# Patient Record
Sex: FEMALE | Race: WHITE | Employment: OTHER | URBAN - METROPOLITAN AREA
[De-identification: names, ages, dates, MRNs, and addresses within clinical notes are randomized per-mention and may not be internally consistent; named-entity substitution may affect disease eponyms.]

---

## 2023-09-09 ENCOUNTER — HOSPITAL ENCOUNTER (EMERGENCY)
Age: 78
Discharge: HOME | DRG: 870 | End: 2023-09-09
Attending: EMERGENCY MEDICINE
Payer: MEDICARE

## 2023-09-09 VITALS
BODY MASS INDEX: 31.41 KG/M2 | OXYGEN SATURATION: 100 % | WEIGHT: 160 LBS | SYSTOLIC BLOOD PRESSURE: 109 MMHG | RESPIRATION RATE: 29 BRPM | HEART RATE: 90 BPM | HEIGHT: 60 IN | DIASTOLIC BLOOD PRESSURE: 47 MMHG | TEMPERATURE: 96 F

## 2023-09-09 DIAGNOSIS — A41.9 SEPTIC SHOCK (HCC): ICD-10-CM

## 2023-09-09 DIAGNOSIS — R41.89 UNRESPONSIVE: ICD-10-CM

## 2023-09-09 DIAGNOSIS — Z66 DNR (DO NOT RESUSCITATE): Primary | ICD-10-CM

## 2023-09-09 DIAGNOSIS — R65.21 SEPTIC SHOCK (HCC): ICD-10-CM

## 2023-09-09 DIAGNOSIS — Z71.89 ENCOUNTER FOR HOSPICE CARE DISCUSSION: ICD-10-CM

## 2023-09-09 LAB
ABO + RH BLD: NORMAL
ALBUMIN SERPL-MCNC: 3.7 G/DL (ref 3.5–5.2)
ALP SERPL-CCNC: 140 U/L (ref 35–104)
ALT SERPL-CCNC: 21 U/L (ref 0–32)
ANION GAP SERPL CALCULATED.3IONS-SCNC: 11 MMOL/L (ref 7–16)
ARM BAND NUMBER: NORMAL
AST SERPL-CCNC: 27 U/L (ref 0–31)
BACTERIA URNS QL MICRO: ABNORMAL
BASOPHILS # BLD: 0 K/UL (ref 0–0.2)
BASOPHILS NFR BLD: 0 % (ref 0–2)
BILIRUB SERPL-MCNC: 0.4 MG/DL (ref 0–1.2)
BILIRUB UR QL STRIP: NEGATIVE
BLOOD BANK SAMPLE EXPIRATION: NORMAL
BLOOD GROUP ANTIBODIES SERPL: NEGATIVE
BUN SERPL-MCNC: 18 MG/DL (ref 6–23)
CALCIUM SERPL-MCNC: 9.3 MG/DL (ref 8.6–10.2)
CASTS #/AREA URNS LPF: ABNORMAL /LPF
CASTS #/AREA URNS LPF: ABNORMAL /LPF
CHLORIDE SERPL-SCNC: 98 MMOL/L (ref 98–107)
CLARITY UR: ABNORMAL
CO2 SERPL-SCNC: 24 MMOL/L (ref 22–29)
COLOR UR: YELLOW
CREAT SERPL-MCNC: 0.9 MG/DL (ref 0.5–1)
EKG ATRIAL RATE: 79 BPM
EKG P AXIS: 75 DEGREES
EKG P-R INTERVAL: 148 MS
EKG Q-T INTERVAL: 468 MS
EKG QRS DURATION: 94 MS
EKG QTC CALCULATION (BAZETT): 536 MS
EKG R AXIS: 20 DEGREES
EKG T AXIS: -177 DEGREES
EKG VENTRICULAR RATE: 79 BPM
EOSINOPHIL # BLD: 0 K/UL (ref 0.05–0.5)
EOSINOPHILS RELATIVE PERCENT: 0 % (ref 0–6)
ERYTHROCYTE [DISTWIDTH] IN BLOOD BY AUTOMATED COUNT: 15.3 % (ref 11.5–15)
GFR SERPL CREATININE-BSD FRML MDRD: >60 ML/MIN/1.73M2
GLUCOSE BLD-MCNC: 217 MG/DL (ref 74–99)
GLUCOSE SERPL-MCNC: 204 MG/DL (ref 74–99)
GLUCOSE UR STRIP-MCNC: NEGATIVE MG/DL
HCT VFR BLD AUTO: 37 % (ref 34–48)
HGB BLD-MCNC: 11.7 G/DL (ref 11.5–15.5)
HGB UR QL STRIP.AUTO: ABNORMAL
INR PPP: 1.1
KETONES UR STRIP-MCNC: NEGATIVE MG/DL
LACTATE BLDV-SCNC: 1.5 MMOL/L (ref 0.5–2.2)
LEUKOCYTE ESTERASE UR QL STRIP: ABNORMAL
LYMPHOCYTES NFR BLD: 0.66 K/UL (ref 1.5–4)
LYMPHOCYTES RELATIVE PERCENT: 3 % (ref 20–42)
MAGNESIUM SERPL-MCNC: 2.3 MG/DL (ref 1.6–2.6)
MCH RBC QN AUTO: 27.9 PG (ref 26–35)
MCHC RBC AUTO-ENTMCNC: 31.6 G/DL (ref 32–34.5)
MCV RBC AUTO: 88.1 FL (ref 80–99.9)
MONOCYTES NFR BLD: 1.32 K/UL (ref 0.1–0.95)
MONOCYTES NFR BLD: 5 % (ref 2–12)
NEUTROPHILS NFR BLD: 92 % (ref 43–80)
NEUTS SEG NFR BLD: 23.32 K/UL (ref 1.8–7.3)
NITRITE UR QL STRIP: POSITIVE
NUCLEATED RED BLOOD CELLS: 1 PER 100 WBC
PH UR STRIP: 6 [PH] (ref 5–9)
PLATELET # BLD AUTO: 319 K/UL (ref 130–450)
PMV BLD AUTO: 10.2 FL (ref 7–12)
POTASSIUM SERPL-SCNC: 5.1 MMOL/L (ref 3.5–5)
PROT SERPL-MCNC: 7.5 G/DL (ref 6.4–8.3)
PROT UR STRIP-MCNC: 100 MG/DL
PROTHROMBIN TIME: 12.2 SEC (ref 9.3–12.4)
RBC # BLD AUTO: 4.2 M/UL (ref 3.5–5.5)
RBC # BLD: ABNORMAL 10*6/UL
RBC #/AREA URNS HPF: ABNORMAL /HPF
SODIUM SERPL-SCNC: 133 MMOL/L (ref 132–146)
SP GR UR STRIP: 1.02 (ref 1–1.03)
TROPONIN I SERPL HS-MCNC: 75 NG/L (ref 0–9)
UROBILINOGEN UR STRIP-ACNC: 0.2 EU/DL (ref 0–1)
WBC #/AREA URNS HPF: ABNORMAL /HPF
WBC OTHER # BLD: 25.3 K/UL (ref 4.5–11.5)

## 2023-09-09 PROCEDURE — 51701 INSERT BLADDER CATHETER: CPT

## 2023-09-09 PROCEDURE — 87154 CUL TYP ID BLD PTHGN 6+ TRGT: CPT

## 2023-09-09 PROCEDURE — 2580000003 HC RX 258

## 2023-09-09 PROCEDURE — 93010 ELECTROCARDIOGRAM REPORT: CPT | Performed by: INTERNAL MEDICINE

## 2023-09-09 PROCEDURE — 86850 RBC ANTIBODY SCREEN: CPT

## 2023-09-09 PROCEDURE — 99284 EMERGENCY DEPT VISIT MOD MDM: CPT

## 2023-09-09 PROCEDURE — 86901 BLOOD TYPING SEROLOGIC RH(D): CPT

## 2023-09-09 PROCEDURE — 96360 HYDRATION IV INFUSION INIT: CPT

## 2023-09-09 PROCEDURE — 80053 COMPREHEN METABOLIC PANEL: CPT

## 2023-09-09 PROCEDURE — 84484 ASSAY OF TROPONIN QUANT: CPT

## 2023-09-09 PROCEDURE — 81001 URINALYSIS AUTO W/SCOPE: CPT

## 2023-09-09 PROCEDURE — 87077 CULTURE AEROBIC IDENTIFY: CPT

## 2023-09-09 PROCEDURE — 85610 PROTHROMBIN TIME: CPT

## 2023-09-09 PROCEDURE — 82962 GLUCOSE BLOOD TEST: CPT

## 2023-09-09 PROCEDURE — 87040 BLOOD CULTURE FOR BACTERIA: CPT

## 2023-09-09 PROCEDURE — 96361 HYDRATE IV INFUSION ADD-ON: CPT

## 2023-09-09 PROCEDURE — 83605 ASSAY OF LACTIC ACID: CPT

## 2023-09-09 PROCEDURE — 85025 COMPLETE CBC W/AUTO DIFF WBC: CPT

## 2023-09-09 PROCEDURE — 86900 BLOOD TYPING SEROLOGIC ABO: CPT

## 2023-09-09 PROCEDURE — 93005 ELECTROCARDIOGRAM TRACING: CPT

## 2023-09-09 PROCEDURE — 83735 ASSAY OF MAGNESIUM: CPT

## 2023-09-09 RX ORDER — PANTOPRAZOLE SODIUM 40 MG/1
40 FOR SUSPENSION ORAL
COMMUNITY
End: 2023-09-10 | Stop reason: ALTCHOICE

## 2023-09-09 RX ORDER — 0.9 % SODIUM CHLORIDE 0.9 %
1000 INTRAVENOUS SOLUTION INTRAVENOUS ONCE
Status: COMPLETED | OUTPATIENT
Start: 2023-09-09 | End: 2023-09-09

## 2023-09-09 RX ORDER — POLYETHYLENE GLYCOL 3350 17 G/17G
17 POWDER, FOR SOLUTION ORAL DAILY PRN
COMMUNITY

## 2023-09-09 RX ORDER — OMEPRAZOLE 20 MG/1
40 CAPSULE, DELAYED RELEASE ORAL DAILY
COMMUNITY

## 2023-09-09 RX ORDER — FUROSEMIDE 20 MG/1
20 TABLET ORAL DAILY
COMMUNITY

## 2023-09-09 RX ORDER — ACETAMINOPHEN 325 MG/1
650 TABLET ORAL EVERY 4 HOURS PRN
COMMUNITY

## 2023-09-09 RX ORDER — BISACODYL 10 MG
10 SUPPOSITORY, RECTAL RECTAL DAILY PRN
COMMUNITY

## 2023-09-09 RX ORDER — NITROGLYCERIN 0.4 MG/1
0.4 TABLET SUBLINGUAL EVERY 5 MIN PRN
Status: ON HOLD | COMMUNITY
End: 2023-09-16 | Stop reason: HOSPADM

## 2023-09-09 RX ORDER — ARIPIPRAZOLE 15 MG/1
15 TABLET ORAL DAILY
COMMUNITY

## 2023-09-09 RX ORDER — BUDESONIDE 0.5 MG/2ML
1 INHALANT ORAL EVERY 12 HOURS
COMMUNITY

## 2023-09-09 RX ORDER — ASPIRIN 81 MG/1
324 TABLET, CHEWABLE ORAL ONCE
Status: DISCONTINUED | OUTPATIENT
Start: 2023-09-09 | End: 2023-09-09 | Stop reason: HOSPADM

## 2023-09-09 RX ORDER — AMLODIPINE BESYLATE 5 MG/1
5 TABLET ORAL DAILY
COMMUNITY

## 2023-09-09 RX ORDER — HYDROCODONE BITARTRATE AND ACETAMINOPHEN 5; 325 MG/1; MG/1
1 TABLET ORAL EVERY 6 HOURS PRN
COMMUNITY
End: 2023-09-10 | Stop reason: ALTCHOICE

## 2023-09-09 RX ORDER — SUCRALFATE ORAL 1 G/10ML
1 SUSPENSION ORAL 2 TIMES DAILY
COMMUNITY

## 2023-09-09 RX ORDER — FERROUS SULFATE 300 MG/5ML
300 LIQUID (ML) ORAL DAILY
COMMUNITY

## 2023-09-09 RX ORDER — SODIUM CHLORIDE, SODIUM LACTATE, POTASSIUM CHLORIDE, CALCIUM CHLORIDE 600; 310; 30; 20 MG/100ML; MG/100ML; MG/100ML; MG/100ML
INJECTION, SOLUTION INTRAVENOUS
Status: COMPLETED
Start: 2023-09-09 | End: 2023-09-09

## 2023-09-09 RX ORDER — CLOPIDOGREL BISULFATE 75 MG/1
75 TABLET ORAL DAILY
COMMUNITY

## 2023-09-09 RX ORDER — VENLAFAXINE HYDROCHLORIDE 75 MG/1
75 CAPSULE, EXTENDED RELEASE ORAL EVERY EVENING
Status: ON HOLD | COMMUNITY
End: 2023-09-16 | Stop reason: HOSPADM

## 2023-09-09 RX ORDER — GUAIFENESIN AND CODEINE PHOSPHATE 100; 10 MG/5ML; MG/5ML
5 SOLUTION ORAL 4 TIMES DAILY PRN
COMMUNITY
End: 2023-09-10 | Stop reason: ALTCHOICE

## 2023-09-09 RX ORDER — ATORVASTATIN CALCIUM 10 MG/1
10 TABLET, FILM COATED ORAL NIGHTLY
COMMUNITY

## 2023-09-09 RX ORDER — ISOSORBIDE MONONITRATE 30 MG/1
30 TABLET, EXTENDED RELEASE ORAL DAILY
COMMUNITY

## 2023-09-09 RX ORDER — SODIUM CHLORIDE, SODIUM LACTATE, POTASSIUM CHLORIDE, CALCIUM CHLORIDE 600; 310; 30; 20 MG/100ML; MG/100ML; MG/100ML; MG/100ML
INJECTION, SOLUTION INTRAVENOUS ONCE
Status: COMPLETED | OUTPATIENT
Start: 2023-09-09 | End: 2023-09-09

## 2023-09-09 RX ORDER — IPRATROPIUM BROMIDE AND ALBUTEROL SULFATE 2.5; .5 MG/3ML; MG/3ML
1 SOLUTION RESPIRATORY (INHALATION) 4 TIMES DAILY
COMMUNITY

## 2023-09-09 RX ADMIN — SODIUM CHLORIDE 1000 ML: 9 INJECTION, SOLUTION INTRAVENOUS at 06:05

## 2023-09-09 RX ADMIN — SODIUM CHLORIDE, SODIUM LACTATE, POTASSIUM CHLORIDE, CALCIUM CHLORIDE: 600; 310; 30; 20 INJECTION, SOLUTION INTRAVENOUS at 07:10

## 2023-09-09 RX ADMIN — SODIUM CHLORIDE, POTASSIUM CHLORIDE, SODIUM LACTATE AND CALCIUM CHLORIDE: 600; 310; 30; 20 INJECTION, SOLUTION INTRAVENOUS at 07:10

## 2023-09-09 ASSESSMENT — PULMONARY FUNCTION TESTS
PIF_VALUE: 19
PIF_VALUE: 22
PIF_VALUE: 20
PIF_VALUE: 28
PIF_VALUE: 21

## 2023-09-09 ASSESSMENT — PAIN - FUNCTIONAL ASSESSMENT: PAIN_FUNCTIONAL_ASSESSMENT: CRITICAL CARE PAIN OBSERVATION TOOL (CPOT)

## 2023-09-09 NOTE — ED NOTES
PT's son at bedside. Asked if he needed anything;stated he is ok at this time.      Alexia Nichols RN  09/09/23 9687

## 2023-09-09 NOTE — ED NOTES
Informed by ED physician who spoke with patient's son via telephone that she is now a [facility] and he wants no further heroic measures.      David Guaman RN  09/09/23 7457

## 2023-09-09 NOTE — ED PROVIDER NOTES
[facility]  Department of Emergency Medicine   EM Physician H&P                  Shelley Mackay 66 y.o. female PHMx of trach and PEG, dementia, chronic deficits presents to the ED c/o unresponsive at the nursing home facility. No caregiver or family is with the patient to give history. Apparently EMS gave the patient IM Narcan and sent her to the ER. No family with the patient at time of receiving to the ER. No caregiver with the patient. Report from nursing homes that she was discharged from [facility] yesterday. Then this morning was unresponsive and not acting herself. EMS brought her to [address] [place] [address] because it is closest.  All of her records are at [facility].  There is no records here at [address] [place] [address]. Review of Systems   Unable to perform ROS: Dementia        Physical Exam  Constitutional:       Appearance: She is toxic-appearing. HENT:      Head: Normocephalic and atraumatic. Right Ear: External ear normal.      Left Ear: External ear normal.      Nose: Nose normal.      Mouth/Throat:      Mouth: Mucous membranes are moist.      Pharynx: Oropharynx is clear. Cardiovascular:      Rate and Rhythm: Normal rate and regular rhythm. Pulses: Normal pulses. Heart sounds: Normal heart sounds. Pulmonary:      Effort: Pulmonary effort is normal.      Comments: Chronic trach/vent  Abdominal:      Palpations: Abdomen is soft. Comments: PEG tube in place   Musculoskeletal:         General: Deformity present. Normal range of motion. Cervical back: Normal range of motion and neck supple. Skin:     General: Skin is warm and dry. Capillary Refill: Capillary refill takes less than 2 seconds. Neurological:      Mental Status: She is disoriented.           Procedures     Medical Decision Making  This is a 68-year-old female presented to the ER from her nursing home facility for report of her being unresponsive and out of her baseline signed and interpreted by EP. Rate: 79  Rhythm: Sinus  Interpretation: st segment elevation in V3, concern for STEMI  Comparison: no previous EKG available  [JR]   0629 EKG#2:  This EKG is signed and interpreted by EP. Rate: 78  Rhythm: Sinus  Interpretation: ST segment elevation in V3 still present, concern for STEMI  Comparison: stable as compared to patient's most recent EKG  [JR]   0630 Long discussion with patient's POA/son Parag over the phone. I informed him that his mother is likely having a heart attack. After thorough discussion options the son requested to make the patient DNR comfort care. He reports that he lives about 2 hours when he is driving here right now to be with her. He reports he just wants to keep her comfortable and out of pain. He wants no aggressive treatments, medications, or testing done. He was made aware that this is likely end-of-life for her and she will pass today or tonight. He verbalized understanding. He verbally consented over the phone to the patient being DNR CC. [JR]   0698 ATTENDING PROVIDER ATTESTATION:     I have personally performed and/or participated in the history, exam, medical decision making, and procedures and agree with all pertinent clinical information unless otherwise noted. I have also reviewed and agree with the past medical, family and social history unless otherwise noted. I have discussed this patient in detail with the resident and provided the instruction and education regarding the evidence-based evaluation and treatment of altered mental status. Any EKG that may have been performed has been personally reviewed by me and I agree with the documentation as noted by the resident. History: Patient presents from local nursing facility. She usually goes to [facility] but, EMS brought her here as this was the closest facility. She was found unresponsive this morning. She is ventilated via trach.   She was noted to

## 2023-09-09 NOTE — ED NOTES
Called to blood bank and informed them that Dr Bola Aparicio and Dr Stormy Zamora stated to discontinue type & screen at this time. Dr Stormy Zamora notified that order needed canceled.       Elena Ventura RN  09/09/23 0843

## 2023-09-09 NOTE — ED TRIAGE NOTES
Patient on vent from SNF. Unresponsive. Narcan 2mg given by EMS and patient did respond some to sternal rub.

## 2023-09-09 NOTE — ED NOTES
Attempted to call nurse to nurse report to [facility].  No answer at this time     Aaron Patrick RN  09/09/23 9582

## 2023-09-09 NOTE — PROGRESS NOTES
Consult received and chart reviewed. Son at the bedside. Hospice philosophy and services discussed. Pt is currently on a vent and would need to be compassionately extubated to meet criteria for hospice care. Son is not ready to compassionately extubate the pt. Plan for pt to go back to [address]  [facility] on the vent as a [facility]. Son would like to think about extubation for a few days. Contact information given to son should he decide he would like to compassionately extubate pt at the hospice house. If the son would like to proceed with that he will contact [facility] in the community to proceed. Updated bedside nurse and Dr. Edgar Seymour about conversation with son.      Electronically signed by Yvon Tolbert RN on 9/9/2023 at 1:29 PM  [phone]

## 2023-09-10 ENCOUNTER — APPOINTMENT (OUTPATIENT)
Dept: GENERAL RADIOLOGY | Age: 78
DRG: 870 | End: 2023-09-10
Payer: MEDICARE

## 2023-09-10 ENCOUNTER — HOSPITAL ENCOUNTER (INPATIENT)
Age: 78
LOS: 6 days | Discharge: OTHER INSTITUTION | DRG: 870 | End: 2023-09-16
Attending: EMERGENCY MEDICINE | Admitting: INTERNAL MEDICINE
Payer: MEDICARE

## 2023-09-10 DIAGNOSIS — R78.81 BACTEREMIA: Primary | ICD-10-CM

## 2023-09-10 LAB
ACB COMPLEX DNA BLD POS QL NAA+NON-PROBE: NOT DETECTED
ALBUMIN SERPL-MCNC: 3.1 G/DL (ref 3.5–5.2)
ALP SERPL-CCNC: 103 U/L (ref 35–104)
ALT SERPL-CCNC: 19 U/L (ref 0–32)
ANION GAP SERPL CALCULATED.3IONS-SCNC: 11 MMOL/L (ref 7–16)
AST SERPL-CCNC: 23 U/L (ref 0–31)
B FRAGILIS DNA BLD POS QL NAA+NON-PROBE: NOT DETECTED
BACTERIA URNS QL MICRO: ABNORMAL
BASOPHILS # BLD: 0.02 K/UL (ref 0–0.2)
BASOPHILS NFR BLD: 0 % (ref 0–2)
BILIRUB SERPL-MCNC: 0.4 MG/DL (ref 0–1.2)
BILIRUB UR QL STRIP: NEGATIVE
BIOFIRE TEST COMMENT: ABNORMAL
BLACTX-M ISLT/SPM QL: DETECTED
BLAIMP ISLT/SPM QL: NOT DETECTED
BLAKPC ISLT/SPM QL: NOT DETECTED
BLAOXA-48-LIKE ISLT/SPM QL: NOT DETECTED
BLAVIM ISLT/SPM QL: NOT DETECTED
BUN SERPL-MCNC: 29 MG/DL (ref 6–23)
C ALBICANS DNA BLD POS QL NAA+NON-PROBE: NOT DETECTED
C AURIS DNA BLD POS QL NAA+NON-PROBE: NOT DETECTED
C GATTII+NEOFOR DNA BLD POS QL NAA+N-PRB: NOT DETECTED
C GLABRATA DNA BLD POS QL NAA+NON-PROBE: NOT DETECTED
C KRUSEI DNA BLD POS QL NAA+NON-PROBE: NOT DETECTED
C PARAP DNA BLD POS QL NAA+NON-PROBE: NOT DETECTED
C TROPICLS DNA BLD POS QL NAA+NON-PROBE: NOT DETECTED
CALCIUM SERPL-MCNC: 9.1 MG/DL (ref 8.6–10.2)
CHLORIDE SERPL-SCNC: 103 MMOL/L (ref 98–107)
CLARITY UR: CLEAR
CO2 SERPL-SCNC: 23 MMOL/L (ref 22–29)
COLISTIN RES MCR-1 ISLT/SPM QL: NOT DETECTED
COLOR UR: YELLOW
CREAT SERPL-MCNC: 0.9 MG/DL (ref 0.5–1)
E CLOAC COMP DNA BLD POS NAA+NON-PROBE: NOT DETECTED
E COLI DNA BLD POS QL NAA+NON-PROBE: NOT DETECTED
E FAECALIS DNA BLD POS QL NAA+NON-PROBE: NOT DETECTED
E FAECIUM DNA BLD POS QL NAA+NON-PROBE: NOT DETECTED
ENTEROBACTERALES DNA BLD POS NAA+N-PRB: DETECTED
EOSINOPHIL # BLD: 0.1 K/UL (ref 0.05–0.5)
EOSINOPHILS RELATIVE PERCENT: 1 % (ref 0–6)
EPI CELLS #/AREA URNS HPF: ABNORMAL /HPF
ERYTHROCYTE [DISTWIDTH] IN BLOOD BY AUTOMATED COUNT: 16.1 % (ref 11.5–15)
GFR SERPL CREATININE-BSD FRML MDRD: >60 ML/MIN/1.73M2
GLUCOSE SERPL-MCNC: 76 MG/DL (ref 74–99)
GLUCOSE UR STRIP-MCNC: NEGATIVE MG/DL
GP B STREP DNA BLD POS QL NAA+NON-PROBE: NOT DETECTED
HAEM INFLU DNA BLD POS QL NAA+NON-PROBE: NOT DETECTED
HCT VFR BLD AUTO: 25.4 % (ref 34–48)
HGB BLD-MCNC: 8.2 G/DL (ref 11.5–15.5)
HGB UR QL STRIP.AUTO: ABNORMAL
IMM GRANULOCYTES # BLD AUTO: 0.03 K/UL (ref 0–0.58)
IMM GRANULOCYTES NFR BLD: 0 % (ref 0–5)
K OXYTOCA DNA BLD POS QL NAA+NON-PROBE: NOT DETECTED
KETONES UR STRIP-MCNC: NEGATIVE MG/DL
KLEBSIELLA SP DNA BLD POS QL NAA+NON-PRB: DETECTED
KLEBSIELLA SP DNA BLD POS QL NAA+NON-PRB: NOT DETECTED
L MONOCYTOG DNA BLD POS QL NAA+NON-PROBE: NOT DETECTED
LACTATE BLDV-SCNC: 1.2 MMOL/L (ref 0.5–1.9)
LEUKOCYTE ESTERASE UR QL STRIP: ABNORMAL
LYMPHOCYTES NFR BLD: 0.82 K/UL (ref 1.5–4)
LYMPHOCYTES RELATIVE PERCENT: 9 % (ref 20–42)
MCH RBC QN AUTO: 27.8 PG (ref 26–35)
MCHC RBC AUTO-ENTMCNC: 32.3 G/DL (ref 32–34.5)
MCV RBC AUTO: 86.1 FL (ref 80–99.9)
MECA+MECC ISLT/SPM QL: DETECTED
MICROORGANISM SPEC CULT: ABNORMAL
MICROORGANISM SPEC CULT: NORMAL
MICROORGANISM/AGENT SPEC: ABNORMAL
MONOCYTES NFR BLD: 0.61 K/UL (ref 0.1–0.95)
MONOCYTES NFR BLD: 7 % (ref 2–12)
N MEN DNA BLD POS QL NAA+NON-PROBE: NOT DETECTED
NEUTROPHILS NFR BLD: 83 % (ref 43–80)
NEUTS SEG NFR BLD: 7.49 K/UL (ref 1.8–7.3)
NITRITE UR QL STRIP: POSITIVE
P AERUGINOSA DNA BLD POS NAA+NON-PROBE: NOT DETECTED
PH UR STRIP: 5.5 [PH] (ref 5–9)
PLATELET, FLUORESCENCE: 218 K/UL (ref 130–450)
PMV BLD AUTO: 10.8 FL (ref 7–12)
POTASSIUM SERPL-SCNC: 4.3 MMOL/L (ref 3.5–5)
PROT SERPL-MCNC: 6.3 G/DL (ref 6.4–8.3)
PROT UR STRIP-MCNC: NEGATIVE MG/DL
PROTEUS SP DNA BLD POS QL NAA+NON-PROBE: NOT DETECTED
RBC # BLD AUTO: 2.95 M/UL (ref 3.5–5.5)
RBC #/AREA URNS HPF: ABNORMAL /HPF
RESISTANT GENE NDM BY PCR: NOT DETECTED
S AUREUS DNA BLD POS QL NAA+NON-PROBE: NOT DETECTED
S AUREUS+CONS DNA BLD POS NAA+NON-PROBE: DETECTED
S EPIDERMIDIS DNA BLD POS QL NAA+NON-PRB: DETECTED
S LUGDUNENSIS DNA BLD POS QL NAA+NON-PRB: NOT DETECTED
S MALTOPHILIA DNA BLD POS QL NAA+NON-PRB: NOT DETECTED
S MARCESCENS DNA BLD POS NAA+NON-PROBE: NOT DETECTED
S PNEUM DNA BLD POS QL NAA+NON-PROBE: NOT DETECTED
S PYO DNA BLD POS QL NAA+NON-PROBE: NOT DETECTED
SALMONELLA DNA BLD POS QL NAA+NON-PROBE: NOT DETECTED
SERVICE CMNT-IMP: ABNORMAL
SERVICE CMNT-IMP: NORMAL
SODIUM SERPL-SCNC: 137 MMOL/L (ref 132–146)
SP GR UR STRIP: 1.01 (ref 1–1.03)
SPECIMEN DESCRIPTION: ABNORMAL
SPECIMEN DESCRIPTION: NORMAL
STREPTOCOCCUS DNA BLD POS NAA+NON-PROBE: NOT DETECTED
UROBILINOGEN UR STRIP-ACNC: 0.2 EU/DL (ref 0–1)
WBC #/AREA URNS HPF: ABNORMAL /HPF
WBC OTHER # BLD: 9.1 K/UL (ref 4.5–11.5)

## 2023-09-10 PROCEDURE — 80053 COMPREHEN METABOLIC PANEL: CPT

## 2023-09-10 PROCEDURE — 36415 COLL VENOUS BLD VENIPUNCTURE: CPT

## 2023-09-10 PROCEDURE — 93005 ELECTROCARDIOGRAM TRACING: CPT | Performed by: EMERGENCY MEDICINE

## 2023-09-10 PROCEDURE — 85025 COMPLETE CBC W/AUTO DIFF WBC: CPT

## 2023-09-10 PROCEDURE — 87077 CULTURE AEROBIC IDENTIFY: CPT

## 2023-09-10 PROCEDURE — 94002 VENT MGMT INPAT INIT DAY: CPT

## 2023-09-10 PROCEDURE — 96365 THER/PROPH/DIAG IV INF INIT: CPT

## 2023-09-10 PROCEDURE — 99285 EMERGENCY DEPT VISIT HI MDM: CPT

## 2023-09-10 PROCEDURE — 2580000003 HC RX 258: Performed by: EMERGENCY MEDICINE

## 2023-09-10 PROCEDURE — 6360000002 HC RX W HCPCS: Performed by: EMERGENCY MEDICINE

## 2023-09-10 PROCEDURE — 96367 TX/PROPH/DG ADDL SEQ IV INF: CPT

## 2023-09-10 PROCEDURE — 2060000000 HC ICU INTERMEDIATE R&B

## 2023-09-10 PROCEDURE — 81001 URINALYSIS AUTO W/SCOPE: CPT

## 2023-09-10 PROCEDURE — 87086 URINE CULTURE/COLONY COUNT: CPT

## 2023-09-10 PROCEDURE — 71045 X-RAY EXAM CHEST 1 VIEW: CPT

## 2023-09-10 PROCEDURE — 99223 1ST HOSP IP/OBS HIGH 75: CPT | Performed by: INTERNAL MEDICINE

## 2023-09-10 PROCEDURE — 83605 ASSAY OF LACTIC ACID: CPT

## 2023-09-10 PROCEDURE — 87040 BLOOD CULTURE FOR BACTERIA: CPT

## 2023-09-10 RX ORDER — ENOXAPARIN SODIUM 100 MG/ML
40 INJECTION SUBCUTANEOUS DAILY
Status: DISCONTINUED | OUTPATIENT
Start: 2023-09-11 | End: 2023-09-11

## 2023-09-10 RX ORDER — ACETAMINOPHEN 650 MG/1
650 SUPPOSITORY RECTAL EVERY 6 HOURS PRN
Status: DISCONTINUED | OUTPATIENT
Start: 2023-09-10 | End: 2023-09-16 | Stop reason: HOSPADM

## 2023-09-10 RX ORDER — POLYETHYLENE GLYCOL 3350 17 G/17G
17 POWDER, FOR SOLUTION ORAL DAILY PRN
Status: DISCONTINUED | OUTPATIENT
Start: 2023-09-10 | End: 2023-09-11 | Stop reason: SDUPTHER

## 2023-09-10 RX ORDER — SODIUM CHLORIDE 9 MG/ML
INJECTION, SOLUTION INTRAVENOUS PRN
Status: DISCONTINUED | OUTPATIENT
Start: 2023-09-10 | End: 2023-09-14 | Stop reason: SDUPTHER

## 2023-09-10 RX ORDER — PROMETHAZINE HYDROCHLORIDE 25 MG/1
12.5 TABLET ORAL EVERY 6 HOURS PRN
Status: DISCONTINUED | OUTPATIENT
Start: 2023-09-10 | End: 2023-09-16 | Stop reason: HOSPADM

## 2023-09-10 RX ORDER — GUAIFENESIN 200 MG/10ML
200 LIQUID ORAL EVERY 6 HOURS PRN
COMMUNITY

## 2023-09-10 RX ORDER — SODIUM CHLORIDE 0.9 % (FLUSH) 0.9 %
10 SYRINGE (ML) INJECTION EVERY 12 HOURS SCHEDULED
Status: DISCONTINUED | OUTPATIENT
Start: 2023-09-10 | End: 2023-09-16 | Stop reason: HOSPADM

## 2023-09-10 RX ORDER — ONDANSETRON 2 MG/ML
4 INJECTION INTRAMUSCULAR; INTRAVENOUS EVERY 6 HOURS PRN
Status: DISCONTINUED | OUTPATIENT
Start: 2023-09-10 | End: 2023-09-16 | Stop reason: HOSPADM

## 2023-09-10 RX ORDER — SODIUM CHLORIDE 0.9 % (FLUSH) 0.9 %
10 SYRINGE (ML) INJECTION PRN
Status: DISCONTINUED | OUTPATIENT
Start: 2023-09-10 | End: 2023-09-16 | Stop reason: HOSPADM

## 2023-09-10 RX ORDER — ACETAMINOPHEN 325 MG/1
650 TABLET ORAL EVERY 6 HOURS PRN
Status: DISCONTINUED | OUTPATIENT
Start: 2023-09-10 | End: 2023-09-16 | Stop reason: HOSPADM

## 2023-09-10 RX ORDER — ACETAMINOPHEN 650 MG/1
650 SUPPOSITORY RECTAL EVERY 4 HOURS PRN
COMMUNITY

## 2023-09-10 RX ADMIN — VANCOMYCIN HYDROCHLORIDE 1500 MG: 10 INJECTION, POWDER, LYOPHILIZED, FOR SOLUTION INTRAVENOUS at 20:11

## 2023-09-10 RX ADMIN — MEROPENEM 1000 MG: 1 INJECTION, POWDER, FOR SOLUTION INTRAVENOUS at 19:02

## 2023-09-10 ASSESSMENT — PULMONARY FUNCTION TESTS
PIF_VALUE: 22
PIF_VALUE: 23

## 2023-09-10 ASSESSMENT — ENCOUNTER SYMPTOMS
ABDOMINAL PAIN: 0
CHEST TIGHTNESS: 0

## 2023-09-10 ASSESSMENT — LIFESTYLE VARIABLES
HOW MANY STANDARD DRINKS CONTAINING ALCOHOL DO YOU HAVE ON A TYPICAL DAY: PATIENT DOES NOT DRINK
HOW OFTEN DO YOU HAVE A DRINK CONTAINING ALCOHOL: NEVER

## 2023-09-10 NOTE — ED NOTES
Reviewed patients after hours culture results. Blood culture growing GRAM NEGATIVE RODS, patient discharged back to [facility]. I contacted the facility three times, unable to reach a nurse in patient's unit at the facility. I did talk to a nurse in the crabapple unit, who requested results be faxed to [phone] and she will give the results to the unit taking care of the patient. I spoke with Robbie, hospice nurse who saw patient in the ER - she states patient is not currently under hospice care at this time and there is nothing further she can do. Colette Vaughn PharmD, BCPS 9/10/2023 1:54 PM   [phone]       Addendum:    Multiple attempts to fax - called [facility] again, apparently they do not have a working fax machine. Dr. Christa Rojas is taking care of Marilu Daley at the facility, a nurse at the facility provided this fax number: [phone] for Dr. Martha Richards office. Patient's son, Pee Richards, was notified of results and that the results were faxed to Dr. Christa Rojas. Colette Vaughn PharmD, BCPS 9/10/2023 2:19 PM   [phone]     Addendum:    Patient's nurse from the facility returned my call and provided an additional fax #: [phone], notified her of positive cultures and faxed the results.     Colette Vaughn PharmD, BCPS 9/10/2023 3:36 PM   [phone]

## 2023-09-11 PROBLEM — Z86.718 HISTORY OF DVT (DEEP VEIN THROMBOSIS): Status: ACTIVE | Noted: 2023-09-11

## 2023-09-11 PROBLEM — J96.11 CHRONIC RESPIRATORY FAILURE WITH HYPOXIA (HCC): Status: ACTIVE | Noted: 2023-09-11

## 2023-09-11 PROBLEM — I69.30 HISTORY OF STROKE WITH RESIDUAL DEFICIT: Status: ACTIVE | Noted: 2023-09-11

## 2023-09-11 PROBLEM — I10 PRIMARY HYPERTENSION: Status: ACTIVE | Noted: 2023-09-11

## 2023-09-11 LAB
BASOPHILS # BLD: 0.03 K/UL (ref 0–0.2)
BASOPHILS NFR BLD: 0 % (ref 0–2)
EKG ATRIAL RATE: 107 BPM
EKG P AXIS: 80 DEGREES
EKG P-R INTERVAL: 130 MS
EKG Q-T INTERVAL: 368 MS
EKG QRS DURATION: 90 MS
EKG QTC CALCULATION (BAZETT): 491 MS
EKG R AXIS: 58 DEGREES
EKG T AXIS: -140 DEGREES
EKG VENTRICULAR RATE: 107 BPM
EOSINOPHIL # BLD: 0.07 K/UL (ref 0.05–0.5)
EOSINOPHILS RELATIVE PERCENT: 1 % (ref 0–6)
ERYTHROCYTE [DISTWIDTH] IN BLOOD BY AUTOMATED COUNT: 16 % (ref 11.5–15)
HCT VFR BLD AUTO: 25.3 % (ref 34–48)
HGB BLD-MCNC: 8.3 G/DL (ref 11.5–15.5)
IMM GRANULOCYTES # BLD AUTO: 0.03 K/UL (ref 0–0.58)
IMM GRANULOCYTES NFR BLD: 0 % (ref 0–5)
LACTATE BLDV-SCNC: 0.8 MMOL/L (ref 0.5–1.9)
LYMPHOCYTES NFR BLD: 0.76 K/UL (ref 1.5–4)
LYMPHOCYTES RELATIVE PERCENT: 9 % (ref 20–42)
MCH RBC QN AUTO: 27.9 PG (ref 26–35)
MCHC RBC AUTO-ENTMCNC: 32.8 G/DL (ref 32–34.5)
MCV RBC AUTO: 84.9 FL (ref 80–99.9)
MONOCYTES NFR BLD: 0.54 K/UL (ref 0.1–0.95)
MONOCYTES NFR BLD: 7 % (ref 2–12)
NEUTROPHILS NFR BLD: 83 % (ref 43–80)
NEUTS SEG NFR BLD: 6.86 K/UL (ref 1.8–7.3)
PLATELET # BLD AUTO: 219 K/UL (ref 130–450)
PMV BLD AUTO: 10.1 FL (ref 7–12)
RBC # BLD AUTO: 2.98 M/UL (ref 3.5–5.5)
WBC OTHER # BLD: 8.3 K/UL (ref 4.5–11.5)

## 2023-09-11 PROCEDURE — 36415 COLL VENOUS BLD VENIPUNCTURE: CPT

## 2023-09-11 PROCEDURE — 6360000002 HC RX W HCPCS: Performed by: INTERNAL MEDICINE

## 2023-09-11 PROCEDURE — 93010 ELECTROCARDIOGRAM REPORT: CPT | Performed by: INTERNAL MEDICINE

## 2023-09-11 PROCEDURE — 85025 COMPLETE CBC W/AUTO DIFF WBC: CPT

## 2023-09-11 PROCEDURE — 94003 VENT MGMT INPAT SUBQ DAY: CPT

## 2023-09-11 PROCEDURE — 6370000000 HC RX 637 (ALT 250 FOR IP): Performed by: INTERNAL MEDICINE

## 2023-09-11 PROCEDURE — 2500000003 HC RX 250 WO HCPCS: Performed by: INTERNAL MEDICINE

## 2023-09-11 PROCEDURE — 99232 SBSQ HOSP IP/OBS MODERATE 35: CPT | Performed by: INTERNAL MEDICINE

## 2023-09-11 PROCEDURE — 2060000000 HC ICU INTERMEDIATE R&B

## 2023-09-11 PROCEDURE — 5A1955Z RESPIRATORY VENTILATION, GREATER THAN 96 CONSECUTIVE HOURS: ICD-10-PCS | Performed by: INTERNAL MEDICINE

## 2023-09-11 PROCEDURE — 0BH17EZ INSERTION OF ENDOTRACHEAL AIRWAY INTO TRACHEA, VIA NATURAL OR ARTIFICIAL OPENING: ICD-10-PCS | Performed by: INTERNAL MEDICINE

## 2023-09-11 PROCEDURE — 94640 AIRWAY INHALATION TREATMENT: CPT

## 2023-09-11 PROCEDURE — 2580000003 HC RX 258: Performed by: INTERNAL MEDICINE

## 2023-09-11 RX ORDER — GUAIFENESIN 200 MG/10ML
200 LIQUID ORAL EVERY 6 HOURS PRN
Status: DISCONTINUED | OUTPATIENT
Start: 2023-09-11 | End: 2023-09-16 | Stop reason: HOSPADM

## 2023-09-11 RX ORDER — FERROUS SULFATE 300 MG/5ML
300 LIQUID (ML) ORAL DAILY
Status: DISCONTINUED | OUTPATIENT
Start: 2023-09-11 | End: 2023-09-16 | Stop reason: HOSPADM

## 2023-09-11 RX ORDER — AMLODIPINE BESYLATE 5 MG/1
5 TABLET ORAL DAILY
Status: DISCONTINUED | OUTPATIENT
Start: 2023-09-11 | End: 2023-09-16 | Stop reason: HOSPADM

## 2023-09-11 RX ORDER — VENLAFAXINE HYDROCHLORIDE 37.5 MG/1
75 CAPSULE, EXTENDED RELEASE ORAL EVERY EVENING
Status: DISCONTINUED | OUTPATIENT
Start: 2023-09-11 | End: 2023-09-11

## 2023-09-11 RX ORDER — ARIPIPRAZOLE 5 MG/1
15 TABLET ORAL DAILY
Status: DISCONTINUED | OUTPATIENT
Start: 2023-09-11 | End: 2023-09-16 | Stop reason: HOSPADM

## 2023-09-11 RX ORDER — ATORVASTATIN CALCIUM 10 MG/1
10 TABLET, FILM COATED ORAL NIGHTLY
Status: DISCONTINUED | OUTPATIENT
Start: 2023-09-11 | End: 2023-09-16 | Stop reason: HOSPADM

## 2023-09-11 RX ORDER — HYDROCODONE BITARTRATE AND ACETAMINOPHEN 5; 325 MG/1; MG/1
1 TABLET ORAL EVERY 6 HOURS PRN
Status: DISCONTINUED | OUTPATIENT
Start: 2023-09-11 | End: 2023-09-16 | Stop reason: HOSPADM

## 2023-09-11 RX ORDER — POLYETHYLENE GLYCOL 3350 17 G/17G
17 POWDER, FOR SOLUTION ORAL DAILY PRN
Status: DISCONTINUED | OUTPATIENT
Start: 2023-09-11 | End: 2023-09-16 | Stop reason: HOSPADM

## 2023-09-11 RX ORDER — BISACODYL 10 MG
10 SUPPOSITORY, RECTAL RECTAL DAILY PRN
Status: DISCONTINUED | OUTPATIENT
Start: 2023-09-11 | End: 2023-09-16 | Stop reason: HOSPADM

## 2023-09-11 RX ORDER — BUDESONIDE 0.5 MG/2ML
500 INHALANT ORAL EVERY 12 HOURS
Status: DISCONTINUED | OUTPATIENT
Start: 2023-09-11 | End: 2023-09-16 | Stop reason: HOSPADM

## 2023-09-11 RX ORDER — VENLAFAXINE 25 MG/1
25 TABLET ORAL
Status: DISCONTINUED | OUTPATIENT
Start: 2023-09-11 | End: 2023-09-16 | Stop reason: HOSPADM

## 2023-09-11 RX ORDER — IPRATROPIUM BROMIDE AND ALBUTEROL SULFATE 2.5; .5 MG/3ML; MG/3ML
1 SOLUTION RESPIRATORY (INHALATION) 4 TIMES DAILY
Status: DISCONTINUED | OUTPATIENT
Start: 2023-09-11 | End: 2023-09-16 | Stop reason: HOSPADM

## 2023-09-11 RX ORDER — HYDROCODONE BITARTRATE AND ACETAMINOPHEN 5; 325 MG/1; MG/1
1 TABLET ORAL EVERY 6 HOURS PRN
COMMUNITY

## 2023-09-11 RX ORDER — FUROSEMIDE 20 MG/1
20 TABLET ORAL DAILY
Status: DISCONTINUED | OUTPATIENT
Start: 2023-09-11 | End: 2023-09-16 | Stop reason: HOSPADM

## 2023-09-11 RX ORDER — CLOPIDOGREL BISULFATE 75 MG/1
75 TABLET ORAL DAILY
Status: DISCONTINUED | OUTPATIENT
Start: 2023-09-11 | End: 2023-09-16 | Stop reason: HOSPADM

## 2023-09-11 RX ADMIN — ACETAMINOPHEN 650 MG: 325 TABLET ORAL at 14:03

## 2023-09-11 RX ADMIN — VENLAFAXINE 25 MG: 25 TABLET ORAL at 18:31

## 2023-09-11 RX ADMIN — HYDROCODONE BITARTRATE AND ACETAMINOPHEN 1 TABLET: 5; 325 TABLET ORAL at 00:53

## 2023-09-11 RX ADMIN — SODIUM CHLORIDE, PRESERVATIVE FREE 10 ML: 5 INJECTION INTRAVENOUS at 00:06

## 2023-09-11 RX ADMIN — CLOPIDOGREL BISULFATE 75 MG: 75 TABLET ORAL at 09:14

## 2023-09-11 RX ADMIN — APIXABAN 2.5 MG: 2.5 TABLET, FILM COATED ORAL at 00:59

## 2023-09-11 RX ADMIN — IPRATROPIUM BROMIDE AND ALBUTEROL SULFATE 1 DOSE: .5; 2.5 SOLUTION RESPIRATORY (INHALATION) at 17:51

## 2023-09-11 RX ADMIN — HYDROCODONE BITARTRATE AND ACETAMINOPHEN 1 TABLET: 5; 325 TABLET ORAL at 09:14

## 2023-09-11 RX ADMIN — SODIUM CHLORIDE, PRESERVATIVE FREE 10 ML: 5 INJECTION INTRAVENOUS at 09:16

## 2023-09-11 RX ADMIN — BUDESONIDE INHALATION 500 MCG: 0.5 SUSPENSION RESPIRATORY (INHALATION) at 05:31

## 2023-09-11 RX ADMIN — ACETAMINOPHEN 650 MG: 650 SUPPOSITORY RECTAL at 06:26

## 2023-09-11 RX ADMIN — IPRATROPIUM BROMIDE AND ALBUTEROL SULFATE 1 DOSE: .5; 2.5 SOLUTION RESPIRATORY (INHALATION) at 14:07

## 2023-09-11 RX ADMIN — BUDESONIDE INHALATION 500 MCG: 0.5 SUSPENSION RESPIRATORY (INHALATION) at 17:51

## 2023-09-11 RX ADMIN — FUROSEMIDE 20 MG: 20 TABLET ORAL at 09:15

## 2023-09-11 RX ADMIN — MEROPENEM 1000 MG: 1 INJECTION, POWDER, FOR SOLUTION INTRAVENOUS at 18:33

## 2023-09-11 RX ADMIN — ACETAMINOPHEN 650 MG: 325 TABLET ORAL at 20:29

## 2023-09-11 RX ADMIN — METOPROLOL TARTRATE 12.5 MG: 25 TABLET, FILM COATED ORAL at 20:29

## 2023-09-11 RX ADMIN — METOPROLOL TARTRATE 12.5 MG: 25 TABLET, FILM COATED ORAL at 00:59

## 2023-09-11 RX ADMIN — Medication 300 MG: at 09:15

## 2023-09-11 RX ADMIN — SODIUM CHLORIDE, PRESERVATIVE FREE 10 ML: 5 INJECTION INTRAVENOUS at 20:34

## 2023-09-11 RX ADMIN — VENLAFAXINE 25 MG: 25 TABLET ORAL at 14:00

## 2023-09-11 RX ADMIN — APIXABAN 2.5 MG: 2.5 TABLET, FILM COATED ORAL at 20:29

## 2023-09-11 RX ADMIN — MEROPENEM 1000 MG: 1 INJECTION, POWDER, FOR SOLUTION INTRAVENOUS at 06:31

## 2023-09-11 RX ADMIN — IPRATROPIUM BROMIDE AND ALBUTEROL SULFATE 1 DOSE: .5; 2.5 SOLUTION RESPIRATORY (INHALATION) at 05:31

## 2023-09-11 RX ADMIN — METOPROLOL TARTRATE 12.5 MG: 25 TABLET, FILM COATED ORAL at 09:16

## 2023-09-11 RX ADMIN — IPRATROPIUM BROMIDE AND ALBUTEROL SULFATE 1 DOSE: .5; 2.5 SOLUTION RESPIRATORY (INHALATION) at 09:21

## 2023-09-11 RX ADMIN — ATORVASTATIN CALCIUM 10 MG: 10 TABLET, FILM COATED ORAL at 00:59

## 2023-09-11 RX ADMIN — GUAIFENESIN 200 MG: 200 SOLUTION ORAL at 09:15

## 2023-09-11 RX ADMIN — ARIPIPRAZOLE 15 MG: 5 TABLET ORAL at 09:20

## 2023-09-11 RX ADMIN — VANCOMYCIN HYDROCHLORIDE 1250 MG: 10 INJECTION, POWDER, LYOPHILIZED, FOR SOLUTION INTRAVENOUS at 20:29

## 2023-09-11 RX ADMIN — ATORVASTATIN CALCIUM 10 MG: 10 TABLET, FILM COATED ORAL at 20:30

## 2023-09-11 RX ADMIN — AMLODIPINE BESYLATE 5 MG: 5 TABLET ORAL at 09:15

## 2023-09-11 RX ADMIN — APIXABAN 2.5 MG: 2.5 TABLET, FILM COATED ORAL at 09:14

## 2023-09-11 ASSESSMENT — PAIN DESCRIPTION - ORIENTATION
ORIENTATION: RIGHT;LEFT
ORIENTATION: LEFT

## 2023-09-11 ASSESSMENT — PAIN SCALES - GENERAL
PAINLEVEL_OUTOF10: 7
PAINLEVEL_OUTOF10: 2
PAINLEVEL_OUTOF10: 0

## 2023-09-11 ASSESSMENT — PULMONARY FUNCTION TESTS
PIF_VALUE: 29
PIF_VALUE: 22
PIF_VALUE: 26

## 2023-09-11 ASSESSMENT — PAIN - FUNCTIONAL ASSESSMENT
PAIN_FUNCTIONAL_ASSESSMENT: PREVENTS OR INTERFERES WITH MANY ACTIVE NOT PASSIVE ACTIVITIES
PAIN_FUNCTIONAL_ASSESSMENT: ACTIVITIES ARE NOT PREVENTED

## 2023-09-11 ASSESSMENT — PAIN DESCRIPTION - LOCATION
LOCATION: GENERALIZED
LOCATION: FOOT
LOCATION: GENERALIZED

## 2023-09-11 ASSESSMENT — PAIN SCALES - WONG BAKER
WONGBAKER_NUMERICALRESPONSE: 2
WONGBAKER_NUMERICALRESPONSE: HURTS A LITTLE BIT

## 2023-09-11 ASSESSMENT — PAIN DESCRIPTION - DESCRIPTORS
DESCRIPTORS: DISCOMFORT
DESCRIPTORS: ACHING

## 2023-09-11 NOTE — PROGRESS NOTES
4 Eyes Skin Assessment     NAME:  Brittaney Graff  YOB: 1945  MEDICAL RECORD NUMBER:  [id number]    The patient is being assessed for  Admission    I agree that at least one RN has performed a thorough Head to Toe Skin Assessment on the patient. ALL assessment sites listed below have been assessed. Areas assessed by both nurses:    Head, Face, Ears, Shoulders, Back, Chest, Arms, Elbows, Hands, Sacrum. Buttock, Coccyx, Ischium, Legs. Feet and Heels, and Under Medical Devices         Does the Patient have a Wound? Yes wound(s) were present on assessment.  LDA wound assessment was Initiated and completed by RN       Branden Prevention initiated by RN: Yes  Wound Care Orders initiated by RN: Yes    Pressure Injury (Stage 3,4, Unstageable, DTI, NWPT, and Complex wounds) if present, place Wound referral order by RN under : Yes    2nd toe on left foot- necrotic area  Left great toe- open with yellow slough  Right great toe- blanchable redness  Sacrum- redness    New Ostomies, if present place, Ostomy referral order under : No     Nurse 1 eSignature: Electronically signed by Gonzalo Gonsalez RN on 9/11/23 at 2:33 AM EDT    **SHARE this note so that the co-signing nurse can place an eSignature**    Nurse 2 eSignature: Electronically signed by Janet Ohara RN on 9/11/23 at 2:35 AM EDT

## 2023-09-11 NOTE — PROGRESS NOTES
Dr. Fernando Pride made aware of dietician's recommendations for TF rate. OK to change rate accordingly. TF changed to 35 mL/hr with 10 mL water flush q 1 hour.

## 2023-09-11 NOTE — H&P
[facility]   HISTORY AND PHYSICAL EXAM      AUTHOR: Wong Roth MD PATIENT NAME: Cheli Weir   PCP: Susan Lagos MD  MRN: [id number], : 1945       CHIEF COMPLAINT / REASON FOR ADMISSION: Blood culture is growing bacteria  HPI:   This is a 66 y.o. female PMH with Trach and PEG, quadriplegia, multiple decubiti wounds presented with positive Blood culture is growing bacteria . She vitiated ED yesterday where a blood culture was drawn and today SNF staff sent the patient to ED because blood cure is growing. Patient has a a trach and on chronically vent. Nonverbal but can obey simple commands. upper and lower limbs are contracted. Patient is DNR cc but after extensive discussion in ED, family wanted to get treated for bacteremia with IV antibiotics, but she remains DNR cc. No additional history could be obtained because of poor mentals and non-verbal state. ROS:  Unable to obtain because of poor historian    PMH:  History reviewed. No pertinent past medical history. Surgical History:  History reviewed. No pertinent surgical history. Medications Prior to Admission:    Prior to Admission medications    Medication Sig Start Date End Date Taking? Authorizing Provider   HYDROcodone-acetaminophen (NORCO) 5-325 MG per tablet Take 1 tablet by mouth every 6 hours as needed for Pain.  Max Daily Amount: 4 tablets   Yes Historical Provider, MD   acetaminophen (TYLENOL) 650 MG suppository Place 1 suppository rectally every 4 hours as needed for Pain   Yes Historical Provider, MD   guaiFENesin (ROBITUSSIN) 100 MG/5ML liquid 10 mLs by Per G Tube route every 6 hours as needed for Cough   Yes Historical Provider, MD   Nutritional Supplements (JEVITY 1.5 ENID PO) 35 mL/hr by PEG Tube route 2 times daily   Yes Historical Provider, MD   acetaminophen (TYLENOL) 325 MG tablet 2 tablets by PEG Tube route every 4 hours as needed for Pain or Fever    Historical Provider, MD   amLODIPine 48.0 %    MCV 86.1 80.0 - 99.9 fL    MCH 27.8 26.0 - 35.0 pg    MCHC 32.3 32.0 - 34.5 g/dL    RDW 16.1 (H) 11.5 - 15.0 %    MPV 10.8 7.0 - 12.0 fL    Platelet, Fluorescence 218 130 - 450 k/uL    Neutrophils % 83 (H) 43.0 - 80.0 %    Lymphocytes % 9 (L) 20.0 - 42.0 %    Monocytes % 7 2.0 - 12.0 %    Eosinophils % 1 0 - 6 %    Basophils % 0 0.0 - 2.0 %    Immature Granulocytes 0 0.0 - 5.0 %    Neutrophils Absolute 7.49 (H) 1.80 - 7.30 k/uL    Lymphocytes Absolute 0.82 (L) 1.50 - 4.00 k/uL    Monocytes Absolute 0.61 0.10 - 0.95 k/uL    Eosinophils Absolute 0.10 0.05 - 0.50 k/uL    Basophils Absolute 0.02 0.00 - 0.20 k/uL    Absolute Immature Granulocyte 0.03 0.00 - 0.58 k/uL   CMP w/ Reflex to MG   Result Value Ref Range    Sodium 137 132 - 146 mmol/L    Potassium 4.3 3.5 - 5.0 mmol/L    Chloride 103 98 - 107 mmol/L    CO2 23 22 - 29 mmol/L    Anion Gap 11 7 - 16 mmol/L    Glucose 76 74 - 99 mg/dL    BUN 29 (H) 6 - 23 mg/dL    Creatinine 0.9 0.50 - 1.00 mg/dL    Est, Glom Filt Rate >60 >60 mL/min/1.73m2    Calcium 9.1 8.6 - 10.2 mg/dL    Total Protein 6.3 (L) 6.4 - 8.3 g/dL    Albumin 3.1 (L) 3.5 - 5.2 g/dL    Total Bilirubin 0.4 0.0 - 1.2 mg/dL    Alkaline Phosphatase 103 35 - 104 U/L    ALT 19 0 - 32 U/L    AST 23 0 - 31 U/L   Lactate, Sepsis   Result Value Ref Range    Lactic Acid, Sepsis 1.2 0.5 - 1.9 mmol/L   Lactate, Sepsis   Result Value Ref Range    Lactic Acid, Sepsis 0.8 0.5 - 1.9 mmol/L   Urinalysis   Result Value Ref Range    Color, UA Yellow Yellow    Turbidity UA Clear Clear    Glucose, Ur NEGATIVE NEGATIVE mg/dL    Bilirubin Urine NEGATIVE NEGATIVE    Ketones, Urine NEGATIVE NEGATIVE mg/dL    Specific Gravity, UA 1.015 1.005 - 1.030    Urine Hgb TRACE (A) NEGATIVE    pH, UA 5.5 5.0 - 9.0    Protein, UA NEGATIVE NEGATIVE mg/dL    Urobilinogen, Urine 0.2 0.0 - 1.0 EU/dL    Nitrite, Urine POSITIVE (A) NEGATIVE    Leukocyte Esterase, Urine SMALL (A) NEGATIVE   Microscopic Urinalysis   Result Value Ref Range

## 2023-09-11 NOTE — ED NOTES
Attempted to call pt report, nurse unavailable for report now and will call back for report soon.       Traci Sidhu RN  09/10/23 9572

## 2023-09-11 NOTE — ED NOTES
Pt report called to [facility], pt stable for transport on current vent settings      Erick Guevara RN  09/10/23 0491

## 2023-09-11 NOTE — CONSULTS
Consult Note            Date:9/11/2023        Patient Leslie Pod     YOB: 1945     Age:78 y.o. Inpatient consult to Infectious Diseases  Consult performed by: Bashir Zafar MD  Consult ordered by: Aileen Nash DO          Chief Complaint     Chief Complaint   Patient presents with    Abnormal Lab     Here yesterday for NSTEMI and UTI on UA, [facility] and sent back to facility; called today with positive blood cultures from yesterday; trach and vent d/t hx chronic resp failure           History Obtained From   patient, electronic medical record, printed records    History of Present Illness   Tracy Seen is a 66 y.o. female who  has no past medical history on file. Afebrile tachycardic trach 4/2023/vent peg  From NH with unresponsiveness  Recent admission form [facility]  Pt was in ER on 9/9  Seen by Hospice d/c back to snf  Pt was brought back in due to + blood cx    Wbc25.3->9.1->8.3 cr0.9   9/9 Blood cx CONS/Klebsuella   XR CHEST PORTABLE    Result Date: 9/10/2023  No acute process. Currently on vanco/meropenem   Reviewed  [address]   -h/o esbl mdro A baumanii pneumonia  Left foot ulcer seen by podiatry/Dr Mavis Latham and gregg       Past Medical History   History reviewed. No pertinent past medical history. Trach/vent/peg    Past Surgical History   History reviewed. No pertinent surgical history. Medications   Medications Prior to Admission: HYDROcodone-acetaminophen (NORCO) 5-325 MG per tablet, Take 1 tablet by mouth every 6 hours as needed for Pain.  Max Daily Amount: 4 tablets  acetaminophen (TYLENOL) 650 MG suppository, Place 1 suppository rectally every 4 hours as needed for Pain  guaiFENesin (ROBITUSSIN) 100 MG/5ML liquid, 10 mLs by Per G Tube route every 6 hours as needed for Cough  Nutritional Supplements (JEVITY 1.5 ENID PO), 35 mL/hr by PEG Tube route 2 times daily  acetaminophen (TYLENOL) 325 MG tablet, 2 tablets by PEG Tube route every 4 hours as needed for Pain or

## 2023-09-11 NOTE — PROGRESS NOTES
Comprehensive Nutrition Assessment    Type and Reason for Visit:  Initial, Positive Nutrition Screen (home TF)    Nutrition Recommendations/Plan:   TF Recommendations: Jevity 1.5 to goal 35 mL/hr x 24 hours, continue 10ml water q 1 hour when TF infusing   To provide: 840 ml, 1260 kcal, 54 gm pro/d, 638 ml free water (878 ml water)    Current renal TF providing over estimated needs at this time. Hyperkalemia resolved, renal labs WNL, no indication for renal formula at this time. Will monitor. 2. Recommend initiation of Willie BID via PEG to aid in wound healing if desired per goals of care. (Provides additional 180 kcal, 5 gm pro per day)     Malnutrition Assessment:  Malnutrition Status: At risk for malnutrition (Comment) (09/11/23 8415)    Context:  Chronic Illness     Findings of the 6 clinical characteristics of malnutrition:  Energy Intake:  Unable to assess (Noted per Caroline Cargo tolerating TF at goal rate x 22 hours/d. Pt NPO)  Weight Loss:  Unable to assess (No wt hx to trend.)     Body Fat Loss:  Unable to assess     Muscle Mass Loss:  Unable to assess (contractures)    Fluid Accumulation:  Unable to assess     Strength:  Not Performed    Nutrition Assessment:    Pt admitted with bacteremia. PMHx: stroke, PEG / trach, vent dependent, quadriplegia, multiple decubiti wounds, contractures. Home TF per [facility] 1.5 at 35 mL/hr x 24 hours (up to 2 hours off per day) with 10ml/hr water infusing with TF (providing ~770 ml, ~1155 kcal, 49 gm pro) . Hyperkalemia noted on admission, renal TF formula initiated. K+ now improved, recommend resuming home TF formula, Jevity 1.5. Recommendations provided. Nutrition Related Findings:    Nonverbal, trach/vent, +PEG; -I/Os;  trace edema.  Wound Type: Multiple, Unstageable, Wound Consult Pending (Great toes, heels, sacrum)       Current Nutrition Intake & Therapies:    Average Meal Intake: NPO  Average Supplements Intake: NPO  Diet NPO  ADULT TUBE FEEDING;

## 2023-09-11 NOTE — PROGRESS NOTES
Pharmacy Consultation Note  (Antibiotic Dosing and Monitoring)    Initial consult date: 09/10/2023   Consulting physician/provider: Jaxson  Drug: Vancomycin  Indication: Bloodstream Infection     Age/  Gender Height Weight IBW  Allergy Information   78 y.o./female         Ideal body weight: 45.5 kg (100 lb 4.9 oz)  Adjusted ideal body weight: 56.3 kg (124 lb 3 oz)   Penicillins      Renal Function:  Recent Labs     09/09/23  0619 09/10/23  1809   BUN 18 29*   CREATININE 0.9 0.9     No intake or output data in the 24 hours ending 09/10/23 2325    Vancomycin Monitoring:  Trough:  No results for input(s): \"VANCOTROUGH\" in the last 72 hours. Random:  No results for input(s): \"VANCORANDOM\" in the last 72 hours. No results for input(s): \"BLOODCULT2\" in the last 72 hours. Historical Cultures:  No results found for: \"ORG\"  No results for input(s): \"BC\" in the last 72 hours. Vancomycin Administration Times:  Recent vancomycin administrations                     vancomycin (VANCOCIN) 1,500 mg in sodium chloride 0.9 % 250 mL IVPB (mg) 1,500 mg New Bag 09/10/23 2011                    Assessment:  Patient is a 66 y.o. female who has been initiated on vancomycin  Estimated Creatinine Clearance: 46 mL/min (based on SCr of 0.9 mg/dL). To dose vancomycin, pharmacy will be utilizing Mod[employer] calculation software for goal AUC/DIANE 400-600 mg/L-hr (predicted AUC/DIANE = 472, Tr =13.8)    Plan:   Will check vancomycin levels when appropriate  Will continue to monitor renal function   Pharmacy to follow      ANUJ Mix [facility] 9/10/2023 11:25 PM

## 2023-09-11 NOTE — CARE COORDINATION
Case Management Assessment  Initial Evaluation    Date/Time of Evaluation: 9/11/2023 1:00 PM  Assessment Completed by: DAMARIS Donaldson    If patient is discharged prior to next notation, then this note serves as note for discharge by case management. Patient Name: Genie Chowdhury                   YOB: 1945  Diagnosis: Bacteremia [R78.81]                   Date / Time: 9/10/2023  5:08 PM    Patient Admission Status: Inpatient   Readmission Risk (Low < 19, Mod (19-27), High > 27): No data recorded  Current PCP: Richard Zuniga MD  PCP verified by CM? Yes    Chart Reviewed: Yes      History Provided by: Medical Record, Other (see comment) (discussed with rep from SNF)  Patient Orientation: Alert and Oriented, Person    Patient Cognition: Alert    Hospitalization in the last 30 days (Readmission):  No    If yes, Readmission Assessment in  Navigator will be completed. Advance Directives:      Code Status: DNR-CCA   Patient's Primary Decision Maker is: Legal Next of Kin      Discharge Planning:    Patient lives with: Other (Comment) (LTC facility) Type of Home: Long-Term Care  Primary Care Giver: Other (Comment) ([facility])  Patient Support Systems include: Children   Current Financial resources:    Current community resources:    Current services prior to admission: [address]            Current DME:              Type of Home Care services:  None    ADLS  Prior functional level: Assistance with the following:, Bathing, Dressing, Toileting, Cooking, Housework, Shopping, Mobility, Feeding  Current functional level: Assistance with the following:, Toileting, Dressing, Bathing, Mobility, Shopping, Cooking, Housework, Feeding    PT AM-PAC:   /24  OT AM-PAC:   /24    Family can provide assistance at DC: No  Would you like Case Management to discuss the discharge plan with any other family members/significant others, and if so, who?  Yes (son)  Plans to Return to Present Housing: Yes  Other Identified Issues/Barriers to RETURNING to current housing: none noted   Potential Assistance needed at discharge: Other (Comment) (possible hospice care per son)            Potential DME:    Patient expects to discharge to: Long-term care  Plan for transportation at discharge:      Financial    Payor: [facility] / Plan: MEDICARE PART A AND B / Product Type: *No Product type* /     Does insurance require precert for SNF: No    Potential assistance Purchasing Medications:    Meds-to-Beds request:      No Pharmacies Listed    Notes:    Factors facilitating achievement of predicted outcomes: pt's facility have been managing vent/trach PEG     Barriers to discharge: none noted     Additional Case Management Notes: Pt admitted from [facility] and [address] [place] [address] fax # [phone] intermediate. Per Bernarda Montgomery, rep pt is a [employer], will accept back and NO PRECERT is needed. Pt is trach/vent dependent. Sw spoke with son, Jagjit Rodriguez via phone, agreeable with return when medically stable. Pt was admitted locally 5/2023 from [facility] in [place] due to need for vent SNF. IV Vanco/ IV Merrem. LEOBARDO will need signed by physician.               DAMARIS Cruz  Case Management Department  Ph:  Fax:

## 2023-09-11 NOTE — FLOWSHEET NOTE
Inpatient Wound Care    Admit Date: 9/10/2023  5:08 PM    Reason for consult:  left foot    Significant history:  History reviewed. No pertinent past medical history. Wound history:      Findings:     09/11/23 1606   Wound 09/11/23 Toe (Comment  which one) Left; Anterior 2x2cm black area   Date First Assessed/Time First Assessed: 09/11/23 0129   Present on Original Admission: Yes  Primary Wound Type: Soft Tissue Necrosis  Location: Toe (Comment  which one)  Wound Location Orientation: Left; Anterior  Wound Description (Comments): 2x2cm b. .. Wound Image    Wound Cleansed Cleansed with saline   Wound Length (cm) 2 cm   Wound Width (cm) 2 cm   Wound Surface Area (cm^2) 4 cm^2   Change in Wound Size % (l*w) 0   Wound Assessment Eschar dry   Drainage Amount None (dry)   Odor None   Wound 09/11/23 Foot Left; Anterior yellow, moist open area to lateral side of left great toe 1.5 x 2 cm   Date First Assessed/Time First Assessed: 09/11/23 0130   Present on Original Admission: Yes  Primary Wound Type: Pressure Injury  Location: Foot  Wound Location Orientation: Left; Anterior  Wound Description (Comments): yellow, moist open area to later. .. Wound Image    Wound Cleansed Cleansed with saline   Wound Length (cm) 1.5 cm   Wound Width (cm) 2 cm   Wound Depth (cm) 0.2 cm   Wound Surface Area (cm^2) 3 cm^2   Change in Wound Size % (l*w) 0   Wound Volume (cm^3) 0.6 cm^3   Wound Assessment Slough   Drainage Amount Scant (moist but unmeasurable)   Drainage Description Yellow   Odor None   Ana-wound Assessment Fragile       **Informed Consent**    The patient has given verbal consent to have photos taken of wounds and inserted into their chart as part of their permanent medical record for purposes of documentation, treatment management and/or medical review.    All Images taken on 9/11/23 of patient name: Tye Pérez were transmitted and stored on secured [employer] located within [employer] by a registered [employer] Mobile Application Device.         Impression:     Interventions in place:  using aquacell and mepilex    Plan:  Cont with aquacell and mepilex dressing      Finesse Rockwell RN 9/11/2023 4:13 PM

## 2023-09-11 NOTE — PROGRESS NOTES
Pharmacy Consultation Note  (Antibiotic Dosing and Monitoring)    Initial consult date: 09/10/2023   Consulting physician/provider: Jaxson  Drug: Vancomycin  Indication: Bloodstream Infection     Age/  Gender Height Weight IBW  Allergy Information   78 y.o./female 5' (152.4 cm) 110 lb 6.4 oz (50.1 kg)     Ideal body weight: 45.5 kg (100 lb 4.9 oz)  Adjusted ideal body weight: 47.3 kg (104 lb 5.5 oz)   Penicillins      Renal Function:  Recent Labs     09/09/23  0619 09/10/23  1809   BUN 18 29*   CREATININE 0.9 0.9         Intake/Output Summary (Last 24 hours) at 9/11/2023 1433  Last data filed at 9/11/2023 1335  Gross per 24 hour   Intake 344 ml   Output 700 ml   Net -356 ml       Vancomycin Monitoring:  Trough:  No results for input(s): \"VANCOTROUGH\" in the last 72 hours. Random:  No results for input(s): \"VANCORANDOM\" in the last 72 hours. No results for input(s): \"BLOODCULT2\" in the last 72 hours. Historical Cultures:  No results found for: \"ORG\"  No results for input(s): \"BC\" in the last 72 hours. Recent vancomycin administrations                     vancomycin (VANCOCIN) 1,500 mg in sodium chloride 0.9 % 250 mL IVPB (mg) 1,500 mg New Bag 09/10/23 2011             Assessment:  Patient is a 66 y.o. female who has been initiated on vancomycin  Estimated Creatinine Clearance: 37 mL/min (based on SCr of 0.9 mg/dL).   To dose vancomycin, pharmacy will be utilizing [employer] calculation software for goal AUC/DIANE 400-600 mg/L-hr (predicted AUC/DIANE = 472, Tr =13.8)    Plan:  Continue vancomycin 1250 mg IV q24hr  Will check vancomycin levels when appropriate  Will continue to monitor renal function   Pharmacy to follow    Nina GriffinD, BCPS 9/11/2023 2:33 PM   Ext: [phone]

## 2023-09-11 NOTE — ACP (ADVANCE CARE PLANNING)
Advance Care Planning   Healthcare Decision Maker:    Primary Decision Maker: Stewart Sampson [facility] - [phone]    Click here to complete Healthcare Decision Makers including selection of the Healthcare Decision Maker Relationship (ie \"Primary\"). Today we documented Decision Maker(s) consistent with Legal Next of Kin hierarchy.

## 2023-09-11 NOTE — ED NOTES
When [facility] bed ready, resp to be called to bag pt during transport.       Patrick Martin RN  09/10/23 3132

## 2023-09-11 NOTE — PROGRESS NOTES
This patient is on medication that requires renal, weight, and/or indication dose adjustment. Date Body Weight IBW  Adjusted BW SCr  CrCl Dialysis status   9/10/2023   Ideal body weight: 45.5 kg (100 lb 4.9 oz)  Adjusted ideal body weight: 56.3 kg (124 lb 3 oz) Serum creatinine: 0.9 mg/dL 09/10/23 1809  Estimated creatinine clearance: 46 mL/min N/a       Pharmacy has dose-adjusted the following medication(s):    Date Previous Order Adjusted Order   9/10/2023 Merrem 1000 mg Q8H Merrem 1000 mg Q12H       These changes were made per protocol according to the [address] [facility]. *Please note this dose may need readjusted if patient's condition changes. Please contact pharmacy with any questions regarding these changes.     Braulio Reyes, [facility]  9/10/2023  11:11 PM

## 2023-09-12 ENCOUNTER — APPOINTMENT (OUTPATIENT)
Dept: ULTRASOUND IMAGING | Age: 78
DRG: 870 | End: 2023-09-12
Payer: MEDICARE

## 2023-09-12 ENCOUNTER — APPOINTMENT (OUTPATIENT)
Dept: GENERAL RADIOLOGY | Age: 78
DRG: 870 | End: 2023-09-12
Payer: MEDICARE

## 2023-09-12 PROBLEM — I25.5 ISCHEMIC CARDIOMYOPATHY: Status: ACTIVE | Noted: 2023-09-12

## 2023-09-12 PROBLEM — I25.10 CORONARY ARTERY DISEASE INVOLVING NATIVE CORONARY ARTERY OF NATIVE HEART WITHOUT ANGINA PECTORIS: Status: ACTIVE | Noted: 2023-09-12

## 2023-09-12 LAB
ANION GAP SERPL CALCULATED.3IONS-SCNC: 8 MMOL/L (ref 7–16)
BASOPHILS # BLD: 0.02 K/UL (ref 0–0.2)
BASOPHILS NFR BLD: 0 % (ref 0–2)
BUN SERPL-MCNC: 22 MG/DL (ref 6–23)
CALCIUM SERPL-MCNC: 9.3 MG/DL (ref 8.6–10.2)
CHLORIDE SERPL-SCNC: 101 MMOL/L (ref 98–107)
CO2 SERPL-SCNC: 29 MMOL/L (ref 22–29)
CREAT SERPL-MCNC: 0.8 MG/DL (ref 0.5–1)
CRP SERPL HS-MCNC: 48 MG/L (ref 0–5)
EOSINOPHIL # BLD: 0.09 K/UL (ref 0.05–0.5)
EOSINOPHILS RELATIVE PERCENT: 2 % (ref 0–6)
ERYTHROCYTE [DISTWIDTH] IN BLOOD BY AUTOMATED COUNT: 16.8 % (ref 11.5–15)
ERYTHROCYTE [SEDIMENTATION RATE] IN BLOOD BY WESTERGREN METHOD: 55 MM/HR (ref 0–20)
GFR SERPL CREATININE-BSD FRML MDRD: >60 ML/MIN/1.73M2
GLUCOSE SERPL-MCNC: 109 MG/DL (ref 74–99)
HCT VFR BLD AUTO: 26.5 % (ref 34–48)
HGB BLD-MCNC: 8.6 G/DL (ref 11.5–15.5)
IMM GRANULOCYTES # BLD AUTO: 0.03 K/UL (ref 0–0.58)
IMM GRANULOCYTES NFR BLD: 1 % (ref 0–5)
LYMPHOCYTES NFR BLD: 0.77 K/UL (ref 1.5–4)
LYMPHOCYTES RELATIVE PERCENT: 13 % (ref 20–42)
MAGNESIUM SERPL-MCNC: 1.7 MG/DL (ref 1.6–2.6)
MCH RBC QN AUTO: 27.7 PG (ref 26–35)
MCHC RBC AUTO-ENTMCNC: 32.5 G/DL (ref 32–34.5)
MCV RBC AUTO: 85.2 FL (ref 80–99.9)
MONOCYTES NFR BLD: 0.42 K/UL (ref 0.1–0.95)
MONOCYTES NFR BLD: 7 % (ref 2–12)
NEUTROPHILS NFR BLD: 77 % (ref 43–80)
NEUTS SEG NFR BLD: 4.41 K/UL (ref 1.8–7.3)
PHOSPHATE SERPL-MCNC: 2.5 MG/DL (ref 2.5–4.5)
PLATELET # BLD AUTO: 215 K/UL (ref 130–450)
PMV BLD AUTO: 10.3 FL (ref 7–12)
POTASSIUM SERPL-SCNC: 4.1 MMOL/L (ref 3.5–5)
RBC # BLD AUTO: 3.11 M/UL (ref 3.5–5.5)
SODIUM SERPL-SCNC: 138 MMOL/L (ref 132–146)
WBC OTHER # BLD: 5.7 K/UL (ref 4.5–11.5)

## 2023-09-12 PROCEDURE — 84100 ASSAY OF PHOSPHORUS: CPT

## 2023-09-12 PROCEDURE — 36415 COLL VENOUS BLD VENIPUNCTURE: CPT

## 2023-09-12 PROCEDURE — 6370000000 HC RX 637 (ALT 250 FOR IP): Performed by: INTERNAL MEDICINE

## 2023-09-12 PROCEDURE — 99232 SBSQ HOSP IP/OBS MODERATE 35: CPT | Performed by: INTERNAL MEDICINE

## 2023-09-12 PROCEDURE — 6360000002 HC RX W HCPCS: Performed by: INTERNAL MEDICINE

## 2023-09-12 PROCEDURE — 76937 US GUIDE VASCULAR ACCESS: CPT

## 2023-09-12 PROCEDURE — 2060000000 HC ICU INTERMEDIATE R&B

## 2023-09-12 PROCEDURE — 94640 AIRWAY INHALATION TREATMENT: CPT

## 2023-09-12 PROCEDURE — 73620 X-RAY EXAM OF FOOT: CPT

## 2023-09-12 PROCEDURE — 85025 COMPLETE CBC W/AUTO DIFF WBC: CPT

## 2023-09-12 PROCEDURE — 31502 CHANGE OF WINDPIPE AIRWAY: CPT

## 2023-09-12 PROCEDURE — 86140 C-REACTIVE PROTEIN: CPT

## 2023-09-12 PROCEDURE — 85652 RBC SED RATE AUTOMATED: CPT

## 2023-09-12 PROCEDURE — 2580000003 HC RX 258: Performed by: INTERNAL MEDICINE

## 2023-09-12 PROCEDURE — 80048 BASIC METABOLIC PNL TOTAL CA: CPT

## 2023-09-12 PROCEDURE — 83735 ASSAY OF MAGNESIUM: CPT

## 2023-09-12 PROCEDURE — 93925 LOWER EXTREMITY STUDY: CPT

## 2023-09-12 PROCEDURE — 94003 VENT MGMT INPAT SUBQ DAY: CPT

## 2023-09-12 RX ADMIN — BUDESONIDE INHALATION 500 MCG: 0.5 SUSPENSION RESPIRATORY (INHALATION) at 17:42

## 2023-09-12 RX ADMIN — FUROSEMIDE 20 MG: 20 TABLET ORAL at 09:44

## 2023-09-12 RX ADMIN — VENLAFAXINE 25 MG: 25 TABLET ORAL at 12:37

## 2023-09-12 RX ADMIN — APIXABAN 2.5 MG: 2.5 TABLET, FILM COATED ORAL at 21:05

## 2023-09-12 RX ADMIN — AMLODIPINE BESYLATE 5 MG: 5 TABLET ORAL at 09:45

## 2023-09-12 RX ADMIN — SODIUM CHLORIDE, PRESERVATIVE FREE 10 ML: 5 INJECTION INTRAVENOUS at 21:06

## 2023-09-12 RX ADMIN — ARIPIPRAZOLE 15 MG: 5 TABLET ORAL at 09:44

## 2023-09-12 RX ADMIN — IPRATROPIUM BROMIDE AND ALBUTEROL SULFATE 1 DOSE: .5; 2.5 SOLUTION RESPIRATORY (INHALATION) at 17:42

## 2023-09-12 RX ADMIN — VANCOMYCIN HYDROCHLORIDE 1250 MG: 10 INJECTION, POWDER, LYOPHILIZED, FOR SOLUTION INTRAVENOUS at 19:33

## 2023-09-12 RX ADMIN — CLOPIDOGREL BISULFATE 75 MG: 75 TABLET ORAL at 09:44

## 2023-09-12 RX ADMIN — BUDESONIDE INHALATION 500 MCG: 0.5 SUSPENSION RESPIRATORY (INHALATION) at 06:37

## 2023-09-12 RX ADMIN — VENLAFAXINE 25 MG: 25 TABLET ORAL at 09:44

## 2023-09-12 RX ADMIN — METOPROLOL TARTRATE 12.5 MG: 25 TABLET, FILM COATED ORAL at 09:44

## 2023-09-12 RX ADMIN — VENLAFAXINE 25 MG: 25 TABLET ORAL at 18:18

## 2023-09-12 RX ADMIN — IPRATROPIUM BROMIDE AND ALBUTEROL SULFATE 1 DOSE: .5; 2.5 SOLUTION RESPIRATORY (INHALATION) at 13:36

## 2023-09-12 RX ADMIN — METOPROLOL TARTRATE 12.5 MG: 25 TABLET, FILM COATED ORAL at 21:05

## 2023-09-12 RX ADMIN — IPRATROPIUM BROMIDE AND ALBUTEROL SULFATE 1 DOSE: .5; 2.5 SOLUTION RESPIRATORY (INHALATION) at 09:22

## 2023-09-12 RX ADMIN — ATORVASTATIN CALCIUM 10 MG: 10 TABLET, FILM COATED ORAL at 21:05

## 2023-09-12 RX ADMIN — APIXABAN 2.5 MG: 2.5 TABLET, FILM COATED ORAL at 09:44

## 2023-09-12 RX ADMIN — Medication 300 MG: at 09:44

## 2023-09-12 RX ADMIN — ACETAMINOPHEN 650 MG: 325 TABLET ORAL at 18:18

## 2023-09-12 RX ADMIN — MEROPENEM 1000 MG: 1 INJECTION, POWDER, FOR SOLUTION INTRAVENOUS at 19:29

## 2023-09-12 RX ADMIN — HYDROCODONE BITARTRATE AND ACETAMINOPHEN 1 TABLET: 5; 325 TABLET ORAL at 15:04

## 2023-09-12 RX ADMIN — IPRATROPIUM BROMIDE AND ALBUTEROL SULFATE 1 DOSE: .5; 2.5 SOLUTION RESPIRATORY (INHALATION) at 06:37

## 2023-09-12 ASSESSMENT — PULMONARY FUNCTION TESTS
PIF_VALUE: 26
PIF_VALUE: 27
PIF_VALUE: 24
PIF_VALUE: 29

## 2023-09-12 ASSESSMENT — PAIN - FUNCTIONAL ASSESSMENT
PAIN_FUNCTIONAL_ASSESSMENT: PREVENTS OR INTERFERES WITH MANY ACTIVE NOT PASSIVE ACTIVITIES
PAIN_FUNCTIONAL_ASSESSMENT: PREVENTS OR INTERFERES WITH MANY ACTIVE NOT PASSIVE ACTIVITIES

## 2023-09-12 ASSESSMENT — PAIN SCALES - WONG BAKER
WONGBAKER_NUMERICALRESPONSE: 2
WONGBAKER_NUMERICALRESPONSE: HURTS A LITTLE BIT

## 2023-09-12 ASSESSMENT — PAIN DESCRIPTION - DESCRIPTORS
DESCRIPTORS: ACHING;DISCOMFORT
DESCRIPTORS: DISCOMFORT

## 2023-09-12 ASSESSMENT — PAIN DESCRIPTION - LOCATION
LOCATION: FOOT
LOCATION: GENERALIZED

## 2023-09-12 ASSESSMENT — PAIN DESCRIPTION - ORIENTATION: ORIENTATION: LEFT

## 2023-09-12 ASSESSMENT — PAIN SCALES - GENERAL
PAINLEVEL_OUTOF10: 9
PAINLEVEL_OUTOF10: 7

## 2023-09-12 NOTE — PROGRESS NOTES
IV to right wrist infiltrated at this team.  Patient difficult to obtain access. IV team notified via perfect serve.

## 2023-09-12 NOTE — PROGRESS NOTES
Inpatient wound care note  Discussed treatment of foot ulcers with Dr Kaitlyn Lay to have Dr Krupa veeal and treat

## 2023-09-12 NOTE — PLAN OF CARE
Problem: Discharge Planning  Goal: Discharge to home or other facility with appropriate resources  Outcome: Progressing  Flowsheets (Taken 9/11/2023 1930)  Discharge to home or other facility with appropriate resources:   Identify barriers to discharge with patient and caregiver   Arrange for needed discharge resources and transportation as appropriate     Problem: Pain  Goal: Verbalizes/displays adequate comfort level or baseline comfort level  Outcome: Progressing     Problem: Skin/Tissue Integrity  Goal: Absence of new skin breakdown  Description: 1. Monitor for areas of redness and/or skin breakdown  2. Assess vascular access sites hourly  3. Every 4-6 hours minimum:  Change oxygen saturation probe site  4. Every 4-6 hours:  If on nasal continuous positive airway pressure, respiratory therapy assess nares and determine need for appliance change or resting period.   Outcome: Progressing     Problem: Safety - Adult  Goal: Free from fall injury  Outcome: Progressing     Problem: Nutrition Deficit:  Goal: Optimize nutritional status  Outcome: Progressing

## 2023-09-12 NOTE — PLAN OF CARE
Problem: Discharge Planning  Goal: Discharge to home or other facility with appropriate resources  9/12/2023 1030 by Justyna Sparrow RN  Outcome: Progressing     Problem: Pain  Goal: Verbalizes/displays adequate comfort level or baseline comfort level  9/12/2023 1030 by Justyna Sparrow RN  Outcome: Progressing     Problem: Skin/Tissue Integrity  Goal: Absence of new skin breakdown  Description: 1. Monitor for areas of redness and/or skin breakdown  2. Assess vascular access sites hourly  3. Every 4-6 hours minimum:  Change oxygen saturation probe site  4. Every 4-6 hours:  If on nasal continuous positive airway pressure, respiratory therapy assess nares and determine need for appliance change or resting period.   9/12/2023 1030 by Justyna Sparrow RN  Outcome: Progressing     Problem: Safety - Adult  Goal: Free from fall injury  9/12/2023 1030 by Justyna Sparrow RN  Outcome: Progressing     Problem: Nutrition Deficit:  Goal: Optimize nutritional status  9/12/2023 1030 by Justyna Sparrow RN  Outcome: Progressing

## 2023-09-12 NOTE — PROGRESS NOTES
Pharmacy Consultation Note  (Antibiotic Dosing and Monitoring)    Initial consult date: 09/10/2023   Consulting physician/provider: Jaxson  Drug: Vancomycin  Indication: Bloodstream Infection     Age/  Gender Height Weight IBW  Allergy Information   78 y.o./female 5' (152.4 cm) 110 lb 6.4 oz (50.1 kg)     Ideal body weight: 45.5 kg (100 lb 4.9 oz)  Adjusted ideal body weight: 47.3 kg (104 lb 5.5 oz)   Penicillins      Renal Function:  Recent Labs     09/10/23  1809 09/12/23  0721   BUN 29* 22   CREATININE 0.9 0.8         Intake/Output Summary (Last 24 hours) at 9/12/2023 1352  Last data filed at 9/12/2023 1321  Gross per 24 hour   Intake 1380 ml   Output 1510 ml   Net -130 ml         Vancomycin Monitoring:  Trough:  No results for input(s): \"VANCOTROUGH\" in the last 72 hours. Random:  No results for input(s): \"VANCORANDOM\" in the last 72 hours. No results for input(s): \"BLOODCULT2\" in the last 72 hours. Historical Cultures:  No results found for: \"ORG\"  No results for input(s): \"BC\" in the last 72 hours. Recent vancomycin administrations                     vancomycin (VANCOCIN) 1,250 mg in sodium chloride 0.9 % 250 mL IVPB (mg) 1,250 mg New Bag 09/11/23 2029    vancomycin (VANCOCIN) 1,500 mg in sodium chloride 0.9 % 250 mL IVPB (mg) 1,500 mg New Bag 09/10/23 2011             Assessment:  Patient is a 66 y.o. female who has been initiated on vancomycin  Estimated Creatinine Clearance: 42 mL/min (based on SCr of 0.8 mg/dL). To dose vancomycin, pharmacy will be utilizing [employer] calculation software for goal AUC/DIANE 400-600 mg/L-hr (predicted AUC/DIANE = 472, Tr =13.8)    Plan:  Continue vancomycin 1250 mg IV q24hr  Trough tonight @ 1900.  Hold dose for level > 20 mcg/mL  Will continue to monitor renal function   Pharmacy to follow    Nina ReneD, BCPS 9/12/2023 1:52 PM   Ext: [phone]

## 2023-09-12 NOTE — PROGRESS NOTES
[facility] Hospitalist   Progress Note    Admitting Date and Time: 9/10/2023  5:08 PM  Admit Dx: Bacteremia [R78.81]    Subjective/interval history:    9/11: Pt admitted last night with bacteremia due to multidrug-resistant organisms. Patient was seen in the ED on 9/9, blood cultures drawn at that time and were found to be positive thus she was sent back to the ED from nursing facility. She has a history of chronic respiratory failure with hypoxia and is ventilator dependent, stroke, DVT on chronic anticoagulation. Per social work she was recently hospitalized multiple times at [facility]; we are awaiting records to be sent. At this time she is awake, alert, tracks with eyes but does not otherwise interact. She appears comfortable. Per RN: Patient follows commands but inconsistently, unclear if this is volitional    9/12: Patient sleeping, awakens to voice, not following commands at this time. Appears comfortable. ROS: denies fever, chills, cp, sob, n/v, HA unless stated above.      amLODIPine  5 mg PEG Tube Daily    apixaban  2.5 mg Per G Tube BID    clopidogrel  75 mg PEG Tube Daily    atorvastatin  10 mg PEG Tube Nightly    ARIPiprazole  15 mg PEG Tube Daily    budesonide  500 mcg Nebulization Q12H    furosemide  20 mg PEG Tube Daily    ferrous sulfate  300 mg Per G Tube Daily    ipratropium 0.5 mg-albuterol 2.5 mg  1 Dose Inhalation 4x Daily    metoprolol tartrate  12.5 mg PEG Tube BID    venlafaxine  25 mg PEG Tube TID WC    meropenem  1,000 mg IntraVENous Q12H    sodium chloride flush  10 mL IntraVENous 2 times per day    vancomycin  1,250 mg IntraVENous Q24H     guaiFENesin, 200 mg, Q6H PRN  HYDROcodone-acetaminophen, 1 tablet, Q6H PRN  bisacodyl, 10 mg, Daily PRN  polyethylene glycol, 17 g, Daily PRN  sodium chloride flush, 10 mL, PRN  sodium chloride, , PRN  promethazine, 12.5 mg, Q6H PRN   Or  ondansetron, 4 mg, Q6H PRN  acetaminophen, 650 mg, Q6H PRN

## 2023-09-13 ENCOUNTER — APPOINTMENT (OUTPATIENT)
Dept: MRI IMAGING | Age: 78
DRG: 870 | End: 2023-09-13
Payer: MEDICARE

## 2023-09-13 ENCOUNTER — APPOINTMENT (OUTPATIENT)
Dept: CT IMAGING | Age: 78
DRG: 870 | End: 2023-09-13
Payer: MEDICARE

## 2023-09-13 ENCOUNTER — APPOINTMENT (OUTPATIENT)
Dept: ULTRASOUND IMAGING | Age: 78
DRG: 870 | End: 2023-09-13
Payer: MEDICARE

## 2023-09-13 LAB
ACB COMPLEX DNA BLD POS QL NAA+NON-PROBE: NOT DETECTED
ALBUMIN SERPL-MCNC: 3.2 G/DL (ref 3.5–5.2)
ALP SERPL-CCNC: 109 U/L (ref 35–104)
ALT SERPL-CCNC: 11 U/L (ref 0–32)
ANION GAP SERPL CALCULATED.3IONS-SCNC: 11 MMOL/L (ref 7–16)
AST SERPL-CCNC: 13 U/L (ref 0–31)
B FRAGILIS DNA BLD POS QL NAA+NON-PROBE: NOT DETECTED
BILIRUB SERPL-MCNC: 0.5 MG/DL (ref 0–1.2)
BIOFIRE TEST COMMENT: ABNORMAL
BLACTX-M ISLT/SPM QL: DETECTED
BLAIMP ISLT/SPM QL: NOT DETECTED
BLAKPC ISLT/SPM QL: NOT DETECTED
BLAOXA-48-LIKE ISLT/SPM QL: NOT DETECTED
BLAVIM ISLT/SPM QL: NOT DETECTED
BUN SERPL-MCNC: 19 MG/DL (ref 6–23)
C ALBICANS DNA BLD POS QL NAA+NON-PROBE: NOT DETECTED
C AURIS DNA BLD POS QL NAA+NON-PROBE: NOT DETECTED
C GATTII+NEOFOR DNA BLD POS QL NAA+N-PRB: NOT DETECTED
C GLABRATA DNA BLD POS QL NAA+NON-PROBE: NOT DETECTED
C KRUSEI DNA BLD POS QL NAA+NON-PROBE: NOT DETECTED
C PARAP DNA BLD POS QL NAA+NON-PROBE: NOT DETECTED
C TROPICLS DNA BLD POS QL NAA+NON-PROBE: NOT DETECTED
CALCIUM SERPL-MCNC: 9.4 MG/DL (ref 8.6–10.2)
CHLORIDE SERPL-SCNC: 97 MMOL/L (ref 98–107)
CO2 SERPL-SCNC: 25 MMOL/L (ref 22–29)
COLISTIN RES MCR-1 ISLT/SPM QL: NOT DETECTED
CREAT SERPL-MCNC: 0.7 MG/DL (ref 0.5–1)
DATE LAST DOSE: NORMAL
E CLOAC COMP DNA BLD POS NAA+NON-PROBE: NOT DETECTED
E COLI DNA BLD POS QL NAA+NON-PROBE: NOT DETECTED
E FAECALIS DNA BLD POS QL NAA+NON-PROBE: NOT DETECTED
E FAECIUM DNA BLD POS QL NAA+NON-PROBE: NOT DETECTED
ENTEROBACTERALES DNA BLD POS NAA+N-PRB: DETECTED
GFR SERPL CREATININE-BSD FRML MDRD: >60 ML/MIN/1.73M2
GLUCOSE SERPL-MCNC: 108 MG/DL (ref 74–99)
GP B STREP DNA BLD POS QL NAA+NON-PROBE: NOT DETECTED
HAEM INFLU DNA BLD POS QL NAA+NON-PROBE: NOT DETECTED
K OXYTOCA DNA BLD POS QL NAA+NON-PROBE: NOT DETECTED
KLEBSIELLA SP DNA BLD POS QL NAA+NON-PRB: DETECTED
KLEBSIELLA SP DNA BLD POS QL NAA+NON-PRB: NOT DETECTED
L MONOCYTOG DNA BLD POS QL NAA+NON-PROBE: NOT DETECTED
MECA+MECC ISLT/SPM QL: DETECTED
MICROORGANISM SPEC CULT: ABNORMAL
MICROORGANISM/AGENT SPEC: ABNORMAL
N MEN DNA BLD POS QL NAA+NON-PROBE: NOT DETECTED
P AERUGINOSA DNA BLD POS NAA+NON-PROBE: NOT DETECTED
POTASSIUM SERPL-SCNC: 4 MMOL/L (ref 3.5–5)
PROT SERPL-MCNC: 6.5 G/DL (ref 6.4–8.3)
PROTEUS SP DNA BLD POS QL NAA+NON-PROBE: NOT DETECTED
RESISTANT GENE NDM BY PCR: NOT DETECTED
S AUREUS DNA BLD POS QL NAA+NON-PROBE: NOT DETECTED
S AUREUS+CONS DNA BLD POS NAA+NON-PROBE: DETECTED
S EPIDERMIDIS DNA BLD POS QL NAA+NON-PRB: DETECTED
S LUGDUNENSIS DNA BLD POS QL NAA+NON-PRB: NOT DETECTED
S MALTOPHILIA DNA BLD POS QL NAA+NON-PRB: NOT DETECTED
S MARCESCENS DNA BLD POS NAA+NON-PROBE: NOT DETECTED
S PNEUM DNA BLD POS QL NAA+NON-PROBE: NOT DETECTED
S PYO DNA BLD POS QL NAA+NON-PROBE: NOT DETECTED
SALMONELLA DNA BLD POS QL NAA+NON-PROBE: NOT DETECTED
SERVICE CMNT-IMP: ABNORMAL
SODIUM SERPL-SCNC: 133 MMOL/L (ref 132–146)
SPECIMEN DESCRIPTION: ABNORMAL
STREPTOCOCCUS DNA BLD POS NAA+NON-PROBE: NOT DETECTED
TME LAST DOSE: NORMAL H
VANCOMYCIN DOSE: NORMAL MG
VANCOMYCIN SERPL-MCNC: 33 UG/ML (ref 5–40)

## 2023-09-13 PROCEDURE — 2060000000 HC ICU INTERMEDIATE R&B

## 2023-09-13 PROCEDURE — 73718 MRI LOWER EXTREMITY W/O DYE: CPT

## 2023-09-13 PROCEDURE — 6360000002 HC RX W HCPCS: Performed by: INTERNAL MEDICINE

## 2023-09-13 PROCEDURE — 80053 COMPREHEN METABOLIC PANEL: CPT

## 2023-09-13 PROCEDURE — 76705 ECHO EXAM OF ABDOMEN: CPT

## 2023-09-13 PROCEDURE — 6370000000 HC RX 637 (ALT 250 FOR IP): Performed by: INTERNAL MEDICINE

## 2023-09-13 PROCEDURE — 36415 COLL VENOUS BLD VENIPUNCTURE: CPT

## 2023-09-13 PROCEDURE — 74177 CT ABD & PELVIS W/CONTRAST: CPT

## 2023-09-13 PROCEDURE — 99232 SBSQ HOSP IP/OBS MODERATE 35: CPT | Performed by: INTERNAL MEDICINE

## 2023-09-13 PROCEDURE — 94003 VENT MGMT INPAT SUBQ DAY: CPT

## 2023-09-13 PROCEDURE — 80202 ASSAY OF VANCOMYCIN: CPT

## 2023-09-13 PROCEDURE — 6360000004 HC RX CONTRAST MEDICATION: Performed by: RADIOLOGY

## 2023-09-13 PROCEDURE — 94640 AIRWAY INHALATION TREATMENT: CPT

## 2023-09-13 PROCEDURE — 2580000003 HC RX 258: Performed by: INTERNAL MEDICINE

## 2023-09-13 RX ORDER — SODIUM CHLORIDE, SODIUM LACTATE, POTASSIUM CHLORIDE, CALCIUM CHLORIDE 600; 310; 30; 20 MG/100ML; MG/100ML; MG/100ML; MG/100ML
INJECTION, SOLUTION INTRAVENOUS CONTINUOUS
Status: ACTIVE | OUTPATIENT
Start: 2023-09-13 | End: 2023-09-13

## 2023-09-13 RX ADMIN — ATORVASTATIN CALCIUM 10 MG: 10 TABLET, FILM COATED ORAL at 21:20

## 2023-09-13 RX ADMIN — AMLODIPINE BESYLATE 5 MG: 5 TABLET ORAL at 08:43

## 2023-09-13 RX ADMIN — Medication 300 MG: at 08:42

## 2023-09-13 RX ADMIN — IPRATROPIUM BROMIDE AND ALBUTEROL SULFATE 1 DOSE: .5; 2.5 SOLUTION RESPIRATORY (INHALATION) at 09:57

## 2023-09-13 RX ADMIN — FUROSEMIDE 20 MG: 20 TABLET ORAL at 08:43

## 2023-09-13 RX ADMIN — SODIUM CHLORIDE, POTASSIUM CHLORIDE, SODIUM LACTATE AND CALCIUM CHLORIDE: 600; 310; 30; 20 INJECTION, SOLUTION INTRAVENOUS at 10:19

## 2023-09-13 RX ADMIN — BUDESONIDE INHALATION 500 MCG: 0.5 SUSPENSION RESPIRATORY (INHALATION) at 06:36

## 2023-09-13 RX ADMIN — ARIPIPRAZOLE 15 MG: 5 TABLET ORAL at 08:42

## 2023-09-13 RX ADMIN — MEROPENEM 1000 MG: 1 INJECTION, POWDER, FOR SOLUTION INTRAVENOUS at 06:33

## 2023-09-13 RX ADMIN — APIXABAN 2.5 MG: 2.5 TABLET, FILM COATED ORAL at 21:20

## 2023-09-13 RX ADMIN — CLOPIDOGREL BISULFATE 75 MG: 75 TABLET ORAL at 08:43

## 2023-09-13 RX ADMIN — METOPROLOL TARTRATE 12.5 MG: 25 TABLET, FILM COATED ORAL at 08:43

## 2023-09-13 RX ADMIN — BUDESONIDE INHALATION 500 MCG: 0.5 SUSPENSION RESPIRATORY (INHALATION) at 19:36

## 2023-09-13 RX ADMIN — VENLAFAXINE 25 MG: 25 TABLET ORAL at 12:38

## 2023-09-13 RX ADMIN — VENLAFAXINE 25 MG: 25 TABLET ORAL at 08:42

## 2023-09-13 RX ADMIN — HYDROCODONE BITARTRATE AND ACETAMINOPHEN 1 TABLET: 5; 325 TABLET ORAL at 21:20

## 2023-09-13 RX ADMIN — IOPAMIDOL 70 ML: 755 INJECTION, SOLUTION INTRAVENOUS at 12:11

## 2023-09-13 RX ADMIN — IPRATROPIUM BROMIDE AND ALBUTEROL SULFATE 1 DOSE: .5; 2.5 SOLUTION RESPIRATORY (INHALATION) at 14:43

## 2023-09-13 RX ADMIN — IPRATROPIUM BROMIDE AND ALBUTEROL SULFATE 1 DOSE: .5; 2.5 SOLUTION RESPIRATORY (INHALATION) at 06:36

## 2023-09-13 RX ADMIN — METOPROLOL TARTRATE 12.5 MG: 25 TABLET, FILM COATED ORAL at 21:20

## 2023-09-13 RX ADMIN — IPRATROPIUM BROMIDE AND ALBUTEROL SULFATE 1 DOSE: .5; 2.5 SOLUTION RESPIRATORY (INHALATION) at 19:36

## 2023-09-13 RX ADMIN — MEROPENEM 1000 MG: 1 INJECTION, POWDER, FOR SOLUTION INTRAVENOUS at 18:08

## 2023-09-13 RX ADMIN — VENLAFAXINE 25 MG: 25 TABLET ORAL at 18:06

## 2023-09-13 RX ADMIN — APIXABAN 2.5 MG: 2.5 TABLET, FILM COATED ORAL at 08:43

## 2023-09-13 ASSESSMENT — PAIN SCALES - WONG BAKER
WONGBAKER_NUMERICALRESPONSE: HURTS A LITTLE BIT
WONGBAKER_NUMERICALRESPONSE: 2
WONGBAKER_NUMERICALRESPONSE: NO HURT
WONGBAKER_NUMERICALRESPONSE: NO HURT
WONGBAKER_NUMERICALRESPONSE: 0
WONGBAKER_NUMERICALRESPONSE: 0

## 2023-09-13 ASSESSMENT — PAIN - FUNCTIONAL ASSESSMENT: PAIN_FUNCTIONAL_ASSESSMENT: PREVENTS OR INTERFERES WITH MANY ACTIVE NOT PASSIVE ACTIVITIES

## 2023-09-13 ASSESSMENT — PAIN DESCRIPTION - ORIENTATION: ORIENTATION: LEFT

## 2023-09-13 ASSESSMENT — PULMONARY FUNCTION TESTS
PIF_VALUE: 22
PIF_VALUE: 23
PIF_VALUE: 22
PIF_VALUE: 24
PIF_VALUE: 22

## 2023-09-13 ASSESSMENT — PAIN SCALES - GENERAL
PAINLEVEL_OUTOF10: 0
PAINLEVEL_OUTOF10: 10

## 2023-09-13 ASSESSMENT — PAIN DESCRIPTION - LOCATION: LOCATION: FOOT

## 2023-09-13 ASSESSMENT — PAIN DESCRIPTION - DESCRIPTORS: DESCRIPTORS: ACHING;DISCOMFORT;SHARP

## 2023-09-13 NOTE — PROGRESS NOTES
09/13/23 1443   Patient Observation   Pulse (!) 115   Respirations 15   SpO2 95 %   Breath Sounds   Breath Sounds Bilateral Diminished   Right Upper Lobe Diminished   Right Middle Lobe Diminished   Right Lower Lobe Diminished   Left Upper Lobe Diminished   Left Lower Lobe Diminished   Vent Information   Vent Mode AC/VC   $Ventilation $Subsequent Day   Ventilator Settings   FiO2  35 %   Resp Rate (Set) 15 bmp   PEEP/CPAP (cmH2O) 8   Pressure Support (cm H2O) 0 cm H2O   Peak Inspiratory Flow (Set) 60 L/sec   Vent Patient Data (Readings)   Vt (Measured) 460 mL   Peak Inspiratory Pressure (cmH2O) 23 cmH2O   Rate Measured 15 br/min   Minute Volume (L/min) 7.36 Liters   Peak Inspiratory Flow (lpm) 60 L/sec   Mean Airway Pressure (cmH2O) 12 cmH20   Plateau Pressure (cm H2O) 18 cm H2O   Driving Pressure 10   I:E Ratio 1:3.9   Flow Sensitivity 3 L/min   PEEP Intrinsic (cm H2O) 0 cm H2O   Static Compliance (L/cm H2O) 42   Backup Apnea On   Backup Rate 12 Breaths Per Minute   Backup Vt 450   Vent Alarm Settings   High Pressure (cmH2O) 45 cmH2O   Low Minute Volume (lpm) 3.5 L/min   High Minute Volume (lpm) 20 L/min   Low Exhaled Vt (ml) 170 mL   High Exhaled Vt (ml) 800 mL   RR High (bpm) 45 br/min   Apnea (secs) 20 secs   Additional Respiratoray Assessments   Humidification Source Heated wire   Humidification Temp 37.4   Circuit Condensation Not drained   Treatment   $Treatment Type $Inhaled Therapy/Meds

## 2023-09-13 NOTE — PROGRESS NOTES
[facility] Hospitalist   Progress Note    Admitting Date and Time: 9/10/2023  5:08 PM  Admit Dx: Bacteremia [R78.81]    Subjective/interval history:    9/11: Pt admitted last night with bacteremia due to multidrug-resistant organisms. Patient was seen in the ED on 9/9, blood cultures drawn at that time and were found to be positive thus she was sent back to the ED from nursing facility. She has a history of chronic respiratory failure with hypoxia and is ventilator dependent, stroke, DVT on chronic anticoagulation. Per social work she was recently hospitalized multiple times at [facility]; we are awaiting records to be sent. At this time she is awake, alert, tracks with eyes but does not otherwise interact. She appears comfortable. Per RN: Patient follows commands but inconsistently, unclear if this is volitional    9/12: Patient sleeping, awakens to voice, not following commands at this time. Appears comfortable. 9/13: Patient sleeping on mechanical ventilator, but opens eyes to voice. She does nod head yes/no today, denies any complaints. ROS: denies fever, chills, cp, sob, n/v, HA unless stated above.      amLODIPine  5 mg PEG Tube Daily    apixaban  2.5 mg Per G Tube BID    clopidogrel  75 mg PEG Tube Daily    atorvastatin  10 mg PEG Tube Nightly    ARIPiprazole  15 mg PEG Tube Daily    budesonide  500 mcg Nebulization Q12H    furosemide  20 mg PEG Tube Daily    ferrous sulfate  300 mg Per G Tube Daily    ipratropium 0.5 mg-albuterol 2.5 mg  1 Dose Inhalation 4x Daily    metoprolol tartrate  12.5 mg PEG Tube BID    venlafaxine  25 mg PEG Tube TID     meropenem  1,000 mg IntraVENous Q12H    sodium chloride flush  10 mL IntraVENous 2 times per day    vancomycin  1,250 mg IntraVENous Q24H     guaiFENesin, 200 mg, Q6H PRN  HYDROcodone-acetaminophen, 1 tablet, Q6H PRN  bisacodyl, 10 mg, Daily PRN  polyethylene glycol, 17 g, Daily PRN  sodium chloride flush, Depression   -continue Abilify, Effexor via PEG. To note, patient had extended release Effexor 5 mg daily listed on medication list which cannot be given via PEG. This was changed to immediate release 25 mg 3 times daily via PEG    DVT prophylaxis: Apixaban  CODE STATUS: DNR CCA. To note, nursing home documentation noted patient's CODE STATUS is DNR CC. Given family wishes to pursue IV antibiotics, continued mechanical ventilation, etc., CODE STATUS updated to DNR CCA which is more appropriate in this setting. Disposition: Once patient has final antibiotic plan, can discharge back to skilled nursing facility anticipate at least 1-2 more days inpatient treatment and monitoring    NOTE: This report was transcribed using voice recognition software. Every effort was made to ensure accuracy; however, inadvertent computerized transcription errors may be present.      Electronically signed by Aileen Nash DO on 9/13/2023 at 8:29 AM

## 2023-09-13 NOTE — PROGRESS NOTES
Lab called and stated the CBC specimen was not able to be run d/t it clotting. Spoke with Dr Meyer. Instructed to cancel order. Notified bedside nurse Lenka Florian.

## 2023-09-13 NOTE — PROGRESS NOTES
Physician Progress Note      Marshall Montoya  CSN #:                  [id number]  :                       1945  ADMIT DATE:       9/10/2023 5:08 PM  [facility] DATE:  RESPONDING  PROVIDER #:        Curtis Gutierrez MD          QUERY TEXT:    Pt admitted with Bacteremia/ Klebsiella Pneumoniae septicemia. Noted   documentation of UTI by UA in ID note , with chronic indwelling urinary   catheter in place. If possible, please document in the progress notes and   discharge summary if you are evaluating and/or treating any of the following: The medical record reflects the following:  Risk Factors: Bacteremia/ Klebsiella pneumoniae septicemia, chronic indwelling   espinoza catheter  Clinical Indicators: UC: ,000 E Coli, UA: Yellow/clear/positive   Nitrite/small leukocyte est/wbc 0-5/rbc 0-2/epithelial 0-2/bacteria 1+ /trace   hgb  Treatment: Merrem, Vancomycin. Thank you, Clementina lal RN CDS  [email or web address]  Options provided:  -- UTI due to chronic indwelling urinary catheter  -- UTI not due to indwelling urinary catheter  -- Other - I will add my own diagnosis  -- Disagree - Not applicable / Not valid  -- Disagree - Clinically unable to determine / Unknown  -- Refer to Clinical Documentation Reviewer    PROVIDER RESPONSE TEXT:    UTI is due to the chronic indwelling urinary catheter.     Query created by: Rito Basilio on 2023 2:27 PM      Electronically signed by:  Curtis Gutierrez MD 2023 2:33 PM

## 2023-09-13 NOTE — PLAN OF CARE
Problem: Discharge Planning  Goal: Discharge to home or other facility with appropriate resources  Outcome: Progressing  Flowsheets (Taken 9/12/2023 1915)  Discharge to home or other facility with appropriate resources:   Identify barriers to discharge with patient and caregiver   Arrange for needed discharge resources and transportation as appropriate     Problem: Pain  Goal: Verbalizes/displays adequate comfort level or baseline comfort level  Outcome: Progressing     Problem: Skin/Tissue Integrity  Goal: Absence of new skin breakdown  Description: 1. Monitor for areas of redness and/or skin breakdown  2. Assess vascular access sites hourly  3. Every 4-6 hours minimum:  Change oxygen saturation probe site  4. Every 4-6 hours:  If on nasal continuous positive airway pressure, respiratory therapy assess nares and determine need for appliance change or resting period.   Outcome: Progressing     Problem: Safety - Adult  Goal: Free from fall injury  Outcome: Progressing     Problem: Nutrition Deficit:  Goal: Optimize nutritional status  Outcome: Progressing

## 2023-09-13 NOTE — PROGRESS NOTES
Pharmacy Consultation Note  (Antibiotic Dosing and Monitoring)    Initial consult date: 09/10/2023   Consulting physician/provider: Jaxson  Drug: Vancomycin  Indication: Bloodstream Infection     Age/  Gender Height Weight IBW  Allergy Information   78 y.o./female 5' (152.4 cm) 110 lb 6.4 oz (50.1 kg)     Ideal body weight: 45.5 kg (100 lb 4.9 oz)  Adjusted ideal body weight: 47.3 kg (104 lb 5.5 oz)   Penicillins      Renal Function:  Recent Labs     09/10/23  1809 09/12/23  0721 09/13/23  0659   BUN 29* 22 19   CREATININE 0.9 0.8 0.7         Intake/Output Summary (Last 24 hours) at 9/13/2023 1214  Last data filed at 9/13/2023 0826  Gross per 24 hour   Intake 549 ml   Output 1325 ml   Net -776 ml         Vancomycin Monitoring:  Trough:  No results for input(s): \"VANCOTROUGH\" in the last 72 hours. Random:    Recent Labs     09/13/23  0659   VANCORANDOM 33.0       No results for input(s): \"BLOODCULT2\" in the last 72 hours. Historical Cultures:  No results found for: \"ORG\"  No results for input(s): \"BC\" in the last 72 hours. Recent vancomycin administrations                     vancomycin (VANCOCIN) 1,250 mg in sodium chloride 0.9 % 250 mL IVPB (mg) 1,250 mg New Bag 09/11/23 2029    vancomycin (VANCOCIN) 1,500 mg in sodium chloride 0.9 % 250 mL IVPB (mg) 1,500 mg New Bag 09/10/23 2011             Assessment:  Patient is a 66 y.o. female who has been initiated on vancomycin  Estimated Creatinine Clearance: 48 mL/min (based on SCr of 0.7 mg/dL). To dose vancomycin, pharmacy will be utilizing [employer] calculation software for goal AUC/DIANE 400-600 mg/L-hr (predicted AUC/DIANE = 472, Tr =13.8)  9/13: Random level = 33 mcg/mL. This is NOT A TROUGH. Trough estimated to be ~ 18.7 mcg/mL.  AUC/DIANE 647 mg/L.hr    Plan:  Reduce dose to vancomycin 1000 mg IV q24hr - start tomorrow morning  Repeat level when needed  Will continue to monitor renal function   Pharmacy to follow    Delfino Fischer PharmD, BCPS 9/13/2023 12:14 PM Ext: [phone]

## 2023-09-13 NOTE — CARE COORDINATION
SOCIAL WORK / DISCHARGE PLANNING:   Long term bedhold at [facility] [phone] fax # [phone]. She will return at [place], NO PRECERT needed. Vent/trach/PEG. IV Vanco/IV Merrem. Podiatry following for wound, MRI ordered. Son lives out of area but had recently not wanted treatment but changed mind. LEOBARDO will need signed by physician.             Electronically signed by DAMARIS White on 9/13/2023 at 11:20 AM

## 2023-09-13 NOTE — PROGRESS NOTES
Progress Note    Date:9/13/2023       Room:[id number]  Patient Chidi Duong     YOB: 1945     Age:78 y.o. FACE TO FACE ENCOUNTER  09/13/23    Assessment/PLAN        Hospital Problems             Last Modified POA    * (Principal) Bacteremia 9/10/2023 Yes    Chronic respiratory failure with hypoxia ([address]) 9/11/2023 Yes    Primary hypertension 9/11/2023 Yes    History of stroke with residual deficit 9/11/2023 Yes    History of DVT (deep vein thrombosis) 9/11/2023 Yes    Coronary artery disease involving native coronary artery of native heart without angina pectoris 9/12/2023 Yes    Ischemic cardiomyopathy 9/12/2023 Yes   Klebsiella pneumonia septicemia ?  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      Plan:   Continue meropenem   On vancomycin- pharmacy dosing - will stop in next few days if blood cultures remain negative   For CT A/P  Podiatry following   Monitor labs   Follow-up cultures     TROY Jain - CNS  9/13/2023      Subjective   Interval History Status:    9/13/2023- in bed- on vent  35 % FiO2- A/C  Tmax- 99.0      9/12 tmax99.2 son present updated/questions answered reviewed labs and cx   pt on vent asleep nad     Review of Systems   Review of Systems  Unable to from pt due to condition   Medications   Scheduled Meds:    amLODIPine  5 mg PEG Tube Daily    apixaban  2.5 mg Per G Tube BID    clopidogrel  75 mg PEG Tube Daily    atorvastatin  10 mg PEG Tube Nightly    ARIPiprazole  15 mg PEG Tube Daily    budesonide  500 mcg Nebulization Q12H    furosemide  20 mg PEG Tube Daily    ferrous sulfate  300 mg Per G Tube Daily    ipratropium 0.5 mg-albuterol 2.5 mg  1 Dose Inhalation 4x Daily    metoprolol tartrate  12.5 mg PEG Tube BID    venlafaxine  25 mg PEG Tube TID WC    meropenem  1,000 mg IntraVENous Q12H    sodium chloride flush  10 mL IntraVENous 2 times per day    vancomycin  1,250 mg DETECTED     Resistant gene kpc by PCR Not Detected     Colistin Resistance mcr-1 gene by PCR Not Detected     Resistant gene ndm by PCR Not Detected     Resistant gene oxa-48-like by pcr Not Detected     Resistant gene vim by PCR Not Detected     Methicillin Resistance mecA/C  by PCR DETECTED    Susceptibility        Klebsiella pneumoniae      BACTERIAL SUSCEPTIBILITY PANEL DIANE (Preliminary)      ceFAZolin >=64  Resistant      cefepime >=32  Resistant      cefotaxime >=64  Resistant      cefOXitin 32  Resistant      cefTAZidime 16  Intermediate      ciprofloxacin >=4  Resistant      levofloxacin >=8  Resistant      meropenem <=0.25  Sensitive      nitrofurantoin 128  Resistant      piperacillin-tazobactam >=128  Resistant      trimethoprim-sulfamethoxazole >=320  Resistant                           Culture, Blood 2 [[id number]] Collected: 09/09/23 1009    Order Status: Completed Specimen: Blood Updated: 09/13/23 1244     Specimen Description . BLOOD     Special Requests          Culture NO GROWTH 4 DAYS            Imaging Last 24 Hours:  XR FOOT LEFT (2 VIEWS)    Result Date: 9/12/2023  EXAMINATION: THREE XRAY VIEWS OF THE LEFT FOOT 9/12/2023 9:25 am COMPARISON: None. 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 No definite acute fracture or dislocation. No definite osteolysis or periostitis however evaluation of the distal digits is very limited. There is hallux valgus and there is joint space narrowing at most of the DIP and PIP joints. The patient's reported soft tissue ulcer is not well delineated on this examination. There are vascular calcifications. Limited radiographs without definite evidence of osteomyelitis. However, if that is of significant clinical concern, further MRI evaluation of the left foot is recommended.  Severe

## 2023-09-13 NOTE — PROGRESS NOTES
extended release tablet, 1 tablet daily Via peg tube  metoprolol tartrate (LOPRESSOR) 25 MG tablet, 0.5 tablets by PEG Tube route 2 times daily  magnesium hydroxide (MILK OF MAGNESIA) 400 MG/5ML suspension, 30 mLs by Per G Tube route daily as needed for Constipation  nitroGLYCERIN (NITROSTAT) 0.4 MG SL tablet, Place 1 tablet under the tongue every 5 minutes as needed for Chest pain up to max of 3 total doses. If no relief after 1 dose, call 911. omeprazole (PRILOSEC) 20 MG delayed release capsule, 2 capsules Daily Via peg tube. polyethylene glycol (GLYCOLAX) 17 g packet, 1 packet by Per G Tube route daily as needed for Constipation  sucralfate (CARAFATE) 1 GM/10ML suspension, 10 mLs by Per G Tube route in the morning and 10 mLs in the evening. venlafaxine (EFFEXOR XR) 75 MG extended release capsule, Take 1 capsule by mouth every evening Via peg tube    Allergies:  Penicillins    Social History:   TOBACCO:   reports that she has quit smoking. Her smoking use included cigarettes. She does not have any smokeless tobacco history on file. ETOH:   reports that she does not currently use alcohol. DRUGS:   Social History     Substance and Sexual Activity   Drug Use Not on file       Family History:   History reviewed. No pertinent family history. REVIEW OF SYSTEMS:    All pertinent positives and negatives as noted in HPI       LOWER EXTREMITY EXAMINATION   Previous examination     VASCULAR:  DP and PT pulses are not palpable. CFT < 5 seconds B/L. Warm to warm from the tibial tuberosity to the distal aspect of the digits dorsally. Hair growth noted to the distal aspects dorsally. NEUROLOGIC:  Protective sensation is diminished by grossly intact    DERM: Full-thickness Wound noted to the medial aspect of the left foot at the level of the MTPJ. Wound is fibrotic with erythema, edema, and drainage noted. Eschar noted to the distal aspect of the left second digit      MUSCULOSKELETAL: No deformities noted.   5/5

## 2023-09-14 PROBLEM — M86.272 SUBACUTE OSTEOMYELITIS OF LEFT FOOT (HCC): Status: ACTIVE | Noted: 2023-09-14

## 2023-09-14 LAB
GLUCOSE BLD-MCNC: 118 MG/DL (ref 74–99)
GLUCOSE BLD-MCNC: 134 MG/DL (ref 74–99)
MICROORGANISM SPEC CULT: ABNORMAL
MICROORGANISM SPEC CULT: NORMAL
SERVICE CMNT-IMP: NORMAL
SPECIMEN DESCRIPTION: ABNORMAL
SPECIMEN DESCRIPTION: NORMAL

## 2023-09-14 PROCEDURE — 2060000000 HC ICU INTERMEDIATE R&B

## 2023-09-14 PROCEDURE — 2580000003 HC RX 258: Performed by: INTERNAL MEDICINE

## 2023-09-14 PROCEDURE — 82962 GLUCOSE BLOOD TEST: CPT

## 2023-09-14 PROCEDURE — 94640 AIRWAY INHALATION TREATMENT: CPT

## 2023-09-14 PROCEDURE — 6370000000 HC RX 637 (ALT 250 FOR IP): Performed by: INTERNAL MEDICINE

## 2023-09-14 PROCEDURE — 6360000002 HC RX W HCPCS: Performed by: INTERNAL MEDICINE

## 2023-09-14 PROCEDURE — 2500000003 HC RX 250 WO HCPCS: Performed by: INTERNAL MEDICINE

## 2023-09-14 PROCEDURE — 94003 VENT MGMT INPAT SUBQ DAY: CPT

## 2023-09-14 PROCEDURE — 99232 SBSQ HOSP IP/OBS MODERATE 35: CPT | Performed by: INTERNAL MEDICINE

## 2023-09-14 PROCEDURE — 6370000000 HC RX 637 (ALT 250 FOR IP): Performed by: FAMILY MEDICINE

## 2023-09-14 RX ORDER — HEPARIN 100 UNIT/ML
1 SYRINGE INTRAVENOUS PRN
Status: DISCONTINUED | OUTPATIENT
Start: 2023-09-14 | End: 2023-09-16 | Stop reason: HOSPADM

## 2023-09-14 RX ORDER — IPRATROPIUM BROMIDE AND ALBUTEROL SULFATE 2.5; .5 MG/3ML; MG/3ML
1 SOLUTION RESPIRATORY (INHALATION) EVERY 4 HOURS PRN
Status: DISCONTINUED | OUTPATIENT
Start: 2023-09-14 | End: 2023-09-16 | Stop reason: HOSPADM

## 2023-09-14 RX ORDER — SODIUM CHLORIDE 0.9 % (FLUSH) 0.9 %
5-40 SYRINGE (ML) INJECTION EVERY 12 HOURS SCHEDULED
Status: DISCONTINUED | OUTPATIENT
Start: 2023-09-14 | End: 2023-09-16 | Stop reason: HOSPADM

## 2023-09-14 RX ORDER — HEPARIN 100 UNIT/ML
1 SYRINGE INTRAVENOUS EVERY 12 HOURS SCHEDULED
Status: DISCONTINUED | OUTPATIENT
Start: 2023-09-14 | End: 2023-09-16 | Stop reason: HOSPADM

## 2023-09-14 RX ORDER — SODIUM CHLORIDE 9 MG/ML
INJECTION, SOLUTION INTRAVENOUS PRN
Status: DISCONTINUED | OUTPATIENT
Start: 2023-09-14 | End: 2023-09-16 | Stop reason: HOSPADM

## 2023-09-14 RX ORDER — SODIUM CHLORIDE 0.9 % (FLUSH) 0.9 %
5-40 SYRINGE (ML) INJECTION PRN
Status: DISCONTINUED | OUTPATIENT
Start: 2023-09-14 | End: 2023-09-16 | Stop reason: HOSPADM

## 2023-09-14 RX ADMIN — ARIPIPRAZOLE 15 MG: 5 TABLET ORAL at 10:53

## 2023-09-14 RX ADMIN — IPRATROPIUM BROMIDE AND ALBUTEROL SULFATE 1 DOSE: .5; 2.5 SOLUTION RESPIRATORY (INHALATION) at 09:51

## 2023-09-14 RX ADMIN — APIXABAN 2.5 MG: 2.5 TABLET, FILM COATED ORAL at 20:09

## 2023-09-14 RX ADMIN — MEROPENEM 1000 MG: 1 INJECTION, POWDER, FOR SOLUTION INTRAVENOUS at 06:38

## 2023-09-14 RX ADMIN — IPRATROPIUM BROMIDE AND ALBUTEROL SULFATE 1 DOSE: .5; 2.5 SOLUTION RESPIRATORY (INHALATION) at 17:17

## 2023-09-14 RX ADMIN — IPRATROPIUM BROMIDE AND ALBUTEROL SULFATE 1 DOSE: .5; 2.5 SOLUTION RESPIRATORY (INHALATION) at 05:11

## 2023-09-14 RX ADMIN — BUDESONIDE INHALATION 500 MCG: 0.5 SUSPENSION RESPIRATORY (INHALATION) at 17:17

## 2023-09-14 RX ADMIN — Medication 300 MG: at 10:00

## 2023-09-14 RX ADMIN — METOPROLOL TARTRATE 12.5 MG: 25 TABLET, FILM COATED ORAL at 10:00

## 2023-09-14 RX ADMIN — CLOPIDOGREL BISULFATE 75 MG: 75 TABLET ORAL at 10:57

## 2023-09-14 RX ADMIN — MEROPENEM 1000 MG: 1 INJECTION, POWDER, FOR SOLUTION INTRAVENOUS at 17:50

## 2023-09-14 RX ADMIN — IPRATROPIUM BROMIDE AND ALBUTEROL SULFATE 1 DOSE: .5; 2.5 SOLUTION RESPIRATORY (INHALATION) at 13:55

## 2023-09-14 RX ADMIN — BUDESONIDE INHALATION 500 MCG: 0.5 SUSPENSION RESPIRATORY (INHALATION) at 05:10

## 2023-09-14 RX ADMIN — VENLAFAXINE 25 MG: 25 TABLET ORAL at 12:35

## 2023-09-14 RX ADMIN — FUROSEMIDE 20 MG: 20 TABLET ORAL at 10:00

## 2023-09-14 RX ADMIN — IPRATROPIUM BROMIDE AND ALBUTEROL SULFATE 1 DOSE: .5; 2.5 SOLUTION RESPIRATORY (INHALATION) at 01:10

## 2023-09-14 RX ADMIN — SODIUM CHLORIDE, PRESERVATIVE FREE 10 ML: 5 INJECTION INTRAVENOUS at 20:09

## 2023-09-14 RX ADMIN — ATORVASTATIN CALCIUM 10 MG: 10 TABLET, FILM COATED ORAL at 20:09

## 2023-09-14 RX ADMIN — VENLAFAXINE 25 MG: 25 TABLET ORAL at 17:48

## 2023-09-14 RX ADMIN — ACETAMINOPHEN 650 MG: 325 TABLET ORAL at 20:09

## 2023-09-14 RX ADMIN — VENLAFAXINE 25 MG: 25 TABLET ORAL at 10:00

## 2023-09-14 RX ADMIN — GUAIFENESIN 200 MG: 200 SOLUTION ORAL at 01:00

## 2023-09-14 RX ADMIN — HYDROCODONE BITARTRATE AND ACETAMINOPHEN 1 TABLET: 5; 325 TABLET ORAL at 10:53

## 2023-09-14 RX ADMIN — METOPROLOL TARTRATE 12.5 MG: 25 TABLET, FILM COATED ORAL at 20:09

## 2023-09-14 RX ADMIN — APIXABAN 2.5 MG: 2.5 TABLET, FILM COATED ORAL at 10:00

## 2023-09-14 RX ADMIN — AMLODIPINE BESYLATE 5 MG: 5 TABLET ORAL at 10:00

## 2023-09-14 RX ADMIN — VANCOMYCIN HYDROCHLORIDE 1000 MG: 1 INJECTION, POWDER, LYOPHILIZED, FOR SOLUTION INTRAVENOUS at 06:13

## 2023-09-14 ASSESSMENT — PULMONARY FUNCTION TESTS
PIF_VALUE: 23
PIF_VALUE: 24
PIF_VALUE: 22
PIF_VALUE: 27
PIF_VALUE: 23
PIF_VALUE: 24

## 2023-09-14 ASSESSMENT — PAIN SCALES - WONG BAKER
WONGBAKER_NUMERICALRESPONSE: NO HURT
WONGBAKER_NUMERICALRESPONSE: 0

## 2023-09-14 NOTE — PROGRESS NOTES
Physician Progress Note      PATIENT:               Tye Pérez  CSN #:                  [id number]  :                       1945  ADMIT DATE:       9/10/2023 5:08 PM  DISCH DATE:  Jumana Walsh  PROVIDER #:        Lyn Riley DO          QUERY TEXT:    Pt admitted with MDRO Bacteremia . Noted documentation of Klebsiella   pneumonia septicemia on  &  by ID consultant. If possible, please   document in progress notes and discharge summary:    The medical record reflects the following:  Risk Factors: Trach/ vent dependent, PEG, quadriplegia, chronic decubiti   wounds, UTI due to espinoza cath placed . Clinical Indicators: 9/10: -115, temp max 100.0, BC + Klebsiella   pneumoniae, UC ,000 E Coli, Sed 48,  Treatment: Merrem, Vancomycin, ID, Podiatry consults. Thank you, Kirsty Powell RN CDS RN CDS  [email or web address]  Options provided:  -- Klebsiella pneumoniae septicemia confirmed present on admission  -- Klebsiella pneumoniae septicemia confirmed not present on admission  -- Sepsis ruled out  -- Other - I will add my own diagnosis  -- Disagree - Not applicable / Not valid  -- Disagree - Clinically unable to determine / Unknown  -- Refer to Clinical Documentation Reviewer    PROVIDER RESPONSE TEXT:    The diagnosis of Klebsiella pneumoniae septicemia was confirmed as present on   admission.     Query created by: Kirsty Powell on 2023 2:18 PM      Electronically signed by:  Lyn Riley DO 2023 9:46 AM

## 2023-09-14 NOTE — PROGRESS NOTES
Department of Podiatry   Progress Note          Subjective:  Patient is seen bedside today for left foot wounds. Patient was sleeping during examination today. Dressing to her left foot was intact and heel protectors were on at that time. No new pedal complaints. Past Medical History:    History reviewed. No pertinent past medical history. Past Surgical History:    History reviewed. No pertinent surgical history. Medications Prior to Admission:    Medications Prior to Admission: HYDROcodone-acetaminophen (NORCO) 5-325 MG per tablet, Take 1 tablet by mouth every 6 hours as needed for Pain. Max Daily Amount: 4 tablets  acetaminophen (TYLENOL) 650 MG suppository, Place 1 suppository rectally every 4 hours as needed for Pain  guaiFENesin (ROBITUSSIN) 100 MG/5ML liquid, 10 mLs by Per G Tube route every 6 hours as needed for Cough  Nutritional Supplements (JEVITY 1.5 ENID PO), 35 mL/hr by PEG Tube route 2 times daily  acetaminophen (TYLENOL) 325 MG tablet, 2 tablets by PEG Tube route every 4 hours as needed for Pain or Fever  amLODIPine (NORVASC) 5 MG tablet, 1 tablet by PEG Tube route daily  ARIPiprazole (ABILIFY) 15 MG tablet, 1 tablet by PEG Tube route daily  atorvastatin (LIPITOR) 10 MG tablet, 1 tablet by PEG Tube route nightly  bisacodyl (DULCOLAX) 10 MG suppository, Place 1 suppository rectally daily as needed for Constipation  budesonide (PULMICORT) 0.5 MG/2ML nebulizer suspension, Take 2 mLs by nebulization in the morning and 2 mLs in the evening.   clopidogrel (PLAVIX) 75 MG tablet, 1 tablet by PEG Tube route daily  apixaban (ELIQUIS) 5 MG TABS tablet, 0.5 tablets by Per G Tube route 2 times daily  ferrous sulfate 300 (60 Fe) MG/5ML syrup, 5 mLs by Per G Tube route daily  furosemide (LASIX) 20 MG tablet, 1 tablet by PEG Tube route daily  ipratropium 0.5 mg-albuterol 2.5 mg (DUONEB) 0.5-2.5 (3) MG/3ML SOLN nebulizer solution, Inhale 3 mLs into the lungs 4 times daily  isosorbide mononitrate (IMDUR) 30 MG

## 2023-09-14 NOTE — PROGRESS NOTES
Pharmacy Consultation Note  (Antibiotic Dosing and Monitoring)    Initial consult date: 09/10/2023   Consulting physician/provider: Jaxson  Drug: Vancomycin  Indication: Bloodstream Infection     Age/  Gender Height Weight IBW  Allergy Information   78 y.o./female 5' (152.4 cm) 110 lb 6.4 oz (50.1 kg)     Ideal body weight: 45.5 kg (100 lb 4.9 oz)  Adjusted ideal body weight: 47.3 kg (104 lb 5.5 oz)   Penicillins      Renal Function:  Recent Labs     09/12/23  0721 09/13/23  0659   BUN 22 19   CREATININE 0.8 0.7         Intake/Output Summary (Last 24 hours) at 9/14/2023 1335  Last data filed at 9/14/2023 1101  Gross per 24 hour   Intake 464 ml   Output 150 ml   Net 314 ml         Vancomycin Monitoring:  Trough:  No results for input(s): \"VANCOTROUGH\" in the last 72 hours. Random:    Recent Labs     09/13/23  0659   VANCORANDOM 33.0         No results for input(s): \"BLOODCULT2\" in the last 72 hours. Historical Cultures:  No results found for: \"ORG\"  No results for input(s): \"BC\" in the last 72 hours. Vancomycin Administration Times:  Recent vancomycin administrations                     vancomycin (VANCOCIN) 1,000 mg in sodium chloride 0.9 % 250 mL IVPB ([id number]) (mg) 1,000 mg New Bag 09/14/23 0613    vancomycin (VANCOCIN) 1,250 mg in sodium chloride 0.9 % 250 mL IVPB (mg) 1,250 mg New Bag 09/12/23 1933     1,250 mg New Bag 09/11/23 2029                      Assessment:  Patient is a 66 y.o. female who has been initiated on vancomycin  Estimated Creatinine Clearance: 48 mL/min (based on SCr of 0.7 mg/dL). To dose vancomycin, pharmacy will be utilizing [employer] calculation software for goal AUC/DIANE 400-600 mg/L-hr (predicted AUC/DIANE = 472, Tr =13.8)  9/13: Random level = 33 mcg/mL. This is NOT A TROUGH. Trough estimated to be ~ 18.7 mcg/mL.  AUC/DIANE 647 mg/L.hr    Plan:  Continue Vancomycin 1000 mg IV q24hr   Repeat level when needed  Will continue to monitor renal function   Pharmacy to follow    Loida Bowen

## 2023-09-14 NOTE — PROGRESS NOTES
[facility] Hospitalist   Progress Note    Admitting Date and Time: 9/10/2023  5:08 PM  Admit Dx: Bacteremia [R78.81]    Subjective/interval history:    9/11: Pt admitted last night with bacteremia due to multidrug-resistant organisms. Patient was seen in the ED on 9/9, blood cultures drawn at that time and were found to be positive thus she was sent back to the ED from nursing facility. She has a history of chronic respiratory failure with hypoxia and is ventilator dependent, stroke, DVT on chronic anticoagulation. Per social work she was recently hospitalized multiple times at [facility]; we are awaiting records to be sent. At this time she is awake, alert, tracks with eyes but does not otherwise interact. She appears comfortable. Per RN: Patient follows commands but inconsistently, unclear if this is volitional    9/12: Patient sleeping, awakens to voice, not following commands at this time. Appears comfortable. 9/13: Patient sleeping on mechanical ventilator, but opens eyes to voice. She does nod head yes/no today, denies any complaints. 9/14: Patient sleeping on ventilator, briefly opens eyes but does not interact today. Does not appear to be in any distress.     ROS: Unable to obtain review of systems due to patient condition     vancomycin  1,000 mg IntraVENous Q24H    amLODIPine  5 mg PEG Tube Daily    apixaban  2.5 mg Per G Tube BID    clopidogrel  75 mg PEG Tube Daily    atorvastatin  10 mg PEG Tube Nightly    ARIPiprazole  15 mg PEG Tube Daily    budesonide  500 mcg Nebulization Q12H    furosemide  20 mg PEG Tube Daily    ferrous sulfate  300 mg Per G Tube Daily    ipratropium 0.5 mg-albuterol 2.5 mg  1 Dose Inhalation 4x Daily    metoprolol tartrate  12.5 mg PEG Tube BID    venlafaxine  25 mg PEG Tube TID     meropenem  1,000 mg IntraVENous Q12H    sodium chloride flush  10 mL IntraVENous 2 times per day     ipratropium 0.5 mg-albuterol 2.5 mg, 1 ischemic stroke primary hypertension, ischemic cardiomyopathy, paroxysmal atrial fibrillation   -Continue antihypertensive medications including amlodipine, metoprolol tartrate  -Continue atorvastatin, apixaban, Plavix    4. Iron deficiency   -continue ferrous sulfate liquid via PEG    5. Depression   -continue Abilify, Effexor via PEG. To note, patient had extended release Effexor 5 mg daily listed on medication list which cannot be given via PEG. This was changed to immediate release 25 mg 3 times daily via PEG    DVT prophylaxis: Apixaban  CODE STATUS: DNR CCA. To note, nursing home documentation noted patient's CODE STATUS is DNR CC. Given family wishes to pursue IV antibiotics, continued mechanical ventilation, etc., CODE STATUS updated to DNR CCA which is more appropriate in this setting. Disposition: Once patient has final antibiotic plan, can discharge back to skilled nursing facility anticipate at least 1-2 more days inpatient treatment and monitoring, especially given new findings as noted above    NOTE: This report was transcribed using voice recognition software. Every effort was made to ensure accuracy; however, inadvertent computerized transcription errors may be present.      Electronically signed by Meño Kelly DO on 9/14/2023 at 12:35 PM

## 2023-09-14 NOTE — CARE COORDINATION
SOCIAL WORK / DISCHARGE PLANNING:   Dc plan to return to [facility] [phone] fax # [phone] skilled. NO PRECERT needed. IV Merrem med rec x 10 days, possible stop IV vanco, Needs IV access prior to dc. Update to [facility] rep. Vent/trach/PEG. LEOBARDO will need signed by physician.               Electronically signed by DAMARIS Green on 9/14/2023 at 11:54 AM

## 2023-09-14 NOTE — PROGRESS NOTES
Progress Note    Date:9/14/2023       Room:290629-01  Patient Manjeet Macias     YOB: 1945     Age:78 y.o. FACE TO FACE ENCOUNTER  09/14/23    Assessment/PLAN        Hospital Problems             Last Modified POA    * (Principal) Bacteremia 9/10/2023 Yes    Chronic respiratory failure with hypoxia ([address]) 9/11/2023 Yes    Primary hypertension 9/11/2023 Yes    History of stroke with residual deficit 9/11/2023 Yes    History of DVT (deep vein thrombosis) 9/11/2023 Yes    Coronary artery disease involving native coronary artery of native heart without angina pectoris 9/12/2023 Yes    Ischemic cardiomyopathy 9/12/2023 Yes   Klebsiella pneumonia septicemia ? Source ? GI/bilary   E.coli- UTI   Cons Methicillin Resistance mecA/C  septicemia   Left foot toe dry gangrene with medial ulcer -Osteomyelitis of the 1st metatarsal head and medial sesamoid.        Plan:   Continue meropenem med rec  On vancomycin- pharmacy dosing - will stop in next few days if blood cultures remain negative      Podiatry following   Monitor labs   Follow-up cultures            Subjective   Interval History Status:    9/14/2023- in be trach vent arouses  9/13  in bed- on vent  35 % FiO2- A/C  Tmax- 99.0  9/12 tmax99.2 son present updated/questions answered reviewed labs and cx   pt on vent asleep nad     Review of Systems   Review of Systems  Unable to from pt due to condition   Medications   Scheduled Meds:    vancomycin  1,000 mg IntraVENous Q24H    amLODIPine  5 mg PEG Tube Daily    apixaban  2.5 mg Per G Tube BID    clopidogrel  75 mg PEG Tube Daily    atorvastatin  10 mg PEG Tube Nightly    ARIPiprazole  15 mg PEG Tube Daily    budesonide  500 mcg Nebulization Q12H    furosemide  20 mg PEG Tube Daily    ferrous sulfate  300 mg Per G Tube Daily    ipratropium 0.5 mg-albuterol 2.5 mg  1 Dose Inhalation 4x Daily    metoprolol tartrate  12.5 mg PEG Tube BID    venlafaxine  25 mg PEG Tube TID WC    meropenem  1,000 mg

## 2023-09-15 LAB
ANION GAP SERPL CALCULATED.3IONS-SCNC: 8 MMOL/L (ref 7–16)
BASOPHILS # BLD: 0.02 K/UL (ref 0–0.2)
BASOPHILS NFR BLD: 0 % (ref 0–2)
BUN SERPL-MCNC: 24 MG/DL (ref 6–23)
CALCIUM SERPL-MCNC: 9.1 MG/DL (ref 8.6–10.2)
CHLORIDE SERPL-SCNC: 98 MMOL/L (ref 98–107)
CO2 SERPL-SCNC: 31 MMOL/L (ref 22–29)
CREAT SERPL-MCNC: 0.8 MG/DL (ref 0.5–1)
EOSINOPHIL # BLD: 0.11 K/UL (ref 0.05–0.5)
EOSINOPHILS RELATIVE PERCENT: 2 % (ref 0–6)
ERYTHROCYTE [DISTWIDTH] IN BLOOD BY AUTOMATED COUNT: 16.9 % (ref 11.5–15)
GFR SERPL CREATININE-BSD FRML MDRD: >60 ML/MIN/1.73M2
GLUCOSE SERPL-MCNC: 117 MG/DL (ref 74–99)
HCT VFR BLD AUTO: 24.6 % (ref 34–48)
HGB BLD-MCNC: 8 G/DL (ref 11.5–15.5)
IMM GRANULOCYTES # BLD AUTO: <0.03 K/UL (ref 0–0.58)
IMM GRANULOCYTES NFR BLD: 0 % (ref 0–5)
LYMPHOCYTES NFR BLD: 0.47 K/UL (ref 1.5–4)
LYMPHOCYTES RELATIVE PERCENT: 8 % (ref 20–42)
MCH RBC QN AUTO: 28.1 PG (ref 26–35)
MCHC RBC AUTO-ENTMCNC: 32.5 G/DL (ref 32–34.5)
MCV RBC AUTO: 86.3 FL (ref 80–99.9)
MONOCYTES NFR BLD: 0.53 K/UL (ref 0.1–0.95)
MONOCYTES NFR BLD: 9 % (ref 2–12)
NEUTROPHILS NFR BLD: 80 % (ref 43–80)
NEUTS SEG NFR BLD: 4.7 K/UL (ref 1.8–7.3)
PLATELET # BLD AUTO: 216 K/UL (ref 130–450)
PMV BLD AUTO: 10.5 FL (ref 7–12)
POTASSIUM SERPL-SCNC: 3.5 MMOL/L (ref 3.5–5)
RBC # BLD AUTO: 2.85 M/UL (ref 3.5–5.5)
RBC # BLD: ABNORMAL 10*6/UL
SODIUM SERPL-SCNC: 137 MMOL/L (ref 132–146)
WBC OTHER # BLD: 5.9 K/UL (ref 4.5–11.5)

## 2023-09-15 PROCEDURE — 6370000000 HC RX 637 (ALT 250 FOR IP): Performed by: INTERNAL MEDICINE

## 2023-09-15 PROCEDURE — 85025 COMPLETE CBC W/AUTO DIFF WBC: CPT

## 2023-09-15 PROCEDURE — 2060000000 HC ICU INTERMEDIATE R&B

## 2023-09-15 PROCEDURE — 05HC33Z INSERTION OF INFUSION DEVICE INTO LEFT BASILIC VEIN, PERCUTANEOUS APPROACH: ICD-10-PCS | Performed by: INTERNAL MEDICINE

## 2023-09-15 PROCEDURE — 80048 BASIC METABOLIC PNL TOTAL CA: CPT

## 2023-09-15 PROCEDURE — 76937 US GUIDE VASCULAR ACCESS: CPT

## 2023-09-15 PROCEDURE — 6360000002 HC RX W HCPCS: Performed by: INTERNAL MEDICINE

## 2023-09-15 PROCEDURE — 94003 VENT MGMT INPAT SUBQ DAY: CPT

## 2023-09-15 PROCEDURE — 94640 AIRWAY INHALATION TREATMENT: CPT

## 2023-09-15 PROCEDURE — C1751 CATH, INF, PER/CENT/MIDLINE: HCPCS

## 2023-09-15 PROCEDURE — 2580000003 HC RX 258: Performed by: SPECIALIST

## 2023-09-15 PROCEDURE — 6360000002 HC RX W HCPCS: Performed by: SPECIALIST

## 2023-09-15 PROCEDURE — 6370000000 HC RX 637 (ALT 250 FOR IP): Performed by: FAMILY MEDICINE

## 2023-09-15 PROCEDURE — 2580000003 HC RX 258: Performed by: INTERNAL MEDICINE

## 2023-09-15 PROCEDURE — 36415 COLL VENOUS BLD VENIPUNCTURE: CPT

## 2023-09-15 PROCEDURE — 36410 VNPNXR 3YR/> PHY/QHP DX/THER: CPT

## 2023-09-15 PROCEDURE — 99232 SBSQ HOSP IP/OBS MODERATE 35: CPT | Performed by: INTERNAL MEDICINE

## 2023-09-15 RX ADMIN — SODIUM CHLORIDE, PRESERVATIVE FREE 10 ML: 5 INJECTION INTRAVENOUS at 21:16

## 2023-09-15 RX ADMIN — SODIUM CHLORIDE, PRESERVATIVE FREE 10 ML: 5 INJECTION INTRAVENOUS at 08:28

## 2023-09-15 RX ADMIN — VENLAFAXINE 25 MG: 25 TABLET ORAL at 12:23

## 2023-09-15 RX ADMIN — VENLAFAXINE 25 MG: 25 TABLET ORAL at 08:29

## 2023-09-15 RX ADMIN — HEPARIN 100 UNITS: 100 SYRINGE at 21:16

## 2023-09-15 RX ADMIN — CLOPIDOGREL BISULFATE 75 MG: 75 TABLET ORAL at 08:27

## 2023-09-15 RX ADMIN — IPRATROPIUM BROMIDE AND ALBUTEROL SULFATE 1 DOSE: .5; 2.5 SOLUTION RESPIRATORY (INHALATION) at 06:13

## 2023-09-15 RX ADMIN — IPRATROPIUM BROMIDE AND ALBUTEROL SULFATE 1 DOSE: .5; 2.5 SOLUTION RESPIRATORY (INHALATION) at 09:53

## 2023-09-15 RX ADMIN — IPRATROPIUM BROMIDE AND ALBUTEROL SULFATE 1 DOSE: .5; 2.5 SOLUTION RESPIRATORY (INHALATION) at 13:53

## 2023-09-15 RX ADMIN — BUDESONIDE INHALATION 500 MCG: 0.5 SUSPENSION RESPIRATORY (INHALATION) at 06:13

## 2023-09-15 RX ADMIN — HYDROCODONE BITARTRATE AND ACETAMINOPHEN 1 TABLET: 5; 325 TABLET ORAL at 19:21

## 2023-09-15 RX ADMIN — IPRATROPIUM BROMIDE AND ALBUTEROL SULFATE 1 DOSE: .5; 2.5 SOLUTION RESPIRATORY (INHALATION) at 17:14

## 2023-09-15 RX ADMIN — VENLAFAXINE 25 MG: 25 TABLET ORAL at 17:29

## 2023-09-15 RX ADMIN — ARIPIPRAZOLE 15 MG: 5 TABLET ORAL at 08:27

## 2023-09-15 RX ADMIN — APIXABAN 2.5 MG: 2.5 TABLET, FILM COATED ORAL at 08:27

## 2023-09-15 RX ADMIN — APIXABAN 2.5 MG: 2.5 TABLET, FILM COATED ORAL at 21:15

## 2023-09-15 RX ADMIN — VANCOMYCIN HYDROCHLORIDE 1000 MG: 1 INJECTION, POWDER, LYOPHILIZED, FOR SOLUTION INTRAVENOUS at 06:09

## 2023-09-15 RX ADMIN — METOPROLOL TARTRATE 12.5 MG: 25 TABLET, FILM COATED ORAL at 21:15

## 2023-09-15 RX ADMIN — Medication 300 MG: at 08:29

## 2023-09-15 RX ADMIN — FUROSEMIDE 20 MG: 20 TABLET ORAL at 08:27

## 2023-09-15 RX ADMIN — BUDESONIDE INHALATION 500 MCG: 0.5 SUSPENSION RESPIRATORY (INHALATION) at 17:14

## 2023-09-15 RX ADMIN — MEROPENEM 1000 MG: 1 INJECTION, POWDER, FOR SOLUTION INTRAVENOUS at 07:31

## 2023-09-15 RX ADMIN — METOPROLOL TARTRATE 12.5 MG: 25 TABLET, FILM COATED ORAL at 08:27

## 2023-09-15 RX ADMIN — MEROPENEM 1000 MG: 1 INJECTION, POWDER, FOR SOLUTION INTRAVENOUS at 18:38

## 2023-09-15 RX ADMIN — IPRATROPIUM BROMIDE AND ALBUTEROL SULFATE 1 DOSE: .5; 2.5 SOLUTION RESPIRATORY (INHALATION) at 01:38

## 2023-09-15 RX ADMIN — HEPARIN 100 UNITS: 100 SYRINGE at 08:28

## 2023-09-15 RX ADMIN — AMLODIPINE BESYLATE 5 MG: 5 TABLET ORAL at 08:27

## 2023-09-15 RX ADMIN — ATORVASTATIN CALCIUM 10 MG: 10 TABLET, FILM COATED ORAL at 21:15

## 2023-09-15 ASSESSMENT — PAIN SCALES - WONG BAKER
WONGBAKER_NUMERICALRESPONSE: 0
WONGBAKER_NUMERICALRESPONSE: NO HURT

## 2023-09-15 ASSESSMENT — PULMONARY FUNCTION TESTS
PIF_VALUE: 25
PIF_VALUE: 24
PIF_VALUE: 24
PIF_VALUE: 27
PIF_VALUE: 22
PIF_VALUE: 27
PIF_VALUE: 26

## 2023-09-15 NOTE — PROGRESS NOTES
SYSTEM PROVIDED HISTORY: Sepsis TECHNOLOGIST PROVIDED HISTORY: Reason for exam:->Sepsis FINDINGS: The lungs are without acute focal process. There is no effusion or pneumothorax. The cardiomediastinal silhouette is without acute process. The osseous structures are without acute process. Tracheostomy tube tip in appropriate position. No acute process.         Electronically signed by Pastora Ford MD on 9/12/23 at 12:36 PM EDT

## 2023-09-15 NOTE — PROCEDURES
SL Power MIDLINE Placement 9/15/2023    Product number: [id number]   Lot Number: [id number]      Ultrasound: yes   L Basilic      Upper Arm Circumference:     Size: 4.5FR    Exposed Length:     Internal Length: 15cm   Cut:    Vein Measurement: 0.4cm    Pete Christensen RN  9/15/2023  1:53 PM    Brisk blood return  Flushed well  Pt tolerated well  RN notified

## 2023-09-15 NOTE — PROGRESS NOTES
Pharmacy Consultation Note  (Antibiotic Dosing and Monitoring)    Vancomycin has been discontinued; pharmacy will sign-off. Please reconsult if needed. Thank you,    Hesham Fortune PharmD.   9/15/2023 12:10 PM

## 2023-09-15 NOTE — PLAN OF CARE
Problem: Discharge Planning  Goal: Discharge to home or other facility with appropriate resources  9/15/2023 0150 by Anmol Chaney RN  Outcome: Progressing  9/14/2023 2353 by Anmol Chaney RN  Outcome: Progressing  Flowsheets (Taken 9/14/2023 2000)  Discharge to home or other facility with appropriate resources:   Identify barriers to discharge with patient and caregiver   Arrange for needed discharge resources and transportation as appropriate   Identify discharge learning needs (meds, wound care, etc)   Refer to discharge planning if patient needs post-hospital services based on physician order or complex needs related to functional status, cognitive ability or social support system     Problem: Pain  Goal: Verbalizes/displays adequate comfort level or baseline comfort level  9/15/2023 0150 by Anmol Chaney RN  Outcome: Progressing  9/14/2023 2353 by Anmol Chaney RN  Outcome: Progressing     Problem: Skin/Tissue Integrity  Goal: Absence of new skin breakdown  Description: 1. Monitor for areas of redness and/or skin breakdown  2. Assess vascular access sites hourly  3. Every 4-6 hours minimum:  Change oxygen saturation probe site  4. Every 4-6 hours:  If on nasal continuous positive airway pressure, respiratory therapy assess nares and determine need for appliance change or resting period.   9/15/2023 0150 by Anmol Chaney RN  Outcome: Progressing  9/14/2023 2353 by Anmol Chaney RN  Outcome: Progressing     Problem: Safety - Adult  Goal: Free from fall injury  9/15/2023 0150 by Anmol Chaney RN  Outcome: Progressing  9/14/2023 2353 by Anmol Chaney RN  Outcome: Progressing  Flowsheets (Taken 9/14/2023 2000)  Free From Fall Injury: Instruct family/caregiver on patient safety     Problem: Nutrition Deficit:  Goal: Optimize nutritional status  9/15/2023 0150 by Anmol Chaney RN  Outcome: Progressing  9/14/2023 2353 by Anmol Chaney RN  Outcome: Progressing

## 2023-09-15 NOTE — CARE COORDINATION
SOCIAL WORK / DISCHARGE PLANNING:   Dc plan to return to [facility] [phone] fax # [phone] skilled. NO PRECERT needed. To need IV Merrem x 10 days, IV access being placed today. [employer] [facility] rep aware. Await podiatry to speak with son if wish to pursue intervention with toe. LEOBARDO will need signed by physician. Vent/trach/PEG.               Electronically signed by DAMARIS Ocasio on 9/15/2023 at 12:51 PM

## 2023-09-16 VITALS
OXYGEN SATURATION: 95 % | RESPIRATION RATE: 18 BRPM | DIASTOLIC BLOOD PRESSURE: 57 MMHG | SYSTOLIC BLOOD PRESSURE: 120 MMHG | HEART RATE: 88 BPM | TEMPERATURE: 98.8 F | BODY MASS INDEX: 21.68 KG/M2 | WEIGHT: 110.4 LBS | HEIGHT: 60 IN

## 2023-09-16 LAB
ANION GAP SERPL CALCULATED.3IONS-SCNC: 7 MMOL/L (ref 7–16)
BUN SERPL-MCNC: 25 MG/DL (ref 6–23)
CALCIUM SERPL-MCNC: 9.3 MG/DL (ref 8.6–10.2)
CHLORIDE SERPL-SCNC: 98 MMOL/L (ref 98–107)
CO2 SERPL-SCNC: 33 MMOL/L (ref 22–29)
CREAT SERPL-MCNC: 0.7 MG/DL (ref 0.5–1)
GFR SERPL CREATININE-BSD FRML MDRD: >60 ML/MIN/1.73M2
GLUCOSE SERPL-MCNC: 109 MG/DL (ref 74–99)
MICROORGANISM SPEC CULT: NORMAL
MICROORGANISM SPEC CULT: NORMAL
POTASSIUM SERPL-SCNC: 3.5 MMOL/L (ref 3.5–5)
SERVICE CMNT-IMP: NORMAL
SERVICE CMNT-IMP: NORMAL
SODIUM SERPL-SCNC: 138 MMOL/L (ref 132–146)
SPECIMEN DESCRIPTION: NORMAL
SPECIMEN DESCRIPTION: NORMAL

## 2023-09-16 PROCEDURE — 6370000000 HC RX 637 (ALT 250 FOR IP): Performed by: INTERNAL MEDICINE

## 2023-09-16 PROCEDURE — 99239 HOSP IP/OBS DSCHRG MGMT >30: CPT | Performed by: INTERNAL MEDICINE

## 2023-09-16 PROCEDURE — 94640 AIRWAY INHALATION TREATMENT: CPT

## 2023-09-16 PROCEDURE — 80048 BASIC METABOLIC PNL TOTAL CA: CPT

## 2023-09-16 PROCEDURE — 6360000002 HC RX W HCPCS: Performed by: INTERNAL MEDICINE

## 2023-09-16 PROCEDURE — 6360000002 HC RX W HCPCS: Performed by: SPECIALIST

## 2023-09-16 PROCEDURE — 2580000003 HC RX 258: Performed by: INTERNAL MEDICINE

## 2023-09-16 PROCEDURE — 94003 VENT MGMT INPAT SUBQ DAY: CPT

## 2023-09-16 RX ORDER — VENLAFAXINE 25 MG/1
25 TABLET ORAL
Qty: 90 TABLET | Refills: 3 | DISCHARGE
Start: 2023-09-16

## 2023-09-16 RX ADMIN — HEPARIN 100 UNITS: 100 SYRINGE at 09:25

## 2023-09-16 RX ADMIN — CLOPIDOGREL BISULFATE 75 MG: 75 TABLET ORAL at 09:22

## 2023-09-16 RX ADMIN — APIXABAN 2.5 MG: 2.5 TABLET, FILM COATED ORAL at 09:23

## 2023-09-16 RX ADMIN — SODIUM CHLORIDE, PRESERVATIVE FREE 10 ML: 5 INJECTION INTRAVENOUS at 09:25

## 2023-09-16 RX ADMIN — BUDESONIDE INHALATION 500 MCG: 0.5 SUSPENSION RESPIRATORY (INHALATION) at 06:12

## 2023-09-16 RX ADMIN — METOPROLOL TARTRATE 12.5 MG: 25 TABLET, FILM COATED ORAL at 09:23

## 2023-09-16 RX ADMIN — ARIPIPRAZOLE 15 MG: 5 TABLET ORAL at 09:22

## 2023-09-16 RX ADMIN — AMLODIPINE BESYLATE 5 MG: 5 TABLET ORAL at 09:23

## 2023-09-16 RX ADMIN — FUROSEMIDE 20 MG: 20 TABLET ORAL at 09:23

## 2023-09-16 RX ADMIN — MEROPENEM 1000 MG: 1 INJECTION, POWDER, FOR SOLUTION INTRAVENOUS at 06:22

## 2023-09-16 RX ADMIN — IPRATROPIUM BROMIDE AND ALBUTEROL SULFATE 1 DOSE: .5; 2.5 SOLUTION RESPIRATORY (INHALATION) at 09:13

## 2023-09-16 RX ADMIN — VENLAFAXINE 25 MG: 25 TABLET ORAL at 09:22

## 2023-09-16 RX ADMIN — IPRATROPIUM BROMIDE AND ALBUTEROL SULFATE 1 DOSE: .5; 2.5 SOLUTION RESPIRATORY (INHALATION) at 14:02

## 2023-09-16 RX ADMIN — IPRATROPIUM BROMIDE AND ALBUTEROL SULFATE 1 DOSE: .5; 2.5 SOLUTION RESPIRATORY (INHALATION) at 06:12

## 2023-09-16 RX ADMIN — Medication 300 MG: at 09:22

## 2023-09-16 RX ADMIN — VENLAFAXINE 25 MG: 25 TABLET ORAL at 11:30

## 2023-09-16 ASSESSMENT — PULMONARY FUNCTION TESTS
PIF_VALUE: 26
PIF_VALUE: 29
PIF_VALUE: 28
PIF_VALUE: 28

## 2023-09-16 NOTE — PROGRESS NOTES
Patient set for a 1400  with a PAS Intermediate Repair And Flap Additional Text (Will Appearing After The Standard Complex Repair Text): The intermediate repair was not sufficient to completely close the primary defect. The remaining additional defect was repaired with the flap mentioned below.

## 2023-09-16 NOTE — PLAN OF CARE
Problem: Discharge Planning  Goal: Discharge to home or other facility with appropriate resources  9/15/2023 2211 by Cari Grey RN  Outcome: Progressing  9/15/2023 2211 by Cari Grey RN  Outcome: Progressing  Flowsheets (Taken 9/15/2023 2000)  Discharge to home or other facility with appropriate resources:   Identify barriers to discharge with patient and caregiver   Arrange for needed discharge resources and transportation as appropriate   Identify discharge learning needs (meds, wound care, etc)   Refer to discharge planning if patient needs post-hospital services based on physician order or complex needs related to functional status, cognitive ability or social support system     Problem: Pain  Goal: Verbalizes/displays adequate comfort level or baseline comfort level  9/15/2023 2211 by Cari Grey RN  Outcome: Progressing  9/15/2023 2211 by Cari Grey RN  Outcome: Progressing     Problem: Skin/Tissue Integrity  Goal: Absence of new skin breakdown  Description: 1. Monitor for areas of redness and/or skin breakdown  2. Assess vascular access sites hourly  3. Every 4-6 hours minimum:  Change oxygen saturation probe site  4. Every 4-6 hours:  If on nasal continuous positive airway pressure, respiratory therapy assess nares and determine need for appliance change or resting period.   9/15/2023 2211 by Cari Grey RN  Outcome: Progressing  9/15/2023 2211 by Cari Grey RN  Outcome: Progressing     Problem: Safety - Adult  Goal: Free from fall injury  9/15/2023 2211 by Cari Grey RN  Outcome: Progressing  9/15/2023 2211 by Cari Grey RN  Outcome: Progressing  Flowsheets (Taken 9/15/2023 2000)  Free From Fall Injury: Instruct family/caregiver on patient safety     Problem: Nutrition Deficit:  Goal: Optimize nutritional status  9/15/2023 2211 by Cari Grey RN  Outcome: Progressing  9/15/2023 2211 by Cari Grey RN  Outcome: Progressing

## 2023-09-16 NOTE — PROGRESS NOTES
Tried to call WNR to give nurse to nurse but got no answers. Tried calling at 1300 and 1400.  So I faxed over her AVS and will try to call again when transport arrives

## 2023-09-16 NOTE — PLAN OF CARE
Problem: Discharge Planning  Goal: Discharge to home or other facility with appropriate resources  9/16/2023 1050 by Raleigh Delcid RN  Outcome: Progressing  9/15/2023 2211 by Moni Cordova RN  Outcome: Progressing  9/15/2023 2211 by Moni Cordova RN  Outcome: Progressing  Flowsheets (Taken 9/15/2023 2000)  Discharge to home or other facility with appropriate resources:   Identify barriers to discharge with patient and caregiver   Arrange for needed discharge resources and transportation as appropriate   Identify discharge learning needs (meds, wound care, etc)   Refer to discharge planning if patient needs post-hospital services based on physician order or complex needs related to functional status, cognitive ability or social support system     Problem: Pain  Goal: Verbalizes/displays adequate comfort level or baseline comfort level  9/16/2023 1050 by Raleigh Delcid RN  Outcome: Progressing  9/15/2023 2211 by Moni Cordova RN  Outcome: Progressing  9/15/2023 2211 by Moni Cordova RN  Outcome: Progressing     Problem: Skin/Tissue Integrity  Goal: Absence of new skin breakdown  Description: 1. Monitor for areas of redness and/or skin breakdown  2. Assess vascular access sites hourly  3. Every 4-6 hours minimum:  Change oxygen saturation probe site  4. Every 4-6 hours:  If on nasal continuous positive airway pressure, respiratory therapy assess nares and determine need for appliance change or resting period.   9/16/2023 1050 by Raleigh Delcid RN  Outcome: Progressing  9/15/2023 2211 by Moni Cordova RN  Outcome: Progressing  9/15/2023 2211 by Moni Cordova RN  Outcome: Progressing     Problem: Safety - Adult  Goal: Free from fall injury  9/16/2023 1050 by Raleigh Delcid RN  Outcome: Progressing  9/15/2023 2211 by Moni Cordova RN  Outcome: Progressing  9/15/2023 2211 by Moni Cordova RN  Outcome: Progressing  Flowsheets (Taken 9/15/2023 2000)  Free From Fall Injury: Instruct family/caregiver on patient

## 2023-09-16 NOTE — PROGRESS NOTES
Progress Note    Date:9/16/2023       Room:0629/0629-01  Patient Sae Chavez     YOB: 1945     Age:78 y.o. FACE TO FACE ENCOUNTER  09/16/23    Assessment/PLAN        Hospital Problems             Last Modified POA    * (Principal) Bacteremia 9/10/2023 Yes    Chronic respiratory failure with hypoxia ([address]) 9/11/2023 Yes    Primary hypertension 9/11/2023 Yes    History of stroke with residual deficit 9/11/2023 Yes    History of DVT (deep vein thrombosis) 9/11/2023 Yes    Coronary artery disease involving native coronary artery of native heart without angina pectoris 9/12/2023 Yes    Ischemic cardiomyopathy 9/12/2023 Yes    Subacute osteomyelitis of left foot ([address]) 9/14/2023 Yes   Klebsiella pneumonia septicemia ? Source ? GI/bilary   E.coli- UTI   Cons Methicillin Resistance mecA/C  septicemia   Left foot toe dry gangrene with medial ulcer -Osteomyelitis of the 1st metatarsal head and medial sesamoid.        Plan:   Continue meropenem med rec  Stop vancomycin-   blood cultures remain negative   Podiatry following SPOKE WITH DR PEPE WHO SPOKE TO  SON FOR CONSERVATIVE MX  Monitor labs   Follow-up cultures         Subjective   Interval History Status:    9/16/2023-N BED TRACH VENT ASLEEP NAD   9/15 being turned had soft stools  trach vent   9/14 in be trach vent arouses  9/13  in bed- on vent  35 % FiO2- A/C  Tmax- 99.0  9/12 tmax99.2 son present updated/questions answered reviewed labs and cx   pt on vent asleep nad     Review of Systems   Review of Systems  Unable to from pt due to condition   Medications   Scheduled Meds:    lidocaine  5 mL IntraDERmal Once    sodium chloride flush  5-40 mL IntraVENous 2 times per day    heparin flush  1 mL IntraVENous 2 times per day    amLODIPine  5 mg PEG Tube Daily    apixaban  2.5 mg Per G Tube BID    clopidogrel  75 mg PEG Tube Daily    atorvastatin  10 mg PEG Tube Nightly    ARIPiprazole  15 mg PEG Tube Daily    budesonide  500 mcg Nebulization Q12H arterial disease. XR CHEST PORTABLE    Result Date: 9/10/2023  EXAMINATION: ONE XRAY VIEW OF THE CHEST 9/10/2023 5:23 pm COMPARISON: None. HISTORY: ORDERING SYSTEM PROVIDED HISTORY: Sepsis TECHNOLOGIST PROVIDED HISTORY: Reason for exam:->Sepsis FINDINGS: The lungs are without acute focal process. There is no effusion or pneumothorax. The cardiomediastinal silhouette is without acute process. The osseous structures are without acute process. Tracheostomy tube tip in appropriate position. No acute process.         Electronically signed by Nelson Goodwin MD on 9/12/23 at 12:36 PM EDT

## 2023-09-16 NOTE — PROGRESS NOTES
Recent Labs     09/16/23  0620      K 3.5   CL 98   CO2 33*   BUN 25*   CREATININE 0.7       No results for input(s): \"PROT\", \"INR\", \"APTT\" in the last 72 hours. ASSESSMENTS:   -Fullthickness wound left foot, POA   -PVD  -Gangrenous changes left second digit   -Bacteremia    PLAN:    - Patient was examined and evaluated. Reviewed patient's recent lab results, charts and pertinent diagnostic imaging. Reviewed ancillary service notes. - Antibiotics as per ID: Appreciate recommendations   - Cultures : pending   - Vascular:pending   - X-rays: Negative for osteomyelitis   - Dressing:xeroform and DSD. QoD dressing changes   -MRI:Osteomyelitis of the left 1st metatarsal head and medial sesamoid  -No surgical intervention from podiatry perspective. Continue conservative management for now. - Will continue to follow patient while they are in-house.   Discussed with Dr. Hein Headings PGY2  9/16/2023   12:44 PM

## 2023-12-27 ENCOUNTER — HOSPITAL ENCOUNTER (EMERGENCY)
Age: 78
Discharge: HOME | End: 2023-12-28
Attending: EMERGENCY MEDICINE
Payer: MEDICARE

## 2023-12-27 ENCOUNTER — APPOINTMENT (OUTPATIENT)
Dept: GENERAL RADIOLOGY | Age: 78
End: 2023-12-27
Payer: MEDICARE

## 2023-12-27 DIAGNOSIS — R07.9 CHEST PAIN, UNSPECIFIED TYPE: Primary | ICD-10-CM

## 2023-12-27 LAB
ANION GAP SERPL CALCULATED.3IONS-SCNC: 9 MMOL/L (ref 7–16)
BNP SERPL-MCNC: ABNORMAL PG/ML (ref 0–450)
BUN SERPL-MCNC: 36 MG/DL (ref 6–23)
CALCIUM SERPL-MCNC: 9.2 MG/DL (ref 8.6–10.2)
CHLORIDE SERPL-SCNC: 110 MMOL/L (ref 98–107)
CO2 SERPL-SCNC: 29 MMOL/L (ref 22–29)
CREAT SERPL-MCNC: 0.7 MG/DL (ref 0.5–1)
ERYTHROCYTE [DISTWIDTH] IN BLOOD BY AUTOMATED COUNT: 17.6 % (ref 11.5–15)
GFR SERPL CREATININE-BSD FRML MDRD: >60 ML/MIN/1.73M2
GLUCOSE SERPL-MCNC: 82 MG/DL (ref 74–99)
HCT VFR BLD AUTO: 30.5 % (ref 34–48)
HGB BLD-MCNC: 9.4 G/DL (ref 11.5–15.5)
MCH RBC QN AUTO: 27.7 PG (ref 26–35)
MCHC RBC AUTO-ENTMCNC: 30.8 G/DL (ref 32–34.5)
MCV RBC AUTO: 90 FL (ref 80–99.9)
PLATELET # BLD AUTO: 288 K/UL (ref 130–450)
PMV BLD AUTO: 10.3 FL (ref 7–12)
POTASSIUM SERPL-SCNC: 4.3 MMOL/L (ref 3.5–5)
RBC # BLD AUTO: 3.39 M/UL (ref 3.5–5.5)
SODIUM SERPL-SCNC: 148 MMOL/L (ref 132–146)
TROPONIN I SERPL HS-MCNC: 54 NG/L (ref 0–9)
TROPONIN I SERPL HS-MCNC: 54 NG/L (ref 0–9)
WBC OTHER # BLD: 7.4 K/UL (ref 4.5–11.5)

## 2023-12-27 PROCEDURE — 93005 ELECTROCARDIOGRAM TRACING: CPT | Performed by: EMERGENCY MEDICINE

## 2023-12-27 PROCEDURE — 85027 COMPLETE CBC AUTOMATED: CPT

## 2023-12-27 PROCEDURE — 83880 ASSAY OF NATRIURETIC PEPTIDE: CPT

## 2023-12-27 PROCEDURE — 80048 BASIC METABOLIC PNL TOTAL CA: CPT

## 2023-12-27 PROCEDURE — 71045 X-RAY EXAM CHEST 1 VIEW: CPT

## 2023-12-27 PROCEDURE — 84484 ASSAY OF TROPONIN QUANT: CPT

## 2023-12-27 PROCEDURE — 94002 VENT MGMT INPAT INIT DAY: CPT

## 2023-12-27 PROCEDURE — 99285 EMERGENCY DEPT VISIT HI MDM: CPT

## 2023-12-28 VITALS
RESPIRATION RATE: 24 BRPM | DIASTOLIC BLOOD PRESSURE: 60 MMHG | SYSTOLIC BLOOD PRESSURE: 143 MMHG | OXYGEN SATURATION: 97 % | HEART RATE: 84 BPM | TEMPERATURE: 97.8 F

## 2023-12-28 LAB
EKG ATRIAL RATE: 91 BPM
EKG P AXIS: 56 DEGREES
EKG P-R INTERVAL: 128 MS
EKG Q-T INTERVAL: 390 MS
EKG QRS DURATION: 82 MS
EKG QTC CALCULATION (BAZETT): 479 MS
EKG R AXIS: 19 DEGREES
EKG T AXIS: 122 DEGREES
EKG VENTRICULAR RATE: 91 BPM

## 2023-12-28 PROCEDURE — 93010 ELECTROCARDIOGRAM REPORT: CPT | Performed by: INTERNAL MEDICINE

## 2023-12-28 PROCEDURE — 94003 VENT MGMT INPAT SUBQ DAY: CPT

## 2023-12-28 NOTE — ED PROVIDER NOTES
132 - 146 mmol/L Final    Potassium 09/13/2023 4.0  3.5 - 5.0 mmol/L Final    Chloride 09/13/2023 97 (L)  98 - 107 mmol/L Final    CO2 09/13/2023 25  22 - 29 mmol/L Final    Anion Gap 09/13/2023 11  7 - 16 mmol/L Final    Glucose 09/13/2023 108 (H)  74 - 99 mg/dL Final    BUN 09/13/2023 19  6 - 23 mg/dL Final    Creatinine 09/13/2023 0.7  0.50 - 1.00 mg/dL Final    Est, Glom Filt Rate 09/13/2023 >60  >60 mL/min/1.73m2 Final    Calcium 09/13/2023 9.4  8.6 - 10.2 mg/dL Final    Total Protein 09/13/2023 6.5  6.4 - 8.3 g/dL Final    Albumin 09/13/2023 3.2 (L)  3.5 - 5.2 g/dL Final    Total Bilirubin 09/13/2023 0.5  0.0 - 1.2 mg/dL Final    Alkaline Phosphatase 09/13/2023 109 (H)  35 - 104 U/L Final    ALT 09/13/2023 11  0 - 32 U/L Final    AST 09/13/2023 13  0 - 31 U/L Final    Vancomycin Rm 09/13/2023 33.0  5.0 - 40.0 ug/mL Final    Vancomycin Random Dose amount 09/13/2023 RANDOM   Final    Vancomycin Random Date last dose 09/13/2023 RANDOM^RANDOM^L   Final    Vancomycin Random Time last dose 09/13/2023 RANDOM^RANDOM^L   Final    POC Glucose 09/14/2023 134 (H)  74 - 99 mg/dL Final    POC Glucose 09/14/2023 118 (H)  74 - 99 mg/dL Final    WBC 09/15/2023 5.9  4.5 - 11.5 k/uL Final    RBC 09/15/2023 2.85 (L)  3.50 - 5.50 m/uL Final    Hemoglobin 09/15/2023 8.0 (L)  11.5 - 15.5 g/dL Final    Hematocrit 09/15/2023 24.6 (L)  34.0 - 48.0 % Final    MCV 09/15/2023 86.3  80.0 - 99.9 fL Final    MCH 09/15/2023 28.1  26.0 - 35.0 pg Final    MCHC 09/15/2023 32.5  32.0 - 34.5 g/dL Final    RDW 09/15/2023 16.9 (H)  11.5 - 15.0 % Final    Platelets 93/25/4144 216  130 - 450 k/uL Final    MPV 09/15/2023 10.5  7.0 - 12.0 fL Final    Neutrophils % 09/15/2023 80  43.0 - 80.0 % Final    Lymphocytes % 09/15/2023 8 (L)  20.0 - 42.0 % Final    Monocytes % 09/15/2023 9  2.0 - 12.0 % Final    Eosinophils % 09/15/2023 2  0 - 6 % Final    Basophils % 09/15/2023 0  0.0 - 2.0 % Final    Immature Granulocytes 09/15/2023 0  0.0 - 5.0 % Final

## 2023-12-28 NOTE — ED NOTES
Patient report called to [facility]. Nurse aware of patients discharge. No further concerns at this time.

## 2024-01-01 ENCOUNTER — APPOINTMENT (OUTPATIENT)
Dept: GENERAL RADIOLOGY | Age: 79
DRG: 870 | End: 2024-01-01
Payer: MEDICARE

## 2024-01-01 ENCOUNTER — APPOINTMENT (OUTPATIENT)
Dept: NUCLEAR MEDICINE | Age: 79
DRG: 870 | End: 2024-01-01
Payer: MEDICARE

## 2024-01-01 ENCOUNTER — HOSPITAL ENCOUNTER (INPATIENT)
Age: 79
LOS: 5 days | DRG: 870 | End: 2024-10-02
Attending: STUDENT IN AN ORGANIZED HEALTH CARE EDUCATION/TRAINING PROGRAM | Admitting: FAMILY MEDICINE
Payer: MEDICARE

## 2024-01-01 ENCOUNTER — APPOINTMENT (OUTPATIENT)
Dept: CT IMAGING | Age: 79
DRG: 870 | End: 2024-01-01
Payer: MEDICARE

## 2024-01-01 VITALS — OXYGEN SATURATION: 88 % | BODY MASS INDEX: 28.65 KG/M2 | HEIGHT: 62 IN | WEIGHT: 155.7 LBS | TEMPERATURE: 98.9 F

## 2024-01-01 DIAGNOSIS — A41.9 SEPTICEMIA (HCC): Primary | ICD-10-CM

## 2024-01-01 DIAGNOSIS — M46.28 SACRAL OSTEOMYELITIS: ICD-10-CM

## 2024-01-01 DIAGNOSIS — N30.91 CYSTITIS WITH HEMATURIA: ICD-10-CM

## 2024-01-01 DIAGNOSIS — D64.9 CHRONIC ANEMIA: ICD-10-CM

## 2024-01-01 DIAGNOSIS — E72.20 HYPERAMMONEMIA (HCC): ICD-10-CM

## 2024-01-01 DIAGNOSIS — K92.2 ACUTE GI BLEEDING: ICD-10-CM

## 2024-01-01 LAB
AADO2: 276.2 MMHG
AADO2: 348.4 MMHG
ABO/RH: NORMAL
ACB COMPLEX DNA BLD POS QL NAA+NON-PROBE: NOT DETECTED
ALBUMIN SERPL-MCNC: 1.8 G/DL (ref 3.5–5.2)
ALBUMIN SERPL-MCNC: 2 G/DL (ref 3.5–5.2)
ALP SERPL-CCNC: 114 U/L (ref 35–104)
ALP SERPL-CCNC: 127 U/L (ref 35–104)
ALT SERPL-CCNC: 12 U/L (ref 0–32)
ALT SERPL-CCNC: 12 U/L (ref 0–32)
AMMONIA PLAS-SCNC: 42 UMOL/L (ref 11–51)
AMMONIA PLAS-SCNC: 74 UMOL/L (ref 11–51)
ANION GAP SERPL CALCULATED.3IONS-SCNC: 5 MMOL/L (ref 7–16)
ANION GAP SERPL CALCULATED.3IONS-SCNC: 7 MMOL/L (ref 7–16)
ANION GAP SERPL CALCULATED.3IONS-SCNC: 7 MMOL/L (ref 7–16)
ANION GAP SERPL CALCULATED.3IONS-SCNC: 8 MMOL/L (ref 7–16)
ANION GAP SERPL CALCULATED.3IONS-SCNC: 8 MMOL/L (ref 7–16)
ANION GAP SERPL CALCULATED.3IONS-SCNC: 9 MMOL/L (ref 7–16)
ANTIBODY SCREEN: NEGATIVE
ARM BAND NUMBER: NORMAL
AST SERPL-CCNC: 10 U/L (ref 0–31)
AST SERPL-CCNC: 15 U/L (ref 0–31)
B FRAGILIS DNA BLD POS QL NAA+NON-PROBE: NOT DETECTED
B PARAP IS1001 DNA NPH QL NAA+NON-PROBE: NOT DETECTED
B PERT DNA SPEC QL NAA+PROBE: NOT DETECTED
B.E.: -3.6 MMOL/L (ref -3–3)
B.E.: 0.9 MMOL/L (ref -3–3)
BACTERIA URNS QL MICRO: ABNORMAL
BASOPHILS # BLD: 0 K/UL (ref 0–0.2)
BASOPHILS NFR BLD: 0 % (ref 0–2)
BILIRUB SERPL-MCNC: 0.2 MG/DL (ref 0–1.2)
BILIRUB SERPL-MCNC: 0.4 MG/DL (ref 0–1.2)
BILIRUB UR QL STRIP: NEGATIVE
BIOFIRE TEST COMMENT: ABNORMAL
BLACTX-M ISLT/SPM QL: DETECTED
BLAIMP ISLT/SPM QL: NOT DETECTED
BLAKPC ISLT/SPM QL: NOT DETECTED
BLAOXA-48-LIKE ISLT/SPM QL: NOT DETECTED
BLAVIM ISLT/SPM QL: NOT DETECTED
BLOOD BANK BLOOD PRODUCT EXPIRATION DATE: NORMAL
BLOOD BANK BLOOD PRODUCT EXPIRATION DATE: NORMAL
BLOOD BANK DISPENSE STATUS: NORMAL
BLOOD BANK DISPENSE STATUS: NORMAL
BLOOD BANK ISBT PRODUCT BLOOD TYPE: 5100
BLOOD BANK ISBT PRODUCT BLOOD TYPE: 5100
BLOOD BANK PRODUCT CODE: NORMAL
BLOOD BANK PRODUCT CODE: NORMAL
BLOOD BANK SAMPLE EXPIRATION: NORMAL
BLOOD BANK UNIT TYPE AND RH: NORMAL
BLOOD BANK UNIT TYPE AND RH: NORMAL
BNP SERPL-MCNC: ABNORMAL PG/ML (ref 0–450)
BPU ID: NORMAL
BPU ID: NORMAL
BUN SERPL-MCNC: 100 MG/DL (ref 6–23)
BUN SERPL-MCNC: 80 MG/DL (ref 6–23)
BUN SERPL-MCNC: 81 MG/DL (ref 6–23)
BUN SERPL-MCNC: 83 MG/DL (ref 6–23)
BUN SERPL-MCNC: 90 MG/DL (ref 6–23)
BUN SERPL-MCNC: 98 MG/DL (ref 6–23)
C ALBICANS DNA BLD POS QL NAA+NON-PROBE: NOT DETECTED
C AURIS DNA BLD POS QL NAA+NON-PROBE: NOT DETECTED
C GATTII+NEOFOR DNA BLD POS QL NAA+N-PRB: NOT DETECTED
C GLABRATA DNA BLD POS QL NAA+NON-PROBE: NOT DETECTED
C KRUSEI DNA BLD POS QL NAA+NON-PROBE: NOT DETECTED
C PARAP DNA BLD POS QL NAA+NON-PROBE: NOT DETECTED
C PNEUM DNA NPH QL NAA+NON-PROBE: NOT DETECTED
C TROPICLS DNA BLD POS QL NAA+NON-PROBE: NOT DETECTED
CALCIUM SERPL-MCNC: 9 MG/DL (ref 8.6–10.2)
CALCIUM SERPL-MCNC: 9 MG/DL (ref 8.6–10.2)
CALCIUM SERPL-MCNC: 9.1 MG/DL (ref 8.6–10.2)
CALCIUM SERPL-MCNC: 9.2 MG/DL (ref 8.6–10.2)
CALCIUM SERPL-MCNC: 9.3 MG/DL (ref 8.6–10.2)
CALCIUM SERPL-MCNC: 9.5 MG/DL (ref 8.6–10.2)
CHLORIDE SERPL-SCNC: 108 MMOL/L (ref 98–107)
CHLORIDE SERPL-SCNC: 114 MMOL/L (ref 98–107)
CHLORIDE SERPL-SCNC: 117 MMOL/L (ref 98–107)
CHLORIDE SERPL-SCNC: 117 MMOL/L (ref 98–107)
CHLORIDE SERPL-SCNC: 122 MMOL/L (ref 98–107)
CHLORIDE SERPL-SCNC: 124 MMOL/L (ref 98–107)
CLARITY UR: ABNORMAL
CO2 SERPL-SCNC: 19 MMOL/L (ref 22–29)
CO2 SERPL-SCNC: 20 MMOL/L (ref 22–29)
CO2 SERPL-SCNC: 22 MMOL/L (ref 22–29)
CO2 SERPL-SCNC: 28 MMOL/L (ref 22–29)
COHB: 0.6 % (ref 0–1.5)
COHB: 1.4 % (ref 0–1.5)
COLISTIN RES MCR-1 ISLT/SPM QL: NOT DETECTED
COLOR UR: YELLOW
COMPONENT: NORMAL
COMPONENT: NORMAL
CREAT SERPL-MCNC: 1.2 MG/DL (ref 0.5–1)
CREAT SERPL-MCNC: 1.3 MG/DL (ref 0.5–1)
CREAT SERPL-MCNC: 1.4 MG/DL (ref 0.5–1)
CREAT SERPL-MCNC: 1.7 MG/DL (ref 0.5–1)
CRITICAL: ABNORMAL
CRITICAL: ABNORMAL
CROSSMATCH RESULT: NORMAL
CROSSMATCH RESULT: NORMAL
DATE ANALYZED: ABNORMAL
DATE ANALYZED: ABNORMAL
DATE LAST DOSE: NORMAL
DATE OF COLLECTION: ABNORMAL
DATE OF COLLECTION: ABNORMAL
E CLOAC COMP DNA BLD POS NAA+NON-PROBE: NOT DETECTED
E COLI DNA BLD POS QL NAA+NON-PROBE: NOT DETECTED
E FAECALIS DNA BLD POS QL NAA+NON-PROBE: NOT DETECTED
E FAECIUM DNA BLD POS QL NAA+NON-PROBE: NOT DETECTED
EKG ATRIAL RATE: 115 BPM
EKG ATRIAL RATE: 140 BPM
EKG ATRIAL RATE: 144 BPM
EKG ATRIAL RATE: 87 BPM
EKG P AXIS: 64 DEGREES
EKG P AXIS: 64 DEGREES
EKG P AXIS: 77 DEGREES
EKG P-R INTERVAL: 120 MS
EKG P-R INTERVAL: 126 MS
EKG P-R INTERVAL: 128 MS
EKG Q-T INTERVAL: 248 MS
EKG Q-T INTERVAL: 288 MS
EKG Q-T INTERVAL: 294 MS
EKG Q-T INTERVAL: 326 MS
EKG QRS DURATION: 84 MS
EKG QRS DURATION: 86 MS
EKG QRS DURATION: 92 MS
EKG QRS DURATION: 96 MS
EKG QTC CALCULATION (BAZETT): 392 MS
EKG QTC CALCULATION (BAZETT): 394 MS
EKG QTC CALCULATION (BAZETT): 406 MS
EKG QTC CALCULATION (BAZETT): 439 MS
EKG R AXIS: 12 DEGREES
EKG R AXIS: 16 DEGREES
EKG R AXIS: 5 DEGREES
EKG R AXIS: 5 DEGREES
EKG T AXIS: 118 DEGREES
EKG T AXIS: 140 DEGREES
EKG T AXIS: 146 DEGREES
EKG T AXIS: 172 DEGREES
EKG VENTRICULAR RATE: 115 BPM
EKG VENTRICULAR RATE: 140 BPM
EKG VENTRICULAR RATE: 152 BPM
EKG VENTRICULAR RATE: 87 BPM
ENTEROBACTERALES DNA BLD POS NAA+N-PRB: DETECTED
EOSINOPHIL # BLD: 0 K/UL (ref 0.05–0.5)
EOSINOPHILS RELATIVE PERCENT: 0 % (ref 0–6)
ERYTHROCYTE [DISTWIDTH] IN BLOOD BY AUTOMATED COUNT: 17 % (ref 11.5–15)
ERYTHROCYTE [DISTWIDTH] IN BLOOD BY AUTOMATED COUNT: 17.1 % (ref 11.5–15)
ERYTHROCYTE [DISTWIDTH] IN BLOOD BY AUTOMATED COUNT: 17.5 % (ref 11.5–15)
ERYTHROCYTE [DISTWIDTH] IN BLOOD BY AUTOMATED COUNT: 18.1 % (ref 11.5–15)
ERYTHROCYTE [DISTWIDTH] IN BLOOD BY AUTOMATED COUNT: 18.6 % (ref 11.5–15)
ERYTHROCYTE [DISTWIDTH] IN BLOOD BY AUTOMATED COUNT: 19.2 % (ref 11.5–15)
FERRITIN SERPL-MCNC: 591 NG/ML
FIO2: 100 %
FIO2: 60 %
FLUAV RNA NPH QL NAA+NON-PROBE: NOT DETECTED
FLUBV RNA NPH QL NAA+NON-PROBE: NOT DETECTED
FOLATE SERPL-MCNC: 7.8 NG/ML (ref 4.8–24.2)
GFR, ESTIMATED: 31 ML/MIN/1.73M2
GFR, ESTIMATED: 38 ML/MIN/1.73M2
GFR, ESTIMATED: 41 ML/MIN/1.73M2
GFR, ESTIMATED: 43 ML/MIN/1.73M2
GFR, ESTIMATED: 44 ML/MIN/1.73M2
GFR, ESTIMATED: 48 ML/MIN/1.73M2
GLUCOSE BLD-MCNC: 108 MG/DL (ref 74–99)
GLUCOSE BLD-MCNC: 111 MG/DL (ref 74–99)
GLUCOSE BLD-MCNC: 114 MG/DL (ref 74–99)
GLUCOSE BLD-MCNC: 124 MG/DL (ref 74–99)
GLUCOSE BLD-MCNC: 129 MG/DL (ref 74–99)
GLUCOSE BLD-MCNC: 132 MG/DL (ref 74–99)
GLUCOSE BLD-MCNC: 133 MG/DL (ref 74–99)
GLUCOSE BLD-MCNC: 143 MG/DL (ref 74–99)
GLUCOSE BLD-MCNC: 151 MG/DL (ref 74–99)
GLUCOSE BLD-MCNC: 152 MG/DL (ref 74–99)
GLUCOSE BLD-MCNC: 168 MG/DL (ref 74–99)
GLUCOSE BLD-MCNC: 184 MG/DL (ref 74–99)
GLUCOSE BLD-MCNC: 187 MG/DL (ref 74–99)
GLUCOSE BLD-MCNC: 61 MG/DL (ref 74–99)
GLUCOSE BLD-MCNC: 68 MG/DL (ref 74–99)
GLUCOSE BLD-MCNC: 79 MG/DL (ref 74–99)
GLUCOSE BLD-MCNC: 88 MG/DL (ref 74–99)
GLUCOSE BLD-MCNC: 92 MG/DL (ref 74–99)
GLUCOSE BLD-MCNC: 96 MG/DL (ref 74–99)
GLUCOSE SERPL-MCNC: 110 MG/DL (ref 74–99)
GLUCOSE SERPL-MCNC: 133 MG/DL (ref 74–99)
GLUCOSE SERPL-MCNC: 146 MG/DL (ref 74–99)
GLUCOSE SERPL-MCNC: 159 MG/DL (ref 74–99)
GLUCOSE SERPL-MCNC: 181 MG/DL (ref 74–99)
GLUCOSE SERPL-MCNC: 37 MG/DL (ref 74–99)
GLUCOSE UR STRIP-MCNC: NEGATIVE MG/DL
GP B STREP DNA BLD POS QL NAA+NON-PROBE: NOT DETECTED
HADV DNA NPH QL NAA+NON-PROBE: NOT DETECTED
HAEM INFLU DNA BLD POS QL NAA+NON-PROBE: NOT DETECTED
HCO3: 21.9 MMOL/L (ref 22–26)
HCO3: 28.6 MMOL/L (ref 22–26)
HCOV 229E RNA NPH QL NAA+NON-PROBE: NOT DETECTED
HCOV HKU1 RNA NPH QL NAA+NON-PROBE: NOT DETECTED
HCOV NL63 RNA NPH QL NAA+NON-PROBE: NOT DETECTED
HCOV OC43 RNA NPH QL NAA+NON-PROBE: NOT DETECTED
HCT VFR BLD AUTO: 19 % (ref 34–48)
HCT VFR BLD AUTO: 22.9 % (ref 34–48)
HCT VFR BLD AUTO: 23.5 % (ref 34–48)
HCT VFR BLD AUTO: 24.1 % (ref 34–48)
HCT VFR BLD AUTO: 28 % (ref 34–48)
HCT VFR BLD AUTO: 30.7 % (ref 34–48)
HCT VFR BLD AUTO: 31.5 % (ref 34–48)
HCT VFR BLD AUTO: 31.5 % (ref 34–48)
HCT VFR BLD AUTO: 31.6 % (ref 34–48)
HCT VFR BLD AUTO: 31.6 % (ref 34–48)
HCT VFR BLD AUTO: 32.3 % (ref 34–48)
HCT VFR BLD AUTO: 32.3 % (ref 34–48)
HCT VFR BLD AUTO: 32.5 % (ref 34–48)
HCT VFR BLD AUTO: 35.9 % (ref 34–48)
HGB BLD-MCNC: 10 G/DL (ref 11.5–15.5)
HGB BLD-MCNC: 10.2 G/DL (ref 11.5–15.5)
HGB BLD-MCNC: 10.4 G/DL (ref 11.5–15.5)
HGB BLD-MCNC: 10.5 G/DL (ref 11.5–15.5)
HGB BLD-MCNC: 10.5 G/DL (ref 11.5–15.5)
HGB BLD-MCNC: 10.6 G/DL (ref 11.5–15.5)
HGB BLD-MCNC: 10.7 G/DL (ref 11.5–15.5)
HGB BLD-MCNC: 10.9 G/DL (ref 11.5–15.5)
HGB BLD-MCNC: 11 G/DL (ref 11.5–15.5)
HGB BLD-MCNC: 6.1 G/DL (ref 11.5–15.5)
HGB BLD-MCNC: 7.1 G/DL (ref 11.5–15.5)
HGB BLD-MCNC: 7.1 G/DL (ref 11.5–15.5)
HGB BLD-MCNC: 7.3 G/DL (ref 11.5–15.5)
HGB BLD-MCNC: 8.8 G/DL (ref 11.5–15.5)
HGB UR QL STRIP.AUTO: ABNORMAL
HHB: 0.3 % (ref 0–5)
HHB: 2.8 % (ref 0–5)
HMPV RNA NPH QL NAA+NON-PROBE: NOT DETECTED
HPIV1 RNA NPH QL NAA+NON-PROBE: NOT DETECTED
HPIV2 RNA NPH QL NAA+NON-PROBE: NOT DETECTED
HPIV3 RNA NPH QL NAA+NON-PROBE: NOT DETECTED
HPIV4 RNA NPH QL NAA+NON-PROBE: NOT DETECTED
INFLUENZA A BY PCR: NOT DETECTED
INFLUENZA B BY PCR: NOT DETECTED
INR PPP: 1.3
IRON SATN MFR SERPL: 30 % (ref 15–50)
IRON SERPL-MCNC: 16 UG/DL (ref 37–145)
K OXYTOCA DNA BLD POS QL NAA+NON-PROBE: NOT DETECTED
KETONES UR STRIP-MCNC: NEGATIVE MG/DL
KLEBSIELLA SP DNA BLD POS QL NAA+NON-PRB: DETECTED
KLEBSIELLA SP DNA BLD POS QL NAA+NON-PRB: NOT DETECTED
L MONOCYTOG DNA BLD POS QL NAA+NON-PROBE: NOT DETECTED
L PNEUMO1 AG UR QL IA.RAPID: NEGATIVE
LAB: ABNORMAL
LAB: ABNORMAL
LACTATE BLDV-SCNC: 2 MMOL/L (ref 0.5–1.9)
LACTATE BLDV-SCNC: 2.2 MMOL/L (ref 0.5–1.9)
LEUKOCYTE ESTERASE UR QL STRIP: ABNORMAL
LIPASE SERPL-CCNC: 19 U/L (ref 13–60)
LYMPHOCYTES NFR BLD: 0.11 K/UL (ref 1.5–4)
LYMPHOCYTES NFR BLD: 0.17 K/UL (ref 1.5–4)
LYMPHOCYTES NFR BLD: 0.51 K/UL (ref 1.5–4)
LYMPHOCYTES RELATIVE PERCENT: 1 % (ref 20–42)
LYMPHOCYTES RELATIVE PERCENT: 1 % (ref 20–42)
LYMPHOCYTES RELATIVE PERCENT: 7 % (ref 20–42)
Lab: 2345
Lab: 430
M PNEUMO DNA NPH QL NAA+NON-PROBE: NOT DETECTED
MAGNESIUM SERPL-MCNC: 2 MG/DL (ref 1.6–2.6)
MCH RBC QN AUTO: 27.8 PG (ref 26–35)
MCH RBC QN AUTO: 28.9 PG (ref 26–35)
MCH RBC QN AUTO: 29.3 PG (ref 26–35)
MCH RBC QN AUTO: 29.6 PG (ref 26–35)
MCHC RBC AUTO-ENTMCNC: 30.3 G/DL (ref 32–34.5)
MCHC RBC AUTO-ENTMCNC: 30.6 G/DL (ref 32–34.5)
MCHC RBC AUTO-ENTMCNC: 31.4 G/DL (ref 32–34.5)
MCHC RBC AUTO-ENTMCNC: 31.6 G/DL (ref 32–34.5)
MCHC RBC AUTO-ENTMCNC: 32.6 G/DL (ref 32–34.5)
MCHC RBC AUTO-ENTMCNC: 33 G/DL (ref 32–34.5)
MCV RBC AUTO: 87.5 FL (ref 80–99.9)
MCV RBC AUTO: 88.6 FL (ref 80–99.9)
MCV RBC AUTO: 90.8 FL (ref 80–99.9)
MCV RBC AUTO: 91.3 FL (ref 80–99.9)
MCV RBC AUTO: 94.2 FL (ref 80–99.9)
MCV RBC AUTO: 96.8 FL (ref 80–99.9)
METAMYELOCYTES ABSOLUTE COUNT: 0.11 K/UL (ref 0–0.12)
METAMYELOCYTES ABSOLUTE COUNT: 0.17 K/UL (ref 0–0.12)
METAMYELOCYTES: 1 % (ref 0–1)
METAMYELOCYTES: 1 % (ref 0–1)
METHB: 0.2 % (ref 0–1.5)
METHB: 0.3 % (ref 0–1.5)
MICROORGANISM SPEC CULT: ABNORMAL
MICROORGANISM SPEC CULT: NORMAL
MICROORGANISM/AGENT SPEC: ABNORMAL
MODE: AC
MODE: AC
MONOCYTES NFR BLD: 0.45 K/UL (ref 0.1–0.95)
MONOCYTES NFR BLD: 0.87 K/UL (ref 0.1–0.95)
MONOCYTES NFR BLD: 0.9 K/UL (ref 0.1–0.95)
MONOCYTES NFR BLD: 4 % (ref 2–12)
MONOCYTES NFR BLD: 6 % (ref 2–12)
MONOCYTES NFR BLD: 7 % (ref 2–12)
MYELOCYTES ABSOLUTE COUNT: 0.11 K/UL
MYELOCYTES ABSOLUTE COUNT: 0.17 K/UL
MYELOCYTES: 1 %
MYELOCYTES: 1 %
N MEN DNA BLD POS QL NAA+NON-PROBE: NOT DETECTED
NEUTROPHILS NFR BLD: 87 % (ref 43–80)
NEUTROPHILS NFR BLD: 90 % (ref 43–80)
NEUTROPHILS NFR BLD: 93 % (ref 43–80)
NEUTS SEG NFR BLD: 11.76 K/UL (ref 1.8–7.3)
NEUTS SEG NFR BLD: 18.81 K/UL (ref 1.8–7.3)
NEUTS SEG NFR BLD: 6.24 K/UL (ref 1.8–7.3)
NITRITE UR QL STRIP: NEGATIVE
O2 CONTENT: 10 ML/DL
O2 CONTENT: 12 ML/DL
O2 SATURATION: 97.2 % (ref 92–98.5)
O2 SATURATION: 99.7 % (ref 92–98.5)
O2HB: 95.6 % (ref 94–97)
O2HB: 98.8 % (ref 94–97)
OPERATOR ID: 514
OPERATOR ID: 514
P AERUGINOSA DNA BLD POS NAA+NON-PROBE: NOT DETECTED
PATIENT TEMP: 37 C
PATIENT TEMP: 37 C
PCO2: 42.1 MMHG (ref 35–45)
PCO2: 66 MMHG (ref 35–45)
PEEP/CPAP: 10 CMH2O
PEEP/CPAP: 10 CMH2O
PFO2: 1.5 MMHG/%
PFO2: 2.74 MMHG/%
PH BLOOD GAS: 7.25 (ref 7.35–7.45)
PH BLOOD GAS: 7.33 (ref 7.35–7.45)
PH UR STRIP: 5.5 [PH] (ref 5–9)
PHOSPHATE SERPL-MCNC: 3 MG/DL (ref 2.5–4.5)
PLATELET # BLD AUTO: 113 K/UL (ref 130–450)
PLATELET # BLD AUTO: 138 K/UL (ref 130–450)
PLATELET # BLD AUTO: 77 K/UL (ref 130–450)
PLATELET # BLD AUTO: 87 K/UL (ref 130–450)
PLATELET CONFIRMATION: NORMAL
PLATELET, FLUORESCENCE: 110 K/UL (ref 130–450)
PLATELET, FLUORESCENCE: 142 K/UL (ref 130–450)
PMV BLD AUTO: 11.2 FL (ref 7–12)
PMV BLD AUTO: 11.7 FL (ref 7–12)
PMV BLD AUTO: 11.9 FL (ref 7–12)
PMV BLD AUTO: 12 FL (ref 7–12)
PMV BLD AUTO: 12.1 FL (ref 7–12)
PMV BLD AUTO: 12.2 FL (ref 7–12)
PO2: 273.6 MMHG (ref 75–100)
PO2: 90.3 MMHG (ref 75–100)
POTASSIUM SERPL-SCNC: 3.3 MMOL/L (ref 3.5–5)
POTASSIUM SERPL-SCNC: 3.9 MMOL/L (ref 3.5–5)
POTASSIUM SERPL-SCNC: 4 MMOL/L (ref 3.5–5)
POTASSIUM SERPL-SCNC: 4.2 MMOL/L (ref 3.5–5)
POTASSIUM SERPL-SCNC: 4.7 MMOL/L (ref 3.5–5)
POTASSIUM SERPL-SCNC: 4.8 MMOL/L (ref 3.5–5)
PROCALCITONIN SERPL-MCNC: 1.54 NG/ML (ref 0–0.08)
PROT SERPL-MCNC: 4.6 G/DL (ref 6.4–8.3)
PROT SERPL-MCNC: 4.8 G/DL (ref 6.4–8.3)
PROT UR STRIP-MCNC: NEGATIVE MG/DL
PROTEUS SP DNA BLD POS QL NAA+NON-PROBE: NOT DETECTED
PROTHROMBIN TIME: 14.1 SEC (ref 9.3–12.4)
RBC # BLD AUTO: 2.49 M/UL (ref 3.5–5.5)
RBC # BLD AUTO: 3.16 M/UL (ref 3.5–5.5)
RBC # BLD AUTO: 3.46 M/UL (ref 3.5–5.5)
RBC # BLD AUTO: 3.58 M/UL (ref 3.5–5.5)
RBC # BLD AUTO: 3.6 M/UL (ref 3.5–5.5)
RBC # BLD AUTO: 3.81 M/UL (ref 3.5–5.5)
RBC # BLD: ABNORMAL 10*6/UL
RBC # BLD: NORMAL 10*6/UL
RBC #/AREA URNS HPF: ABNORMAL /HPF
RESISTANT GENE NDM BY PCR: NOT DETECTED
RI(T): 1.27
RI(T): 3.06
RR MECHANICAL: 16 B/MIN
RR MECHANICAL: 20 B/MIN
RSV RNA NPH QL NAA+NON-PROBE: NOT DETECTED
RV+EV RNA NPH QL NAA+NON-PROBE: NOT DETECTED
S AUREUS DNA BLD POS QL NAA+NON-PROBE: NOT DETECTED
S AUREUS+CONS DNA BLD POS NAA+NON-PROBE: NOT DETECTED
S EPIDERMIDIS DNA BLD POS QL NAA+NON-PRB: NOT DETECTED
S LUGDUNENSIS DNA BLD POS QL NAA+NON-PRB: NOT DETECTED
S MALTOPHILIA DNA BLD POS QL NAA+NON-PRB: NOT DETECTED
S MARCESCENS DNA BLD POS NAA+NON-PROBE: NOT DETECTED
S PNEUM AG SPEC QL: NEGATIVE
S PNEUM DNA BLD POS QL NAA+NON-PROBE: NOT DETECTED
S PYO DNA BLD POS QL NAA+NON-PROBE: NOT DETECTED
SALMONELLA DNA BLD POS QL NAA+NON-PROBE: NOT DETECTED
SARS-COV-2 RDRP RESP QL NAA+PROBE: NOT DETECTED
SARS-COV-2 RNA NPH QL NAA+NON-PROBE: NOT DETECTED
SERVICE CMNT-IMP: ABNORMAL
SODIUM SERPL-SCNC: 141 MMOL/L (ref 132–146)
SODIUM SERPL-SCNC: 144 MMOL/L (ref 132–146)
SODIUM SERPL-SCNC: 144 MMOL/L (ref 132–146)
SODIUM SERPL-SCNC: 146 MMOL/L (ref 132–146)
SODIUM SERPL-SCNC: 149 MMOL/L (ref 132–146)
SODIUM SERPL-SCNC: 151 MMOL/L (ref 132–146)
SOURCE, BLOOD GAS: ABNORMAL
SOURCE, BLOOD GAS: ABNORMAL
SP GR UR STRIP: 1.02 (ref 1–1.03)
SPECIMEN DESCRIPTION: ABNORMAL
SPECIMEN DESCRIPTION: NORMAL
SPECIMEN SOURCE: NORMAL
STREPTOCOCCUS DNA BLD POS NAA+NON-PROBE: NOT DETECTED
THB: 7.3 G/DL (ref 11.5–16.5)
THB: 8.1 G/DL (ref 11.5–16.5)
TIBC SERPL-MCNC: 54 UG/DL (ref 250–450)
TIME ANALYZED: 2356
TIME ANALYZED: 439
TME LAST DOSE: NORMAL H
TRANSFUSION STATUS: NORMAL
TRANSFUSION STATUS: NORMAL
TROPONIN I SERPL HS-MCNC: 23 NG/L (ref 0–9)
TROPONIN I SERPL HS-MCNC: 32 NG/L (ref 0–9)
TROPONIN I SERPL HS-MCNC: 38 NG/L (ref 0–9)
UNIT DIVISION: 0
UNIT DIVISION: 0
UNIT ISSUE DATE/TIME: NORMAL
UNIT ISSUE DATE/TIME: NORMAL
UROBILINOGEN UR STRIP-ACNC: 0.2 EU/DL (ref 0–1)
VANCOMYCIN DOSE: NORMAL MG
VANCOMYCIN SERPL-MCNC: 23.5 UG/ML (ref 5–40)
VIT B12 SERPL-MCNC: >2000 PG/ML (ref 211–946)
VT MECHANICAL: 375 ML
VT MECHANICAL: 375 ML
WBC #/AREA URNS HPF: ABNORMAL /HPF
WBC OTHER # BLD: 13 K/UL (ref 4.5–11.5)
WBC OTHER # BLD: 20.2 K/UL (ref 4.5–11.5)
WBC OTHER # BLD: 22.1 K/UL (ref 4.5–11.5)
WBC OTHER # BLD: 26.8 K/UL (ref 4.5–11.5)
WBC OTHER # BLD: 34 K/UL (ref 4.5–11.5)
WBC OTHER # BLD: 7.2 K/UL (ref 4.5–11.5)
YEAST URNS QL MICRO: PRESENT

## 2024-01-01 PROCEDURE — 2500000003 HC RX 250 WO HCPCS: Performed by: FAMILY MEDICINE

## 2024-01-01 PROCEDURE — 82140 ASSAY OF AMMONIA: CPT

## 2024-01-01 PROCEDURE — 93010 ELECTROCARDIOGRAM REPORT: CPT | Performed by: INTERNAL MEDICINE

## 2024-01-01 PROCEDURE — 6360000002 HC RX W HCPCS: Performed by: HOSPITALIST

## 2024-01-01 PROCEDURE — 2580000003 HC RX 258

## 2024-01-01 PROCEDURE — 6370000000 HC RX 637 (ALT 250 FOR IP): Performed by: FAMILY MEDICINE

## 2024-01-01 PROCEDURE — 2580000003 HC RX 258: Performed by: SPECIALIST

## 2024-01-01 PROCEDURE — 84484 ASSAY OF TROPONIN QUANT: CPT

## 2024-01-01 PROCEDURE — 85027 COMPLETE CBC AUTOMATED: CPT

## 2024-01-01 PROCEDURE — 82746 ASSAY OF FOLIC ACID SERUM: CPT

## 2024-01-01 PROCEDURE — APPSS30 APP SPLIT SHARED TIME 16-30 MINUTES: Performed by: NURSE PRACTITIONER

## 2024-01-01 PROCEDURE — 87040 BLOOD CULTURE FOR BACTERIA: CPT

## 2024-01-01 PROCEDURE — 84100 ASSAY OF PHOSPHORUS: CPT

## 2024-01-01 PROCEDURE — 87106 FUNGI IDENTIFICATION YEAST: CPT

## 2024-01-01 PROCEDURE — 6360000002 HC RX W HCPCS: Performed by: FAMILY MEDICINE

## 2024-01-01 PROCEDURE — 94640 AIRWAY INHALATION TREATMENT: CPT

## 2024-01-01 PROCEDURE — 80053 COMPREHEN METABOLIC PANEL: CPT

## 2024-01-01 PROCEDURE — 80048 BASIC METABOLIC PNL TOTAL CA: CPT

## 2024-01-01 PROCEDURE — 81001 URINALYSIS AUTO W/SCOPE: CPT

## 2024-01-01 PROCEDURE — 85014 HEMATOCRIT: CPT

## 2024-01-01 PROCEDURE — 2580000003 HC RX 258: Performed by: FAMILY MEDICINE

## 2024-01-01 PROCEDURE — 87899 AGENT NOS ASSAY W/OPTIC: CPT

## 2024-01-01 PROCEDURE — 87449 NOS EACH ORGANISM AG IA: CPT

## 2024-01-01 PROCEDURE — 7100000010 HC PHASE II RECOVERY - FIRST 15 MIN: Performed by: INTERNAL MEDICINE

## 2024-01-01 PROCEDURE — 82607 VITAMIN B-12: CPT

## 2024-01-01 PROCEDURE — 6360000002 HC RX W HCPCS: Performed by: NURSE PRACTITIONER

## 2024-01-01 PROCEDURE — 6360000002 HC RX W HCPCS

## 2024-01-01 PROCEDURE — 78278 ACUTE GI BLOOD LOSS IMAGING: CPT

## 2024-01-01 PROCEDURE — 2500000003 HC RX 250 WO HCPCS: Performed by: INTERNAL MEDICINE

## 2024-01-01 PROCEDURE — 5A1955Z RESPIRATORY VENTILATION, GREATER THAN 96 CONSECUTIVE HOURS: ICD-10-PCS | Performed by: INTERNAL MEDICINE

## 2024-01-01 PROCEDURE — 70450 CT HEAD/BRAIN W/O DYE: CPT

## 2024-01-01 PROCEDURE — 71275 CT ANGIOGRAPHY CHEST: CPT

## 2024-01-01 PROCEDURE — 86403 PARTICLE AGGLUT ANTBDY SCRN: CPT

## 2024-01-01 PROCEDURE — 82962 GLUCOSE BLOOD TEST: CPT

## 2024-01-01 PROCEDURE — 99291 CRITICAL CARE FIRST HOUR: CPT | Performed by: INTERNAL MEDICINE

## 2024-01-01 PROCEDURE — 83540 ASSAY OF IRON: CPT

## 2024-01-01 PROCEDURE — 2580000003 HC RX 258: Performed by: HOSPITALIST

## 2024-01-01 PROCEDURE — 85018 HEMOGLOBIN: CPT

## 2024-01-01 PROCEDURE — A9560 TC99M LABELED RBC: HCPCS | Performed by: RADIOLOGY

## 2024-01-01 PROCEDURE — 94003 VENT MGMT INPAT SUBQ DAY: CPT

## 2024-01-01 PROCEDURE — 36415 COLL VENOUS BLD VENIPUNCTURE: CPT

## 2024-01-01 PROCEDURE — 82805 BLOOD GASES W/O2 SATURATION: CPT

## 2024-01-01 PROCEDURE — 2580000003 HC RX 258: Performed by: INTERNAL MEDICINE

## 2024-01-01 PROCEDURE — 3E033XZ INTRODUCTION OF VASOPRESSOR INTO PERIPHERAL VEIN, PERCUTANEOUS APPROACH: ICD-10-PCS | Performed by: INTERNAL MEDICINE

## 2024-01-01 PROCEDURE — 31500 INSERT EMERGENCY AIRWAY: CPT

## 2024-01-01 PROCEDURE — 93005 ELECTROCARDIOGRAM TRACING: CPT | Performed by: FAMILY MEDICINE

## 2024-01-01 PROCEDURE — 0202U NFCT DS 22 TRGT SARS-COV-2: CPT

## 2024-01-01 PROCEDURE — 87077 CULTURE AEROBIC IDENTIFY: CPT

## 2024-01-01 PROCEDURE — 85610 PROTHROMBIN TIME: CPT

## 2024-01-01 PROCEDURE — 83735 ASSAY OF MAGNESIUM: CPT

## 2024-01-01 PROCEDURE — 87205 SMEAR GRAM STAIN: CPT

## 2024-01-01 PROCEDURE — 83880 ASSAY OF NATRIURETIC PEPTIDE: CPT

## 2024-01-01 PROCEDURE — 6360000002 HC RX W HCPCS: Performed by: SPECIALIST

## 2024-01-01 PROCEDURE — 87635 SARS-COV-2 COVID-19 AMP PRB: CPT

## 2024-01-01 PROCEDURE — P9016 RBC LEUKOCYTES REDUCED: HCPCS

## 2024-01-01 PROCEDURE — 96375 TX/PRO/DX INJ NEW DRUG ADDON: CPT

## 2024-01-01 PROCEDURE — 83605 ASSAY OF LACTIC ACID: CPT

## 2024-01-01 PROCEDURE — 99223 1ST HOSP IP/OBS HIGH 75: CPT | Performed by: FAMILY MEDICINE

## 2024-01-01 PROCEDURE — 80202 ASSAY OF VANCOMYCIN: CPT

## 2024-01-01 PROCEDURE — 86923 COMPATIBILITY TEST ELECTRIC: CPT

## 2024-01-01 PROCEDURE — 6360000004 HC RX CONTRAST MEDICATION: Performed by: RADIOLOGY

## 2024-01-01 PROCEDURE — 96361 HYDRATE IV INFUSION ADD-ON: CPT

## 2024-01-01 PROCEDURE — 85025 COMPLETE CBC W/AUTO DIFF WBC: CPT

## 2024-01-01 PROCEDURE — 3609017100 HC EGD: Performed by: INTERNAL MEDICINE

## 2024-01-01 PROCEDURE — 84145 PROCALCITONIN (PCT): CPT

## 2024-01-01 PROCEDURE — 71045 X-RAY EXAM CHEST 1 VIEW: CPT

## 2024-01-01 PROCEDURE — 93005 ELECTROCARDIOGRAM TRACING: CPT | Performed by: STUDENT IN AN ORGANIZED HEALTH CARE EDUCATION/TRAINING PROGRAM

## 2024-01-01 PROCEDURE — 82728 ASSAY OF FERRITIN: CPT

## 2024-01-01 PROCEDURE — 99233 SBSQ HOSP IP/OBS HIGH 50: CPT | Performed by: INTERNAL MEDICINE

## 2024-01-01 PROCEDURE — 86850 RBC ANTIBODY SCREEN: CPT

## 2024-01-01 PROCEDURE — 2060000000 HC ICU INTERMEDIATE R&B

## 2024-01-01 PROCEDURE — 87086 URINE CULTURE/COLONY COUNT: CPT

## 2024-01-01 PROCEDURE — 0DJ08ZZ INSPECTION OF UPPER INTESTINAL TRACT, VIA NATURAL OR ARTIFICIAL OPENING ENDOSCOPIC: ICD-10-PCS | Performed by: INTERNAL MEDICINE

## 2024-01-01 PROCEDURE — 83690 ASSAY OF LIPASE: CPT

## 2024-01-01 PROCEDURE — 99232 SBSQ HOSP IP/OBS MODERATE 35: CPT | Performed by: FAMILY MEDICINE

## 2024-01-01 PROCEDURE — 30233N1 TRANSFUSION OF NONAUTOLOGOUS RED BLOOD CELLS INTO PERIPHERAL VEIN, PERCUTANEOUS APPROACH: ICD-10-PCS | Performed by: INTERNAL MEDICINE

## 2024-01-01 PROCEDURE — 3430000000 HC RX DIAGNOSTIC RADIOPHARMACEUTICAL: Performed by: RADIOLOGY

## 2024-01-01 PROCEDURE — 83550 IRON BINDING TEST: CPT

## 2024-01-01 PROCEDURE — 99285 EMERGENCY DEPT VISIT HI MDM: CPT

## 2024-01-01 PROCEDURE — 96365 THER/PROPH/DIAG IV INF INIT: CPT

## 2024-01-01 PROCEDURE — 6370000000 HC RX 637 (ALT 250 FOR IP): Performed by: SPECIALIST

## 2024-01-01 PROCEDURE — 87154 CUL TYP ID BLD PTHGN 6+ TRGT: CPT

## 2024-01-01 PROCEDURE — 87502 INFLUENZA DNA AMP PROBE: CPT

## 2024-01-01 PROCEDURE — 2709999900 HC NON-CHARGEABLE SUPPLY: Performed by: INTERNAL MEDICINE

## 2024-01-01 PROCEDURE — 6360000002 HC RX W HCPCS: Performed by: INTERNAL MEDICINE

## 2024-01-01 PROCEDURE — 93005 ELECTROCARDIOGRAM TRACING: CPT | Performed by: NURSE PRACTITIONER

## 2024-01-01 PROCEDURE — 87081 CULTURE SCREEN ONLY: CPT

## 2024-01-01 PROCEDURE — 86900 BLOOD TYPING SEROLOGIC ABO: CPT

## 2024-01-01 PROCEDURE — 74177 CT ABD & PELVIS W/CONTRAST: CPT

## 2024-01-01 PROCEDURE — 94002 VENT MGMT INPAT INIT DAY: CPT

## 2024-01-01 PROCEDURE — 2580000003 HC RX 258: Performed by: STUDENT IN AN ORGANIZED HEALTH CARE EDUCATION/TRAINING PROGRAM

## 2024-01-01 PROCEDURE — 87070 CULTURE OTHR SPECIMN AEROBIC: CPT

## 2024-01-01 PROCEDURE — 7100000011 HC PHASE II RECOVERY - ADDTL 15 MIN: Performed by: INTERNAL MEDICINE

## 2024-01-01 PROCEDURE — 3700000000 HC ANESTHESIA ATTENDED CARE: Performed by: INTERNAL MEDICINE

## 2024-01-01 PROCEDURE — 86901 BLOOD TYPING SEROLOGIC RH(D): CPT

## 2024-01-01 PROCEDURE — 3700000001 HC ADD 15 MINUTES (ANESTHESIA): Performed by: INTERNAL MEDICINE

## 2024-01-01 PROCEDURE — 36430 TRANSFUSION BLD/BLD COMPNT: CPT

## 2024-01-01 PROCEDURE — 6370000000 HC RX 637 (ALT 250 FOR IP)

## 2024-01-01 PROCEDURE — 2500000003 HC RX 250 WO HCPCS

## 2024-01-01 RX ORDER — METOPROLOL TARTRATE 1 MG/ML
5 INJECTION, SOLUTION INTRAVENOUS ONCE
Status: COMPLETED | OUTPATIENT
Start: 2024-01-01 | End: 2024-01-01

## 2024-01-01 RX ORDER — METOPROLOL TARTRATE 1 MG/ML
2.5 INJECTION, SOLUTION INTRAVENOUS EVERY 6 HOURS PRN
Status: DISCONTINUED | OUTPATIENT
Start: 2024-01-01 | End: 2024-01-01 | Stop reason: HOSPADM

## 2024-01-01 RX ORDER — ENOXAPARIN SODIUM 100 MG/ML
40 INJECTION SUBCUTANEOUS DAILY
Status: DISCONTINUED | OUTPATIENT
Start: 2024-01-01 | End: 2024-01-01 | Stop reason: HOSPADM

## 2024-01-01 RX ORDER — EPINEPHRINE IN SOD CHLOR,ISO 1 MG/10 ML
SYRINGE (ML) INTRAVENOUS
Status: COMPLETED | OUTPATIENT
Start: 2024-01-01 | End: 2024-01-01

## 2024-01-01 RX ORDER — SODIUM CHLORIDE 0.9 % (FLUSH) 0.9 %
5-40 SYRINGE (ML) INJECTION EVERY 12 HOURS SCHEDULED
Status: DISCONTINUED | OUTPATIENT
Start: 2024-01-01 | End: 2024-01-01 | Stop reason: HOSPADM

## 2024-01-01 RX ORDER — DIGOXIN 0.25 MG/ML
250 INJECTION INTRAMUSCULAR; INTRAVENOUS ONCE
Status: COMPLETED | OUTPATIENT
Start: 2024-01-01 | End: 2024-01-01

## 2024-01-01 RX ORDER — SODIUM CHLORIDE 9 MG/ML
INJECTION, SOLUTION INTRAVENOUS PRN
Status: DISCONTINUED | OUTPATIENT
Start: 2024-01-01 | End: 2024-01-01 | Stop reason: HOSPADM

## 2024-01-01 RX ORDER — SODIUM CHLORIDE 0.9 % (FLUSH) 0.9 %
5-40 SYRINGE (ML) INJECTION PRN
Status: DISCONTINUED | OUTPATIENT
Start: 2024-01-01 | End: 2024-01-01 | Stop reason: HOSPADM

## 2024-01-01 RX ORDER — FUROSEMIDE 10 MG/ML
20 INJECTION INTRAMUSCULAR; INTRAVENOUS 2 TIMES DAILY
Status: DISCONTINUED | OUTPATIENT
Start: 2024-01-01 | End: 2024-01-01 | Stop reason: HOSPADM

## 2024-01-01 RX ORDER — IOPAMIDOL 755 MG/ML
80 INJECTION, SOLUTION INTRAVASCULAR
Status: COMPLETED | OUTPATIENT
Start: 2024-01-01 | End: 2024-01-01

## 2024-01-01 RX ORDER — ACETAMINOPHEN 650 MG/1
650 SUPPOSITORY RECTAL EVERY 6 HOURS PRN
Status: DISCONTINUED | OUTPATIENT
Start: 2024-01-01 | End: 2024-01-01 | Stop reason: HOSPADM

## 2024-01-01 RX ORDER — GLUCAGON 1 MG/ML
1 KIT INJECTION PRN
Status: DISCONTINUED | OUTPATIENT
Start: 2024-01-01 | End: 2024-01-01 | Stop reason: HOSPADM

## 2024-01-01 RX ORDER — 0.9 % SODIUM CHLORIDE 0.9 %
1000 INTRAVENOUS SOLUTION INTRAVENOUS ONCE
Status: COMPLETED | OUTPATIENT
Start: 2024-01-01 | End: 2024-01-01

## 2024-01-01 RX ORDER — LIDOCAINE HYDROCHLORIDE 10 MG/ML
50 INJECTION, SOLUTION INFILTRATION; PERINEURAL ONCE
Status: DISCONTINUED | OUTPATIENT
Start: 2024-01-01 | End: 2024-01-01 | Stop reason: HOSPADM

## 2024-01-01 RX ORDER — SULFAMETHOXAZOLE AND TRIMETHOPRIM 200; 40 MG/5ML; MG/5ML
160 SUSPENSION ORAL EVERY 12 HOURS
Status: DISCONTINUED | OUTPATIENT
Start: 2024-01-01 | End: 2024-01-01 | Stop reason: HOSPADM

## 2024-01-01 RX ORDER — LACTULOSE 10 G/15ML
20 SOLUTION ORAL 3 TIMES DAILY
Status: DISCONTINUED | OUTPATIENT
Start: 2024-01-01 | End: 2024-01-01

## 2024-01-01 RX ORDER — SODIUM CHLORIDE 9 MG/ML
INJECTION, SOLUTION INTRAVENOUS CONTINUOUS
Status: DISCONTINUED | OUTPATIENT
Start: 2024-01-01 | End: 2024-01-01

## 2024-01-01 RX ORDER — METOPROLOL TARTRATE 50 MG
50 TABLET ORAL ONCE
Status: COMPLETED | OUTPATIENT
Start: 2024-01-01 | End: 2024-01-01

## 2024-01-01 RX ORDER — FUROSEMIDE 10 MG/ML
20 INJECTION INTRAMUSCULAR; INTRAVENOUS ONCE
Status: COMPLETED | OUTPATIENT
Start: 2024-01-01 | End: 2024-01-01

## 2024-01-01 RX ORDER — SODIUM CHLORIDE 9 MG/ML
INJECTION, SOLUTION INTRAVENOUS ONCE
Status: COMPLETED | OUTPATIENT
Start: 2024-01-01 | End: 2024-01-01

## 2024-01-01 RX ORDER — LACTULOSE 10 G/15ML
20 SOLUTION ORAL ONCE
Status: COMPLETED | OUTPATIENT
Start: 2024-01-01 | End: 2024-01-01

## 2024-01-01 RX ORDER — DEXTROSE MONOHYDRATE 100 MG/ML
INJECTION, SOLUTION INTRAVENOUS CONTINUOUS PRN
Status: DISCONTINUED | OUTPATIENT
Start: 2024-01-01 | End: 2024-01-01 | Stop reason: HOSPADM

## 2024-01-01 RX ORDER — IPRATROPIUM BROMIDE AND ALBUTEROL SULFATE 2.5; .5 MG/3ML; MG/3ML
1 SOLUTION RESPIRATORY (INHALATION)
Status: DISCONTINUED | OUTPATIENT
Start: 2024-01-01 | End: 2024-01-01 | Stop reason: HOSPADM

## 2024-01-01 RX ORDER — METOPROLOL TARTRATE 50 MG
50 TABLET ORAL 2 TIMES DAILY
Status: CANCELLED | OUTPATIENT
Start: 2024-01-01

## 2024-01-01 RX ORDER — DEXTROSE MONOHYDRATE 50 MG/ML
INJECTION, SOLUTION INTRAVENOUS CONTINUOUS
Status: DISCONTINUED | OUTPATIENT
Start: 2024-01-01 | End: 2024-01-01 | Stop reason: HOSPADM

## 2024-01-01 RX ORDER — FLUCONAZOLE 2 MG/ML
400 INJECTION, SOLUTION INTRAVENOUS EVERY 24 HOURS
Status: DISCONTINUED | OUTPATIENT
Start: 2024-01-01 | End: 2024-01-01 | Stop reason: HOSPADM

## 2024-01-01 RX ORDER — LACTULOSE 10 G/15ML
20 SOLUTION ORAL 2 TIMES DAILY
Status: DISCONTINUED | OUTPATIENT
Start: 2024-01-01 | End: 2024-01-01 | Stop reason: HOSPADM

## 2024-01-01 RX ORDER — 0.9 % SODIUM CHLORIDE 0.9 %
250 INTRAVENOUS SOLUTION INTRAVENOUS ONCE
Status: COMPLETED | OUTPATIENT
Start: 2024-01-01 | End: 2024-01-01

## 2024-01-01 RX ORDER — CARVEDILOL 3.12 MG/1
3.12 TABLET ORAL DAILY
Status: DISCONTINUED | OUTPATIENT
Start: 2024-01-01 | End: 2024-01-01 | Stop reason: HOSPADM

## 2024-01-01 RX ORDER — METOPROLOL TARTRATE 1 MG/ML
5 INJECTION, SOLUTION INTRAVENOUS ONCE
Status: DISCONTINUED | OUTPATIENT
Start: 2024-01-01 | End: 2024-01-01 | Stop reason: HOSPADM

## 2024-01-01 RX ORDER — ACETAMINOPHEN 325 MG/1
650 TABLET ORAL EVERY 6 HOURS PRN
Status: DISCONTINUED | OUTPATIENT
Start: 2024-01-01 | End: 2024-01-01 | Stop reason: HOSPADM

## 2024-01-01 RX ADMIN — ENOXAPARIN SODIUM 40 MG: 100 INJECTION SUBCUTANEOUS at 08:44

## 2024-01-01 RX ADMIN — IPRATROPIUM BROMIDE AND ALBUTEROL SULFATE 1 DOSE: 2.5; .5 SOLUTION RESPIRATORY (INHALATION) at 16:53

## 2024-01-01 RX ADMIN — PANTOPRAZOLE SODIUM 40 MG: 40 INJECTION, POWDER, FOR SOLUTION INTRAVENOUS at 01:10

## 2024-01-01 RX ADMIN — CARVEDILOL 3.12 MG: 3.12 TABLET, FILM COATED ORAL at 10:08

## 2024-01-01 RX ADMIN — DEXTROSE MONOHYDRATE: 50 INJECTION, SOLUTION INTRAVENOUS at 01:04

## 2024-01-01 RX ADMIN — PANTOPRAZOLE SODIUM 40 MG: 40 INJECTION, POWDER, FOR SOLUTION INTRAVENOUS at 18:24

## 2024-01-01 RX ADMIN — PANTOPRAZOLE SODIUM 40 MG: 40 INJECTION, POWDER, FOR SOLUTION INTRAVENOUS at 17:08

## 2024-01-01 RX ADMIN — MICONAZOLE NITRATE: 20 OINTMENT TOPICAL at 18:40

## 2024-01-01 RX ADMIN — ANTI-FUNGAL POWDER MICONAZOLE NITRATE TALC FREE: 1.42 POWDER TOPICAL at 08:11

## 2024-01-01 RX ADMIN — PANTOPRAZOLE SODIUM 40 MG: 40 INJECTION, POWDER, FOR SOLUTION INTRAVENOUS at 00:59

## 2024-01-01 RX ADMIN — PANTOPRAZOLE SODIUM 40 MG: 40 INJECTION, POWDER, FOR SOLUTION INTRAVENOUS at 05:23

## 2024-01-01 RX ADMIN — DEXTROSE MONOHYDRATE 250 ML: 100 INJECTION, SOLUTION INTRAVENOUS at 12:21

## 2024-01-01 RX ADMIN — MICONAZOLE NITRATE: 20 OINTMENT TOPICAL at 10:28

## 2024-01-01 RX ADMIN — ANTI-FUNGAL POWDER MICONAZOLE NITRATE TALC FREE: 1.42 POWDER TOPICAL at 20:45

## 2024-01-01 RX ADMIN — MEROPENEM 1000 MG: 1 INJECTION INTRAVENOUS at 10:48

## 2024-01-01 RX ADMIN — IPRATROPIUM BROMIDE AND ALBUTEROL SULFATE 1 DOSE: 2.5; .5 SOLUTION RESPIRATORY (INHALATION) at 05:14

## 2024-01-01 RX ADMIN — ENOXAPARIN SODIUM 40 MG: 100 INJECTION SUBCUTANEOUS at 10:51

## 2024-01-01 RX ADMIN — SULFAMETHOXAZOLE AND TRIMETHOPRIM 20 ML: 200; 40 SUSPENSION ORAL at 03:50

## 2024-01-01 RX ADMIN — METOPROLOL TARTRATE 50 MG: 50 TABLET, FILM COATED ORAL at 13:09

## 2024-01-01 RX ADMIN — SODIUM BICARBONATE 50 MEQ: 84 INJECTION, SOLUTION INTRAVENOUS at 08:36

## 2024-01-01 RX ADMIN — VANCOMYCIN HYDROCHLORIDE 1000 MG: 1 INJECTION, POWDER, LYOPHILIZED, FOR SOLUTION INTRAVENOUS at 04:12

## 2024-01-01 RX ADMIN — MEROPENEM 1000 MG: 1 INJECTION INTRAVENOUS at 09:33

## 2024-01-01 RX ADMIN — FLUCONAZOLE 400 MG: 400 INJECTION, SOLUTION INTRAVENOUS at 16:12

## 2024-01-01 RX ADMIN — IPRATROPIUM BROMIDE AND ALBUTEROL SULFATE 1 DOSE: 2.5; .5 SOLUTION RESPIRATORY (INHALATION) at 05:42

## 2024-01-01 RX ADMIN — PANTOPRAZOLE SODIUM 40 MG: 40 INJECTION, POWDER, FOR SOLUTION INTRAVENOUS at 05:42

## 2024-01-01 RX ADMIN — CARVEDILOL 3.12 MG: 3.12 TABLET, FILM COATED ORAL at 13:13

## 2024-01-01 RX ADMIN — IPRATROPIUM BROMIDE AND ALBUTEROL SULFATE 1 DOSE: 2.5; .5 SOLUTION RESPIRATORY (INHALATION) at 21:04

## 2024-01-01 RX ADMIN — LACTULOSE 20 G: 20 SOLUTION ORAL at 10:20

## 2024-01-01 RX ADMIN — IPRATROPIUM BROMIDE AND ALBUTEROL SULFATE 1 DOSE: 2.5; .5 SOLUTION RESPIRATORY (INHALATION) at 21:28

## 2024-01-01 RX ADMIN — ANTI-FUNGAL POWDER MICONAZOLE NITRATE TALC FREE: 1.42 POWDER TOPICAL at 20:47

## 2024-01-01 RX ADMIN — SODIUM CHLORIDE: 9 INJECTION, SOLUTION INTRAVENOUS at 14:07

## 2024-01-01 RX ADMIN — Medication 10 ML: at 00:12

## 2024-01-01 RX ADMIN — VANCOMYCIN HYDROCHLORIDE 1500 MG: 10 INJECTION, POWDER, LYOPHILIZED, FOR SOLUTION INTRAVENOUS at 03:46

## 2024-01-01 RX ADMIN — LACTULOSE 20 G: 20 SOLUTION ORAL at 10:08

## 2024-01-01 RX ADMIN — IOPAMIDOL 80 ML: 755 INJECTION, SOLUTION INTRAVENOUS at 01:17

## 2024-01-01 RX ADMIN — Medication 20 MILLICURIE: at 16:36

## 2024-01-01 RX ADMIN — PANTOPRAZOLE SODIUM 40 MG: 40 INJECTION, POWDER, FOR SOLUTION INTRAVENOUS at 05:21

## 2024-01-01 RX ADMIN — PANTOPRAZOLE SODIUM 40 MG: 40 INJECTION, POWDER, FOR SOLUTION INTRAVENOUS at 18:37

## 2024-01-01 RX ADMIN — MEROPENEM 1000 MG: 1 INJECTION INTRAVENOUS at 02:37

## 2024-01-01 RX ADMIN — MICONAZOLE NITRATE: 20 OINTMENT TOPICAL at 21:31

## 2024-01-01 RX ADMIN — FLUCONAZOLE 400 MG: 400 INJECTION, SOLUTION INTRAVENOUS at 16:21

## 2024-01-01 RX ADMIN — SODIUM CHLORIDE 250 ML: 9 INJECTION, SOLUTION INTRAVENOUS at 19:40

## 2024-01-01 RX ADMIN — ANTI-FUNGAL POWDER MICONAZOLE NITRATE TALC FREE: 1.42 POWDER TOPICAL at 18:40

## 2024-01-01 RX ADMIN — MEROPENEM 1000 MG: 1 INJECTION INTRAVENOUS at 10:26

## 2024-01-01 RX ADMIN — CARVEDILOL 3.12 MG: 3.12 TABLET, FILM COATED ORAL at 08:44

## 2024-01-01 RX ADMIN — Medication 10 ML: at 20:05

## 2024-01-01 RX ADMIN — SODIUM CHLORIDE 1000 ML: 9 INJECTION, SOLUTION INTRAVENOUS at 00:22

## 2024-01-01 RX ADMIN — ENOXAPARIN SODIUM 40 MG: 100 INJECTION SUBCUTANEOUS at 08:12

## 2024-01-01 RX ADMIN — POTASSIUM BICARBONATE 40 MEQ: 782 TABLET, EFFERVESCENT ORAL at 13:12

## 2024-01-01 RX ADMIN — LACTULOSE 20 G: 20 SOLUTION ORAL at 04:46

## 2024-01-01 RX ADMIN — SODIUM CHLORIDE: 9 INJECTION, SOLUTION INTRAVENOUS at 13:06

## 2024-01-01 RX ADMIN — IPRATROPIUM BROMIDE AND ALBUTEROL SULFATE 1 DOSE: 2.5; .5 SOLUTION RESPIRATORY (INHALATION) at 18:44

## 2024-01-01 RX ADMIN — MEROPENEM 1000 MG: 1 INJECTION INTRAVENOUS at 10:55

## 2024-01-01 RX ADMIN — LACTULOSE 20 G: 20 SOLUTION ORAL at 08:44

## 2024-01-01 RX ADMIN — LACTULOSE 20 G: 20 SOLUTION ORAL at 16:03

## 2024-01-01 RX ADMIN — IPRATROPIUM BROMIDE AND ALBUTEROL SULFATE 1 DOSE: 2.5; .5 SOLUTION RESPIRATORY (INHALATION) at 18:20

## 2024-01-01 RX ADMIN — PANTOPRAZOLE SODIUM 40 MG: 40 INJECTION, POWDER, FOR SOLUTION INTRAVENOUS at 06:02

## 2024-01-01 RX ADMIN — ANTI-FUNGAL POWDER MICONAZOLE NITRATE TALC FREE: 1.42 POWDER TOPICAL at 10:27

## 2024-01-01 RX ADMIN — SODIUM CHLORIDE 1000 ML: 9 INJECTION, SOLUTION INTRAVENOUS at 03:18

## 2024-01-01 RX ADMIN — IPRATROPIUM BROMIDE AND ALBUTEROL SULFATE 1 DOSE: 2.5; .5 SOLUTION RESPIRATORY (INHALATION) at 15:14

## 2024-01-01 RX ADMIN — FLUCONAZOLE 400 MG: 400 INJECTION, SOLUTION INTRAVENOUS at 15:52

## 2024-01-01 RX ADMIN — Medication 10 ML: at 10:50

## 2024-01-01 RX ADMIN — IPRATROPIUM BROMIDE AND ALBUTEROL SULFATE 1 DOSE: 2.5; .5 SOLUTION RESPIRATORY (INHALATION) at 09:57

## 2024-01-01 RX ADMIN — Medication 10 ML: at 08:46

## 2024-01-01 RX ADMIN — ENOXAPARIN SODIUM 40 MG: 100 INJECTION SUBCUTANEOUS at 10:19

## 2024-01-01 RX ADMIN — VANCOMYCIN HYDROCHLORIDE 1000 MG: 1 INJECTION, POWDER, LYOPHILIZED, FOR SOLUTION INTRAVENOUS at 01:15

## 2024-01-01 RX ADMIN — MEROPENEM 1000 MG: 1 INJECTION INTRAVENOUS at 21:01

## 2024-01-01 RX ADMIN — IPRATROPIUM BROMIDE AND ALBUTEROL SULFATE 1 DOSE: 2.5; .5 SOLUTION RESPIRATORY (INHALATION) at 10:01

## 2024-01-01 RX ADMIN — Medication 10 ML: at 21:37

## 2024-01-01 RX ADMIN — PANTOPRAZOLE SODIUM 40 MG: 40 INJECTION, POWDER, FOR SOLUTION INTRAVENOUS at 17:18

## 2024-01-01 RX ADMIN — MEROPENEM 1000 MG: 1 INJECTION INTRAVENOUS at 20:36

## 2024-01-01 RX ADMIN — MICONAZOLE NITRATE: 20 OINTMENT TOPICAL at 20:48

## 2024-01-01 RX ADMIN — MEROPENEM 1000 MG: 1 INJECTION INTRAVENOUS at 22:02

## 2024-01-01 RX ADMIN — PANTOPRAZOLE SODIUM 40 MG: 40 INJECTION, POWDER, FOR SOLUTION INTRAVENOUS at 12:08

## 2024-01-01 RX ADMIN — PANTOPRAZOLE SODIUM 40 MG: 40 INJECTION, POWDER, FOR SOLUTION INTRAVENOUS at 13:27

## 2024-01-01 RX ADMIN — IPRATROPIUM BROMIDE AND ALBUTEROL SULFATE 1 DOSE: 2.5; .5 SOLUTION RESPIRATORY (INHALATION) at 01:27

## 2024-01-01 RX ADMIN — MEROPENEM 1000 MG: 1 INJECTION INTRAVENOUS at 21:35

## 2024-01-01 RX ADMIN — MICONAZOLE NITRATE: 20 OINTMENT TOPICAL at 10:11

## 2024-01-01 RX ADMIN — SODIUM CHLORIDE: 9 INJECTION, SOLUTION INTRAVENOUS at 08:58

## 2024-01-01 RX ADMIN — ANTI-FUNGAL POWDER MICONAZOLE NITRATE TALC FREE: 1.42 POWDER TOPICAL at 23:54

## 2024-01-01 RX ADMIN — PANTOPRAZOLE SODIUM 40 MG: 40 INJECTION, POWDER, FOR SOLUTION INTRAVENOUS at 06:49

## 2024-01-01 RX ADMIN — Medication 10 ML: at 20:33

## 2024-01-01 RX ADMIN — MICONAZOLE NITRATE: 20 OINTMENT TOPICAL at 20:45

## 2024-01-01 RX ADMIN — IPRATROPIUM BROMIDE AND ALBUTEROL SULFATE 1 DOSE: 2.5; .5 SOLUTION RESPIRATORY (INHALATION) at 10:09

## 2024-01-01 RX ADMIN — ENOXAPARIN SODIUM 40 MG: 100 INJECTION SUBCUTANEOUS at 10:10

## 2024-01-01 RX ADMIN — ANTI-FUNGAL POWDER MICONAZOLE NITRATE TALC FREE: 1.42 POWDER TOPICAL at 21:31

## 2024-01-01 RX ADMIN — DEXTROSE MONOHYDRATE: 50 INJECTION, SOLUTION INTRAVENOUS at 14:45

## 2024-01-01 RX ADMIN — IPRATROPIUM BROMIDE AND ALBUTEROL SULFATE 1 DOSE: 2.5; .5 SOLUTION RESPIRATORY (INHALATION) at 09:36

## 2024-01-01 RX ADMIN — DEXTROSE MONOHYDRATE 250 ML: 100 INJECTION, SOLUTION INTRAVENOUS at 09:50

## 2024-01-01 RX ADMIN — Medication 1 MG: at 08:33

## 2024-01-01 RX ADMIN — ANTI-FUNGAL POWDER MICONAZOLE NITRATE TALC FREE: 1.42 POWDER TOPICAL at 08:45

## 2024-01-01 RX ADMIN — IPRATROPIUM BROMIDE AND ALBUTEROL SULFATE 1 DOSE: 2.5; .5 SOLUTION RESPIRATORY (INHALATION) at 13:24

## 2024-01-01 RX ADMIN — LACTULOSE 20 G: 20 SOLUTION ORAL at 20:47

## 2024-01-01 RX ADMIN — PANTOPRAZOLE SODIUM 40 MG: 40 INJECTION, POWDER, FOR SOLUTION INTRAVENOUS at 11:17

## 2024-01-01 RX ADMIN — IPRATROPIUM BROMIDE AND ALBUTEROL SULFATE 1 DOSE: 2.5; .5 SOLUTION RESPIRATORY (INHALATION) at 06:45

## 2024-01-01 RX ADMIN — ANTI-FUNGAL POWDER MICONAZOLE NITRATE TALC FREE: 1.42 POWDER TOPICAL at 10:10

## 2024-01-01 RX ADMIN — IPRATROPIUM BROMIDE AND ALBUTEROL SULFATE 1 DOSE: 2.5; .5 SOLUTION RESPIRATORY (INHALATION) at 06:38

## 2024-01-01 RX ADMIN — PANTOPRAZOLE SODIUM 40 MG: 40 INJECTION, POWDER, FOR SOLUTION INTRAVENOUS at 00:50

## 2024-01-01 RX ADMIN — LACTULOSE 20 G: 20 SOLUTION ORAL at 00:06

## 2024-01-01 RX ADMIN — Medication 10 ML: at 08:11

## 2024-01-01 RX ADMIN — FLUCONAZOLE 400 MG: 400 INJECTION, SOLUTION INTRAVENOUS at 16:52

## 2024-01-01 RX ADMIN — IPRATROPIUM BROMIDE AND ALBUTEROL SULFATE 1 DOSE: 2.5; .5 SOLUTION RESPIRATORY (INHALATION) at 06:21

## 2024-01-01 RX ADMIN — MICONAZOLE NITRATE: 20 OINTMENT TOPICAL at 08:45

## 2024-01-01 RX ADMIN — MICONAZOLE NITRATE: 20 OINTMENT TOPICAL at 08:12

## 2024-01-01 RX ADMIN — MEROPENEM 1000 MG: 1 INJECTION INTRAVENOUS at 10:22

## 2024-01-01 RX ADMIN — MICONAZOLE NITRATE: 20 OINTMENT TOPICAL at 20:33

## 2024-01-01 RX ADMIN — PANTOPRAZOLE SODIUM 40 MG: 40 INJECTION, POWDER, FOR SOLUTION INTRAVENOUS at 10:10

## 2024-01-01 RX ADMIN — Medication 1 MG: at 08:36

## 2024-01-01 RX ADMIN — PANTOPRAZOLE SODIUM 80 MG: 40 INJECTION, POWDER, FOR SOLUTION INTRAVENOUS at 04:46

## 2024-01-01 RX ADMIN — ANTI-FUNGAL POWDER MICONAZOLE NITRATE TALC FREE: 1.42 POWDER TOPICAL at 20:32

## 2024-01-01 RX ADMIN — PANTOPRAZOLE SODIUM 40 MG: 40 INJECTION, POWDER, FOR SOLUTION INTRAVENOUS at 00:24

## 2024-01-01 RX ADMIN — IPRATROPIUM BROMIDE AND ALBUTEROL SULFATE 1 DOSE: 2.5; .5 SOLUTION RESPIRATORY (INHALATION) at 18:24

## 2024-01-01 RX ADMIN — MEROPENEM 1000 MG: 1 INJECTION INTRAVENOUS at 01:32

## 2024-01-01 RX ADMIN — LACTULOSE 20 G: 20 SOLUTION ORAL at 20:45

## 2024-01-01 RX ADMIN — Medication 10 ML: at 20:48

## 2024-01-01 RX ADMIN — MICONAZOLE NITRATE: 20 OINTMENT TOPICAL at 23:54

## 2024-01-01 RX ADMIN — METOPROLOL TARTRATE 5 MG: 5 INJECTION INTRAVENOUS at 09:25

## 2024-01-01 RX ADMIN — FUROSEMIDE 20 MG: 10 INJECTION, SOLUTION INTRAMUSCULAR; INTRAVENOUS at 10:50

## 2024-01-01 RX ADMIN — IPRATROPIUM BROMIDE AND ALBUTEROL SULFATE 1 DOSE: 2.5; .5 SOLUTION RESPIRATORY (INHALATION) at 09:54

## 2024-01-01 RX ADMIN — LACTULOSE 20 G: 20 SOLUTION ORAL at 20:32

## 2024-01-01 RX ADMIN — DIGOXIN 250 MCG: 0.25 INJECTION INTRAMUSCULAR; INTRAVENOUS at 11:13

## 2024-01-01 RX ADMIN — SODIUM CHLORIDE: 9 INJECTION, SOLUTION INTRAVENOUS at 11:43

## 2024-01-01 RX ADMIN — Medication 10 ML: at 10:31

## 2024-01-01 ASSESSMENT — PULMONARY FUNCTION TESTS
PIF_VALUE: 29
PIF_VALUE: 31
PIF_VALUE: 28
PIF_VALUE: 30
PIF_VALUE: 32
PIF_VALUE: 30
PIF_VALUE: 30
PIF_VALUE: 27
PIF_VALUE: 24
PIF_VALUE: 25
PIF_VALUE: 23
PIF_VALUE: 25
PIF_VALUE: 33
PIF_VALUE: 40
PIF_VALUE: 27
PIF_VALUE: 34
PIF_VALUE: 36
PIF_VALUE: 25
PIF_VALUE: 29
PIF_VALUE: 40
PIF_VALUE: 29
PIF_VALUE: 34
PIF_VALUE: 32
PIF_VALUE: 33
PIF_VALUE: 34
PIF_VALUE: 28
PIF_VALUE: 28
PIF_VALUE: 27
PIF_VALUE: 29
PIF_VALUE: 32
PIF_VALUE: 31
PIF_VALUE: 28
PIF_VALUE: 27
PIF_VALUE: 35

## 2024-01-01 ASSESSMENT — PAIN SCALES - GENERAL
PAINLEVEL_OUTOF10: 0

## 2024-01-01 ASSESSMENT — PAIN SCALES - WONG BAKER

## 2024-01-13 ENCOUNTER — APPOINTMENT (OUTPATIENT)
Dept: CT IMAGING | Age: 79
DRG: 640 | End: 2024-01-13
Payer: MEDICARE

## 2024-01-13 ENCOUNTER — APPOINTMENT (OUTPATIENT)
Dept: GENERAL RADIOLOGY | Age: 79
DRG: 640 | End: 2024-01-13
Payer: MEDICARE

## 2024-01-13 ENCOUNTER — HOSPITAL ENCOUNTER (INPATIENT)
Age: 79
LOS: 3 days | Discharge: HOME | DRG: 640 | End: 2024-01-16
Attending: STUDENT IN AN ORGANIZED HEALTH CARE EDUCATION/TRAINING PROGRAM | Admitting: INTERNAL MEDICINE
Payer: MEDICARE

## 2024-01-13 DIAGNOSIS — Z99.11 VENTILATOR DEPENDENCE (HCC): ICD-10-CM

## 2024-01-13 DIAGNOSIS — E87.0 ACUTE HYPERNATREMIA: Primary | ICD-10-CM

## 2024-01-13 LAB
ALBUMIN SERPL-MCNC: 3.3 G/DL (ref 3.5–5.2)
ALP SERPL-CCNC: 117 U/L (ref 35–104)
ALT SERPL-CCNC: 8 U/L (ref 0–32)
ANION GAP SERPL CALCULATED.3IONS-SCNC: 8 MMOL/L (ref 7–16)
AST SERPL-CCNC: 10 U/L (ref 0–31)
BASOPHILS # BLD: 0.04 K/UL (ref 0–0.2)
BASOPHILS NFR BLD: 0 % (ref 0–2)
BILIRUB SERPL-MCNC: 0.2 MG/DL (ref 0–1.2)
BUN SERPL-MCNC: 58 MG/DL (ref 6–23)
CALCIUM SERPL-MCNC: 9.5 MG/DL (ref 8.6–10.2)
CHLORIDE SERPL-SCNC: 114 MMOL/L (ref 98–107)
CO2 SERPL-SCNC: 31 MMOL/L (ref 22–29)
CREAT SERPL-MCNC: 0.9 MG/DL (ref 0.5–1)
EOSINOPHIL # BLD: 0.16 K/UL (ref 0.05–0.5)
EOSINOPHILS RELATIVE PERCENT: 2 % (ref 0–6)
ERYTHROCYTE [DISTWIDTH] IN BLOOD BY AUTOMATED COUNT: 17.5 % (ref 11.5–15)
GFR SERPL CREATININE-BSD FRML MDRD: >60 ML/MIN/1.73M2
GLUCOSE BLD-MCNC: 97 MG/DL (ref 74–99)
GLUCOSE SERPL-MCNC: 97 MG/DL (ref 74–99)
HCT VFR BLD AUTO: 30.8 % (ref 34–48)
HGB BLD-MCNC: 9.2 G/DL (ref 11.5–15.5)
IMM GRANULOCYTES # BLD AUTO: 0.19 K/UL (ref 0–0.58)
IMM GRANULOCYTES NFR BLD: 2 % (ref 0–5)
INFLUENZA A BY PCR: NOT DETECTED
INFLUENZA B BY PCR: NOT DETECTED
LACTATE BLDV-SCNC: 1.2 MMOL/L (ref 0.5–1.9)
LACTATE BLDV-SCNC: 1.2 MMOL/L (ref 0.5–1.9)
LYMPHOCYTES NFR BLD: 0.89 K/UL (ref 1.5–4)
LYMPHOCYTES RELATIVE PERCENT: 9 % (ref 20–42)
MCH RBC QN AUTO: 26.7 PG (ref 26–35)
MCHC RBC AUTO-ENTMCNC: 29.9 G/DL (ref 32–34.5)
MCV RBC AUTO: 89.3 FL (ref 80–99.9)
MONOCYTES NFR BLD: 0.68 K/UL (ref 0.1–0.95)
MONOCYTES NFR BLD: 7 % (ref 2–12)
NEUTROPHILS NFR BLD: 80 % (ref 43–80)
NEUTS SEG NFR BLD: 7.6 K/UL (ref 1.8–7.3)
PLATELET # BLD AUTO: 260 K/UL (ref 130–450)
PMV BLD AUTO: 11.2 FL (ref 7–12)
POC HCO3: 30.8 MMOL/L (ref 22–26)
POC O2 SATURATION: 97.3 % (ref 92–98.5)
POC PCO2: 42.4 MM HG (ref 35–45)
POC PH: 7.47 (ref 7.35–7.45)
POC PO2: 88.7 MM HG (ref 60–80)
POSITIVE BASE EXCESS, ART: 6.5 MMOL/L (ref 0–3)
POTASSIUM SERPL-SCNC: 5.2 MMOL/L (ref 3.5–5)
PROCALCITONIN SERPL-MCNC: 0.15 NG/ML (ref 0–0.08)
PROT SERPL-MCNC: 6.8 G/DL (ref 6.4–8.3)
RBC # BLD AUTO: 3.45 M/UL (ref 3.5–5.5)
SARS-COV-2 RDRP RESP QL NAA+PROBE: NOT DETECTED
SODIUM SERPL-SCNC: 153 MMOL/L (ref 132–146)
SPECIMEN DESCRIPTION: NORMAL
TROPONIN I SERPL HS-MCNC: 43 NG/L (ref 0–9)
TROPONIN I SERPL HS-MCNC: 45 NG/L (ref 0–9)
WBC OTHER # BLD: 9.6 K/UL (ref 4.5–11.5)

## 2024-01-13 PROCEDURE — 6360000002 HC RX W HCPCS: Performed by: SPECIALIST

## 2024-01-13 PROCEDURE — 84484 ASSAY OF TROPONIN QUANT: CPT

## 2024-01-13 PROCEDURE — 93005 ELECTROCARDIOGRAM TRACING: CPT

## 2024-01-13 PROCEDURE — 87205 SMEAR GRAM STAIN: CPT

## 2024-01-13 PROCEDURE — 87040 BLOOD CULTURE FOR BACTERIA: CPT

## 2024-01-13 PROCEDURE — 96365 THER/PROPH/DIAG IV INF INIT: CPT

## 2024-01-13 PROCEDURE — 99285 EMERGENCY DEPT VISIT HI MDM: CPT

## 2024-01-13 PROCEDURE — 84145 PROCALCITONIN (PCT): CPT

## 2024-01-13 PROCEDURE — 6360000002 HC RX W HCPCS

## 2024-01-13 PROCEDURE — 99223 1ST HOSP IP/OBS HIGH 75: CPT | Performed by: INTERNAL MEDICINE

## 2024-01-13 PROCEDURE — 87086 URINE CULTURE/COLONY COUNT: CPT

## 2024-01-13 PROCEDURE — 74176 CT ABD & PELVIS W/O CONTRAST: CPT

## 2024-01-13 PROCEDURE — 87502 INFLUENZA DNA AMP PROBE: CPT

## 2024-01-13 PROCEDURE — 87635 SARS-COV-2 COVID-19 AMP PRB: CPT

## 2024-01-13 PROCEDURE — 82962 GLUCOSE BLOOD TEST: CPT

## 2024-01-13 PROCEDURE — 87106 FUNGI IDENTIFICATION YEAST: CPT

## 2024-01-13 PROCEDURE — 85025 COMPLETE CBC W/AUTO DIFF WBC: CPT

## 2024-01-13 PROCEDURE — 87070 CULTURE OTHR SPECIMN AEROBIC: CPT

## 2024-01-13 PROCEDURE — 82803 BLOOD GASES ANY COMBINATION: CPT

## 2024-01-13 PROCEDURE — 87077 CULTURE AEROBIC IDENTIFY: CPT

## 2024-01-13 PROCEDURE — 5A1945Z RESPIRATORY VENTILATION, 24-96 CONSECUTIVE HOURS: ICD-10-PCS | Performed by: INTERNAL MEDICINE

## 2024-01-13 PROCEDURE — 2060000000 HC ICU INTERMEDIATE R&B

## 2024-01-13 PROCEDURE — 83605 ASSAY OF LACTIC ACID: CPT

## 2024-01-13 PROCEDURE — 87081 CULTURE SCREEN ONLY: CPT

## 2024-01-13 PROCEDURE — 70450 CT HEAD/BRAIN W/O DYE: CPT

## 2024-01-13 PROCEDURE — 86403 PARTICLE AGGLUT ANTBDY SCRN: CPT

## 2024-01-13 PROCEDURE — 80053 COMPREHEN METABOLIC PANEL: CPT

## 2024-01-13 PROCEDURE — 2580000003 HC RX 258

## 2024-01-13 PROCEDURE — 71045 X-RAY EXAM CHEST 1 VIEW: CPT

## 2024-01-13 PROCEDURE — 36600 WITHDRAWAL OF ARTERIAL BLOOD: CPT

## 2024-01-13 RX ORDER — 0.9 % SODIUM CHLORIDE 0.9 %
1000 INTRAVENOUS SOLUTION INTRAVENOUS ONCE
Status: COMPLETED | OUTPATIENT
Start: 2024-01-13 | End: 2024-01-13

## 2024-01-13 RX ORDER — FLUCONAZOLE 2 MG/ML
400 INJECTION, SOLUTION INTRAVENOUS EVERY 24 HOURS
Status: DISCONTINUED | OUTPATIENT
Start: 2024-01-13 | End: 2024-01-16

## 2024-01-13 RX ORDER — LEVOFLOXACIN 5 MG/ML
750 INJECTION, SOLUTION INTRAVENOUS
Status: ON HOLD | COMMUNITY
End: 2024-01-16 | Stop reason: HOSPADM

## 2024-01-13 RX ORDER — NITROGLYCERIN 0.4 MG/1
0.4 TABLET SUBLINGUAL EVERY 5 MIN PRN
COMMUNITY

## 2024-01-13 RX ADMIN — VANCOMYCIN HYDROCHLORIDE 1250 MG: 10 INJECTION, POWDER, LYOPHILIZED, FOR SOLUTION INTRAVENOUS at 18:37

## 2024-01-13 RX ADMIN — CEFEPIME 2000 MG: 2 INJECTION, POWDER, FOR SOLUTION INTRAVENOUS at 16:15

## 2024-01-13 RX ADMIN — SODIUM CHLORIDE 1000 ML: 9 INJECTION, SOLUTION INTRAVENOUS at 14:36

## 2024-01-13 RX ADMIN — FLUCONAZOLE IN SODIUM CHLORIDE 400 MG: 2 INJECTION, SOLUTION INTRAVENOUS at 20:18

## 2024-01-13 ASSESSMENT — PULMONARY FUNCTION TESTS
PIF_VALUE: 16
PIF_VALUE: 15
PIF_VALUE: 16
PIF_VALUE: 15
PIF_VALUE: 15
PIF_VALUE: 17
PIF_VALUE: 17
PIF_VALUE: 20
PIF_VALUE: 17

## 2024-01-13 NOTE — H&P
as needed for Chest pain up to max of 3 total doses. If no relief after 1 dose, call 911.   Yes ProviderAmina MD   venlafaxine (EFFEXOR) 25 MG tablet 1 tablet by PEG Tube route 3 times daily (with meals) 9/16/23   García Horton DO   HYDROcodone-acetaminophen (NORCO) 5-325 MG per tablet Take 1 tablet by mouth every 6 hours as needed for Pain.    Provider, MD Amina   acetaminophen (TYLENOL) 650 MG suppository Place 1 suppository rectally every 4 hours as needed for Pain    ProviderAmina MD   guaiFENesin (ROBITUSSIN) 100 MG/5ML liquid 10 mLs by Per G Tube route every 6 hours as needed for Cough    ProviderAmina MD   Nutritional Supplements (JEVITY 1.5 ENID PO) 35 mL/hr by PEG Tube route 2 times daily    ProviderAmina MD   acetaminophen (TYLENOL) 325 MG tablet 2 tablets by PEG Tube route every 4 hours as needed for Pain or Fever    ProviderAmina MD   amLODIPine (NORVASC) 5 MG tablet 1 tablet by PEG Tube route daily    ProviderAmina MD   ARIPiprazole (ABILIFY) 15 MG tablet 1 tablet by PEG Tube route daily    ProviderAmina MD   atorvastatin (LIPITOR) 10 MG tablet 1 tablet by PEG Tube route nightly    ProviderAmina MD   bisacodyl (DULCOLAX) 10 MG suppository Place 1 suppository rectally daily as needed for Constipation    ProviderAmina MD   budesonide (PULMICORT) 0.5 MG/2ML nebulizer suspension Take 2 mLs by nebulization in the morning and 2 mLs in the evening.    Provider, MD Amina   clopidogrel (PLAVIX) 75 MG tablet 1 tablet by PEG Tube route daily    ProviderAmina MD   apixaban (ELIQUIS) 5 MG TABS tablet 0.5 tablets by Per G Tube route 2 times daily    Amina Velez MD   ferrous sulfate 300 (60 Fe) MG/5ML syrup 5 mLs by Per G Tube route daily    Amina Velez MD   furosemide (LASIX) 20 MG tablet 1 tablet by PEG Tube route daily    Amina Velez MD   ipratropium 0.5 mg-albuterol 2.5 mg (DUONEB) 0.5-2.5

## 2024-01-13 NOTE — CONSULTS
history.     Medications   Not in a hospital admission.    CURRENT MEDS  vancomycin (VANCOCIN) 1,250 mg in sodium chloride 0.9 % 250 mL IVPB, Once        Allergies   Penicillins    Social History     Social History     Socioeconomic History    Marital status:      Spouse name: None    Number of children: None    Years of education: None    Highest education level: None   Tobacco Use    Smoking status: Former     Types: Cigarettes   Substance and Sexual Activity    Alcohol use: Not Currently       Family History   History reviewed. No pertinent family history.    Review of Systems   Review of Systems   Unable to obtain   Physical Exam   BP (!) 125/51   Pulse 85   Temp 99.3 °F (37.4 °C)   Resp 18   Wt 49.9 kg (110 lb)   SpO2 98%   BMI 20.78 kg/m²   Body Habitus: thin   General Appearance:  looks stated age,  no acute distress  Eyes: PERRLA, EOMI  HENT normocephalic atraumatic  Neck: supple, no masses trach   Cardiovascular: normal heart sounds, regular rate, regular rhythm  Respiratory: few breath sounds, no respiratory distress, no accessory muscle use trach vent  Abdominal: normal bowel sounds, soft, non-tender,  non-distended  Musculoskeletal: no skeletal abnormalities, rle left aka amp stitches  Skin: warm, dry, normal turgor, normal color pale stage 2 ulcer   Dermatitis groin backside  Neurological: nerves II - XII grossly normal and symmetric  Psychology: mood normal, affect normal  Goncalves cloudy     Labs       Recent Labs     01/13/24  1413   WBC 9.6   RBC 3.45*   HGB 9.2*   HCT 30.8*   MCV 89.3   RDW 17.5*           Recent Labs     01/13/24  1413   *   K 5.2*   *   CO2 31*   BUN 58*   CREATININE 0.9   GLUCOSE 97       Recent Labs     01/13/24  1413   AST 10   ALT 8          Recent Labs     01/13/24  1413   AST 10   ALT 8   BILITOT 0.2   ALKPHOS 117*     Results       Procedure Component Value Units Date/Time    Blood Culture 1 [[id number]] Collected: 01/13/24 1413    Order

## 2024-01-13 NOTE — ED PROVIDER NOTES
[facility] EMERGENCY DEPARTMENT  EMERGENCY DEPARTMENT ENCOUNTER        Pt Name: Effie Portillo  MRN: [id number]  Birthdate 1945  Date of evaluation: 1/13/2024  Provider: Adi Rios II, DO  PCP: Sheba Sandoval MD  Note Started: 1:35 PM EST 1/13/24    CHIEF COMPLAINT       Chief Complaint   Patient presents with    Hyperventilating     RT at [facility] rehab stated pt RR 60s fighting the vent, is trach vent, pt paperwork from SNF has conflicting info, one page states dnrcc, one states dnrcca, no actual dnr paper in chart. Snf vent settings are ac/vc, F 12, , peep 8, 1-10L for sat >92%, enteral feed orders osmolite 1.5 @ 50 cc/hr and 100 cc water flush q4hrs       HISTORY OF PRESENT ILLNESS: 1 or more Elements        Limitations to history : Altered Mental Status    Effie Portillo is a 79 y.o. female history of stroke with residual deficits, CAD, brought in by EMS from [facility], for complaint of patient fighting the ventilator settings.  Nursing report was never given to EMS, facility never provided a report to our ER.  Apparently patient was attempting to find her baseline ventilator settings.  Patient is apparently nonverbal at baseline from a previous stroke.  Unsure as to whether patient was suctioned prior to EMS arrival.  Patient does have a recent left leg BKA, chart review revealing that this is for a chronic osteomyelitis.          Nursing Notes were all reviewed and agreed with or any disagreements were addressed in the HPI.      REVIEW OF EXTERNAL NOTE :       Records reviewed for medical history, surgical history, medication list.      Chart Review/External Note Review    Last Echo reviewed by Me:  No results found for: \"LVEF\", \"LVEFMODE\"          Controlled Substance Monitoring:    Acute and Chronic Pain Monitoring:        No data to display                    REVIEW OF SYSTEMS :      Positives and Pertinent negatives as per HPI.

## 2024-01-14 LAB
ALBUMIN SERPL-MCNC: 2.8 G/DL (ref 3.5–5.2)
ALP SERPL-CCNC: 105 U/L (ref 35–104)
ALT SERPL-CCNC: 6 U/L (ref 0–32)
ANION GAP SERPL CALCULATED.3IONS-SCNC: 9 MMOL/L (ref 7–16)
AST SERPL-CCNC: 11 U/L (ref 0–31)
BASOPHILS # BLD: 0.02 K/UL (ref 0–0.2)
BASOPHILS # BLD: 0.03 K/UL (ref 0–0.2)
BASOPHILS NFR BLD: 0 % (ref 0–2)
BASOPHILS NFR BLD: 0 % (ref 0–2)
BILIRUB SERPL-MCNC: 0.3 MG/DL (ref 0–1.2)
BUN SERPL-MCNC: 42 MG/DL (ref 6–23)
CALCIUM SERPL-MCNC: 9 MG/DL (ref 8.6–10.2)
CHLORIDE SERPL-SCNC: 116 MMOL/L (ref 98–107)
CO2 SERPL-SCNC: 25 MMOL/L (ref 22–29)
CREAT SERPL-MCNC: 0.7 MG/DL (ref 0.5–1)
EOSINOPHIL # BLD: 0.27 K/UL (ref 0.05–0.5)
EOSINOPHIL # BLD: 0.38 K/UL (ref 0.05–0.5)
EOSINOPHILS RELATIVE PERCENT: 5 % (ref 0–6)
EOSINOPHILS RELATIVE PERCENT: 6 % (ref 0–6)
ERYTHROCYTE [DISTWIDTH] IN BLOOD BY AUTOMATED COUNT: 17.6 % (ref 11.5–15)
ERYTHROCYTE [DISTWIDTH] IN BLOOD BY AUTOMATED COUNT: 17.7 % (ref 11.5–15)
GFR SERPL CREATININE-BSD FRML MDRD: >60 ML/MIN/1.73M2
GLUCOSE SERPL-MCNC: 81 MG/DL (ref 74–99)
HCT VFR BLD AUTO: 25 % (ref 34–48)
HCT VFR BLD AUTO: 25.9 % (ref 34–48)
HGB BLD-MCNC: 7.6 G/DL (ref 11.5–15.5)
HGB BLD-MCNC: 7.7 G/DL (ref 11.5–15.5)
IMM GRANULOCYTES # BLD AUTO: 0.1 K/UL (ref 0–0.58)
IMM GRANULOCYTES # BLD AUTO: 0.1 K/UL (ref 0–0.58)
IMM GRANULOCYTES NFR BLD: 2 % (ref 0–5)
IMM GRANULOCYTES NFR BLD: 2 % (ref 0–5)
LYMPHOCYTES NFR BLD: 0.74 K/UL (ref 1.5–4)
LYMPHOCYTES NFR BLD: 0.91 K/UL (ref 1.5–4)
LYMPHOCYTES RELATIVE PERCENT: 11 % (ref 20–42)
LYMPHOCYTES RELATIVE PERCENT: 15 % (ref 20–42)
MCH RBC QN AUTO: 26.6 PG (ref 26–35)
MCH RBC QN AUTO: 26.7 PG (ref 26–35)
MCHC RBC AUTO-ENTMCNC: 29.7 G/DL (ref 32–34.5)
MCHC RBC AUTO-ENTMCNC: 30.4 G/DL (ref 32–34.5)
MCV RBC AUTO: 87.4 FL (ref 80–99.9)
MCV RBC AUTO: 89.9 FL (ref 80–99.9)
MONOCYTES NFR BLD: 0.33 K/UL (ref 0.1–0.95)
MONOCYTES NFR BLD: 0.34 K/UL (ref 0.1–0.95)
MONOCYTES NFR BLD: 5 % (ref 2–12)
MONOCYTES NFR BLD: 6 % (ref 2–12)
NEUTROPHILS NFR BLD: 73 % (ref 43–80)
NEUTROPHILS NFR BLD: 77 % (ref 43–80)
NEUTS SEG NFR BLD: 4.36 K/UL (ref 1.8–7.3)
NEUTS SEG NFR BLD: 5.15 K/UL (ref 1.8–7.3)
PLATELET # BLD AUTO: 199 K/UL (ref 130–450)
PLATELET # BLD AUTO: 223 K/UL (ref 130–450)
PMV BLD AUTO: 11.1 FL (ref 7–12)
PMV BLD AUTO: 11.7 FL (ref 7–12)
POTASSIUM SERPL-SCNC: 4 MMOL/L (ref 3.5–5)
PROCALCITONIN SERPL-MCNC: 0.12 NG/ML (ref 0–0.08)
PROT SERPL-MCNC: 6.2 G/DL (ref 6.4–8.3)
RBC # BLD AUTO: 2.86 M/UL (ref 3.5–5.5)
RBC # BLD AUTO: 2.88 M/UL (ref 3.5–5.5)
SODIUM SERPL-SCNC: 150 MMOL/L (ref 132–146)
SODIUM SERPL-SCNC: 152 MMOL/L (ref 132–146)
WBC OTHER # BLD: 6 K/UL (ref 4.5–11.5)
WBC OTHER # BLD: 6.7 K/UL (ref 4.5–11.5)

## 2024-01-14 PROCEDURE — 6360000002 HC RX W HCPCS: Performed by: INTERNAL MEDICINE

## 2024-01-14 PROCEDURE — 84145 PROCALCITONIN (PCT): CPT

## 2024-01-14 PROCEDURE — 6370000000 HC RX 637 (ALT 250 FOR IP): Performed by: INTERNAL MEDICINE

## 2024-01-14 PROCEDURE — 94664 DEMO&/EVAL PT USE INHALER: CPT

## 2024-01-14 PROCEDURE — 2580000003 HC RX 258: Performed by: INTERNAL MEDICINE

## 2024-01-14 PROCEDURE — 87077 CULTURE AEROBIC IDENTIFY: CPT

## 2024-01-14 PROCEDURE — 87070 CULTURE OTHR SPECIMN AEROBIC: CPT

## 2024-01-14 PROCEDURE — A4216 STERILE WATER/SALINE, 10 ML: HCPCS | Performed by: INTERNAL MEDICINE

## 2024-01-14 PROCEDURE — 85025 COMPLETE CBC W/AUTO DIFF WBC: CPT

## 2024-01-14 PROCEDURE — 87181 SC STD AGAR DILUTION PER AGT: CPT

## 2024-01-14 PROCEDURE — 6360000002 HC RX W HCPCS: Performed by: SPECIALIST

## 2024-01-14 PROCEDURE — 87205 SMEAR GRAM STAIN: CPT

## 2024-01-14 PROCEDURE — 94640 AIRWAY INHALATION TREATMENT: CPT

## 2024-01-14 PROCEDURE — 84295 ASSAY OF SERUM SODIUM: CPT

## 2024-01-14 PROCEDURE — 87449 NOS EACH ORGANISM AG IA: CPT

## 2024-01-14 PROCEDURE — C9113 INJ PANTOPRAZOLE SODIUM, VIA: HCPCS | Performed by: INTERNAL MEDICINE

## 2024-01-14 PROCEDURE — 36415 COLL VENOUS BLD VENIPUNCTURE: CPT

## 2024-01-14 PROCEDURE — 2060000000 HC ICU INTERMEDIATE R&B

## 2024-01-14 PROCEDURE — 99233 SBSQ HOSP IP/OBS HIGH 50: CPT | Performed by: INTERNAL MEDICINE

## 2024-01-14 PROCEDURE — 80053 COMPREHEN METABOLIC PANEL: CPT

## 2024-01-14 RX ORDER — CLOPIDOGREL BISULFATE 75 MG/1
75 TABLET ORAL DAILY
Status: DISCONTINUED | OUTPATIENT
Start: 2024-01-14 | End: 2024-01-16 | Stop reason: HOSPADM

## 2024-01-14 RX ORDER — LEVOFLOXACIN 5 MG/ML
750 INJECTION, SOLUTION INTRAVENOUS
Status: DISCONTINUED | OUTPATIENT
Start: 2024-01-14 | End: 2024-01-16

## 2024-01-14 RX ORDER — SODIUM CHLORIDE 9 MG/ML
INJECTION, SOLUTION INTRAVENOUS PRN
Status: DISCONTINUED | OUTPATIENT
Start: 2024-01-14 | End: 2024-01-16 | Stop reason: HOSPADM

## 2024-01-14 RX ORDER — PANTOPRAZOLE SODIUM 40 MG/1
40 TABLET, DELAYED RELEASE ORAL DAILY
Status: DISCONTINUED | OUTPATIENT
Start: 2024-01-14 | End: 2024-01-14

## 2024-01-14 RX ORDER — MAGNESIUM SULFATE IN WATER 40 MG/ML
2000 INJECTION, SOLUTION INTRAVENOUS PRN
Status: DISCONTINUED | OUTPATIENT
Start: 2024-01-14 | End: 2024-01-16 | Stop reason: HOSPADM

## 2024-01-14 RX ORDER — POTASSIUM CHLORIDE 20 MEQ/1
40 TABLET, EXTENDED RELEASE ORAL PRN
Status: DISCONTINUED | OUTPATIENT
Start: 2024-01-14 | End: 2024-01-16 | Stop reason: HOSPADM

## 2024-01-14 RX ORDER — ACETAMINOPHEN 650 MG/1
650 SUPPOSITORY RECTAL EVERY 6 HOURS PRN
Status: DISCONTINUED | OUTPATIENT
Start: 2024-01-14 | End: 2024-01-16 | Stop reason: HOSPADM

## 2024-01-14 RX ORDER — BUDESONIDE 0.5 MG/2ML
500 INHALANT ORAL EVERY 12 HOURS
Status: DISCONTINUED | OUTPATIENT
Start: 2024-01-14 | End: 2024-01-16 | Stop reason: HOSPADM

## 2024-01-14 RX ORDER — SODIUM CHLORIDE 0.9 % (FLUSH) 0.9 %
10 SYRINGE (ML) INJECTION EVERY 12 HOURS SCHEDULED
Status: DISCONTINUED | OUTPATIENT
Start: 2024-01-14 | End: 2024-01-16 | Stop reason: HOSPADM

## 2024-01-14 RX ORDER — POTASSIUM CHLORIDE 7.45 MG/ML
10 INJECTION INTRAVENOUS PRN
Status: DISCONTINUED | OUTPATIENT
Start: 2024-01-14 | End: 2024-01-16 | Stop reason: HOSPADM

## 2024-01-14 RX ORDER — POLYETHYLENE GLYCOL 3350 17 G/17G
17 POWDER, FOR SOLUTION ORAL DAILY PRN
Status: DISCONTINUED | OUTPATIENT
Start: 2024-01-14 | End: 2024-01-16 | Stop reason: HOSPADM

## 2024-01-14 RX ORDER — ATORVASTATIN CALCIUM 10 MG/1
10 TABLET, FILM COATED ORAL NIGHTLY
Status: DISCONTINUED | OUTPATIENT
Start: 2024-01-14 | End: 2024-01-16 | Stop reason: HOSPADM

## 2024-01-14 RX ORDER — METOPROLOL TARTRATE 50 MG/1
100 TABLET, FILM COATED ORAL 2 TIMES DAILY
Status: DISCONTINUED | OUTPATIENT
Start: 2024-01-14 | End: 2024-01-16 | Stop reason: HOSPADM

## 2024-01-14 RX ORDER — SODIUM CHLORIDE 0.9 % (FLUSH) 0.9 %
10 SYRINGE (ML) INJECTION PRN
Status: DISCONTINUED | OUTPATIENT
Start: 2024-01-14 | End: 2024-01-16 | Stop reason: HOSPADM

## 2024-01-14 RX ORDER — ACETAMINOPHEN 325 MG/1
650 TABLET ORAL EVERY 6 HOURS PRN
Status: DISCONTINUED | OUTPATIENT
Start: 2024-01-14 | End: 2024-01-16 | Stop reason: HOSPADM

## 2024-01-14 RX ORDER — SODIUM CHLORIDE 450 MG/100ML
INJECTION, SOLUTION INTRAVENOUS CONTINUOUS
Status: DISCONTINUED | OUTPATIENT
Start: 2024-01-14 | End: 2024-01-16

## 2024-01-14 RX ORDER — IPRATROPIUM BROMIDE AND ALBUTEROL SULFATE 2.5; .5 MG/3ML; MG/3ML
1 SOLUTION RESPIRATORY (INHALATION) 4 TIMES DAILY
Status: DISCONTINUED | OUTPATIENT
Start: 2024-01-14 | End: 2024-01-16 | Stop reason: HOSPADM

## 2024-01-14 RX ORDER — VENLAFAXINE 25 MG/1
25 TABLET ORAL
Status: DISCONTINUED | OUTPATIENT
Start: 2024-01-14 | End: 2024-01-16 | Stop reason: HOSPADM

## 2024-01-14 RX ORDER — ISOSORBIDE MONONITRATE 30 MG/1
30 TABLET, EXTENDED RELEASE ORAL DAILY
Status: DISCONTINUED | OUTPATIENT
Start: 2024-01-14 | End: 2024-01-16 | Stop reason: HOSPADM

## 2024-01-14 RX ADMIN — BUDESONIDE 500 MCG: 0.5 INHALANT RESPIRATORY (INHALATION) at 04:38

## 2024-01-14 RX ADMIN — PANTOPRAZOLE SODIUM 40 MG: 40 INJECTION, POWDER, FOR SOLUTION INTRAVENOUS at 18:02

## 2024-01-14 RX ADMIN — VENLAFAXINE 25 MG: 25 TABLET ORAL at 18:05

## 2024-01-14 RX ADMIN — IPRATROPIUM BROMIDE AND ALBUTEROL SULFATE 1 DOSE: .5; 2.5 SOLUTION RESPIRATORY (INHALATION) at 04:38

## 2024-01-14 RX ADMIN — IPRATROPIUM BROMIDE AND ALBUTEROL SULFATE 1 DOSE: .5; 2.5 SOLUTION RESPIRATORY (INHALATION) at 20:35

## 2024-01-14 RX ADMIN — IPRATROPIUM BROMIDE AND ALBUTEROL SULFATE 1 DOSE: .5; 2.5 SOLUTION RESPIRATORY (INHALATION) at 15:25

## 2024-01-14 RX ADMIN — IPRATROPIUM BROMIDE AND ALBUTEROL SULFATE 1 DOSE: .5; 2.5 SOLUTION RESPIRATORY (INHALATION) at 08:56

## 2024-01-14 RX ADMIN — ATORVASTATIN CALCIUM 10 MG: 10 TABLET, FILM COATED ORAL at 20:51

## 2024-01-14 RX ADMIN — METOPROLOL TARTRATE 100 MG: 50 TABLET, FILM COATED ORAL at 20:51

## 2024-01-14 RX ADMIN — FLUCONAZOLE IN SODIUM CHLORIDE 400 MG: 2 INJECTION, SOLUTION INTRAVENOUS at 18:05

## 2024-01-14 RX ADMIN — BUDESONIDE 500 MCG: 0.5 INHALANT RESPIRATORY (INHALATION) at 20:35

## 2024-01-14 RX ADMIN — SODIUM CHLORIDE, PRESERVATIVE FREE 10 ML: 5 INJECTION INTRAVENOUS at 09:23

## 2024-01-14 RX ADMIN — APIXABAN 2.5 MG: 2.5 TABLET, FILM COATED ORAL at 20:51

## 2024-01-14 RX ADMIN — SODIUM CHLORIDE: 4.5 INJECTION, SOLUTION INTRAVENOUS at 16:34

## 2024-01-14 RX ADMIN — ISOSORBIDE MONONITRATE 30 MG: 30 TABLET, EXTENDED RELEASE ORAL at 12:40

## 2024-01-14 RX ADMIN — METOPROLOL TARTRATE 100 MG: 50 TABLET, FILM COATED ORAL at 12:40

## 2024-01-14 RX ADMIN — CLOPIDOGREL BISULFATE 75 MG: 75 TABLET ORAL at 12:41

## 2024-01-14 RX ADMIN — APIXABAN 2.5 MG: 2.5 TABLET, FILM COATED ORAL at 12:40

## 2024-01-14 RX ADMIN — SODIUM CHLORIDE: 4.5 INJECTION, SOLUTION INTRAVENOUS at 03:01

## 2024-01-14 RX ADMIN — LEVOFLOXACIN 750 MG: 5 INJECTION, SOLUTION INTRAVENOUS at 12:40

## 2024-01-14 RX ADMIN — ARIPIPRAZOLE 15 MG: 10 TABLET ORAL at 12:40

## 2024-01-14 RX ADMIN — VENLAFAXINE 25 MG: 25 TABLET ORAL at 15:53

## 2024-01-14 ASSESSMENT — PULMONARY FUNCTION TESTS
PIF_VALUE: 22
PIF_VALUE: 20
PIF_VALUE: 21
PIF_VALUE: 18
PIF_VALUE: 18
PIF_VALUE: 19
PIF_VALUE: 25

## 2024-01-14 ASSESSMENT — PAIN SCALES - GENERAL: PAINLEVEL_OUTOF10: 0

## 2024-01-14 NOTE — PLAN OF CARE
Problem: Safety - Adult  Goal: Free from fall injury  Outcome: Progressing     Problem: Discharge Planning  Goal: Discharge to home or other facility with appropriate resources  Outcome: Progressing     Problem: Skin/Tissue Integrity  Goal: Absence of new skin breakdown  Description: 1.  Monitor for areas of redness and/or skin breakdown  2.  Assess vascular access sites hourly  3.  Every 4-6 hours minimum:  Change oxygen saturation probe site  4.  Every 4-6 hours:  If on nasal continuous positive airway pressure, respiratory therapy assess nares and determine need for appliance change or resting period.  Outcome: Progressing     Problem: ABCDS Injury Assessment  Goal: Absence of physical injury  Outcome: Progressing     Problem: Confusion  Goal: Confusion, delirium, dementia, or psychosis is improved or at baseline  Description: INTERVENTIONS:  1. Assess for possible contributors to thought disturbance, including medications, impaired vision or hearing, underlying metabolic abnormalities, dehydration, psychiatric diagnoses, and notify attending LIP  2. [place] high risk fall precautions, as indicated  3. Provide frequent short contacts to provide reality reorientation, refocusing and direction  4. Decrease environmental stimuli, including noise as appropriate  5. Monitor and intervene to maintain adequate nutrition, hydration, elimination, sleep and activity  6. If unable to ensure safety without constant attention obtain sitter and review sitter guidelines with assigned personnel  7. Initiate Psychosocial CNS and Spiritual Care consult, as indicated  Outcome: Progressing     Problem: Pain  Goal: Verbalizes/displays adequate comfort level or baseline comfort level  Outcome: Progressing

## 2024-01-15 PROBLEM — E43 SEVERE PROTEIN-ENERGY MALNUTRITION (HCC): Status: ACTIVE | Noted: 2024-01-15

## 2024-01-15 PROBLEM — R19.7 DIARRHEA WITH DEHYDRATION: Status: ACTIVE | Noted: 2024-01-15

## 2024-01-15 PROBLEM — B36.9 FUNGAL SKIN INFECTION: Status: ACTIVE | Noted: 2024-01-15

## 2024-01-15 PROBLEM — Z89.612 HX OF AKA (ABOVE KNEE AMPUTATION), LEFT (HCC): Status: ACTIVE | Noted: 2024-01-15

## 2024-01-15 PROBLEM — L03.113 CELLULITIS OF RIGHT ARM: Status: ACTIVE | Noted: 2024-01-15

## 2024-01-15 LAB
ALBUMIN SERPL-MCNC: 2.9 G/DL (ref 3.5–5.2)
ALBUMIN SERPL-MCNC: 2.9 G/DL (ref 3.5–5.2)
ALP SERPL-CCNC: 103 U/L (ref 35–104)
ALP SERPL-CCNC: 98 U/L (ref 35–104)
ALT SERPL-CCNC: 6 U/L (ref 0–32)
ALT SERPL-CCNC: 7 U/L (ref 0–32)
ANION GAP SERPL CALCULATED.3IONS-SCNC: 11 MMOL/L (ref 7–16)
ANION GAP SERPL CALCULATED.3IONS-SCNC: 9 MMOL/L (ref 7–16)
AST SERPL-CCNC: 13 U/L (ref 0–31)
AST SERPL-CCNC: 9 U/L (ref 0–31)
BASOPHILS # BLD: 0.01 K/UL (ref 0–0.2)
BASOPHILS NFR BLD: 0 % (ref 0–2)
BILIRUB SERPL-MCNC: <0.2 MG/DL (ref 0–1.2)
BILIRUB SERPL-MCNC: <0.2 MG/DL (ref 0–1.2)
BUN SERPL-MCNC: 35 MG/DL (ref 6–23)
BUN SERPL-MCNC: 38 MG/DL (ref 6–23)
CALCIUM SERPL-MCNC: 8.6 MG/DL (ref 8.6–10.2)
CALCIUM SERPL-MCNC: 8.6 MG/DL (ref 8.6–10.2)
CHLORIDE SERPL-SCNC: 105 MMOL/L (ref 98–107)
CHLORIDE SERPL-SCNC: 108 MMOL/L (ref 98–107)
CO2 SERPL-SCNC: 20 MMOL/L (ref 22–29)
CO2 SERPL-SCNC: 23 MMOL/L (ref 22–29)
CREAT SERPL-MCNC: 0.7 MG/DL (ref 0.5–1)
CREAT SERPL-MCNC: 0.8 MG/DL (ref 0.5–1)
EKG ATRIAL RATE: 95 BPM
EKG P AXIS: 77 DEGREES
EKG P-R INTERVAL: 114 MS
EKG Q-T INTERVAL: 340 MS
EKG QRS DURATION: 78 MS
EKG QTC CALCULATION (BAZETT): 427 MS
EKG R AXIS: 35 DEGREES
EKG T AXIS: 106 DEGREES
EKG VENTRICULAR RATE: 95 BPM
EOSINOPHIL # BLD: 0.27 K/UL (ref 0.05–0.5)
EOSINOPHILS RELATIVE PERCENT: 4 % (ref 0–6)
ERYTHROCYTE [DISTWIDTH] IN BLOOD BY AUTOMATED COUNT: 17.7 % (ref 11.5–15)
GFR SERPL CREATININE-BSD FRML MDRD: >60 ML/MIN/1.73M2
GFR SERPL CREATININE-BSD FRML MDRD: >60 ML/MIN/1.73M2
GLUCOSE BLD-MCNC: 119 MG/DL (ref 74–99)
GLUCOSE BLD-MCNC: 135 MG/DL (ref 74–99)
GLUCOSE BLD-MCNC: 94 MG/DL (ref 74–99)
GLUCOSE SERPL-MCNC: 102 MG/DL (ref 74–99)
GLUCOSE SERPL-MCNC: 123 MG/DL (ref 74–99)
HCT VFR BLD AUTO: 23.2 % (ref 34–48)
HGB BLD-MCNC: 7 G/DL (ref 11.5–15.5)
IMM GRANULOCYTES # BLD AUTO: 0.13 K/UL (ref 0–0.58)
IMM GRANULOCYTES NFR BLD: 2 % (ref 0–5)
LYMPHOCYTES NFR BLD: 0.82 K/UL (ref 1.5–4)
LYMPHOCYTES RELATIVE PERCENT: 13 % (ref 20–42)
MCH RBC QN AUTO: 26 PG (ref 26–35)
MCHC RBC AUTO-ENTMCNC: 30.2 G/DL (ref 32–34.5)
MCV RBC AUTO: 86.2 FL (ref 80–99.9)
MICROORGANISM SPEC CULT: NORMAL
MONOCYTES NFR BLD: 0.36 K/UL (ref 0.1–0.95)
MONOCYTES NFR BLD: 6 % (ref 2–12)
NEUTROPHILS NFR BLD: 75 % (ref 43–80)
NEUTS SEG NFR BLD: 4.86 K/UL (ref 1.8–7.3)
PLATELET # BLD AUTO: 176 K/UL (ref 130–450)
PMV BLD AUTO: 11 FL (ref 7–12)
POTASSIUM SERPL-SCNC: 3.9 MMOL/L (ref 3.5–5)
POTASSIUM SERPL-SCNC: 4.1 MMOL/L (ref 3.5–5)
PROT SERPL-MCNC: 5.9 G/DL (ref 6.4–8.3)
PROT SERPL-MCNC: 5.9 G/DL (ref 6.4–8.3)
RBC # BLD AUTO: 2.69 M/UL (ref 3.5–5.5)
SODIUM SERPL-SCNC: 136 MMOL/L (ref 132–146)
SODIUM SERPL-SCNC: 140 MMOL/L (ref 132–146)
SPECIMEN DESCRIPTION: NORMAL
WBC OTHER # BLD: 6.5 K/UL (ref 4.5–11.5)

## 2024-01-15 PROCEDURE — 6360000002 HC RX W HCPCS: Performed by: SPECIALIST

## 2024-01-15 PROCEDURE — 87081 CULTURE SCREEN ONLY: CPT

## 2024-01-15 PROCEDURE — A4216 STERILE WATER/SALINE, 10 ML: HCPCS | Performed by: INTERNAL MEDICINE

## 2024-01-15 PROCEDURE — 87077 CULTURE AEROBIC IDENTIFY: CPT

## 2024-01-15 PROCEDURE — 2060000000 HC ICU INTERMEDIATE R&B

## 2024-01-15 PROCEDURE — 6360000002 HC RX W HCPCS: Performed by: INTERNAL MEDICINE

## 2024-01-15 PROCEDURE — 6370000000 HC RX 637 (ALT 250 FOR IP): Performed by: INTERNAL MEDICINE

## 2024-01-15 PROCEDURE — 87205 SMEAR GRAM STAIN: CPT

## 2024-01-15 PROCEDURE — 85025 COMPLETE CBC W/AUTO DIFF WBC: CPT

## 2024-01-15 PROCEDURE — 2580000003 HC RX 258: Performed by: INTERNAL MEDICINE

## 2024-01-15 PROCEDURE — 36415 COLL VENOUS BLD VENIPUNCTURE: CPT

## 2024-01-15 PROCEDURE — 87106 FUNGI IDENTIFICATION YEAST: CPT

## 2024-01-15 PROCEDURE — 93010 ELECTROCARDIOGRAM REPORT: CPT | Performed by: INTERNAL MEDICINE

## 2024-01-15 PROCEDURE — 80053 COMPREHEN METABOLIC PANEL: CPT

## 2024-01-15 PROCEDURE — 89220 SPUTUM SPECIMEN COLLECTION: CPT

## 2024-01-15 PROCEDURE — 86403 PARTICLE AGGLUT ANTBDY SCRN: CPT

## 2024-01-15 PROCEDURE — 6370000000 HC RX 637 (ALT 250 FOR IP): Performed by: SPECIALIST

## 2024-01-15 PROCEDURE — 94640 AIRWAY INHALATION TREATMENT: CPT

## 2024-01-15 PROCEDURE — C9113 INJ PANTOPRAZOLE SODIUM, VIA: HCPCS | Performed by: INTERNAL MEDICINE

## 2024-01-15 PROCEDURE — 99233 SBSQ HOSP IP/OBS HIGH 50: CPT | Performed by: INTERNAL MEDICINE

## 2024-01-15 PROCEDURE — 82962 GLUCOSE BLOOD TEST: CPT

## 2024-01-15 PROCEDURE — 87070 CULTURE OTHR SPECIMN AEROBIC: CPT

## 2024-01-15 RX ADMIN — METOPROLOL TARTRATE 100 MG: 50 TABLET, FILM COATED ORAL at 22:31

## 2024-01-15 RX ADMIN — ARIPIPRAZOLE 15 MG: 10 TABLET ORAL at 11:02

## 2024-01-15 RX ADMIN — LEVOFLOXACIN 750 MG: 5 INJECTION, SOLUTION INTRAVENOUS at 12:47

## 2024-01-15 RX ADMIN — VENLAFAXINE 25 MG: 25 TABLET ORAL at 11:02

## 2024-01-15 RX ADMIN — MICONAZOLE NITRATE: 2 OINTMENT TOPICAL at 22:32

## 2024-01-15 RX ADMIN — APIXABAN 2.5 MG: 2.5 TABLET, FILM COATED ORAL at 22:31

## 2024-01-15 RX ADMIN — MICONAZOLE NITRATE: 2 OINTMENT TOPICAL at 22:31

## 2024-01-15 RX ADMIN — BUDESONIDE 500 MCG: 0.5 INHALANT RESPIRATORY (INHALATION) at 17:06

## 2024-01-15 RX ADMIN — METOPROLOL TARTRATE 100 MG: 50 TABLET, FILM COATED ORAL at 11:02

## 2024-01-15 RX ADMIN — SODIUM CHLORIDE: 4.5 INJECTION, SOLUTION INTRAVENOUS at 22:28

## 2024-01-15 RX ADMIN — APIXABAN 2.5 MG: 2.5 TABLET, FILM COATED ORAL at 11:02

## 2024-01-15 RX ADMIN — VENLAFAXINE 25 MG: 25 TABLET ORAL at 16:56

## 2024-01-15 RX ADMIN — FLUCONAZOLE IN SODIUM CHLORIDE 400 MG: 2 INJECTION, SOLUTION INTRAVENOUS at 17:00

## 2024-01-15 RX ADMIN — ATORVASTATIN CALCIUM 10 MG: 10 TABLET, FILM COATED ORAL at 22:31

## 2024-01-15 RX ADMIN — CLOPIDOGREL BISULFATE 75 MG: 75 TABLET ORAL at 11:02

## 2024-01-15 RX ADMIN — POLYETHYLENE GLYCOL 3350 17 G: 17 POWDER, FOR SOLUTION ORAL at 11:03

## 2024-01-15 RX ADMIN — SODIUM CHLORIDE, PRESERVATIVE FREE 10 ML: 5 INJECTION INTRAVENOUS at 22:30

## 2024-01-15 RX ADMIN — ACETAMINOPHEN 650 MG: 325 TABLET ORAL at 11:02

## 2024-01-15 RX ADMIN — SODIUM CHLORIDE, PRESERVATIVE FREE 10 ML: 5 INJECTION INTRAVENOUS at 11:03

## 2024-01-15 RX ADMIN — IPRATROPIUM BROMIDE AND ALBUTEROL SULFATE 1 DOSE: .5; 2.5 SOLUTION RESPIRATORY (INHALATION) at 06:41

## 2024-01-15 RX ADMIN — IPRATROPIUM BROMIDE AND ALBUTEROL SULFATE 1 DOSE: .5; 2.5 SOLUTION RESPIRATORY (INHALATION) at 10:24

## 2024-01-15 RX ADMIN — ISOSORBIDE MONONITRATE 30 MG: 30 TABLET, EXTENDED RELEASE ORAL at 11:02

## 2024-01-15 RX ADMIN — MICONAZOLE NITRATE: 2 OINTMENT TOPICAL at 12:45

## 2024-01-15 RX ADMIN — SODIUM CHLORIDE: 4.5 INJECTION, SOLUTION INTRAVENOUS at 07:42

## 2024-01-15 RX ADMIN — IPRATROPIUM BROMIDE AND ALBUTEROL SULFATE 1 DOSE: .5; 2.5 SOLUTION RESPIRATORY (INHALATION) at 14:23

## 2024-01-15 RX ADMIN — IPRATROPIUM BROMIDE AND ALBUTEROL SULFATE 1 DOSE: .5; 2.5 SOLUTION RESPIRATORY (INHALATION) at 17:06

## 2024-01-15 RX ADMIN — BUDESONIDE 500 MCG: 0.5 INHALANT RESPIRATORY (INHALATION) at 06:41

## 2024-01-15 RX ADMIN — PANTOPRAZOLE SODIUM 40 MG: 40 INJECTION, POWDER, FOR SOLUTION INTRAVENOUS at 11:02

## 2024-01-15 ASSESSMENT — PULMONARY FUNCTION TESTS
PIF_VALUE: 20
PIF_VALUE: 25
PIF_VALUE: 19
PIF_VALUE: 19

## 2024-01-15 ASSESSMENT — PAIN SCALES - GENERAL: PAINLEVEL_OUTOF10: 0

## 2024-01-15 NOTE — PLAN OF CARE
Problem: Safety - Adult  Goal: Free from fall injury  1/15/2024 1618 by Cynthia Marshall RN  Outcome: Progressing  1/15/2024 0242 by Yaz Olivares RN  Outcome: Progressing     Problem: Discharge Planning  Goal: Discharge to home or other facility with appropriate resources  1/15/2024 1618 by Cynthia Marshall RN  Outcome: Progressing  Flowsheets (Taken 1/15/2024 0900)  Discharge to home or other facility with appropriate resources:   Identify barriers to discharge with patient and caregiver   Arrange for needed discharge resources and transportation as appropriate   Identify discharge learning needs (meds, wound care, etc)   Refer to discharge planning if patient needs post-hospital services based on physician order or complex needs related to functional status, cognitive ability or social support system  1/15/2024 0242 by Yaz Olivares RN  Outcome: Progressing     Problem: Skin/Tissue Integrity  Goal: Absence of new skin breakdown  Description: 1.  Monitor for areas of redness and/or skin breakdown  2.  Assess vascular access sites hourly  3.  Every 4-6 hours minimum:  Change oxygen saturation probe site  4.  Every 4-6 hours:  If on nasal continuous positive airway pressure, respiratory therapy assess nares and determine need for appliance change or resting period.  1/15/2024 1618 by Cynthia Marshall RN  Outcome: Progressing  1/15/2024 0242 by Yaz Olivares RN  Outcome: Progressing     Problem: ABCDS Injury Assessment  Goal: Absence of physical injury  1/15/2024 1618 by Cynthia Marshall RN  Outcome: Progressing  1/15/2024 0242 by Yaz Olivares RN  Outcome: Progressing     Problem: Confusion  Goal: Confusion, delirium, dementia, or psychosis is improved or at baseline  Description: INTERVENTIONS:  1. Assess for possible contributors to thought disturbance, including medications, impaired vision or hearing, underlying metabolic abnormalities, dehydration, psychiatric

## 2024-01-15 NOTE — CARE COORDINATION
Case Management Assessment  Initial Evaluation    Date/Time of Evaluation: 1/15/2024 1:31 PM  Assessment Completed by: Shena Solis RN    If patient is discharged prior to next notation, then this note serves as note for discharge by case management.    Patient Name: Effie Torres                   YOB: 1945  Diagnosis: Acute hypernatremia [E87.0]  Hypernatremia [E87.0]  Ventilator dependence (HCC) [Z99.11]                   Date / Time: 1/13/2024  1:25 PM    Patient Admission Status: Inpatient   Readmission Risk (Low < 19, Mod (19-27), High > 27): Readmission Risk Score: 18.9    Current PCP: Sheba Sandoval MD  PCP verified by CM? Yes    Chart Reviewed: Yes      History Provided by: Other (see comment) (Liaison at the NH)  Patient Orientation: Other (see comment) (pt is non verbal has a trach and responds to pain.)    Patient Cognition: Other (see comment) (AMS)    Hospitalization in the last 30 days (Readmission):  No    If yes, Readmission Assessment in  Navigator will be completed.    Advance Directives:      Code Status: DNR-CCA   Patient's Primary Decision Maker is: Legal Next of Kin    Primary Decision Maker: PO TORRES - Child - [phone]    Discharge Planning:    Patient lives with: Other (Comment) (staff) Type of Home: Long-Term Acute Care  Primary Care Giver: Other (Comment) (Staff at the NH)  Patient Support Systems include: Children, Other (Comment) (NH Staff)   Current Financial resources:    Current community resources:    Current services prior to admission: None            Current DME:              Type of Home Care services:  None    ADLS  Prior functional level: Assistance with the following:, Bathing, Dressing, Toileting, Feeding, Cooking, Housework, Shopping, Mobility  Current functional level: Assistance with the following:, Bathing, Dressing, Toileting, Feeding, Cooking, Housework, Shopping, Mobility    PT AM-PAC:   /24  OT AM-PAC:   /24    Family can provide

## 2024-01-15 NOTE — PLAN OF CARE
Problem: Safety - Adult  Goal: Free from fall injury  Outcome: Progressing     Problem: Discharge Planning  Goal: Discharge to home or other facility with appropriate resources  Outcome: Progressing     Problem: Skin/Tissue Integrity  Goal: Absence of new skin breakdown  Description: 1.  Monitor for areas of redness and/or skin breakdown  2.  Assess vascular access sites hourly  3.  Every 4-6 hours minimum:  Change oxygen saturation probe site  4.  Every 4-6 hours:  If on nasal continuous positive airway pressure, respiratory therapy assess nares and determine need for appliance change or resting period.  Outcome: Progressing     Problem: ABCDS Injury Assessment  Goal: Absence of physical injury  Outcome: Progressing     Problem: Confusion  Goal: Confusion, delirium, dementia, or psychosis is improved or at baseline  Description: INTERVENTIONS:  1. Assess for possible contributors to thought disturbance, including medications, impaired vision or hearing, underlying metabolic abnormalities, dehydration, psychiatric diagnoses, and notify attending LIP  2. [place] high risk fall precautions, as indicated  3. Provide frequent short contacts to provide reality reorientation, refocusing and direction  4. Decrease environmental stimuli, including noise as appropriate  5. Monitor and intervene to maintain adequate nutrition, hydration, elimination, sleep and activity  6. If unable to ensure safety without constant attention obtain sitter and review sitter guidelines with assigned personnel  7. Initiate Psychosocial CNS and Spiritual Care consult, as indicated  Outcome: Progressing     Problem: Pain  Goal: Verbalizes/displays adequate comfort level or baseline comfort level  Outcome: Progressing

## 2024-01-15 NOTE — FLOWSHEET NOTE
Inpatient Wound Care    Admit Date: 1/13/2024  1:25 PM    Reason for consult:  stump    Significant history:  History reviewed. No pertinent past medical history.    Wound history:      Findings:     01/15/24 1149   Wound 01/14/24 Thigh Distal;Left;Anterior healing amputation   Date First Assessed/Time First Assessed: 01/14/24 0124   Present on Original Admission: Yes  Primary Wound Type: Incision  Location: Thigh  Wound Location Orientation: Distal;Left;Anterior  Wound Description (Comments): healing amputation   Wound Image    Wound Cleansed Cleansed with saline   Dressing/Treatment Xeroform   Wound Length (cm) 1.2 cm   Wound Width (cm) 1.2 cm   Wound Depth (cm) 0.2 cm   Wound Surface Area (cm^2) 1.44 cm^2   Wound Volume (cm^3) 0.288 cm^3   Drainage Amount Small (< 25%)   Drainage Description Serosanguinous   Odor None   Ana-wound Assessment Intact     Inner groins  thighs  inner buttocks red moist         Impression:  surgical wound    Interventions in place:  xeroform dressing  Low air loss bed    Plan:  Cont with xeroform dressing  Cont with antifungal ointment  ID on consult    Fecal management system inserted  no problem          Jackie Velez RN 1/15/2024 11:52 AM

## 2024-01-16 VITALS
RESPIRATION RATE: 18 BRPM | BODY MASS INDEX: 20.77 KG/M2 | HEIGHT: 61 IN | HEART RATE: 84 BPM | DIASTOLIC BLOOD PRESSURE: 66 MMHG | WEIGHT: 110 LBS | SYSTOLIC BLOOD PRESSURE: 151 MMHG | TEMPERATURE: 97.8 F | OXYGEN SATURATION: 100 %

## 2024-01-16 PROBLEM — Z22.39: Status: ACTIVE | Noted: 2024-01-16

## 2024-01-16 LAB
ALBUMIN SERPL-MCNC: 2.9 G/DL (ref 3.5–5.2)
ALBUMIN SERPL-MCNC: 3 G/DL (ref 3.5–5.2)
ALP SERPL-CCNC: 101 U/L (ref 35–104)
ALP SERPL-CCNC: 105 U/L (ref 35–104)
ALT SERPL-CCNC: 6 U/L (ref 0–32)
ALT SERPL-CCNC: 7 U/L (ref 0–32)
ANION GAP SERPL CALCULATED.3IONS-SCNC: 11 MMOL/L (ref 7–16)
ANION GAP SERPL CALCULATED.3IONS-SCNC: 9 MMOL/L (ref 7–16)
AST SERPL-CCNC: 11 U/L (ref 0–31)
AST SERPL-CCNC: 12 U/L (ref 0–31)
BASOPHILS # BLD: 0 K/UL (ref 0–0.2)
BASOPHILS NFR BLD: 0 % (ref 0–2)
BILIRUB SERPL-MCNC: <0.2 MG/DL (ref 0–1.2)
BILIRUB SERPL-MCNC: <0.2 MG/DL (ref 0–1.2)
BUN SERPL-MCNC: 26 MG/DL (ref 6–23)
BUN SERPL-MCNC: 28 MG/DL (ref 6–23)
CALCIUM SERPL-MCNC: 8.6 MG/DL (ref 8.6–10.2)
CALCIUM SERPL-MCNC: 8.9 MG/DL (ref 8.6–10.2)
CHLORIDE SERPL-SCNC: 104 MMOL/L (ref 98–107)
CHLORIDE SERPL-SCNC: 106 MMOL/L (ref 98–107)
CO2 SERPL-SCNC: 20 MMOL/L (ref 22–29)
CO2 SERPL-SCNC: 21 MMOL/L (ref 22–29)
CREAT SERPL-MCNC: 0.7 MG/DL (ref 0.5–1)
CREAT SERPL-MCNC: 0.7 MG/DL (ref 0.5–1)
EOSINOPHIL # BLD: 0.19 K/UL (ref 0.05–0.5)
EOSINOPHILS RELATIVE PERCENT: 3 % (ref 0–6)
ERYTHROCYTE [DISTWIDTH] IN BLOOD BY AUTOMATED COUNT: 17.6 % (ref 11.5–15)
GFR SERPL CREATININE-BSD FRML MDRD: >60 ML/MIN/1.73M2
GFR SERPL CREATININE-BSD FRML MDRD: >60 ML/MIN/1.73M2
GLUCOSE BLD-MCNC: 122 MG/DL (ref 74–99)
GLUCOSE BLD-MCNC: 122 MG/DL (ref 74–99)
GLUCOSE SERPL-MCNC: 110 MG/DL (ref 74–99)
GLUCOSE SERPL-MCNC: 99 MG/DL (ref 74–99)
HCT VFR BLD AUTO: 23.1 % (ref 34–48)
HGB BLD-MCNC: 7.2 G/DL (ref 11.5–15.5)
IMM GRANULOCYTES # BLD AUTO: 0.09 K/UL (ref 0–0.58)
IMM GRANULOCYTES NFR BLD: 1 % (ref 0–5)
LYMPHOCYTES NFR BLD: 0.8 K/UL (ref 1.5–4)
LYMPHOCYTES RELATIVE PERCENT: 12 % (ref 20–42)
MCH RBC QN AUTO: 27.2 PG (ref 26–35)
MCHC RBC AUTO-ENTMCNC: 31.2 G/DL (ref 32–34.5)
MCV RBC AUTO: 87.2 FL (ref 80–99.9)
MICROORGANISM SPEC CULT: ABNORMAL
MICROORGANISM/AGENT SPEC: ABNORMAL
MONOCYTES NFR BLD: 0.31 K/UL (ref 0.1–0.95)
MONOCYTES NFR BLD: 5 % (ref 2–12)
NEUTROPHILS NFR BLD: 78 % (ref 43–80)
NEUTS SEG NFR BLD: 5.04 K/UL (ref 1.8–7.3)
PLATELET # BLD AUTO: 165 K/UL (ref 130–450)
PMV BLD AUTO: 11.7 FL (ref 7–12)
POTASSIUM SERPL-SCNC: 4 MMOL/L (ref 3.5–5)
POTASSIUM SERPL-SCNC: 4.3 MMOL/L (ref 3.5–5)
PROT SERPL-MCNC: 5.9 G/DL (ref 6.4–8.3)
PROT SERPL-MCNC: 6 G/DL (ref 6.4–8.3)
RBC # BLD AUTO: 2.65 M/UL (ref 3.5–5.5)
SODIUM SERPL-SCNC: 134 MMOL/L (ref 132–146)
SODIUM SERPL-SCNC: 137 MMOL/L (ref 132–146)
SPECIMEN DESCRIPTION: ABNORMAL
SPECIMEN DESCRIPTION: ABNORMAL
WBC OTHER # BLD: 6.4 K/UL (ref 4.5–11.5)

## 2024-01-16 PROCEDURE — 6360000002 HC RX W HCPCS: Performed by: INTERNAL MEDICINE

## 2024-01-16 PROCEDURE — 99239 HOSP IP/OBS DSCHRG MGMT >30: CPT | Performed by: INTERNAL MEDICINE

## 2024-01-16 PROCEDURE — 2580000003 HC RX 258: Performed by: INTERNAL MEDICINE

## 2024-01-16 PROCEDURE — 6370000000 HC RX 637 (ALT 250 FOR IP): Performed by: INTERNAL MEDICINE

## 2024-01-16 PROCEDURE — 94640 AIRWAY INHALATION TREATMENT: CPT

## 2024-01-16 PROCEDURE — C9113 INJ PANTOPRAZOLE SODIUM, VIA: HCPCS | Performed by: INTERNAL MEDICINE

## 2024-01-16 PROCEDURE — 36415 COLL VENOUS BLD VENIPUNCTURE: CPT

## 2024-01-16 PROCEDURE — 87449 NOS EACH ORGANISM AG IA: CPT

## 2024-01-16 PROCEDURE — 85025 COMPLETE CBC W/AUTO DIFF WBC: CPT

## 2024-01-16 PROCEDURE — 82962 GLUCOSE BLOOD TEST: CPT

## 2024-01-16 PROCEDURE — 80053 COMPREHEN METABOLIC PANEL: CPT

## 2024-01-16 PROCEDURE — A4216 STERILE WATER/SALINE, 10 ML: HCPCS | Performed by: INTERNAL MEDICINE

## 2024-01-16 PROCEDURE — 87324 CLOSTRIDIUM AG IA: CPT

## 2024-01-16 RX ORDER — CHLORHEXIDINE GLUCONATE 4 G/100ML
SOLUTION TOPICAL
DISCHARGE
Start: 2024-01-16 | End: 2024-01-30

## 2024-01-16 RX ORDER — CHLORHEXIDINE GLUCONATE 4 G/100ML
SOLUTION TOPICAL EVERY OTHER DAY
Status: DISCONTINUED | OUTPATIENT
Start: 2024-01-16 | End: 2024-01-16 | Stop reason: HOSPADM

## 2024-01-16 RX ADMIN — CLOPIDOGREL BISULFATE 75 MG: 75 TABLET ORAL at 10:16

## 2024-01-16 RX ADMIN — IPRATROPIUM BROMIDE AND ALBUTEROL SULFATE 1 DOSE: .5; 2.5 SOLUTION RESPIRATORY (INHALATION) at 09:29

## 2024-01-16 RX ADMIN — MICONAZOLE NITRATE: 2 OINTMENT TOPICAL at 10:16

## 2024-01-16 RX ADMIN — SODIUM CHLORIDE, PRESERVATIVE FREE 10 ML: 5 INJECTION INTRAVENOUS at 10:21

## 2024-01-16 RX ADMIN — VENLAFAXINE 25 MG: 25 TABLET ORAL at 12:35

## 2024-01-16 RX ADMIN — PANTOPRAZOLE SODIUM 40 MG: 40 INJECTION, POWDER, FOR SOLUTION INTRAVENOUS at 10:16

## 2024-01-16 RX ADMIN — IPRATROPIUM BROMIDE AND ALBUTEROL SULFATE 1 DOSE: .5; 2.5 SOLUTION RESPIRATORY (INHALATION) at 04:34

## 2024-01-16 RX ADMIN — IPRATROPIUM BROMIDE AND ALBUTEROL SULFATE 1 DOSE: .5; 2.5 SOLUTION RESPIRATORY (INHALATION) at 14:03

## 2024-01-16 RX ADMIN — METOPROLOL TARTRATE 100 MG: 50 TABLET, FILM COATED ORAL at 10:16

## 2024-01-16 RX ADMIN — ARIPIPRAZOLE 15 MG: 10 TABLET ORAL at 10:15

## 2024-01-16 RX ADMIN — VENLAFAXINE 25 MG: 25 TABLET ORAL at 10:16

## 2024-01-16 RX ADMIN — VENLAFAXINE 25 MG: 25 TABLET ORAL at 16:12

## 2024-01-16 RX ADMIN — APIXABAN 2.5 MG: 2.5 TABLET, FILM COATED ORAL at 10:16

## 2024-01-16 RX ADMIN — ACETAMINOPHEN 650 MG: 325 TABLET ORAL at 10:16

## 2024-01-16 RX ADMIN — BUDESONIDE 500 MCG: 0.5 INHALANT RESPIRATORY (INHALATION) at 04:34

## 2024-01-16 RX ADMIN — ISOSORBIDE MONONITRATE 30 MG: 30 TABLET, EXTENDED RELEASE ORAL at 10:16

## 2024-01-16 RX ADMIN — LEVOFLOXACIN 750 MG: 5 INJECTION, SOLUTION INTRAVENOUS at 10:20

## 2024-01-16 ASSESSMENT — PULMONARY FUNCTION TESTS
PIF_VALUE: 20
PIF_VALUE: 21

## 2024-01-16 ASSESSMENT — PAIN SCALES - GENERAL: PAINLEVEL_OUTOF10: 0

## 2024-01-16 ASSESSMENT — PAIN SCALES - WONG BAKER: WONGBAKER_NUMERICALRESPONSE: 0

## 2024-01-16 NOTE — DISCHARGE SUMMARY
Discharge Summary  Patient ID:  Effie Portillo  [id number]  79 y.o. 1945 female  Sheba Sandoval MD        Admit date: 1/13/2024    Discharge date and time:  1/16/2024  4:02 PM      Activity level: Will be with supervision  Diet: Tube feeds  Labs: BMP weekly CBC monthly  Disposition: Skilled nursing facility  Condition on Discharge: She is stable and guarded  DME:    Admit Diagnoses:   Patient Active Problem List   Diagnosis    Bacteremia    Chronic respiratory failure with hypoxia (HCC)    Primary hypertension    History of stroke with residual deficit    History of DVT (deep vein thrombosis)    Coronary artery disease involving native coronary artery of native heart without angina pectoris    Ischemic cardiomyopathy    Subacute osteomyelitis of left foot (HCC)    Hypernatremia    Fungal skin infection    Cellulitis of right arm    Diarrhea with dehydration    Severe protein-energy malnutrition (HCC)    Hx of AKA (above knee amputation), left (HCC)    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 *      Consults:  IP CONSULT TO INFECTIOUS DISEASES    Procedures: No    Hospital Course: Darion is a 79-year-old vent dependent female who is seen from a skilled nursing facility.  She presented to the hospital because she had respiratory distress.  She is also found to have an elevated sodium.  She required fluid management to reduce his sodium.  Sodium for the last 2 days has been

## 2024-01-16 NOTE — CARE COORDINATION
1/16/2024 1105 HAZEL Note: Pt is from [facility] and per Melissa guadalupeison she is a BED HOLD and long term resident there. NO PRECERT NEEDED. CM spoke with pt's son and he states that the plan will be to return to [facility].  Will Need Signed LEOBARDO.  CM will follow. Electronically signed by Shena Solis RN on 1/16/2024 at 11:22 AM

## 2024-01-16 NOTE — PROGRESS NOTES
[facility] Hospitalist Progress Note    Admitting Date and Time: 1/13/2024  1:25 PM  Admit Dx: Acute hypernatremia [E87.0]  Hypernatremia [E87.0]  Ventilator dependence (HCC) [Z99.11]    Subjective:  Patient is being followed for Acute hypernatremia [E87.0]  Hypernatremia [E87.0]  Ventilator dependence (HCC) [Z99.11]   No change in mentation. Remains unresponsive.     ROS: unable due to above     apixaban  2.5 mg Per G Tube BID    ARIPiprazole  15 mg PEG Tube Daily    atorvastatin  10 mg PEG Tube Nightly    budesonide  500 mcg Nebulization Q12H    clopidogrel  75 mg PEG Tube Daily    ipratropium 0.5 mg-albuterol 2.5 mg  1 Dose Inhalation 4x Daily    isosorbide mononitrate  30 mg Oral Daily    levoFLOXacin  750 mg IntraVENous Daily    metoprolol tartrate  100 mg PEG Tube BID    omeprazole  40 mg Oral Daily    venlafaxine  25 mg PEG Tube TID WC    sodium chloride flush  10 mL IntraVENous 2 times per day    miconazole nitrate   Topical BID    fluconazole  400 mg IntraVENous Q24H     sodium chloride flush, 10 mL, PRN  sodium chloride, , PRN  potassium chloride, 40 mEq, PRN   Or  potassium alternative oral replacement, 40 mEq, PRN   Or  potassium chloride, 10 mEq, PRN  magnesium sulfate, 2,000 mg, PRN  polyethylene glycol, 17 g, Daily PRN  acetaminophen, 650 mg, Q6H PRN   Or  acetaminophen, 650 mg, Q6H PRN         Objective:    BP (!) 143/59   Pulse (!) 108   Temp 97.5 °F (36.4 °C) (Oral)   Resp 24   Ht 1.549 m (5' 1\")   Wt 49.9 kg (110 lb)   SpO2 98%   BMI 20.78 kg/m²   General Appearance: Resists eye opening.  Does not follow commands.  Does resist movement of her extremities.  Has right-sided paresis.  Skin: warm and dry, turgor not diminished  Head: normocephalic and atraumatic  Eyes: Resisted opening of her eyes  Neck: neck supple and non tender without mass trach present  Pulmonary/Chest: clear to auscultation bilaterally- no wheezes, rales or rhonchi, normal air movement, no respiratory 
    NAME: Effie Portillo  MR:  [id number]  :   1945  Admit Date:  2024    Elements of this note, were copied and pasted from Previous. Updates have been made where noted and reflect current exam and medical decision making from the DOS of this encounter.  CHIEF COMPLAINT       Chief Complaint   Patient presents with    Hyperventilating     RT at [facility] rehab stated pt RR 60s fighting the vent, is trach vent, pt paperwork from SNF has conflicting info, one page states dnrcc, one states dnrcca, no actual dnr paper in chart. Snf vent settings are ac/vc, F 12, , peep 8, 1-10L for sat >92%, enteral feed orders osmolite 1.5 @ 50 cc/hr and 100 cc water flush q4hrs     HISTORY OF PRESENT ILLNESS     Effie Portillo is a 79 y.o. female admitted on 2024 and has  has no past medical history on file.   Pt is known to ID/BRYANIDA  Pt was seen in ER  Last seen on 2023 for Klebsiella pneumonia septicemia ? Source ?GI/bilary   E.coli- UTI   Cons Methicillin Resistance mecA/C  septicemia   Left foot toe dry gangrene with medial ulcer -Osteomyelitis of the 1st metatarsal head and medial sesamoid.   S/p meropenem     Last cultures 9/10 urine cx Esbl/Ecoli  From [facility] record  23 - 11/10/23:              - Hgb 6.4; rectal bleeding. PPI BID and Carafate.  She was given Rocephin for UTI.  Patient underwent metatarsal amputation and was to be treated with vancomycin for the diagnosis of osteomyelitis.  She was discharged on 6 week course of linezolid.     12/3/23 - 23:              - [facility], [facility]              - : switched to linezolid              - : foot left side with gangrenous changes.  T-max 100.2°              - : d/w son about code status and further measures of care.  After discussion, DPOA decided for the patient to have the amputation.               - 12/15: L AKA              - : improved heart rate, on 
    NAME: Effie Portillo  MR:  [id number]  :   1945  Admit Date:  2024    Elements of this note, were copied and pasted from Previous. Updates have been made where noted and reflect current exam and medical decision making from the DOS of this encounter.  CHIEF COMPLAINT       Chief Complaint   Patient presents with    Hyperventilating     [facility] 60s fighting the vent, is trach vent, pt paperwork from SNF has conflicting info, one page states dnrcc, one states dnrcca, no actual dnr paper in chart. Snf vent settings are ac/vc, F 12, , peep 8, 1-10L for sat >92%, enteral feed orders osmolite 1.5 @ 50 cc/hr and 100 cc water flush q4hrs     HISTORY OF PRESENT ILLNESS     Effie Portillo is a 79 y.o. female admitted on 2024 and has  has no past medical history on file.   Pt is known to ID/PRANAV  Pt was seen in ER  Last seen on 2023 for Klebsiella pneumonia septicemia ? Source ?GI/bilary   E.coli- UTI   Cons Methicillin Resistance mecA/C  septicemia   Left foot toe dry gangrene with medial ulcer -Osteomyelitis of the 1st metatarsal head and medial sesamoid.   S/p meropenem med rec     Last cultures 9/10 urine cx Esbl/Ecoli  From [facility] record  23 - 11/10/23:              - Hgb 6.4; rectal bleeding. PPI BID and Carafate.  She was given Rocephin for UTI.  Patient underwent metatarsal amputation and was to be treated with vancomycin for the diagnosis of osteomyelitis.  She was discharged on 6 week course of linezolid.     12/3/23 - 23:              - [facility], [facility]              - : switched to linezolid              - : foot left side with gangrenous changes.  T-max 100.2°              - : d/w son about code status and further measures of care.  After discussion, DPOA decided for the patient to have the amputation.               - 12/15: L AKA              - : improved heart rate, 
    Progress  Note  Chief Complaint   Patient presents with    Hyperventilating     RT at [facility] rehab stated pt RR 60s fighting the vent, is trach vent, pt paperwork from SNF has conflicting info, one page states dnrcc, one states dnrcca, no actual dnr paper in chart. Snf vent settings are ac/vc, F 12, , peep 8, 1-10L for sat >92%, enteral feed orders osmolite 1.5 @ 50 cc/hr and 100 cc water flush q4hrs     Historical Issues:  Current Facility-Administered Medications   Medication Dose Route Frequency Provider Last Rate Last Admin    apixaban (ELIQUIS) tablet 2.5 mg  2.5 mg Per G Tube BID Deepti Gonzalez MD   2.5 mg at 01/14/24 2051    ARIPiprazole (ABILIFY) tablet 15 mg  15 mg PEG Tube Daily Deepti Gonzalez MD   15 mg at 01/14/24 1240    atorvastatin (LIPITOR) tablet 10 mg  10 mg PEG Tube Nightly Deepti Gonzalez MD   10 mg at 01/14/24 2051    budesonide (PULMICORT) nebulizer suspension 500 mcg  500 mcg Nebulization Q12H Deepti Gonzalez MD   500 mcg at 01/15/24 0641    clopidogrel (PLAVIX) tablet 75 mg  75 mg PEG Tube Daily Deepti Gonzalez MD   75 mg at 01/14/24 1241    ipratropium 0.5 mg-albuterol 2.5 mg (DUONEB) nebulizer solution 1 Dose  1 Dose Inhalation 4x Daily Deepti Gonzalez MD   1 Dose at 01/15/24 0641    isosorbide mononitrate (IMDUR) extended release tablet 30 mg  30 mg Oral Daily Deepti Gonzalez MD   30 mg at 01/14/24 1240    levoFLOXacin (LEVAQUIN) 500 MG/100ML infusion 750 mg  750 mg IntraVENous Daily Deepti Gonzalez MD   Stopped at 01/14/24 1313    metoprolol tartrate (LOPRESSOR) tablet 100 mg  100 mg PEG Tube BID Deepti Gonzalez MD   100 mg at 01/14/24 2051    venlafaxine (EFFEXOR) tablet 25 mg  25 mg PEG Tube TID WC Deepti Gonzalez MD   25 mg at 01/14/24 1805    sodium chloride flush 0.9 % injection 10 mL  10 mL IntraVENous 2 times per day Deepti Gonzalez MD   10 mL at 01/14/24 0923    sodium chloride flush 0.9 % injection 10 mL  10 mL IntraVENous PRN Deepti Gonzalez MD        
  Physician Progress Note      PATIENT:               STEVE TORRES  CSN #:                  [id number]  :                       1945  ADMIT DATE:       2024 1:25 PM  DISCH DATE:  RESPONDING  PROVIDER #:        Michel Middleton MD          QUERY TEXT:    Pt admitted with hypernatremia. Pt noted to have decrease in AMS. If possible,   please document in the progress notes and discharge summary if you are   evaluating and / or treating any of the following:    The medical record reflects the following:  Risk Factors: Hypernatremia,  Clinical Indicators: H&P \"In the ED she was unresponsive, apparently at   baseline she opened her eyes and has some interaction.\", 1/15 nursing note   \"opens eyes to voice and light touch. Follows commands, nods/shakes head   appropriately.\"  Treatment: Head CT, 0.9% NS 1L bolus with 0.45% infusion at 100 ml/hr, vent   management, labs.    Thank you,  Eloisa BARAHONA, RN, [facility] [phone]  Options provided:  -- Metabolic encephalopathy  -- Other - I will add my own diagnosis  -- Disagree - Not applicable / Not valid  -- Disagree - Clinically unable to determine / Unknown  -- Refer to Clinical Documentation Reviewer    PROVIDER RESPONSE TEXT:    This patient has metabolic encephalopathy.    Query created by: Eloisa Nicholas on 2024 9:34 AM      Electronically signed by:  Michel Middleton MD 2024 4:55 PM          
4 Eyes Skin Assessment     NAME:  Effie Portillo  YOB: 1945  MEDICAL RECORD NUMBER:  [id number]    The patient is being assessed for  Admission    I agree that at least one RN has performed a thorough Head to Toe Skin Assessment on the patient. ALL assessment sites listed below have been assessed.      Areas assessed by both nurses:    Head, Face, Ears, Shoulders, Back, Chest, Arms, Elbows, Hands, Sacrum. Buttock, Coccyx, Ischium, Legs. Feet and Heels, and Under Medical Devices         Does the Patient have a Wound? Yes wound(s) were present on assessment. LDA wound assessment was Initiated and completed by RN       Branden Prevention initiated by RN: Yes  Wound Care Orders initiated by RN: Yes    Pressure Injury (Stage 3,4, Unstageable, DTI, NWPT, and Complex wounds) if present, place Wound referral order by RN under : Yes    New Ostomies, if present place, Ostomy referral order under : No     Nurse 1 eSignature: Electronically signed by Yaz Olivares RN on 1/14/24 at 3:39 AM EST    **SHARE this note so that the co-signing nurse can place an eSignature**    Nurse 2 eSignature: {Esignature:[id number]}  
ABG drawn x 1 from Left Radial. Patient had NormalAllen's Test.  Patient was on 50% O2 via ventilator  at time of puncture. Pressure held for 5.  No bleeding or bruising noted at puncture site.  Patient tolerated procedure well.      Performed by Haritha Munroe RCP    
Comprehensive Nutrition Assessment    Type and Reason for Visit:  Initial, Positive Nutrition Screen (New TF Day 2)    Nutrition Recommendations/Plan:   Continue NPO status   Provide TF recommendations -  Osmolite 1.5 not in stock, recommend increase rate to better meet nutrition needs     Recommend Osmolite 1.2 (standard w/o fiber) @ 60ml/h w/ 100ml flush q4h while admitted to the hospital. Regimen to provide 1440ml, 1728kcal, 80g pro, 1695ml water  which meets 100% of estimated protein and energy needs at goal rate.     Pt ok to resume previous TF regimen on discharge - Osmolite 1.5 @ 50ml/h with 100ml flush q4h.      Malnutrition Assessment:  Malnutrition Status:  At risk for malnutrition (Comment) (01/15/24 1225)    Context:  Chronic Illness     Findings of the 6 clinical characteristics of malnutrition:  Energy Intake:  No significant decrease in energy intake (on tube feedings)  Weight Loss:  No significant weight loss     Body Fat Loss:  Unable to assess     Muscle Mass Loss:  Unable to assess    Fluid Accumulation:  No significant fluid accumulation     Strength:  Not Performed    Nutrition Assessment:    Pt admit w/ hyperventilation from SNF, acute hypernatremia, hypotension, AMS. PMHx of L AKA w/ OM, stroke, PEG / trach dependent, CAD. Pt is on TF of Osm. @ 50ml/h with 100ml flush q4h, up to 2h off per day. Will provide TF recs & continue to monitor while inpatient.    Nutrition Related Findings:    abd soft, NT, ND, active BSx4, +1/trace edema, I/O's WDL, trach/PEG - Osm @ 50ml/h, Na 150 Wound Type: Surgical Incision (L aka)       Current Nutrition Intake & Therapies:    Average Meal Intake: NPO (on tube feedings)  Average Supplements Intake: NPO  Diet NPO  ADULT TUBE FEEDING; PEG; Standard without Fiber; Continuous; 50; No; 100; Q 4 hours  Current Tube Feeding (TF) Orders:  Feeding Route: PEG  Formula: Standard with Fiber, Standard without Fiber  Schedule: Continuous  Feeding Regimen: 50ml/h (1200ml 
Nurse-to-Nurse report called to [facility]. All questions addressed. PIV removed. FMS removed. Telemetry monitor removed and returned to nurse's station.  
Report received from nightshift RN. Patient awake and alert in bed, nonverbal, has trache, but eyes tracking movement, following commands appropriately, nods and shakes head appropriately. Goncalves catheter checked and confirmed as free of kinks or dependent loops, draining appropriately. FMS checked and confirmed as draining, excess air in bag released. Vital signs reviewed. Labs and orders reviewed. Will assume care of patient.  
Report received from nightshift RN. Patient resting in bed on ventilator support, opens eyes to voice and light touch. Follows commands, nods/shakes head appropriately. Scattered rash over skin noted with blistering areas by RUE/upper arm area. Patient had x2 loose brown BMs, skin broken/bleeding and extremely excoriated in rin area, MD contacted for possible FMS. Vital signs reviewed. Labs and orders reviewed. Will assume care of patient.  
on the right and very small on the left.     CT HEAD WO CONTRAST    Result Date: 1/13/2024  1. No acute intracranial hemorrhage or edema. 2. Chronic stroke involving left frontal lobe, left insula, and anterior left temporal lobe.     Imaging and labs were reviewed per medical records.     Thank you for involving me in the care of Effie Portillo I will continue to follow. Please do not hesitate to call [phone] for any questions or concerns.    Electronically signed by Allie Villegas MD on 1/15/2024 at 2:20 PM

## 2024-01-16 NOTE — DISCHARGE INSTR - COC
Continuity of Care Form    Patient Name: Effie Torres   :  1945  MRN:  [id number]    Admit date:  2024  Discharge date:  2024    Code Status Order: DNR-CCA   Advance Directives:     Admitting Physician:  Deepti Gonzalez MD  PCP: Sheba Sandoval MD    Discharging Nurse: Cynthia Marshall RN  Discharging Hospital Unit/Room#: 0640/0640-01  Discharging Unit Phone Number: [phone]    Emergency Contact:   Extended Emergency Contact Information  Primary Emergency Contact: PO TORRES  [place] Phone: [phone]  Relation: Child   needed? No    Past Surgical History:  History reviewed. No pertinent surgical history.    Immunization History:     There is no immunization history on file for this patient.    Active Problems:  Patient Active Problem List   Diagnosis Code    Bacteremia R78.81    Chronic respiratory failure with hypoxia (HCC) J96.11    Primary hypertension I10    History of stroke with residual deficit I69.30    History of DVT (deep vein thrombosis) Z86.718    Coronary artery disease involving native coronary artery of native heart without angina pectoris I25.10    Ischemic cardiomyopathy I25.5    Subacute osteomyelitis of left foot (HCC) M86.272    Hypernatremia E87.0    Fungal skin infection B36.9    Cellulitis of right arm L03.113    Diarrhea with dehydration R19.7    Severe protein-energy malnutrition (HCC) E43    Hx of AKA (above knee amputation), left (HCC) Z89.612       Isolation/Infection:   Isolation            Strict  Contact          Patient Infection Status       Infection Onset Added Last Indicated Last Indicated By Review Planned Expiration Resolved Resolved By    Anni auris 24 Culture, Wound        MDRO (multi-drug resistant organism)  23 Mary Jane Bueno RN        +ESBL Klebsiella pneumoniae blood 2023  +ESBL Klebsiella pneumoniae urine 9/10/2023    Resolved    ESBL (Extended Spectrum Beta Lactamase) 23

## 2024-01-16 NOTE — PLAN OF CARE
Problem: Safety - Adult  Goal: Free from fall injury  1/16/2024 1801 by Cynthia Marshall RN  Outcome: Adequate for Discharge  1/16/2024 0717 by Yaz Olivares RN  Outcome: Progressing     Problem: Discharge Planning  Goal: Discharge to home or other facility with appropriate resources  1/16/2024 1801 by Cynthia Marshall RN  Outcome: Adequate for Discharge  1/16/2024 0717 by Yaz Olivares RN  Outcome: Progressing     Problem: Skin/Tissue Integrity  Goal: Absence of new skin breakdown  Description: 1.  Monitor for areas of redness and/or skin breakdown  2.  Assess vascular access sites hourly  3.  Every 4-6 hours minimum:  Change oxygen saturation probe site  4.  Every 4-6 hours:  If on nasal continuous positive airway pressure, respiratory therapy assess nares and determine need for appliance change or resting period.  1/16/2024 1801 by Cynthia Marshall RN  Outcome: Adequate for Discharge  1/16/2024 0717 by Yaz Olivares RN  Outcome: Progressing     Problem: ABCDS Injury Assessment  Goal: Absence of physical injury  1/16/2024 1801 by Cynthia Marshall RN  Outcome: Adequate for Discharge  1/16/2024 0717 by Yaz Olivares RN  Outcome: Progressing     Problem: Confusion  Goal: Confusion, delirium, dementia, or psychosis is improved or at baseline  Description: INTERVENTIONS:  1. Assess for possible contributors to thought disturbance, including medications, impaired vision or hearing, underlying metabolic abnormalities, dehydration, psychiatric diagnoses, and notify attending LIP  2. [place] high risk fall precautions, as indicated  3. Provide frequent short contacts to provide reality reorientation, refocusing and direction  4. Decrease environmental stimuli, including noise as appropriate  5. Monitor and intervene to maintain adequate nutrition, hydration, elimination, sleep and activity  6. If unable to ensure safety without constant attention obtain sitter and review

## 2024-01-16 NOTE — PLAN OF CARE
Problem: Safety - Adult  Goal: Free from fall injury  Outcome: Progressing     Problem: Discharge Planning  Goal: Discharge to home or other facility with appropriate resources  Outcome: Progressing     Problem: Skin/Tissue Integrity  Goal: Absence of new skin breakdown  Description: 1.  Monitor for areas of redness and/or skin breakdown  2.  Assess vascular access sites hourly  3.  Every 4-6 hours minimum:  Change oxygen saturation probe site  4.  Every 4-6 hours:  If on nasal continuous positive airway pressure, respiratory therapy assess nares and determine need for appliance change or resting period.  Outcome: Progressing     Problem: ABCDS Injury Assessment  Goal: Absence of physical injury  Outcome: Progressing     Problem: Confusion  Goal: Confusion, delirium, dementia, or psychosis is improved or at baseline  Description: INTERVENTIONS:  1. Assess for possible contributors to thought disturbance, including medications, impaired vision or hearing, underlying metabolic abnormalities, dehydration, psychiatric diagnoses, and notify attending LIP  2. [place] high risk fall precautions, as indicated  3. Provide frequent short contacts to provide reality reorientation, refocusing and direction  4. Decrease environmental stimuli, including noise as appropriate  5. Monitor and intervene to maintain adequate nutrition, hydration, elimination, sleep and activity  6. If unable to ensure safety without constant attention obtain sitter and review sitter guidelines with assigned personnel  7. Initiate Psychosocial CNS and Spiritual Care consult, as indicated  Outcome: Progressing     Problem: Pain  Goal: Verbalizes/displays adequate comfort level or baseline comfort level  Outcome: Progressing     Problem: Nutrition Deficit:  Goal: Optimize nutritional status  Outcome: Progressing

## 2024-01-17 LAB
C DIFF GDH + TOXINS A+B STL QL IA.RAPID: POSITIVE
MICROORGANISM SPEC CULT: ABNORMAL
MICROORGANISM SPEC CULT: NORMAL
MICROORGANISM/AGENT SPEC: ABNORMAL
SEND OUT REPORT: NORMAL
SPECIMEN DESCRIPTION: ABNORMAL
SPECIMEN DESCRIPTION: ABNORMAL
SPECIMEN DESCRIPTION: NORMAL
TEST NAME: NORMAL

## 2024-01-17 NOTE — PLAN OF CARE
Late test results were referred to [facility] rehab.  I spoke to Becca who is the nurse taking care of the patient.  I informed her that C. difficile toxin came back positive today 24 hours after discharge as well as Candida auris today.  The patient currently is at [facility].  She had developed a supraventricular tachycardia with hypotension.    Electronically signed by Michel Middleton MD on 1/17/24 at 3:28 PM EST

## 2024-01-18 LAB
1,3 BETA GLUCAN SER-MCNC: <31 PG/ML
1,3 BETA-D-GLUCAN INTERP: NEGATIVE
MICROORGANISM SPEC CULT: ABNORMAL
MICROORGANISM SPEC CULT: NORMAL
MICROORGANISM SPEC CULT: NORMAL
MICROORGANISM/AGENT SPEC: ABNORMAL
SERVICE CMNT-IMP: NORMAL
SERVICE CMNT-IMP: NORMAL
SPECIMEN DESCRIPTION: ABNORMAL
SPECIMEN DESCRIPTION: NORMAL
SPECIMEN DESCRIPTION: NORMAL

## 2024-01-19 LAB
MICROORGANISM SPEC CULT: ABNORMAL
MICROORGANISM/AGENT SPEC: ABNORMAL
SEND OUT REPORT: NORMAL
SEND OUT REPORT: NORMAL
SPECIMEN DESCRIPTION: ABNORMAL
TEST NAME: NORMAL
TEST NAME: NORMAL

## 2024-01-23 LAB
SEND OUT REPORT: NORMAL
TEST NAME: NORMAL

## 2024-01-29 LAB
SEND OUT REPORT: NORMAL
TEST NAME: NORMAL

## 2024-04-13 ENCOUNTER — APPOINTMENT (OUTPATIENT)
Dept: GENERAL RADIOLOGY | Age: 79
DRG: 208 | End: 2024-04-13
Payer: MEDICARE

## 2024-04-13 ENCOUNTER — HOSPITAL ENCOUNTER (INPATIENT)
Age: 79
LOS: 4 days | Discharge: HOME | DRG: 208 | End: 2024-04-17
Attending: EMERGENCY MEDICINE | Admitting: FAMILY MEDICINE
Payer: MEDICARE

## 2024-04-13 DIAGNOSIS — R79.89 ELEVATED TROPONIN: ICD-10-CM

## 2024-04-13 DIAGNOSIS — R06.00 DYSPNEA, UNSPECIFIED TYPE: ICD-10-CM

## 2024-04-13 DIAGNOSIS — J44.1 COPD EXACERBATION (HCC): Primary | ICD-10-CM

## 2024-04-13 PROBLEM — R00.0 SINUS TACHYCARDIA: Status: ACTIVE | Noted: 2024-04-13

## 2024-04-13 PROBLEM — R19.7 DIARRHEA WITH DEHYDRATION: Status: RESOLVED | Noted: 2024-01-15 | Resolved: 2024-04-13

## 2024-04-13 PROBLEM — Z99.11 VENTILATOR DEPENDENT (HCC): Status: ACTIVE | Noted: 2024-04-13

## 2024-04-13 PROBLEM — B36.9 FUNGAL SKIN INFECTION: Status: RESOLVED | Noted: 2024-01-15 | Resolved: 2024-04-13

## 2024-04-13 PROBLEM — J96.21 ACUTE ON CHRONIC RESPIRATORY FAILURE WITH HYPOXIA (HCC): Status: ACTIVE | Noted: 2024-04-13

## 2024-04-13 PROBLEM — R78.81 BACTEREMIA: Status: RESOLVED | Noted: 2023-09-10 | Resolved: 2024-04-13

## 2024-04-13 PROBLEM — I48.0 PAF (PAROXYSMAL ATRIAL FIBRILLATION) (HCC): Status: ACTIVE | Noted: 2024-04-13

## 2024-04-13 PROBLEM — L03.113 CELLULITIS OF RIGHT ARM: Status: RESOLVED | Noted: 2024-01-15 | Resolved: 2024-04-13

## 2024-04-13 LAB
AADO2: 147.9 MMHG
ALBUMIN SERPL-MCNC: 4.1 G/DL (ref 3.5–5.2)
ALP SERPL-CCNC: 172 U/L (ref 35–104)
ALT SERPL-CCNC: 12 U/L (ref 0–32)
ANION GAP SERPL CALCULATED.3IONS-SCNC: 13 MMOL/L (ref 7–16)
AST SERPL-CCNC: 16 U/L (ref 0–31)
ATYPICAL LYMPHOCYTE ABSOLUTE COUNT: 0.07 K/UL (ref 0–0.46)
ATYPICAL LYMPHOCYTES: 1 % (ref 0–4)
B.E.: -0.4 MMOL/L (ref -3–3)
BASOPHILS # BLD: 0.07 K/UL (ref 0–0.2)
BASOPHILS NFR BLD: 1 % (ref 0–2)
BILIRUB SERPL-MCNC: 0.2 MG/DL (ref 0–1.2)
BNP SERPL-MCNC: 9844 PG/ML (ref 0–450)
BUN SERPL-MCNC: 34 MG/DL (ref 6–23)
CALCIUM SERPL-MCNC: 10.1 MG/DL (ref 8.6–10.2)
CHLORIDE SERPL-SCNC: 97 MMOL/L (ref 98–107)
CO2 SERPL-SCNC: 26 MMOL/L (ref 22–29)
COHB: 0.6 % (ref 0–1.5)
CREAT SERPL-MCNC: 0.7 MG/DL (ref 0.5–1)
CRITICAL: ABNORMAL
D DIMER: 2410 NG/ML DDU (ref 0–232)
DATE ANALYZED: ABNORMAL
DATE OF COLLECTION: ABNORMAL
EKG ATRIAL RATE: 116 BPM
EKG P AXIS: 82 DEGREES
EKG P-R INTERVAL: 146 MS
EKG Q-T INTERVAL: 316 MS
EKG QRS DURATION: 70 MS
EKG QTC CALCULATION (BAZETT): 439 MS
EKG R AXIS: 53 DEGREES
EKG T AXIS: 93 DEGREES
EKG VENTRICULAR RATE: 116 BPM
EOSINOPHIL # BLD: 0 K/UL (ref 0.05–0.5)
EOSINOPHILS RELATIVE PERCENT: 0 % (ref 0–6)
ERYTHROCYTE [DISTWIDTH] IN BLOOD BY AUTOMATED COUNT: 14.1 % (ref 11.5–15)
FIO2: 40 %
GFR SERPL CREATININE-BSD FRML MDRD: 88 ML/MIN/1.73M2
GLUCOSE SERPL-MCNC: 89 MG/DL (ref 74–99)
HCO3: 19.8 MMOL/L (ref 22–26)
HCT VFR BLD AUTO: 29.1 % (ref 34–48)
HGB BLD-MCNC: 9.6 G/DL (ref 11.5–15.5)
HHB: 1.2 % (ref 0–5)
LAB: ABNORMAL
LYMPHOCYTES NFR BLD: 0.21 K/UL (ref 1.5–4)
LYMPHOCYTES RELATIVE PERCENT: 3 % (ref 20–42)
Lab: 307
MCH RBC QN AUTO: 26.8 PG (ref 26–35)
MCHC RBC AUTO-ENTMCNC: 33 G/DL (ref 32–34.5)
MCV RBC AUTO: 81.3 FL (ref 80–99.9)
METHB: 0.3 % (ref 0–1.5)
MODE: AC
MONOCYTES NFR BLD: 0.29 K/UL (ref 0.1–0.95)
MONOCYTES NFR BLD: 4 % (ref 2–12)
NEUTROPHILS NFR BLD: 92 % (ref 43–80)
NEUTS SEG NFR BLD: 7.56 K/UL (ref 1.8–7.3)
O2 CONTENT: 15.4 ML/DL
O2 SATURATION: 98.8 % (ref 92–98.5)
O2HB: 97.9 % (ref 94–97)
OPERATOR ID: 8219
PATIENT TEMP: 37 C
PCO2: 21.2 MMHG (ref 35–45)
PEEP/CPAP: 8 CMH2O
PFO2: 2.57 MMHG/%
PH BLOOD GAS: 7.59 (ref 7.35–7.45)
PLATELET CONFIRMATION: NORMAL
PLATELET, FLUORESCENCE: 160 K/UL (ref 130–450)
PMV BLD AUTO: 11 FL (ref 7–12)
PO2: 102.9 MMHG (ref 75–100)
POTASSIUM SERPL-SCNC: 5.2 MMOL/L (ref 3.5–5)
PROCALCITONIN SERPL-MCNC: 0.63 NG/ML (ref 0–0.08)
PROT SERPL-MCNC: 8.4 G/DL (ref 6.4–8.3)
RBC # BLD AUTO: 3.58 M/UL (ref 3.5–5.5)
RBC # BLD: ABNORMAL 10*6/UL
RI(T): 1.44
RR MECHANICAL: 14 B/MIN
SODIUM SERPL-SCNC: 136 MMOL/L (ref 132–146)
SOURCE, BLOOD GAS: ABNORMAL
THB: 11.1 G/DL (ref 11.5–16.5)
TIME ANALYZED: 324
TROPONIN I SERPL HS-MCNC: 101 NG/L (ref 0–9)
TROPONIN I SERPL HS-MCNC: 104 NG/L (ref 0–9)
TROPONIN I SERPL HS-MCNC: 108 NG/L (ref 0–9)
VT MECHANICAL: 400 ML
WBC # BLD: ABNORMAL 10*3/UL
WBC OTHER # BLD: 8.2 K/UL (ref 4.5–11.5)

## 2024-04-13 PROCEDURE — 99223 1ST HOSP IP/OBS HIGH 75: CPT | Performed by: FAMILY MEDICINE

## 2024-04-13 PROCEDURE — 6370000000 HC RX 637 (ALT 250 FOR IP): Performed by: FAMILY MEDICINE

## 2024-04-13 PROCEDURE — 84484 ASSAY OF TROPONIN QUANT: CPT

## 2024-04-13 PROCEDURE — 94640 AIRWAY INHALATION TREATMENT: CPT

## 2024-04-13 PROCEDURE — 6360000002 HC RX W HCPCS: Performed by: FAMILY MEDICINE

## 2024-04-13 PROCEDURE — 87205 SMEAR GRAM STAIN: CPT

## 2024-04-13 PROCEDURE — 2580000003 HC RX 258

## 2024-04-13 PROCEDURE — 93010 ELECTROCARDIOGRAM REPORT: CPT | Performed by: INTERNAL MEDICINE

## 2024-04-13 PROCEDURE — 80053 COMPREHEN METABOLIC PANEL: CPT

## 2024-04-13 PROCEDURE — 2580000003 HC RX 258: Performed by: EMERGENCY MEDICINE

## 2024-04-13 PROCEDURE — 87077 CULTURE AEROBIC IDENTIFY: CPT

## 2024-04-13 PROCEDURE — 6370000000 HC RX 637 (ALT 250 FOR IP)

## 2024-04-13 PROCEDURE — 85379 FIBRIN DEGRADATION QUANT: CPT

## 2024-04-13 PROCEDURE — 71045 X-RAY EXAM CHEST 1 VIEW: CPT

## 2024-04-13 PROCEDURE — 6360000002 HC RX W HCPCS

## 2024-04-13 PROCEDURE — 85025 COMPLETE CBC W/AUTO DIFF WBC: CPT

## 2024-04-13 PROCEDURE — 87040 BLOOD CULTURE FOR BACTERIA: CPT

## 2024-04-13 PROCEDURE — 93005 ELECTROCARDIOGRAM TRACING: CPT

## 2024-04-13 PROCEDURE — 5A1945Z RESPIRATORY VENTILATION, 24-96 CONSECUTIVE HOURS: ICD-10-PCS | Performed by: FAMILY MEDICINE

## 2024-04-13 PROCEDURE — 87481 CANDIDA DNA AMP PROBE: CPT

## 2024-04-13 PROCEDURE — 93005 ELECTROCARDIOGRAM TRACING: CPT | Performed by: INTERNAL MEDICINE

## 2024-04-13 PROCEDURE — 83880 ASSAY OF NATRIURETIC PEPTIDE: CPT

## 2024-04-13 PROCEDURE — 36415 COLL VENOUS BLD VENIPUNCTURE: CPT

## 2024-04-13 PROCEDURE — 87086 URINE CULTURE/COLONY COUNT: CPT

## 2024-04-13 PROCEDURE — 2060000000 HC ICU INTERMEDIATE R&B

## 2024-04-13 PROCEDURE — 87070 CULTURE OTHR SPECIMN AEROBIC: CPT

## 2024-04-13 PROCEDURE — 82805 BLOOD GASES W/O2 SATURATION: CPT

## 2024-04-13 PROCEDURE — 99285 EMERGENCY DEPT VISIT HI MDM: CPT

## 2024-04-13 PROCEDURE — 84145 PROCALCITONIN (PCT): CPT

## 2024-04-13 PROCEDURE — 99223 1ST HOSP IP/OBS HIGH 75: CPT | Performed by: INTERNAL MEDICINE

## 2024-04-13 RX ORDER — IPRATROPIUM BROMIDE AND ALBUTEROL SULFATE 2.5; .5 MG/3ML; MG/3ML
3 SOLUTION RESPIRATORY (INHALATION) ONCE
Status: COMPLETED | OUTPATIENT
Start: 2024-04-13 | End: 2024-04-13

## 2024-04-13 RX ORDER — SODIUM CHLORIDE 0.9 % (FLUSH) 0.9 %
5-40 SYRINGE (ML) INJECTION PRN
Status: DISCONTINUED | OUTPATIENT
Start: 2024-04-13 | End: 2024-04-17 | Stop reason: HOSPADM

## 2024-04-13 RX ORDER — IPRATROPIUM BROMIDE AND ALBUTEROL SULFATE 2.5; .5 MG/3ML; MG/3ML
1 SOLUTION RESPIRATORY (INHALATION)
Status: DISCONTINUED | OUTPATIENT
Start: 2024-04-13 | End: 2024-04-17 | Stop reason: HOSPADM

## 2024-04-13 RX ORDER — ATORVASTATIN CALCIUM 10 MG/1
10 TABLET, FILM COATED ORAL NIGHTLY
Status: DISCONTINUED | OUTPATIENT
Start: 2024-04-13 | End: 2024-04-17 | Stop reason: HOSPADM

## 2024-04-13 RX ORDER — ARFORMOTEROL TARTRATE 15 UG/2ML
15 SOLUTION RESPIRATORY (INHALATION)
Status: DISCONTINUED | OUTPATIENT
Start: 2024-04-13 | End: 2024-04-17 | Stop reason: HOSPADM

## 2024-04-13 RX ORDER — 0.9 % SODIUM CHLORIDE 0.9 %
1000 INTRAVENOUS SOLUTION INTRAVENOUS ONCE
Status: COMPLETED | OUTPATIENT
Start: 2024-04-13 | End: 2024-04-13

## 2024-04-13 RX ORDER — LANSOPRAZOLE 30 MG/1
30 TABLET, ORALLY DISINTEGRATING, DELAYED RELEASE ORAL
Status: DISCONTINUED | OUTPATIENT
Start: 2024-04-13 | End: 2024-04-17 | Stop reason: HOSPADM

## 2024-04-13 RX ORDER — FERROUS SULFATE 300 MG/5ML
300 LIQUID (ML) ORAL DAILY
Status: DISCONTINUED | OUTPATIENT
Start: 2024-04-13 | End: 2024-04-17 | Stop reason: HOSPADM

## 2024-04-13 RX ORDER — CLOPIDOGREL BISULFATE 75 MG/1
75 TABLET ORAL DAILY
Status: DISCONTINUED | OUTPATIENT
Start: 2024-04-13 | End: 2024-04-17 | Stop reason: HOSPADM

## 2024-04-13 RX ORDER — AMLODIPINE BESYLATE 5 MG/1
5 TABLET ORAL DAILY
Status: DISCONTINUED | OUTPATIENT
Start: 2024-04-13 | End: 2024-04-16

## 2024-04-13 RX ORDER — METOPROLOL TARTRATE 50 MG/1
100 TABLET, FILM COATED ORAL 2 TIMES DAILY
Status: DISCONTINUED | OUTPATIENT
Start: 2024-04-13 | End: 2024-04-16

## 2024-04-13 RX ORDER — BUDESONIDE 0.5 MG/2ML
0.5 INHALANT ORAL
Status: DISCONTINUED | OUTPATIENT
Start: 2024-04-13 | End: 2024-04-17 | Stop reason: HOSPADM

## 2024-04-13 RX ORDER — ENOXAPARIN SODIUM 100 MG/ML
40 INJECTION SUBCUTANEOUS DAILY
Status: DISCONTINUED | OUTPATIENT
Start: 2024-04-13 | End: 2024-04-13

## 2024-04-13 RX ORDER — ARIPIPRAZOLE 5 MG/1
15 TABLET ORAL DAILY
Status: DISCONTINUED | OUTPATIENT
Start: 2024-04-13 | End: 2024-04-17 | Stop reason: HOSPADM

## 2024-04-13 RX ORDER — VENLAFAXINE 25 MG/1
25 TABLET ORAL
Status: DISCONTINUED | OUTPATIENT
Start: 2024-04-13 | End: 2024-04-17 | Stop reason: HOSPADM

## 2024-04-13 RX ORDER — ISOSORBIDE MONONITRATE 30 MG/1
30 TABLET, EXTENDED RELEASE ORAL DAILY
Status: DISCONTINUED | OUTPATIENT
Start: 2024-04-13 | End: 2024-04-16

## 2024-04-13 RX ORDER — SUCRALFATE 1 G/1
1 TABLET ORAL EVERY 12 HOURS SCHEDULED
Status: DISCONTINUED | OUTPATIENT
Start: 2024-04-13 | End: 2024-04-17 | Stop reason: HOSPADM

## 2024-04-13 RX ORDER — ACETAMINOPHEN 650 MG/1
650 SUPPOSITORY RECTAL EVERY 6 HOURS PRN
Status: DISCONTINUED | OUTPATIENT
Start: 2024-04-13 | End: 2024-04-17 | Stop reason: HOSPADM

## 2024-04-13 RX ORDER — SODIUM CHLORIDE 9 MG/ML
INJECTION, SOLUTION INTRAVENOUS PRN
Status: DISCONTINUED | OUTPATIENT
Start: 2024-04-13 | End: 2024-04-17 | Stop reason: HOSPADM

## 2024-04-13 RX ORDER — SODIUM CHLORIDE 0.9 % (FLUSH) 0.9 %
5-40 SYRINGE (ML) INJECTION EVERY 12 HOURS SCHEDULED
Status: DISCONTINUED | OUTPATIENT
Start: 2024-04-13 | End: 2024-04-17 | Stop reason: HOSPADM

## 2024-04-13 RX ORDER — GUAIFENESIN 200 MG/10ML
200 LIQUID ORAL EVERY 6 HOURS PRN
Status: DISCONTINUED | OUTPATIENT
Start: 2024-04-13 | End: 2024-04-17 | Stop reason: HOSPADM

## 2024-04-13 RX ORDER — ACETAMINOPHEN 325 MG/1
650 TABLET ORAL EVERY 6 HOURS PRN
Status: DISCONTINUED | OUTPATIENT
Start: 2024-04-13 | End: 2024-04-17 | Stop reason: HOSPADM

## 2024-04-13 RX ADMIN — SUCRALFATE 1 G: 1 TABLET ORAL at 22:22

## 2024-04-13 RX ADMIN — BUDESONIDE INHALATION 500 MCG: 0.5 SUSPENSION RESPIRATORY (INHALATION) at 07:13

## 2024-04-13 RX ADMIN — AMLODIPINE BESYLATE 5 MG: 5 TABLET ORAL at 12:12

## 2024-04-13 RX ADMIN — ARFORMOTEROL TARTRATE 15 MCG: 15 SOLUTION RESPIRATORY (INHALATION) at 07:13

## 2024-04-13 RX ADMIN — METOPROLOL TARTRATE 100 MG: 50 TABLET, FILM COATED ORAL at 12:12

## 2024-04-13 RX ADMIN — IPRATROPIUM BROMIDE AND ALBUTEROL SULFATE 1 DOSE: .5; 2.5 SOLUTION RESPIRATORY (INHALATION) at 10:44

## 2024-04-13 RX ADMIN — BUDESONIDE INHALATION 500 MCG: 0.5 SUSPENSION RESPIRATORY (INHALATION) at 18:48

## 2024-04-13 RX ADMIN — APIXABAN 2.5 MG: 5 TABLET, FILM COATED ORAL at 12:13

## 2024-04-13 RX ADMIN — ATORVASTATIN CALCIUM 10 MG: 20 TABLET, FILM COATED ORAL at 22:22

## 2024-04-13 RX ADMIN — VENLAFAXINE 25 MG: 25 TABLET ORAL at 18:23

## 2024-04-13 RX ADMIN — ACETAMINOPHEN 650 MG: 325 TABLET ORAL at 12:11

## 2024-04-13 RX ADMIN — IPRATROPIUM BROMIDE AND ALBUTEROL SULFATE 1 DOSE: .5; 2.5 SOLUTION RESPIRATORY (INHALATION) at 07:13

## 2024-04-13 RX ADMIN — IPRATROPIUM BROMIDE AND ALBUTEROL SULFATE 1 DOSE: .5; 2.5 SOLUTION RESPIRATORY (INHALATION) at 14:21

## 2024-04-13 RX ADMIN — WATER 125 MG: 1 INJECTION INTRAMUSCULAR; INTRAVENOUS; SUBCUTANEOUS at 05:45

## 2024-04-13 RX ADMIN — SUCRALFATE 1 G: 1 TABLET ORAL at 12:12

## 2024-04-13 RX ADMIN — SODIUM CHLORIDE 1000 ML: 9 INJECTION, SOLUTION INTRAVENOUS at 05:45

## 2024-04-13 RX ADMIN — CLOPIDOGREL BISULFATE 75 MG: 75 TABLET ORAL at 12:13

## 2024-04-13 RX ADMIN — IPRATROPIUM BROMIDE AND ALBUTEROL SULFATE 1 DOSE: .5; 2.5 SOLUTION RESPIRATORY (INHALATION) at 18:48

## 2024-04-13 RX ADMIN — ISOSORBIDE MONONITRATE 30 MG: 30 TABLET, EXTENDED RELEASE ORAL at 12:12

## 2024-04-13 RX ADMIN — ARFORMOTEROL TARTRATE 15 MCG: 15 SOLUTION RESPIRATORY (INHALATION) at 18:48

## 2024-04-13 RX ADMIN — APIXABAN 2.5 MG: 5 TABLET, FILM COATED ORAL at 22:22

## 2024-04-13 RX ADMIN — IPRATROPIUM BROMIDE AND ALBUTEROL SULFATE 3 DOSE: 2.5; .5 SOLUTION RESPIRATORY (INHALATION) at 01:49

## 2024-04-13 RX ADMIN — METOPROLOL TARTRATE 100 MG: 50 TABLET, FILM COATED ORAL at 22:22

## 2024-04-13 ASSESSMENT — PULMONARY FUNCTION TESTS
PIF_VALUE: 19
PIF_VALUE: 18
PIF_VALUE: 15
PIF_VALUE: 22
PIF_VALUE: 15
PIF_VALUE: 16
PIF_VALUE: 14
PIF_VALUE: 18
PIF_VALUE: 16
PIF_VALUE: 14
PIF_VALUE: 16
PIF_VALUE: 13
PIF_VALUE: 21
PIF_VALUE: 16
PIF_VALUE: 15
PIF_VALUE: 16
PIF_VALUE: 14
PIF_VALUE: 19
PIF_VALUE: 19
PIF_VALUE: 15
PIF_VALUE: 19
PIF_VALUE: 19
PIF_VALUE: 20
PIF_VALUE: 13
PIF_VALUE: 12
PIF_VALUE: 18
PIF_VALUE: 13
PIF_VALUE: 21
PIF_VALUE: 15
PIF_VALUE: 13
PIF_VALUE: 19
PIF_VALUE: 18
PIF_VALUE: 18
PIF_VALUE: 12
PIF_VALUE: 16
PIF_VALUE: 16
PIF_VALUE: 22
PIF_VALUE: 15
PIF_VALUE: 17
PIF_VALUE: 20
PIF_VALUE: 16
PIF_VALUE: 15
PIF_VALUE: 23
PIF_VALUE: 14
PIF_VALUE: 16
PIF_VALUE: 13
PIF_VALUE: 17
PIF_VALUE: 17
PIF_VALUE: 22
PIF_VALUE: 16
PIF_VALUE: 21
PIF_VALUE: 21
PIF_VALUE: 19
PIF_VALUE: 16
PIF_VALUE: 14
PIF_VALUE: 13
PIF_VALUE: 14
PIF_VALUE: 13
PIF_VALUE: 20
PIF_VALUE: 19
PIF_VALUE: 17
PIF_VALUE: 16
PIF_VALUE: 22
PIF_VALUE: 17
PIF_VALUE: 16
PIF_VALUE: 14
PIF_VALUE: 13
PIF_VALUE: 13
PIF_VALUE: 16
PIF_VALUE: 19
PIF_VALUE: 21
PIF_VALUE: 13
PIF_VALUE: 29
PIF_VALUE: 17
PIF_VALUE: 22
PIF_VALUE: 16
PIF_VALUE: 19
PIF_VALUE: 14
PIF_VALUE: 15
PIF_VALUE: 19
PIF_VALUE: 25
PIF_VALUE: 15
PIF_VALUE: 16
PIF_VALUE: 19
PIF_VALUE: 15
PIF_VALUE: 13
PIF_VALUE: 14
PIF_VALUE: 12
PIF_VALUE: 13
PIF_VALUE: 16
PIF_VALUE: 20
PIF_VALUE: 18
PIF_VALUE: 15
PIF_VALUE: 22
PIF_VALUE: 14
PIF_VALUE: 16
PIF_VALUE: 13
PIF_VALUE: 23
PIF_VALUE: 16
PIF_VALUE: 22
PIF_VALUE: 12
PIF_VALUE: 13
PIF_VALUE: 22
PIF_VALUE: 13
PIF_VALUE: 14
PIF_VALUE: 15
PIF_VALUE: 19
PIF_VALUE: 20
PIF_VALUE: 20
PIF_VALUE: 13
PIF_VALUE: 16
PIF_VALUE: 14
PIF_VALUE: 18
PIF_VALUE: 37
PIF_VALUE: 17
PIF_VALUE: 15
PIF_VALUE: 12
PIF_VALUE: 15
PIF_VALUE: 21
PIF_VALUE: 19
PIF_VALUE: 22
PIF_VALUE: 17
PIF_VALUE: 11
PIF_VALUE: 12
PIF_VALUE: 15
PIF_VALUE: 15
PIF_VALUE: 19
PIF_VALUE: 16
PIF_VALUE: 12
PIF_VALUE: 18
PIF_VALUE: 15
PIF_VALUE: 14
PIF_VALUE: 13
PIF_VALUE: 22
PIF_VALUE: 19
PIF_VALUE: 15
PIF_VALUE: 16
PIF_VALUE: 15
PIF_VALUE: 13
PIF_VALUE: 21
PIF_VALUE: 14
PIF_VALUE: 17
PIF_VALUE: 13
PIF_VALUE: 15
PIF_VALUE: 20
PIF_VALUE: 11
PIF_VALUE: 16
PIF_VALUE: 14
PIF_VALUE: 14
PIF_VALUE: 16
PIF_VALUE: 11
PIF_VALUE: 14
PIF_VALUE: 15
PIF_VALUE: 12
PIF_VALUE: 23
PIF_VALUE: 14
PIF_VALUE: 28
PIF_VALUE: 17
PIF_VALUE: 15
PIF_VALUE: 13
PIF_VALUE: 16
PIF_VALUE: 16
PIF_VALUE: 19
PIF_VALUE: 17
PIF_VALUE: 18
PIF_VALUE: 16
PIF_VALUE: 15
PIF_VALUE: 15
PIF_VALUE: 16
PIF_VALUE: 17
PIF_VALUE: 18
PIF_VALUE: 14
PIF_VALUE: 22
PIF_VALUE: 13
PIF_VALUE: 15
PIF_VALUE: 20
PIF_VALUE: 21
PIF_VALUE: 14
PIF_VALUE: 18
PIF_VALUE: 19
PIF_VALUE: 12
PIF_VALUE: 16
PIF_VALUE: 19
PIF_VALUE: 41
PIF_VALUE: 13
PIF_VALUE: 14
PIF_VALUE: 17
PIF_VALUE: 13
PIF_VALUE: 9
PIF_VALUE: 17
PIF_VALUE: 13
PIF_VALUE: 19
PIF_VALUE: 29
PIF_VALUE: 18
PIF_VALUE: 39
PIF_VALUE: 16
PIF_VALUE: 14
PIF_VALUE: 21
PIF_VALUE: 30
PIF_VALUE: 16
PIF_VALUE: 14
PIF_VALUE: 38
PIF_VALUE: 17
PIF_VALUE: 21
PIF_VALUE: 15
PIF_VALUE: 8
PIF_VALUE: 22
PIF_VALUE: 25
PIF_VALUE: 19
PIF_VALUE: 21
PIF_VALUE: 29
PIF_VALUE: 22
PIF_VALUE: 18
PIF_VALUE: 26
PIF_VALUE: 21
PIF_VALUE: 15
PIF_VALUE: 15
PIF_VALUE: 16
PIF_VALUE: 12
PIF_VALUE: 22
PIF_VALUE: 18
PIF_VALUE: 15
PIF_VALUE: 23
PIF_VALUE: 20
PIF_VALUE: 24
PIF_VALUE: 19
PIF_VALUE: 16
PIF_VALUE: 17
PIF_VALUE: 15
PIF_VALUE: 19
PIF_VALUE: 21
PIF_VALUE: 15
PIF_VALUE: 23
PIF_VALUE: 12
PIF_VALUE: 15
PIF_VALUE: 28
PIF_VALUE: 17
PIF_VALUE: 21
PIF_VALUE: 28
PIF_VALUE: 15
PIF_VALUE: 23
PIF_VALUE: 13
PIF_VALUE: 20
PIF_VALUE: 14
PIF_VALUE: 14
PIF_VALUE: 25
PIF_VALUE: 16
PIF_VALUE: 13
PIF_VALUE: 13
PIF_VALUE: 17
PIF_VALUE: 23
PIF_VALUE: 21
PIF_VALUE: 14
PIF_VALUE: 15
PIF_VALUE: 24
PIF_VALUE: 15
PIF_VALUE: 20
PIF_VALUE: 17
PIF_VALUE: 16
PIF_VALUE: 15
PIF_VALUE: 18
PIF_VALUE: 13
PIF_VALUE: 16
PIF_VALUE: 11
PIF_VALUE: 21
PIF_VALUE: 15
PIF_VALUE: 15
PIF_VALUE: 23
PIF_VALUE: 18
PIF_VALUE: 16
PIF_VALUE: 13
PIF_VALUE: 17
PIF_VALUE: 14
PIF_VALUE: 13
PIF_VALUE: 23
PIF_VALUE: 14
PIF_VALUE: 13
PIF_VALUE: 20
PIF_VALUE: 17
PIF_VALUE: 19
PIF_VALUE: 19
PIF_VALUE: 15
PIF_VALUE: 16
PIF_VALUE: 17
PIF_VALUE: 14
PIF_VALUE: 22
PIF_VALUE: 15
PIF_VALUE: 15
PIF_VALUE: 16
PIF_VALUE: 16
PIF_VALUE: 14
PIF_VALUE: 17
PIF_VALUE: 19
PIF_VALUE: 9
PIF_VALUE: 24
PIF_VALUE: 15
PIF_VALUE: 13
PIF_VALUE: 18
PIF_VALUE: 17
PIF_VALUE: 19
PIF_VALUE: 20
PIF_VALUE: 13
PIF_VALUE: 13
PIF_VALUE: 17
PIF_VALUE: 13
PIF_VALUE: 17
PIF_VALUE: 19
PIF_VALUE: 15
PIF_VALUE: 19
PIF_VALUE: 12
PIF_VALUE: 14
PIF_VALUE: 17
PIF_VALUE: 15
PIF_VALUE: 18
PIF_VALUE: 13
PIF_VALUE: 25
PIF_VALUE: 16
PIF_VALUE: 15
PIF_VALUE: 13
PIF_VALUE: 16
PIF_VALUE: 14
PIF_VALUE: 20
PIF_VALUE: 16
PIF_VALUE: 17
PIF_VALUE: 13
PIF_VALUE: 14
PIF_VALUE: 20
PIF_VALUE: 13
PIF_VALUE: 15
PIF_VALUE: 17
PIF_VALUE: 15
PIF_VALUE: 20
PIF_VALUE: 14
PIF_VALUE: 22
PIF_VALUE: 14
PIF_VALUE: 11
PIF_VALUE: 16
PIF_VALUE: 19
PIF_VALUE: 21
PIF_VALUE: 12
PIF_VALUE: 19
PIF_VALUE: 4
PIF_VALUE: 14
PIF_VALUE: 16
PIF_VALUE: 16
PIF_VALUE: 23
PIF_VALUE: 16
PIF_VALUE: 12
PIF_VALUE: 20
PIF_VALUE: 18
PIF_VALUE: 16
PIF_VALUE: 15
PIF_VALUE: 14
PIF_VALUE: 21
PIF_VALUE: 32
PIF_VALUE: 20
PIF_VALUE: 16
PIF_VALUE: 12
PIF_VALUE: 15
PIF_VALUE: 21
PIF_VALUE: 15
PIF_VALUE: 16
PIF_VALUE: 21
PIF_VALUE: 18
PIF_VALUE: 35
PIF_VALUE: 16
PIF_VALUE: 14
PIF_VALUE: 24
PIF_VALUE: 23
PIF_VALUE: 14
PIF_VALUE: 30
PIF_VALUE: 18
PIF_VALUE: 18
PIF_VALUE: 13
PIF_VALUE: 36
PIF_VALUE: 16
PIF_VALUE: 17
PIF_VALUE: 16
PIF_VALUE: 14
PIF_VALUE: 13
PIF_VALUE: 13
PIF_VALUE: 15
PIF_VALUE: 15
PIF_VALUE: 14
PIF_VALUE: 15
PIF_VALUE: 15
PIF_VALUE: 19
PIF_VALUE: 21
PIF_VALUE: 14
PIF_VALUE: 23
PIF_VALUE: 14
PIF_VALUE: 21
PIF_VALUE: 15
PIF_VALUE: 12
PIF_VALUE: 23
PIF_VALUE: 13
PIF_VALUE: 16
PIF_VALUE: 14
PIF_VALUE: 22
PIF_VALUE: 16
PIF_VALUE: 25
PIF_VALUE: 20
PIF_VALUE: 15
PIF_VALUE: 19
PIF_VALUE: 20
PIF_VALUE: 13
PIF_VALUE: 19
PIF_VALUE: 16
PIF_VALUE: 15
PIF_VALUE: 15
PIF_VALUE: 14
PIF_VALUE: 14
PIF_VALUE: 16
PIF_VALUE: 16
PIF_VALUE: 20
PIF_VALUE: 18
PIF_VALUE: 17
PIF_VALUE: 7
PIF_VALUE: 21
PIF_VALUE: 16
PIF_VALUE: 14
PIF_VALUE: 15
PIF_VALUE: 22
PIF_VALUE: 19
PIF_VALUE: 14
PIF_VALUE: 26
PIF_VALUE: 17
PIF_VALUE: 22
PIF_VALUE: 17
PIF_VALUE: 16
PIF_VALUE: 13
PIF_VALUE: 33
PIF_VALUE: 13
PIF_VALUE: 13
PIF_VALUE: 15
PIF_VALUE: 17
PIF_VALUE: 18
PIF_VALUE: 7
PIF_VALUE: 15
PIF_VALUE: 21
PIF_VALUE: 17
PIF_VALUE: 15
PIF_VALUE: 13
PIF_VALUE: 13
PIF_VALUE: 14
PIF_VALUE: 27
PIF_VALUE: 16
PIF_VALUE: 16
PIF_VALUE: 32
PIF_VALUE: 24
PIF_VALUE: 21
PIF_VALUE: 22
PIF_VALUE: 13
PIF_VALUE: 14
PIF_VALUE: 26
PIF_VALUE: 17
PIF_VALUE: 20
PIF_VALUE: 22
PIF_VALUE: 14
PIF_VALUE: 14
PIF_VALUE: 17
PIF_VALUE: 13
PIF_VALUE: 14
PIF_VALUE: 15
PIF_VALUE: 15
PIF_VALUE: 17
PIF_VALUE: 25
PIF_VALUE: 12
PIF_VALUE: 22
PIF_VALUE: 24
PIF_VALUE: 14
PIF_VALUE: 19
PIF_VALUE: 18
PIF_VALUE: 16
PIF_VALUE: 15
PIF_VALUE: 25
PIF_VALUE: 21
PIF_VALUE: 22
PIF_VALUE: 13
PIF_VALUE: 16
PIF_VALUE: 20
PIF_VALUE: 23
PIF_VALUE: 14
PIF_VALUE: 21
PIF_VALUE: 16
PIF_VALUE: 16
PIF_VALUE: 14
PIF_VALUE: 22
PIF_VALUE: 21
PIF_VALUE: 15
PIF_VALUE: 14
PIF_VALUE: 17
PIF_VALUE: 12
PIF_VALUE: 17
PIF_VALUE: 15
PIF_VALUE: 17
PIF_VALUE: 18
PIF_VALUE: 12
PIF_VALUE: 16
PIF_VALUE: 18
PIF_VALUE: 10
PIF_VALUE: 25
PIF_VALUE: 18
PIF_VALUE: 15
PIF_VALUE: 13
PIF_VALUE: 19
PIF_VALUE: 15
PIF_VALUE: 32
PIF_VALUE: 16
PIF_VALUE: 19
PIF_VALUE: 22
PIF_VALUE: 14
PIF_VALUE: 18
PIF_VALUE: 17
PIF_VALUE: 20
PIF_VALUE: 14
PIF_VALUE: 16
PIF_VALUE: 19
PIF_VALUE: 13
PIF_VALUE: 26
PIF_VALUE: 24
PIF_VALUE: 22
PIF_VALUE: 14
PIF_VALUE: 16
PIF_VALUE: 22
PIF_VALUE: 21
PIF_VALUE: 22
PIF_VALUE: 21
PIF_VALUE: 16
PIF_VALUE: 11
PIF_VALUE: 21
PIF_VALUE: 16
PIF_VALUE: 21
PIF_VALUE: 15
PIF_VALUE: 15
PIF_VALUE: 16
PIF_VALUE: 15
PIF_VALUE: 17
PIF_VALUE: 21
PIF_VALUE: 17
PIF_VALUE: 23
PIF_VALUE: 16
PIF_VALUE: 18
PIF_VALUE: 21
PIF_VALUE: 20
PIF_VALUE: 12
PIF_VALUE: 21
PIF_VALUE: 14
PIF_VALUE: 15
PIF_VALUE: 27
PIF_VALUE: 13
PIF_VALUE: 16
PIF_VALUE: 16
PIF_VALUE: 13
PIF_VALUE: 14
PIF_VALUE: 15
PIF_VALUE: 20
PIF_VALUE: 13
PIF_VALUE: 22
PIF_VALUE: 17
PIF_VALUE: 13
PIF_VALUE: 12
PIF_VALUE: 14

## 2024-04-13 ASSESSMENT — PAIN DESCRIPTION - FREQUENCY: FREQUENCY: CONTINUOUS

## 2024-04-13 ASSESSMENT — PAIN DESCRIPTION - PAIN TYPE: TYPE: CHRONIC PAIN

## 2024-04-13 ASSESSMENT — PAIN DESCRIPTION - ORIENTATION: ORIENTATION: LEFT

## 2024-04-13 ASSESSMENT — PAIN - FUNCTIONAL ASSESSMENT: PAIN_FUNCTIONAL_ASSESSMENT: ACTIVITIES ARE NOT PREVENTED

## 2024-04-13 ASSESSMENT — PAIN DESCRIPTION - LOCATION: LOCATION: CHEST

## 2024-04-13 ASSESSMENT — PAIN DESCRIPTION - DESCRIPTORS: DESCRIPTORS: ACHING;DISCOMFORT;SORE

## 2024-04-13 ASSESSMENT — PAIN DESCRIPTION - ONSET: ONSET: ON-GOING

## 2024-04-13 ASSESSMENT — PAIN SCALES - GENERAL: PAINLEVEL_OUTOF10: 8

## 2024-04-13 NOTE — H&P
[facility] Hospitalist Group   History and Physical      CHIEF COMPLAINT:  SOB    History of Present Illness:  79 y.o. female with a history of AFib, HTN, anemia, chronic trach vent, CVA, CAD, left BKA presents with SOB at [facility] which resolved prior to EMS arriving.  Vent at facility is pressure support with 1-10L O2 bleed-in, EMS told ED that O2 had been increased from 2L to 4L prior to their arrival.  Workup in ED significant for K 5.2, pro-BNP 9844 (at baseline), troponin 101 and 108 (chronically elevated but this is higher than prior), hgb 9.6.  ABG showed pH 7.589, pCO2 21.2, pO2 102.9.  CXR shows COPD, no acute finding.  Given bolus, duoneb and solumedrol in ED.  Tachycardic in ED, did have desaturation to mid 80s on FiO2 40%.    Informant(s) for H&P: chart    REVIEW OF SYSTEMS:  no fevers, chills, cp, n/v, ha, vision/hearing changes, wt changes, hot/cold flashes, other open skin lesions, diarrhea, constipation, dysuria/hematuria unless noted in HPI. Complete ROS performed with the patient and is otherwise negative.      PMH:  Past Medical History:   Diagnosis Date    Atrial fibrillation (HCC)     Hypertension     MI (myocardial infarction) (HCC)        Surgical History:  No past surgical history on file.    Medications Prior to Admission:    Prior to Admission medications    Medication Sig Start Date End Date Taking? Authorizing Provider   miconazole nitrate 2 % OINT Apply topically 2 times daily 1/16/24   Matthew Branch APRN - CNS   miconazole nitrate 2 % OINT Apply topically 2 times daily 1/16/24   Matthew Branch APRN - CNS   nitroGLYCERIN (NITROSTAT) 0.4 MG SL tablet Place 1 tablet under the tongue every 5 minutes as needed for Chest pain up to max of 3 total doses. If no relief after 1 dose, call 911.    Provider, MD Amina   venlafaxine (EFFEXOR) 25 MG tablet 1 tablet by PEG Tube route 3 times daily (with meals) 9/16/23   García Horton DO

## 2024-04-13 NOTE — ED PROVIDER NOTES
17 94 % --   04/13/24 0315 -- -- -- (!) 120 17 94 % --   04/13/24 0314 -- -- -- (!) 119 21 95 % --   04/13/24 0313 -- -- -- (!) 119 23 95 % --   04/13/24 0312 -- -- -- (!) 118 26 95 % --   04/13/24 0311 -- -- -- (!) 119 22 94 % --   04/13/24 0310 -- -- -- (!) 120 20 94 % --   04/13/24 0309 -- -- -- (!) 117 18 94 % --   04/13/24 0308 -- -- -- (!) 118 25 95 % --   04/13/24 0307 -- -- -- (!) 123 22 95 % --   04/13/24 0306 -- -- -- (!) 120 21 95 % --   04/13/24 0305 -- -- -- (!) 123 22 95 % --   04/13/24 0304 -- -- -- (!) 122 29 95 % --   04/13/24 0303 -- -- -- (!) 121 23 94 % --   04/13/24 0302 -- -- -- (!) 124 16 94 % --   04/13/24 0301 -- -- -- (!) 115 22 94 % --   04/13/24 0300 -- -- -- (!) 118 22 95 % --   04/13/24 0259 -- -- -- (!) 118 17 95 % --   04/13/24 0258 -- -- -- (!) 124 20 95 % --   04/13/24 0257 -- -- -- (!) 117 17 95 % --   04/13/24 0256 -- -- -- (!) 122 21 95 % --   04/13/24 0255 -- -- -- (!) 119 26 95 % --   04/13/24 0254 -- -- -- (!) 120 19 96 % --   04/13/24 0253 -- -- -- (!) 121 23 96 % --   04/13/24 0252 -- -- -- (!) 117 17 95 % --   04/13/24 0251 -- -- -- (!) 118 29 95 % --   04/13/24 0250 -- -- -- (!) 120 18 95 % --   04/13/24 0249 -- -- -- (!) 119 19 95 % --   04/13/24 0248 -- -- -- (!) 120 16 94 % --   04/13/24 0247 -- -- -- (!) 117 20 95 % --   04/13/24 0246 -- -- -- (!) 122 22 95 % --   04/13/24 0245 -- -- -- (!) 119 16 95 % --   04/13/24 0244 -- -- -- (!) 121 21 95 % --   04/13/24 0243 -- -- -- (!) 121 19 96 % --   04/13/24 0242 -- -- -- (!) 124 22 96 % --   04/13/24 0241 -- -- -- (!) 117 22 96 % --   04/13/24 0240 -- -- -- (!) 119 21 95 % --   04/13/24 0239 -- -- -- (!) 120 19 95 % --   04/13/24 0238 -- -- -- (!) 118 20 95 % --   04/13/24 0237 -- -- -- (!) 118 18 95 % --   04/13/24 0236 -- -- -- (!) 118 17 95 % --   04/13/24 0235 -- -- -- (!) 118 22 95 % --   04/13/24 0234 -- -- -- (!) 117 19 95 % --   04/13/24 0233 -- -- -- (!) 117 15 96 % --   04/13/24 0232 -- -- -- (!) 118 21 95 % --

## 2024-04-14 ENCOUNTER — APPOINTMENT (OUTPATIENT)
Dept: CT IMAGING | Age: 79
DRG: 208 | End: 2024-04-14
Payer: MEDICARE

## 2024-04-14 LAB
ANION GAP SERPL CALCULATED.3IONS-SCNC: 19 MMOL/L (ref 7–16)
BASOPHILS # BLD: 0.02 K/UL (ref 0–0.2)
BASOPHILS NFR BLD: 0 % (ref 0–2)
BUN SERPL-MCNC: 54 MG/DL (ref 6–23)
CALCIUM SERPL-MCNC: 9.5 MG/DL (ref 8.6–10.2)
CHLORIDE SERPL-SCNC: 104 MMOL/L (ref 98–107)
CO2 SERPL-SCNC: 18 MMOL/L (ref 22–29)
CREAT SERPL-MCNC: 0.9 MG/DL (ref 0.5–1)
EKG ATRIAL RATE: 104 BPM
EKG ATRIAL RATE: 96 BPM
EKG P AXIS: 81 DEGREES
EKG P AXIS: 89 DEGREES
EKG P-R INTERVAL: 138 MS
EKG P-R INTERVAL: 154 MS
EKG Q-T INTERVAL: 382 MS
EKG Q-T INTERVAL: 386 MS
EKG QRS DURATION: 88 MS
EKG QRS DURATION: 92 MS
EKG QTC CALCULATION (BAZETT): 482 MS
EKG QTC CALCULATION (BAZETT): 507 MS
EKG R AXIS: 42 DEGREES
EKG R AXIS: 55 DEGREES
EKG T AXIS: -179 DEGREES
EKG T AXIS: 119 DEGREES
EKG VENTRICULAR RATE: 104 BPM
EKG VENTRICULAR RATE: 96 BPM
EOSINOPHIL # BLD: 0.03 K/UL (ref 0.05–0.5)
EOSINOPHILS RELATIVE PERCENT: 0 % (ref 0–6)
ERYTHROCYTE [DISTWIDTH] IN BLOOD BY AUTOMATED COUNT: 14.7 % (ref 11.5–15)
GFR SERPL CREATININE-BSD FRML MDRD: 64 ML/MIN/1.73M2
GLUCOSE SERPL-MCNC: 90 MG/DL (ref 74–99)
HCT VFR BLD AUTO: 26.8 % (ref 34–48)
HGB BLD-MCNC: 8.4 G/DL (ref 11.5–15.5)
IMM GRANULOCYTES # BLD AUTO: <0.03 K/UL (ref 0–0.58)
IMM GRANULOCYTES NFR BLD: 0 % (ref 0–5)
LYMPHOCYTES NFR BLD: 0.87 K/UL (ref 1.5–4)
LYMPHOCYTES RELATIVE PERCENT: 11 % (ref 20–42)
MCH RBC QN AUTO: 27.1 PG (ref 26–35)
MCHC RBC AUTO-ENTMCNC: 31.3 G/DL (ref 32–34.5)
MCV RBC AUTO: 86.5 FL (ref 80–99.9)
MICROORGANISM/AGENT SPEC: NORMAL
MONOCYTES NFR BLD: 0.6 K/UL (ref 0.1–0.95)
MONOCYTES NFR BLD: 7 % (ref 2–12)
NEUTROPHILS NFR BLD: 81 % (ref 43–80)
NEUTS SEG NFR BLD: 6.68 K/UL (ref 1.8–7.3)
PLATELET, FLUORESCENCE: 249 K/UL (ref 130–450)
PMV BLD AUTO: 10.8 FL (ref 7–12)
POTASSIUM SERPL-SCNC: 5 MMOL/L (ref 3.5–5)
RBC # BLD AUTO: 3.1 M/UL (ref 3.5–5.5)
SODIUM SERPL-SCNC: 141 MMOL/L (ref 132–146)
SPECIMEN DESCRIPTION: NORMAL
TROPONIN I SERPL HS-MCNC: 68 NG/L (ref 0–9)
WBC OTHER # BLD: 8.2 K/UL (ref 4.5–11.5)

## 2024-04-14 PROCEDURE — 6370000000 HC RX 637 (ALT 250 FOR IP): Performed by: FAMILY MEDICINE

## 2024-04-14 PROCEDURE — 71275 CT ANGIOGRAPHY CHEST: CPT

## 2024-04-14 PROCEDURE — 93010 ELECTROCARDIOGRAM REPORT: CPT | Performed by: INTERNAL MEDICINE

## 2024-04-14 PROCEDURE — 2580000003 HC RX 258: Performed by: FAMILY MEDICINE

## 2024-04-14 PROCEDURE — 94640 AIRWAY INHALATION TREATMENT: CPT

## 2024-04-14 PROCEDURE — 36415 COLL VENOUS BLD VENIPUNCTURE: CPT

## 2024-04-14 PROCEDURE — 87205 SMEAR GRAM STAIN: CPT

## 2024-04-14 PROCEDURE — 93005 ELECTROCARDIOGRAM TRACING: CPT | Performed by: INTERNAL MEDICINE

## 2024-04-14 PROCEDURE — 87070 CULTURE OTHR SPECIMN AEROBIC: CPT

## 2024-04-14 PROCEDURE — 87077 CULTURE AEROBIC IDENTIFY: CPT

## 2024-04-14 PROCEDURE — 6360000002 HC RX W HCPCS: Performed by: FAMILY MEDICINE

## 2024-04-14 PROCEDURE — 2060000000 HC ICU INTERMEDIATE R&B

## 2024-04-14 PROCEDURE — 87181 SC STD AGAR DILUTION PER AGT: CPT

## 2024-04-14 PROCEDURE — 84484 ASSAY OF TROPONIN QUANT: CPT

## 2024-04-14 PROCEDURE — 99232 SBSQ HOSP IP/OBS MODERATE 35: CPT | Performed by: INTERNAL MEDICINE

## 2024-04-14 PROCEDURE — 80048 BASIC METABOLIC PNL TOTAL CA: CPT

## 2024-04-14 PROCEDURE — 6360000004 HC RX CONTRAST MEDICATION: Performed by: RADIOLOGY

## 2024-04-14 PROCEDURE — 99233 SBSQ HOSP IP/OBS HIGH 50: CPT | Performed by: INTERNAL MEDICINE

## 2024-04-14 PROCEDURE — 85025 COMPLETE CBC W/AUTO DIFF WBC: CPT

## 2024-04-14 RX ORDER — AZITHROMYCIN 250 MG/1
250 TABLET, FILM COATED ORAL EVERY 12 HOURS
Status: ON HOLD | COMMUNITY
End: 2024-04-17 | Stop reason: HOSPADM

## 2024-04-14 RX ORDER — ARFORMOTEROL TARTRATE 15 UG/2ML
1 SOLUTION RESPIRATORY (INHALATION) 2 TIMES DAILY
COMMUNITY

## 2024-04-14 RX ORDER — NYSTATIN 100000 [USP'U]/G
1 POWDER TOPICAL 2 TIMES DAILY
COMMUNITY

## 2024-04-14 RX ORDER — LOSARTAN POTASSIUM 25 MG/1
12.5 TABLET ORAL DAILY
Status: ON HOLD | COMMUNITY
End: 2024-04-17 | Stop reason: HOSPADM

## 2024-04-14 RX ADMIN — ARFORMOTEROL TARTRATE 15 MCG: 15 SOLUTION RESPIRATORY (INHALATION) at 17:55

## 2024-04-14 RX ADMIN — ARFORMOTEROL TARTRATE 15 MCG: 15 SOLUTION RESPIRATORY (INHALATION) at 06:12

## 2024-04-14 RX ADMIN — BUDESONIDE INHALATION 500 MCG: 0.5 SUSPENSION RESPIRATORY (INHALATION) at 17:55

## 2024-04-14 RX ADMIN — BUDESONIDE INHALATION 500 MCG: 0.5 SUSPENSION RESPIRATORY (INHALATION) at 06:12

## 2024-04-14 RX ADMIN — AMLODIPINE BESYLATE 5 MG: 5 TABLET ORAL at 08:50

## 2024-04-14 RX ADMIN — VENLAFAXINE 25 MG: 25 TABLET ORAL at 13:50

## 2024-04-14 RX ADMIN — APIXABAN 2.5 MG: 5 TABLET, FILM COATED ORAL at 21:12

## 2024-04-14 RX ADMIN — IOPAMIDOL 75 ML: 755 INJECTION, SOLUTION INTRAVENOUS at 13:31

## 2024-04-14 RX ADMIN — Medication 10 ML: at 21:24

## 2024-04-14 RX ADMIN — Medication 10 ML: at 00:40

## 2024-04-14 RX ADMIN — VENLAFAXINE 25 MG: 25 TABLET ORAL at 09:06

## 2024-04-14 RX ADMIN — METOPROLOL TARTRATE 100 MG: 50 TABLET, FILM COATED ORAL at 09:05

## 2024-04-14 RX ADMIN — LANSOPRAZOLE 30 MG: 30 TABLET, ORALLY DISINTEGRATING, DELAYED RELEASE ORAL at 06:40

## 2024-04-14 RX ADMIN — APIXABAN 2.5 MG: 5 TABLET, FILM COATED ORAL at 08:50

## 2024-04-14 RX ADMIN — IPRATROPIUM BROMIDE AND ALBUTEROL SULFATE 1 DOSE: .5; 2.5 SOLUTION RESPIRATORY (INHALATION) at 14:17

## 2024-04-14 RX ADMIN — METOPROLOL TARTRATE 100 MG: 50 TABLET, FILM COATED ORAL at 21:11

## 2024-04-14 RX ADMIN — SUCRALFATE 1 G: 1 TABLET ORAL at 08:50

## 2024-04-14 RX ADMIN — SUCRALFATE 1 G: 1 TABLET ORAL at 21:11

## 2024-04-14 RX ADMIN — VENLAFAXINE 25 MG: 25 TABLET ORAL at 19:07

## 2024-04-14 RX ADMIN — ATORVASTATIN CALCIUM 10 MG: 20 TABLET, FILM COATED ORAL at 21:11

## 2024-04-14 RX ADMIN — Medication 300 MG: at 09:06

## 2024-04-14 RX ADMIN — Medication 10 ML: at 09:07

## 2024-04-14 RX ADMIN — ISOSORBIDE MONONITRATE 30 MG: 30 TABLET, EXTENDED RELEASE ORAL at 08:50

## 2024-04-14 RX ADMIN — CLOPIDOGREL BISULFATE 75 MG: 75 TABLET ORAL at 08:50

## 2024-04-14 RX ADMIN — IPRATROPIUM BROMIDE AND ALBUTEROL SULFATE 1 DOSE: .5; 2.5 SOLUTION RESPIRATORY (INHALATION) at 06:13

## 2024-04-14 RX ADMIN — ARIPIPRAZOLE 15 MG: 5 TABLET ORAL at 08:49

## 2024-04-14 RX ADMIN — IPRATROPIUM BROMIDE AND ALBUTEROL SULFATE 1 DOSE: .5; 2.5 SOLUTION RESPIRATORY (INHALATION) at 09:12

## 2024-04-14 RX ADMIN — IPRATROPIUM BROMIDE AND ALBUTEROL SULFATE 1 DOSE: .5; 2.5 SOLUTION RESPIRATORY (INHALATION) at 17:55

## 2024-04-14 ASSESSMENT — PAIN SCALES - GENERAL
PAINLEVEL_OUTOF10: 0

## 2024-04-14 ASSESSMENT — PULMONARY FUNCTION TESTS
PIF_VALUE: 19
PIF_VALUE: 20
PIF_VALUE: 22
PIF_VALUE: 27
PIF_VALUE: 25

## 2024-04-14 ASSESSMENT — PAIN SCALES - WONG BAKER: WONGBAKER_NUMERICALRESPONSE: NO HURT

## 2024-04-15 ENCOUNTER — APPOINTMENT (OUTPATIENT)
Age: 79
DRG: 208 | End: 2024-04-15
Payer: MEDICARE

## 2024-04-15 LAB
ALBUMIN SERPL-MCNC: 3.6 G/DL (ref 3.5–5.2)
ALP SERPL-CCNC: 137 U/L (ref 35–104)
ALT SERPL-CCNC: 14 U/L (ref 0–32)
ANION GAP SERPL CALCULATED.3IONS-SCNC: 13 MMOL/L (ref 7–16)
AST SERPL-CCNC: 17 U/L (ref 0–31)
BASOPHILS # BLD: 0.03 K/UL (ref 0–0.2)
BASOPHILS NFR BLD: 0 % (ref 0–2)
BILIRUB SERPL-MCNC: 0.2 MG/DL (ref 0–1.2)
BUN SERPL-MCNC: 47 MG/DL (ref 6–23)
CALCIUM SERPL-MCNC: 9 MG/DL (ref 8.6–10.2)
CHLORIDE SERPL-SCNC: 110 MMOL/L (ref 98–107)
CO2 SERPL-SCNC: 20 MMOL/L (ref 22–29)
CREAT SERPL-MCNC: 0.9 MG/DL (ref 0.5–1)
ECHO AV AREA PEAK VELOCITY: 1.8 CM2
ECHO AV AREA PEAK VELOCITY: 1.8 CM2
ECHO AV AREA PEAK VELOCITY: 1.9 CM2
ECHO AV AREA PEAK VELOCITY: 1.9 CM2
ECHO AV AREA VTI: 2.1 CM2
ECHO AV AREA/BSA VTI: 1.4 CM2/M2
ECHO AV CUSP MM: 1.6 CM
ECHO AV MEAN GRADIENT: 3 MMHG
ECHO AV MEAN VELOCITY: 0.8 M/S
ECHO AV PEAK GRADIENT: 4 MMHG
ECHO AV PEAK GRADIENT: 4 MMHG
ECHO AV PEAK VELOCITY: 1 M/S
ECHO AV PEAK VELOCITY: 1 M/S
ECHO AV VTI: 20.8 CM
ECHO BSA: 1.51 M2
ECHO EST RA PRESSURE: 3 MMHG
ECHO LA DIAMETER INDEX: 2.15 CM/M2
ECHO LA DIAMETER: 3.2 CM
ECHO LA VOL A-L A2C: 43 ML (ref 22–52)
ECHO LA VOL A-L A4C: 63 ML (ref 22–52)
ECHO LA VOL BP: 49 ML (ref 22–52)
ECHO LA VOL MOD A2C: 43 ML (ref 22–52)
ECHO LA VOL MOD A4C: 57 ML (ref 22–52)
ECHO LA VOL/BSA BIPLANE: 33 ML/M2 (ref 16–34)
ECHO LA VOLUME AREA LENGTH: 52 ML
ECHO LA VOLUME INDEX A-L A2C: 29 ML/M2 (ref 16–34)
ECHO LA VOLUME INDEX A-L A4C: 42 ML/M2 (ref 16–34)
ECHO LA VOLUME INDEX AREA LENGTH: 35 ML/M2 (ref 16–34)
ECHO LA VOLUME INDEX MOD A2C: 29 ML/M2 (ref 16–34)
ECHO LA VOLUME INDEX MOD A4C: 38 ML/M2 (ref 16–34)
ECHO LV EDV A2C: 42 ML
ECHO LV EDV A4C: 51 ML
ECHO LV EDV BP: 46 ML (ref 56–104)
ECHO LV EDV INDEX A4C: 34 ML/M2
ECHO LV EDV INDEX BP: 31 ML/M2
ECHO LV EDV NDEX A2C: 28 ML/M2
ECHO LV EJECTION FRACTION A2C: 30 %
ECHO LV EJECTION FRACTION A4C: 25 %
ECHO LV EJECTION FRACTION BIPLANE: 27 % (ref 55–100)
ECHO LV ESV A2C: 30 ML
ECHO LV ESV A4C: 38 ML
ECHO LV ESV BP: 33 ML (ref 19–49)
ECHO LV ESV INDEX A2C: 20 ML/M2
ECHO LV ESV INDEX A4C: 26 ML/M2
ECHO LV ESV INDEX BP: 22 ML/M2
ECHO LV FRACTIONAL SHORTENING: 17 % (ref 28–44)
ECHO LV INTERNAL DIMENSION DIASTOLE INDEX: 2.75 CM/M2
ECHO LV INTERNAL DIMENSION DIASTOLIC: 4.1 CM (ref 3.9–5.3)
ECHO LV INTERNAL DIMENSION SYSTOLIC INDEX: 2.28 CM/M2
ECHO LV INTERNAL DIMENSION SYSTOLIC: 3.4 CM
ECHO LV IVSD: 1 CM (ref 0.6–0.9)
ECHO LV IVSS: 1.1 CM
ECHO LV MASS 2D: 132.1 G (ref 67–162)
ECHO LV MASS INDEX 2D: 88.7 G/M2 (ref 43–95)
ECHO LV POSTERIOR WALL DIASTOLIC: 1 CM (ref 0.6–0.9)
ECHO LV POSTERIOR WALL SYSTOLIC: 1.4 CM
ECHO LV RELATIVE WALL THICKNESS RATIO: 0.49
ECHO LVOT AREA: 2.8 CM2
ECHO LVOT AV VTI INDEX: 0.75
ECHO LVOT DIAM: 1.9 CM
ECHO LVOT MEAN GRADIENT: 1 MMHG
ECHO LVOT PEAK GRADIENT: 2 MMHG
ECHO LVOT PEAK GRADIENT: 2 MMHG
ECHO LVOT PEAK VELOCITY: 0.7 M/S
ECHO LVOT PEAK VELOCITY: 0.7 M/S
ECHO LVOT STROKE VOLUME INDEX: 29.9 ML/M2
ECHO LVOT SV: 44.5 ML
ECHO LVOT VTI: 15.7 CM
ECHO MV "A" WAVE DURATION: 156 MSEC
ECHO MV A VELOCITY: 0.81 M/S
ECHO MV AREA PHT: 4 CM2
ECHO MV AREA VTI: 1.8 CM2
ECHO MV E DECELERATION TIME (DT): 260 MS
ECHO MV E VELOCITY: 0.57 M/S
ECHO MV E/A RATIO: 0.7
ECHO MV LVOT VTI INDEX: 1.55
ECHO MV MAX VELOCITY: 1 M/S
ECHO MV MEAN GRADIENT: 1 MMHG
ECHO MV MEAN VELOCITY: 0.6 M/S
ECHO MV PEAK GRADIENT: 4 MMHG
ECHO MV PRESSURE HALF TIME (PHT): 54.7 MS
ECHO MV VTI: 24.3 CM
ECHO PV MAX VELOCITY: 0.8 M/S
ECHO PV MEAN GRADIENT: 2 MMHG
ECHO PV MEAN VELOCITY: 0.6 M/S
ECHO PV PEAK GRADIENT: 3 MMHG
ECHO PV VTI: 19.7 CM
ECHO PVEIN A DURATION: 133.2 MS
ECHO PVEIN A VELOCITY: 0.3 M/S
ECHO PVEIN PEAK D VELOCITY: 0.3 M/S
ECHO PVEIN PEAK S VELOCITY: 0.2 M/S
ECHO PVEIN S/D RATIO: 0.7
ECHO RIGHT VENTRICULAR SYSTOLIC PRESSURE (RVSP): 36 MMHG
ECHO RV INTERNAL DIMENSION: 2.7 CM
ECHO RV LONGITUDINAL DIMENSION: 4.6 CM
ECHO RV MID DIMENSION: 2.3 CM
ECHO RVOT MEAN GRADIENT: 1 MMHG
ECHO RVOT PEAK GRADIENT: 1 MMHG
ECHO RVOT PEAK VELOCITY: 0.6 M/S
ECHO RVOT VTI: 12.7 CM
ECHO TV REGURGITANT MAX VELOCITY: 2.88 M/S
ECHO TV REGURGITANT PEAK GRADIENT: 33 MMHG
EOSINOPHIL # BLD: 0.16 K/UL (ref 0.05–0.5)
EOSINOPHILS RELATIVE PERCENT: 2 % (ref 0–6)
ERYTHROCYTE [DISTWIDTH] IN BLOOD BY AUTOMATED COUNT: 14.6 % (ref 11.5–15)
GFR SERPL CREATININE-BSD FRML MDRD: 69 ML/MIN/1.73M2
GLUCOSE SERPL-MCNC: 121 MG/DL (ref 74–99)
HCT VFR BLD AUTO: 29 % (ref 34–48)
HGB BLD-MCNC: 9.1 G/DL (ref 11.5–15.5)
IMM GRANULOCYTES # BLD AUTO: 0.03 K/UL (ref 0–0.58)
IMM GRANULOCYTES NFR BLD: 0 % (ref 0–5)
LYMPHOCYTES NFR BLD: 0.99 K/UL (ref 1.5–4)
LYMPHOCYTES RELATIVE PERCENT: 12 % (ref 20–42)
MCH RBC QN AUTO: 26.1 PG (ref 26–35)
MCHC RBC AUTO-ENTMCNC: 31.4 G/DL (ref 32–34.5)
MCV RBC AUTO: 83.3 FL (ref 80–99.9)
MICROORGANISM SPEC CULT: ABNORMAL
MICROORGANISM/AGENT SPEC: ABNORMAL
MONOCYTES NFR BLD: 0.68 K/UL (ref 0.1–0.95)
MONOCYTES NFR BLD: 8 % (ref 2–12)
NEUTROPHILS NFR BLD: 77 % (ref 43–80)
NEUTS SEG NFR BLD: 6.39 K/UL (ref 1.8–7.3)
PLATELET # BLD AUTO: 259 K/UL (ref 130–450)
PMV BLD AUTO: 10.8 FL (ref 7–12)
POTASSIUM SERPL-SCNC: 4.1 MMOL/L (ref 3.5–5)
PROT SERPL-MCNC: 7.1 G/DL (ref 6.4–8.3)
RBC # BLD AUTO: 3.48 M/UL (ref 3.5–5.5)
SODIUM SERPL-SCNC: 143 MMOL/L (ref 132–146)
SPECIMEN DESCRIPTION: ABNORMAL
SPECIMEN DESCRIPTION: ABNORMAL
WBC OTHER # BLD: 8.3 K/UL (ref 4.5–11.5)

## 2024-04-15 PROCEDURE — 94640 AIRWAY INHALATION TREATMENT: CPT

## 2024-04-15 PROCEDURE — 6370000000 HC RX 637 (ALT 250 FOR IP): Performed by: FAMILY MEDICINE

## 2024-04-15 PROCEDURE — 99233 SBSQ HOSP IP/OBS HIGH 50: CPT | Performed by: INTERNAL MEDICINE

## 2024-04-15 PROCEDURE — 2580000003 HC RX 258: Performed by: FAMILY MEDICINE

## 2024-04-15 PROCEDURE — 97530 THERAPEUTIC ACTIVITIES: CPT | Performed by: PHYSICAL THERAPIST

## 2024-04-15 PROCEDURE — 36415 COLL VENOUS BLD VENIPUNCTURE: CPT

## 2024-04-15 PROCEDURE — 80053 COMPREHEN METABOLIC PANEL: CPT

## 2024-04-15 PROCEDURE — 6360000002 HC RX W HCPCS: Performed by: FAMILY MEDICINE

## 2024-04-15 PROCEDURE — 2060000000 HC ICU INTERMEDIATE R&B

## 2024-04-15 PROCEDURE — 93306 TTE W/DOPPLER COMPLETE: CPT

## 2024-04-15 PROCEDURE — 93306 TTE W/DOPPLER COMPLETE: CPT | Performed by: INTERNAL MEDICINE

## 2024-04-15 PROCEDURE — 97161 PT EVAL LOW COMPLEX 20 MIN: CPT | Performed by: PHYSICAL THERAPIST

## 2024-04-15 PROCEDURE — 85025 COMPLETE CBC W/AUTO DIFF WBC: CPT

## 2024-04-15 RX ORDER — HYDROCODONE BITARTRATE AND ACETAMINOPHEN 5; 325 MG/1; MG/1
1 TABLET ORAL ONCE
Status: COMPLETED | OUTPATIENT
Start: 2024-04-15 | End: 2024-04-15

## 2024-04-15 RX ADMIN — Medication 10 ML: at 21:17

## 2024-04-15 RX ADMIN — SUCRALFATE 1 G: 1 TABLET ORAL at 10:23

## 2024-04-15 RX ADMIN — IPRATROPIUM BROMIDE AND ALBUTEROL SULFATE 1 DOSE: .5; 2.5 SOLUTION RESPIRATORY (INHALATION) at 14:32

## 2024-04-15 RX ADMIN — IPRATROPIUM BROMIDE AND ALBUTEROL SULFATE 1 DOSE: .5; 2.5 SOLUTION RESPIRATORY (INHALATION) at 09:41

## 2024-04-15 RX ADMIN — VENLAFAXINE 25 MG: 25 TABLET ORAL at 16:52

## 2024-04-15 RX ADMIN — Medication 5 ML: at 12:03

## 2024-04-15 RX ADMIN — IPRATROPIUM BROMIDE AND ALBUTEROL SULFATE 1 DOSE: .5; 2.5 SOLUTION RESPIRATORY (INHALATION) at 18:40

## 2024-04-15 RX ADMIN — SUCRALFATE 1 G: 1 TABLET ORAL at 21:15

## 2024-04-15 RX ADMIN — METOPROLOL TARTRATE 100 MG: 50 TABLET, FILM COATED ORAL at 10:23

## 2024-04-15 RX ADMIN — ISOSORBIDE MONONITRATE 30 MG: 30 TABLET, EXTENDED RELEASE ORAL at 10:22

## 2024-04-15 RX ADMIN — APIXABAN 2.5 MG: 5 TABLET, FILM COATED ORAL at 10:23

## 2024-04-15 RX ADMIN — BUDESONIDE INHALATION 500 MCG: 0.5 SUSPENSION RESPIRATORY (INHALATION) at 18:40

## 2024-04-15 RX ADMIN — HYDROCODONE BITARTRATE AND ACETAMINOPHEN 1 TABLET: 5; 325 TABLET ORAL at 21:23

## 2024-04-15 RX ADMIN — ATORVASTATIN CALCIUM 10 MG: 20 TABLET, FILM COATED ORAL at 21:15

## 2024-04-15 RX ADMIN — AMLODIPINE BESYLATE 5 MG: 5 TABLET ORAL at 10:22

## 2024-04-15 RX ADMIN — VENLAFAXINE 25 MG: 25 TABLET ORAL at 13:22

## 2024-04-15 RX ADMIN — IPRATROPIUM BROMIDE AND ALBUTEROL SULFATE 1 DOSE: .5; 2.5 SOLUTION RESPIRATORY (INHALATION) at 06:58

## 2024-04-15 RX ADMIN — ARFORMOTEROL TARTRATE 15 MCG: 15 SOLUTION RESPIRATORY (INHALATION) at 18:40

## 2024-04-15 RX ADMIN — ARIPIPRAZOLE 15 MG: 5 TABLET ORAL at 10:22

## 2024-04-15 RX ADMIN — ARFORMOTEROL TARTRATE 15 MCG: 15 SOLUTION RESPIRATORY (INHALATION) at 06:58

## 2024-04-15 RX ADMIN — METOPROLOL TARTRATE 100 MG: 50 TABLET, FILM COATED ORAL at 21:15

## 2024-04-15 RX ADMIN — APIXABAN 2.5 MG: 5 TABLET, FILM COATED ORAL at 21:17

## 2024-04-15 RX ADMIN — LANSOPRAZOLE 30 MG: 30 TABLET, ORALLY DISINTEGRATING, DELAYED RELEASE ORAL at 06:12

## 2024-04-15 RX ADMIN — Medication 300 MG: at 10:22

## 2024-04-15 RX ADMIN — BUDESONIDE INHALATION 500 MCG: 0.5 SUSPENSION RESPIRATORY (INHALATION) at 06:58

## 2024-04-15 RX ADMIN — CLOPIDOGREL BISULFATE 75 MG: 75 TABLET ORAL at 10:22

## 2024-04-15 ASSESSMENT — PAIN SCALES - GENERAL
PAINLEVEL_OUTOF10: 8
PAINLEVEL_OUTOF10: 0
PAINLEVEL_OUTOF10: 0

## 2024-04-15 ASSESSMENT — PULMONARY FUNCTION TESTS
PIF_VALUE: 22
PIF_VALUE: 20
PIF_VALUE: 18
PIF_VALUE: 13
PIF_VALUE: 13
PIF_VALUE: 19

## 2024-04-15 ASSESSMENT — PAIN DESCRIPTION - LOCATION: LOCATION: COCCYX;BUTTOCKS;BACK;SACRUM

## 2024-04-15 ASSESSMENT — PAIN DESCRIPTION - DESCRIPTORS: DESCRIPTORS: DISCOMFORT;ACHING;THROBBING

## 2024-04-15 ASSESSMENT — PAIN SCALES - WONG BAKER
WONGBAKER_NUMERICALRESPONSE: NO HURT
WONGBAKER_NUMERICALRESPONSE: NO HURT

## 2024-04-15 NOTE — CARE COORDINATION
Case Management Assessment  Initial Evaluation    Date/Time of Evaluation: 4/15/2024 10:58 AM  Assessment Completed by: Shena Solis RN    If patient is discharged prior to next notation, then this note serves as note for discharge by case management.    Patient Name: Effie Torres                   YOB: 1945  Diagnosis: Elevated troponin [R79.89]  COPD exacerbation (HCC) [J44.1]  Acute on chronic respiratory failure with hypoxia (HCC) [J96.21]  Dyspnea, unspecified type [R06.00]                   Date / Time: 4/13/2024 12:57 AM    Patient Admission Status: Inpatient   Readmission Risk (Low < 19, Mod (19-27), High > 27): Readmission Risk Score: 21.5    Current PCP: Sheba Sandoval MD  PCP verified by CM? Yes    Chart Reviewed: Yes      History Provided by: Medical Record, Child/Family (Melissa liaison at [facility])  Patient Orientation: Alert and Oriented, Person, Self (has a trach/vent dependent)    Patient Cognition: Alert    Hospitalization in the last 30 days (Readmission):  No    If yes, Readmission Assessment in  Navigator will be completed.    Advance Directives:      Code Status: DNR-CCA   Patient's Primary Decision Maker is: Legal Next of Kin    Primary Decision Maker: PO TORRES  Vani - [phone]    Discharge Planning:    Patient lives with: Other (Comment) Type of Home: Skilled Nursing Facility  Primary Care Giver: Other (Comment) ([facility] staff)  Patient Support Systems include: Children, /, Other (Comment) (NH staff)   Current Financial resources:    Current community resources:    Current services prior to admission: Oxygen Therapy, Other (Comment) (Vent)            Current DME:              Type of Home Care services:  None    ADLS  Prior functional level: Assistance with the following:, Bathing, Dressing, Toileting, Feeding, Cooking, Housework, Shopping, Mobility  Current functional level: Assistance with the following:, Bathing,

## 2024-04-15 NOTE — PLAN OF CARE
Problem: Discharge Planning  Goal: Discharge to home or other facility with appropriate resources  Outcome: Progressing     Problem: Pain  Goal: Verbalizes/displays adequate comfort level or baseline comfort level  Outcome: Progressing  Flowsheets (Taken 4/15/2024 9402)  Verbalizes/displays adequate comfort level or baseline comfort level: Encourage patient to monitor pain and request assistance     Problem: Safety - Adult  Goal: Free from fall injury  Outcome: Progressing     Problem: Skin/Tissue Integrity  Goal: Absence of new skin breakdown  Description: 1.  Monitor for areas of redness and/or skin breakdown  2.  Assess vascular access sites hourly  3.  Every 4-6 hours minimum:  Change oxygen saturation probe site  4.  Every 4-6 hours:  If on nasal continuous positive airway pressure, respiratory therapy assess nares and determine need for appliance change or resting period.  Outcome: Progressing     Problem: ABCDS Injury Assessment  Goal: Absence of physical injury  Outcome: Progressing     Problem: Nutrition Deficit:  Goal: Optimize nutritional status  4/15/2024 1409 by Mary Reaves, RN  Outcome: Progressing  4/15/2024 0931 by Selin Hudson, RD, LD  Outcome: Progressing

## 2024-04-15 NOTE — CONSULTS
Comprehensive Nutrition Assessment    Type and Reason for Visit:  Initial, Consult (Branden Score;  Tube Feedings Order and Managment)    Nutrition Recommendations/Plan:   Continue NPO status   Provide TF recommendations -  Osmolite 1.5 not in stock, recommend increase rate to better meet nutrition needs      Recommend Osmolite 1.2 (standard w/o fiber) @ 60ml/h w/ 100ml flush q4h while admitted to the hospital. Regimen to provide 1440ml, 1728kcal, 80g pro, 1695ml water  which meets 100% of estimated protein and energy needs at goal rate.     Pt ok to resume previous TF regimen on discharge - Osmolite 1.5 @ 50ml/h with 100ml flush q4h.      Malnutrition Assessment:  Malnutrition Status:  Insufficient data (04/15/24 7918)    Context:  Chronic Illness     Findings of the 6 clinical characteristics of malnutrition:  Energy Intake:  No significant decrease in energy intake (on tube feedings)  Weight Loss:  No significant weight loss     Body Fat Loss:  Unable to assess (isolation status)     Muscle Mass Loss:  Unable to assess (isolation status)    Fluid Accumulation:  No significant fluid accumulation     Strength:  Not Performed    Nutrition Assessment:    Pt admit w/ COPD exac., Afib, HTN, ischemic cardiomyopathy. PMHx of L AKA w/ OM, stroke, dysphagia s/p PEG, CAD. Pt is on TF of Osm. 1.5 @ 50ml/h with 100ml flush q4h, up to 2h off per day. Will provide TF recs for Osm. 1.2 & continue to monitor while inpatient.    Nutrition Related Findings:    abd soft, NT, ND, acitve BSx4, diarrhea noted, trace edema noted, A&Ox2, NPO - PEG Osmolite @ 50ml/h Wound Type: None       Current Nutrition Intake & Therapies:    Average Meal Intake: NPO  Average Supplements Intake: NPO  Diet NPO  ADULT TUBE FEEDING; PEG; Standard without Fiber; Continuous; 60; No; 100; Q 4 hours  Current Tube Feeding (TF) Orders:  Feeding Route: PEG  Formula: Standard without Fiber  Schedule: Continuous  Feeding Regimen: 50ml/h 
History    Marital status:    Tobacco Use    Smoking status: Former     Types: Cigarettes   Substance and Sexual Activity    Alcohol use: Not Currently     Social Determinants of Health     Food Insecurity: Patient Unable To Answer (1/14/2024)    Hunger Vital Sign     Worried About Running Out of Food in the Last Year: Patient unable to answer     Ran Out of Food in the Last Year: Patient unable to answer   Transportation Needs: Patient Unable To Answer (1/14/2024)    PRAPARE - Transportation     Lack of Transportation (Medical): Patient unable to answer     Lack of Transportation (Non-Medical): Patient unable to answer   Housing Stability: Patient Unable To Answer (1/14/2024)    Housing Stability Vital Sign     Unable to Pay for Housing in the Last Year: Patient unable to answer     Number of Places Lived in the Last Year: 1     Unstable Housing in the Last Year: Patient unable to answer     PHYSICAL EXAM        Vitals:    Vitals:    04/13/24 0547 04/13/24 0548 04/13/24 0549 04/13/24 0550   BP:       Pulse: (!) 109 (!) 115 (!) 113 (!) 112   Resp: 23 24 23 26   Temp:       TempSrc:       SpO2: 93% 93% 93% 93%   Weight:            Body Habitus: thin  General Appearance: alert,  cooperative, looks stated age no acute distress  Eyes: PERRLA, EOMI  HENT: mucous membranes moist, normocephalic atraumatic  Neck: supple, no masses  Cardiovascular: normal heart sounds, regular rate, regular rhythm  Respiratory: normal breath sounds, no respiratory distress, no accessory muscle use   Abdominal: normal bowel sounds, soft, non-tender,  non-distended  Musculoskeletal: rle dec pp, left aka fingers contracted  Skin: warm, dry, normal turgor, normal color  Neurological: awake        Peripheral Intravenous Line:  Peripheral IV 04/13/24 Left;Posterior Hand (Active)   Site Assessment Clean, dry & intact 04/13/24 0343   Line Status Brisk blood return 04/13/24 0343   Phlebitis Assessment No symptoms 04/13/24 0343         
Place 1 suppository rectally every 4 hours as needed for Pain, Disp: , Rfl:     guaiFENesin (ROBITUSSIN) 100 MG/5ML liquid, 10 mLs by Per G Tube route every 6 hours as needed for Cough, Disp: , Rfl:     Nutritional Supplements (JEVITY 1.5 ENID PO), 35 mL/hr by PEG Tube route 2 times daily, Disp: , Rfl:     acetaminophen (TYLENOL) 325 MG tablet, 2 tablets by PEG Tube route every 4 hours as needed for Pain or Fever, Disp: , Rfl:     amLODIPine (NORVASC) 5 MG tablet, 1 tablet by PEG Tube route daily, Disp: , Rfl:     ARIPiprazole (ABILIFY) 15 MG tablet, 1 tablet by PEG Tube route daily, Disp: , Rfl:     atorvastatin (LIPITOR) 10 MG tablet, 1 tablet by PEG Tube route nightly, Disp: , Rfl:     bisacodyl (DULCOLAX) 10 MG suppository, Place 1 suppository rectally daily as needed for Constipation, Disp: , Rfl:     budesonide (PULMICORT) 0.5 MG/2ML nebulizer suspension, Take 2 mLs by nebulization in the morning and 2 mLs in the evening., Disp: , Rfl:     clopidogrel (PLAVIX) 75 MG tablet, 1 tablet by PEG Tube route daily, Disp: , Rfl:     apixaban (ELIQUIS) 5 MG TABS tablet, 0.5 tablets by Per G Tube route 2 times daily, Disp: , Rfl:     ferrous sulfate 300 (60 Fe) MG/5ML syrup, 5 mLs by Per G Tube route daily, Disp: , Rfl:     ipratropium 0.5 mg-albuterol 2.5 mg (DUONEB) 0.5-2.5 (3) MG/3ML SOLN nebulizer solution, Inhale 3 mLs into the lungs 4 times daily, Disp: , Rfl:     isosorbide mononitrate (IMDUR) 30 MG extended release tablet, 1 tablet daily Via peg tube, Disp: , Rfl:     metoprolol tartrate (LOPRESSOR) 25 MG tablet, 4 tablets by PEG Tube route 2 times daily, Disp: , Rfl:     omeprazole (PRILOSEC) 20 MG delayed release capsule, Take 2 capsules by mouth 2 times daily Via peg tube. Take while on plavix and eliquis, Disp: , Rfl:     polyethylene glycol (GLYCOLAX) 17 g packet, 1 packet by Per G Tube route daily as needed for Constipation, Disp: , Rfl:     sucralfate (CARAFATE) 1 GM/10ML suspension, 10 mLs by Per G Tube

## 2024-04-15 NOTE — PLAN OF CARE
Problem: Discharge Planning  Goal: Discharge to home or other facility with appropriate resources  Outcome: Progressing     Problem: Pain  Goal: Verbalizes/displays adequate comfort level or baseline comfort level  Outcome: Progressing  Flowsheets (Taken 4/14/2024 1600 by Maria Alejandra Rodrigues RN)  Verbalizes/displays adequate comfort level or baseline comfort level: Encourage patient to monitor pain and request assistance     Problem: Safety - Adult  Goal: Free from fall injury  Outcome: Progressing     Problem: Skin/Tissue Integrity  Goal: Absence of new skin breakdown  Description: 1.  Monitor for areas of redness and/or skin breakdown  2.  Assess vascular access sites hourly  3.  Every 4-6 hours minimum:  Change oxygen saturation probe site  4.  Every 4-6 hours:  If on nasal continuous positive airway pressure, respiratory therapy assess nares and determine need for appliance change or resting period.  Outcome: Progressing     Problem: ABCDS Injury Assessment  Goal: Absence of physical injury  Outcome: Progressing

## 2024-04-15 NOTE — ACP (ADVANCE CARE PLANNING)
Advance Care Planning   Healthcare Decision Maker:    Primary Decision Maker: PO TORRES [facility] - [phone]

## 2024-04-16 LAB
ALBUMIN SERPL-MCNC: 3.5 G/DL (ref 3.5–5.2)
ALP SERPL-CCNC: 136 U/L (ref 35–104)
ALT SERPL-CCNC: 14 U/L (ref 0–32)
ANION GAP SERPL CALCULATED.3IONS-SCNC: 10 MMOL/L (ref 7–16)
AST SERPL-CCNC: 15 U/L (ref 0–31)
BASOPHILS # BLD: 0.04 K/UL (ref 0–0.2)
BASOPHILS NFR BLD: 1 % (ref 0–2)
BILIRUB SERPL-MCNC: 0.3 MG/DL (ref 0–1.2)
BUN SERPL-MCNC: 46 MG/DL (ref 6–23)
CALCIUM SERPL-MCNC: 9.1 MG/DL (ref 8.6–10.2)
CHLORIDE SERPL-SCNC: 109 MMOL/L (ref 98–107)
CO2 SERPL-SCNC: 22 MMOL/L (ref 22–29)
CREAT SERPL-MCNC: 0.9 MG/DL (ref 0.5–1)
EOSINOPHIL # BLD: 0.3 K/UL (ref 0.05–0.5)
EOSINOPHILS RELATIVE PERCENT: 6 % (ref 0–6)
ERYTHROCYTE [DISTWIDTH] IN BLOOD BY AUTOMATED COUNT: 14.5 % (ref 11.5–15)
GFR SERPL CREATININE-BSD FRML MDRD: 63 ML/MIN/1.73M2
GLUCOSE SERPL-MCNC: 108 MG/DL (ref 74–99)
HCT VFR BLD AUTO: 28.3 % (ref 34–48)
HGB BLD-MCNC: 9.1 G/DL (ref 11.5–15.5)
IMM GRANULOCYTES # BLD AUTO: 0.03 K/UL (ref 0–0.58)
IMM GRANULOCYTES NFR BLD: 1 % (ref 0–5)
LYMPHOCYTES NFR BLD: 0.92 K/UL (ref 1.5–4)
LYMPHOCYTES RELATIVE PERCENT: 19 % (ref 20–42)
MCH RBC QN AUTO: 26.8 PG (ref 26–35)
MCHC RBC AUTO-ENTMCNC: 32.2 G/DL (ref 32–34.5)
MCV RBC AUTO: 83.2 FL (ref 80–99.9)
MONOCYTES NFR BLD: 0.46 K/UL (ref 0.1–0.95)
MONOCYTES NFR BLD: 9 % (ref 2–12)
NEUTROPHILS NFR BLD: 65 % (ref 43–80)
NEUTS SEG NFR BLD: 3.18 K/UL (ref 1.8–7.3)
PLATELET # BLD AUTO: 234 K/UL (ref 130–450)
PMV BLD AUTO: 10.9 FL (ref 7–12)
POTASSIUM SERPL-SCNC: 3.9 MMOL/L (ref 3.5–5)
PROCALCITONIN SERPL-MCNC: 0.28 NG/ML (ref 0–0.08)
PROT SERPL-MCNC: 6.9 G/DL (ref 6.4–8.3)
RBC # BLD AUTO: 3.4 M/UL (ref 3.5–5.5)
SODIUM SERPL-SCNC: 141 MMOL/L (ref 132–146)
WBC OTHER # BLD: 4.9 K/UL (ref 4.5–11.5)

## 2024-04-16 PROCEDURE — 99233 SBSQ HOSP IP/OBS HIGH 50: CPT | Performed by: INTERNAL MEDICINE

## 2024-04-16 PROCEDURE — 36415 COLL VENOUS BLD VENIPUNCTURE: CPT

## 2024-04-16 PROCEDURE — 6360000002 HC RX W HCPCS: Performed by: FAMILY MEDICINE

## 2024-04-16 PROCEDURE — 80053 COMPREHEN METABOLIC PANEL: CPT

## 2024-04-16 PROCEDURE — 6370000000 HC RX 637 (ALT 250 FOR IP): Performed by: INTERNAL MEDICINE

## 2024-04-16 PROCEDURE — 94640 AIRWAY INHALATION TREATMENT: CPT

## 2024-04-16 PROCEDURE — 2060000000 HC ICU INTERMEDIATE R&B

## 2024-04-16 PROCEDURE — 2580000003 HC RX 258: Performed by: FAMILY MEDICINE

## 2024-04-16 PROCEDURE — 97110 THERAPEUTIC EXERCISES: CPT

## 2024-04-16 PROCEDURE — 6360000002 HC RX W HCPCS: Performed by: INTERNAL MEDICINE

## 2024-04-16 PROCEDURE — 6370000000 HC RX 637 (ALT 250 FOR IP): Performed by: FAMILY MEDICINE

## 2024-04-16 PROCEDURE — 84145 PROCALCITONIN (PCT): CPT

## 2024-04-16 PROCEDURE — 97165 OT EVAL LOW COMPLEX 30 MIN: CPT

## 2024-04-16 PROCEDURE — 85025 COMPLETE CBC W/AUTO DIFF WBC: CPT

## 2024-04-16 PROCEDURE — 99232 SBSQ HOSP IP/OBS MODERATE 35: CPT | Performed by: INTERNAL MEDICINE

## 2024-04-16 RX ORDER — CARVEDILOL 25 MG/1
25 TABLET ORAL 2 TIMES DAILY WITH MEALS
Status: DISCONTINUED | OUTPATIENT
Start: 2024-04-16 | End: 2024-04-17 | Stop reason: HOSPADM

## 2024-04-16 RX ORDER — HYDROCODONE BITARTRATE AND ACETAMINOPHEN 5; 325 MG/1; MG/1
1 TABLET ORAL 3 TIMES DAILY PRN
Status: DISCONTINUED | OUTPATIENT
Start: 2024-04-16 | End: 2024-04-17 | Stop reason: HOSPADM

## 2024-04-16 RX ORDER — FUROSEMIDE 20 MG/1
20 TABLET ORAL DAILY
Status: DISCONTINUED | OUTPATIENT
Start: 2024-04-16 | End: 2024-04-17 | Stop reason: HOSPADM

## 2024-04-16 RX ORDER — FUROSEMIDE 10 MG/ML
20 INJECTION INTRAMUSCULAR; INTRAVENOUS ONCE
Status: COMPLETED | OUTPATIENT
Start: 2024-04-16 | End: 2024-04-16

## 2024-04-16 RX ADMIN — IPRATROPIUM BROMIDE AND ALBUTEROL SULFATE 1 DOSE: .5; 2.5 SOLUTION RESPIRATORY (INHALATION) at 18:25

## 2024-04-16 RX ADMIN — VENLAFAXINE 25 MG: 25 TABLET ORAL at 08:12

## 2024-04-16 RX ADMIN — HYDROCODONE BITARTRATE AND ACETAMINOPHEN 1 TABLET: 5; 325 TABLET ORAL at 20:45

## 2024-04-16 RX ADMIN — ARFORMOTEROL TARTRATE 15 MCG: 15 SOLUTION RESPIRATORY (INHALATION) at 06:46

## 2024-04-16 RX ADMIN — SACUBITRIL AND VALSARTAN 1 TABLET: 24; 26 TABLET, FILM COATED ORAL at 08:12

## 2024-04-16 RX ADMIN — ARIPIPRAZOLE 15 MG: 5 TABLET ORAL at 08:12

## 2024-04-16 RX ADMIN — ACETAMINOPHEN 650 MG: 325 TABLET ORAL at 08:12

## 2024-04-16 RX ADMIN — VENLAFAXINE 25 MG: 25 TABLET ORAL at 14:18

## 2024-04-16 RX ADMIN — Medication 300 MG: at 08:12

## 2024-04-16 RX ADMIN — CLOPIDOGREL BISULFATE 75 MG: 75 TABLET ORAL at 08:19

## 2024-04-16 RX ADMIN — Medication 10 ML: at 20:45

## 2024-04-16 RX ADMIN — ARFORMOTEROL TARTRATE 15 MCG: 15 SOLUTION RESPIRATORY (INHALATION) at 18:25

## 2024-04-16 RX ADMIN — LANSOPRAZOLE 30 MG: 30 TABLET, ORALLY DISINTEGRATING, DELAYED RELEASE ORAL at 05:54

## 2024-04-16 RX ADMIN — SUCRALFATE 1 G: 1 TABLET ORAL at 08:12

## 2024-04-16 RX ADMIN — IPRATROPIUM BROMIDE AND ALBUTEROL SULFATE 1 DOSE: .5; 2.5 SOLUTION RESPIRATORY (INHALATION) at 10:44

## 2024-04-16 RX ADMIN — VENLAFAXINE 25 MG: 25 TABLET ORAL at 18:05

## 2024-04-16 RX ADMIN — CARVEDILOL 25 MG: 25 TABLET, FILM COATED ORAL at 08:12

## 2024-04-16 RX ADMIN — SACUBITRIL AND VALSARTAN 1 TABLET: 24; 26 TABLET, FILM COATED ORAL at 20:45

## 2024-04-16 RX ADMIN — FUROSEMIDE 20 MG: 10 INJECTION, SOLUTION INTRAMUSCULAR; INTRAVENOUS at 14:18

## 2024-04-16 RX ADMIN — SUCRALFATE 1 G: 1 TABLET ORAL at 20:45

## 2024-04-16 RX ADMIN — BUDESONIDE INHALATION 500 MCG: 0.5 SUSPENSION RESPIRATORY (INHALATION) at 18:25

## 2024-04-16 RX ADMIN — CARVEDILOL 25 MG: 25 TABLET, FILM COATED ORAL at 18:05

## 2024-04-16 RX ADMIN — APIXABAN 2.5 MG: 5 TABLET, FILM COATED ORAL at 08:12

## 2024-04-16 RX ADMIN — ATORVASTATIN CALCIUM 10 MG: 20 TABLET, FILM COATED ORAL at 20:45

## 2024-04-16 RX ADMIN — IPRATROPIUM BROMIDE AND ALBUTEROL SULFATE 1 DOSE: .5; 2.5 SOLUTION RESPIRATORY (INHALATION) at 06:46

## 2024-04-16 RX ADMIN — IPRATROPIUM BROMIDE AND ALBUTEROL SULFATE 1 DOSE: .5; 2.5 SOLUTION RESPIRATORY (INHALATION) at 21:01

## 2024-04-16 RX ADMIN — FUROSEMIDE 20 MG: 20 TABLET ORAL at 14:18

## 2024-04-16 RX ADMIN — BUDESONIDE INHALATION 500 MCG: 0.5 SUSPENSION RESPIRATORY (INHALATION) at 06:46

## 2024-04-16 RX ADMIN — Medication 10 ML: at 08:13

## 2024-04-16 RX ADMIN — IPRATROPIUM BROMIDE AND ALBUTEROL SULFATE 1 DOSE: .5; 2.5 SOLUTION RESPIRATORY (INHALATION) at 14:03

## 2024-04-16 RX ADMIN — APIXABAN 2.5 MG: 5 TABLET, FILM COATED ORAL at 20:45

## 2024-04-16 ASSESSMENT — PAIN SCALES - WONG BAKER
WONGBAKER_NUMERICALRESPONSE: NO HURT

## 2024-04-16 ASSESSMENT — PULMONARY FUNCTION TESTS
PIF_VALUE: 15
PIF_VALUE: 18
PIF_VALUE: 19
PIF_VALUE: 17
PIF_VALUE: 15

## 2024-04-16 ASSESSMENT — PAIN SCALES - GENERAL
PAINLEVEL_OUTOF10: 6
PAINLEVEL_OUTOF10: 6
PAINLEVEL_OUTOF10: 0
PAINLEVEL_OUTOF10: 2

## 2024-04-16 ASSESSMENT — PAIN DESCRIPTION - LOCATION: LOCATION: GENERALIZED

## 2024-04-16 NOTE — PLAN OF CARE
Problem: Discharge Planning  Goal: Discharge to home or other facility with appropriate resources  4/16/2024 0318 by Chasidy Kiran RN  Outcome: Progressing     Problem: Pain  Goal: Verbalizes/displays adequate comfort level or baseline comfort level  4/16/2024 0318 by Chasidy Kiran RN  Outcome: Progressing     Problem: Safety - Adult  Goal: Free from fall injury  4/16/2024 0318 by Chasidy Kiran RN  Outcome: Progressing     Problem: Skin/Tissue Integrity  Goal: Absence of new skin breakdown  Description: 1.  Monitor for areas of redness and/or skin breakdown  2.  Assess vascular access sites hourly  3.  Every 4-6 hours minimum:  Change oxygen saturation probe site  4.  Every 4-6 hours:  If on nasal continuous positive airway pressure, respiratory therapy assess nares and determine need for appliance change or resting period.  4/16/2024 0318 by Chasidy Kiran RN  Outcome: Progressing     Problem: ABCDS Injury Assessment  Goal: Absence of physical injury  4/16/2024 0318 by Chasidy Kiran RN  Outcome: Progressing     Problem: Nutrition Deficit:  Goal: Optimize nutritional status  4/16/2024 0318 by Chasidy Kiran RN  Outcome: Progressing

## 2024-04-16 NOTE — CARE COORDINATION
4/16/2024 1020 HAZEL Note:  Pt is a trach/vent from [facility].  Per Melissa calderon pt is LTC and a BED HOLD, NO PRECERT NEEDED.  Will NEED a Signed LEOBARDO.  CM spoke with pt's son Parag and he plans for his Mom to return to [facility] at d/c.  CM will foillow. Electronically signed by Shena Solis RN on 4/16/2024 at 10:26 AM

## 2024-04-17 ENCOUNTER — APPOINTMENT (OUTPATIENT)
Dept: ULTRASOUND IMAGING | Age: 79
DRG: 208 | End: 2024-04-17
Payer: MEDICARE

## 2024-04-17 ENCOUNTER — APPOINTMENT (OUTPATIENT)
Dept: GENERAL RADIOLOGY | Age: 79
DRG: 208 | End: 2024-04-17
Payer: MEDICARE

## 2024-04-17 VITALS
RESPIRATION RATE: 22 BRPM | HEART RATE: 99 BPM | DIASTOLIC BLOOD PRESSURE: 48 MMHG | OXYGEN SATURATION: 95 % | SYSTOLIC BLOOD PRESSURE: 109 MMHG | TEMPERATURE: 97.4 F | BODY MASS INDEX: 23.16 KG/M2 | WEIGHT: 118 LBS | HEIGHT: 60 IN

## 2024-04-17 LAB
ALBUMIN SERPL-MCNC: 3.2 G/DL (ref 3.5–5.2)
ALP SERPL-CCNC: 126 U/L (ref 35–104)
ALT SERPL-CCNC: 13 U/L (ref 0–32)
ANION GAP SERPL CALCULATED.3IONS-SCNC: 11 MMOL/L (ref 7–16)
AST SERPL-CCNC: 14 U/L (ref 0–31)
BASOPHILS # BLD: 0.02 K/UL (ref 0–0.2)
BASOPHILS NFR BLD: 0 % (ref 0–2)
BILIRUB SERPL-MCNC: 0.2 MG/DL (ref 0–1.2)
BUN SERPL-MCNC: 43 MG/DL (ref 6–23)
CALCIUM SERPL-MCNC: 8.6 MG/DL (ref 8.6–10.2)
CHLORIDE SERPL-SCNC: 107 MMOL/L (ref 98–107)
CO2 SERPL-SCNC: 21 MMOL/L (ref 22–29)
CREAT SERPL-MCNC: 0.8 MG/DL (ref 0.5–1)
EOSINOPHIL # BLD: 0.41 K/UL (ref 0.05–0.5)
EOSINOPHILS RELATIVE PERCENT: 7 % (ref 0–6)
ERYTHROCYTE [DISTWIDTH] IN BLOOD BY AUTOMATED COUNT: 14.3 % (ref 11.5–15)
GFR SERPL CREATININE-BSD FRML MDRD: 80 ML/MIN/1.73M2
GLUCOSE SERPL-MCNC: 137 MG/DL (ref 74–99)
HCT VFR BLD AUTO: 28 % (ref 34–48)
HGB BLD-MCNC: 9 G/DL (ref 11.5–15.5)
IMM GRANULOCYTES # BLD AUTO: <0.03 K/UL (ref 0–0.58)
IMM GRANULOCYTES NFR BLD: 0 % (ref 0–5)
LYMPHOCYTES NFR BLD: 0.87 K/UL (ref 1.5–4)
LYMPHOCYTES RELATIVE PERCENT: 15 % (ref 20–42)
MCH RBC QN AUTO: 26.9 PG (ref 26–35)
MCHC RBC AUTO-ENTMCNC: 32.1 G/DL (ref 32–34.5)
MCV RBC AUTO: 83.6 FL (ref 80–99.9)
MICROORGANISM SPEC CULT: ABNORMAL
MICROORGANISM SPEC CULT: ABNORMAL
MICROORGANISM/AGENT SPEC: ABNORMAL
MONOCYTES NFR BLD: 0.46 K/UL (ref 0.1–0.95)
MONOCYTES NFR BLD: 8 % (ref 2–12)
NEUTROPHILS NFR BLD: 70 % (ref 43–80)
NEUTS SEG NFR BLD: 4.19 K/UL (ref 1.8–7.3)
PLATELET # BLD AUTO: 221 K/UL (ref 130–450)
PMV BLD AUTO: 11.2 FL (ref 7–12)
POTASSIUM SERPL-SCNC: 4.6 MMOL/L (ref 3.5–5)
PROCALCITONIN SERPL-MCNC: 0.17 NG/ML (ref 0–0.08)
PROT SERPL-MCNC: 6.4 G/DL (ref 6.4–8.3)
RBC # BLD AUTO: 3.35 M/UL (ref 3.5–5.5)
SEND OUT REPORT: NORMAL
SODIUM SERPL-SCNC: 139 MMOL/L (ref 132–146)
SPECIMEN DESCRIPTION: ABNORMAL
TEST NAME: NORMAL
WBC OTHER # BLD: 6 K/UL (ref 4.5–11.5)

## 2024-04-17 PROCEDURE — 36415 COLL VENOUS BLD VENIPUNCTURE: CPT

## 2024-04-17 PROCEDURE — 80053 COMPREHEN METABOLIC PANEL: CPT

## 2024-04-17 PROCEDURE — 71045 X-RAY EXAM CHEST 1 VIEW: CPT

## 2024-04-17 PROCEDURE — 85025 COMPLETE CBC W/AUTO DIFF WBC: CPT

## 2024-04-17 PROCEDURE — 6360000002 HC RX W HCPCS: Performed by: FAMILY MEDICINE

## 2024-04-17 PROCEDURE — 6370000000 HC RX 637 (ALT 250 FOR IP): Performed by: INTERNAL MEDICINE

## 2024-04-17 PROCEDURE — 84145 PROCALCITONIN (PCT): CPT

## 2024-04-17 PROCEDURE — 94640 AIRWAY INHALATION TREATMENT: CPT

## 2024-04-17 PROCEDURE — 2580000003 HC RX 258: Performed by: FAMILY MEDICINE

## 2024-04-17 PROCEDURE — 6370000000 HC RX 637 (ALT 250 FOR IP): Performed by: FAMILY MEDICINE

## 2024-04-17 PROCEDURE — 99239 HOSP IP/OBS DSCHRG MGMT >30: CPT | Performed by: INTERNAL MEDICINE

## 2024-04-17 RX ORDER — CARVEDILOL 25 MG/1
25 TABLET ORAL 2 TIMES DAILY WITH MEALS
Qty: 60 TABLET | Refills: 3 | Status: SHIPPED | OUTPATIENT
Start: 2024-04-17

## 2024-04-17 RX ORDER — IBUPROFEN 200 MG
TABLET ORAL 3 TIMES DAILY
Status: DISCONTINUED | OUTPATIENT
Start: 2024-04-17 | End: 2024-04-17 | Stop reason: HOSPADM

## 2024-04-17 RX ORDER — FUROSEMIDE 20 MG/1
20 TABLET ORAL EVERY OTHER DAY
Qty: 15 TABLET | Refills: 1 | Status: SHIPPED | OUTPATIENT
Start: 2024-04-17

## 2024-04-17 RX ADMIN — FUROSEMIDE 20 MG: 20 TABLET ORAL at 08:44

## 2024-04-17 RX ADMIN — APIXABAN 2.5 MG: 5 TABLET, FILM COATED ORAL at 08:44

## 2024-04-17 RX ADMIN — CLOPIDOGREL BISULFATE 75 MG: 75 TABLET ORAL at 08:44

## 2024-04-17 RX ADMIN — IPRATROPIUM BROMIDE AND ALBUTEROL SULFATE 1 DOSE: .5; 2.5 SOLUTION RESPIRATORY (INHALATION) at 09:15

## 2024-04-17 RX ADMIN — Medication 300 MG: at 08:44

## 2024-04-17 RX ADMIN — SACUBITRIL AND VALSARTAN 1 TABLET: 24; 26 TABLET, FILM COATED ORAL at 08:44

## 2024-04-17 RX ADMIN — LANSOPRAZOLE 30 MG: 30 TABLET, ORALLY DISINTEGRATING, DELAYED RELEASE ORAL at 05:11

## 2024-04-17 RX ADMIN — SUCRALFATE 1 G: 1 TABLET ORAL at 08:44

## 2024-04-17 RX ADMIN — ARIPIPRAZOLE 15 MG: 5 TABLET ORAL at 08:44

## 2024-04-17 RX ADMIN — VENLAFAXINE 25 MG: 25 TABLET ORAL at 13:03

## 2024-04-17 RX ADMIN — IPRATROPIUM BROMIDE AND ALBUTEROL SULFATE 1 DOSE: .5; 2.5 SOLUTION RESPIRATORY (INHALATION) at 14:42

## 2024-04-17 RX ADMIN — Medication 10 ML: at 08:45

## 2024-04-17 RX ADMIN — VENLAFAXINE 25 MG: 25 TABLET ORAL at 08:45

## 2024-04-17 RX ADMIN — BUDESONIDE INHALATION 500 MCG: 0.5 SUSPENSION RESPIRATORY (INHALATION) at 06:26

## 2024-04-17 RX ADMIN — CARVEDILOL 25 MG: 25 TABLET, FILM COATED ORAL at 08:44

## 2024-04-17 RX ADMIN — IPRATROPIUM BROMIDE AND ALBUTEROL SULFATE 1 DOSE: .5; 2.5 SOLUTION RESPIRATORY (INHALATION) at 06:25

## 2024-04-17 RX ADMIN — ARFORMOTEROL TARTRATE 15 MCG: 15 SOLUTION RESPIRATORY (INHALATION) at 06:26

## 2024-04-17 RX ADMIN — EMPAGLIFLOZIN 10 MG: 10 TABLET, FILM COATED ORAL at 08:44

## 2024-04-17 ASSESSMENT — PULMONARY FUNCTION TESTS
PIF_VALUE: 19
PIF_VALUE: 19
PIF_VALUE: 21

## 2024-04-17 NOTE — DISCHARGE SUMMARY
[facility] Hospitalist       Hospitalist Physician Discharge Summary       No follow-up provider specified.    Activity level: bedridden     Diet: No diet orders on file      Condition at discharge: stable     Dispo:[facility] rehab   .    Patient ID:  Effie Portillo  [id number]  79 y.o.  1945    Admit date: 4/13/2024    Discharge date and time:  4/17/2024      Admission Diagnoses: Principal Problem:    Acute on chronic respiratory failure with hypoxia (HCC)  Active Problems:    Primary hypertension    History of stroke with residual deficit    Hx of AKA (above knee amputation), left (HCC)    Elevated troponin    COPD exacerbation (HCC)    PAF (paroxysmal atrial fibrillation) (HCC)    Sinus tachycardia    Ventilator dependent (HCC)  Resolved Problems:    * No resolved hospital problems. *      Discharge Diagnoses: Principal Problem:    Acute on chronic respiratory failure with hypoxia (HCC)  Active Problems:    Primary hypertension    History of stroke with residual deficit    Hx of AKA (above knee amputation), left (HCC)    Elevated troponin    COPD exacerbation (HCC)    PAF (paroxysmal atrial fibrillation) (HCC)    Sinus tachycardia    Ventilator dependent (HCC)  Resolved Problems:    * No resolved hospital problems. *      Consults:  IP CONSULT TO INFECTIOUS DISEASES  IP CONSULT TO CARDIOLOGY  IP CONSULT TO DIETITIAN  IP CONSULT TO DIETITIAN  IP CONSULT TO DIETITIAN    Procedures: none     Hospital Course: This is a 79 years old white lady with significant past medical history of atrial fibrillation, hypertension, anemia, chronic trach on the vent.  Coronary artery disease left AKA.  Who presented from the nursing facility because shortness of breath.  They stated that she needed more oxygen than she usually requires, CT of the chest did not reveal any pulmonary embolism.  Showed alveolar consolidation in the right and left lower lobes.  And emphysema.  Infectious disease was consulted.  It

## 2024-04-17 NOTE — CARE COORDINATION
[id number] CM Note:  Pt is a trach/vent from [facility]. Per Melissa calderon pt is LTC and a BED HOLD, NO PRECERT NEEDED. Pt is discharging today, pt's son Parag is aware.  [facility] is scheduled to transport pt at 1400 via stretcher. Ambulance form completed and placed with soft chart.  Melissa calderon made aware of d/c and time.  LEOBARDO needs signed.  CM will follow.Electronically signed by Shena Solis RN on 4/17/2024 at 10:49 AM

## 2024-04-17 NOTE — DISCHARGE INSTR - COC
ARTHUR        Klebsiella pneumoniae ESBL arm 24  Acinetobacter baumannii respiratory 24    ESBL (Extended Spectrum Beta Lactamase) 01/15/24 01/19/24 01/15/24 Culture, Wound        Acinetobacter, MDRO 24 Culture, Nasal        Candida auris 01/13/24 01/16/24 01/15/24 Culture, Wound        STRICT ISOLATION ON ADMISSION  Wound 24    MDRO (multi-drug resistant organism)  23 Mary Jane Bueno RN        +ESBL Klebsiella pneumoniae blood 2023  +ESBL Klebsiella pneumoniae urine 9/10/2023  Klebsiella pneumoniae wound 1     Resolved    C-diff (Clostridium difficile) 24 Clostridium Difficile Toxin/Antigen   24 Infection     ESBL (Extended Spectrum Beta Lactamase) 09/09/23 09/14/23 09/10/23 Culture, Urine   09/15/23 Mary Jane Bueno RN    ESBL (Extended Spectrum Beta Lactamase) 23 Culture, Blood 1   23 Mary Jane Bueno RN                       Nurse Assessment:  Last Vital Signs: BP (!) 109/48   Pulse 99   Temp 97.4 °F (36.3 °C) (Axillary)   Resp 22   Ht 1.524 m (5')   Wt 53.5 kg (118 lb)   SpO2 95%   BMI 23.05 kg/m²     Last documented pain score (0-10 scale): Pain Level: 0  Last Weight:   Wt Readings from Last 1 Encounters:   04/15/24 53.5 kg (118 lb)     Mental Status:  disoriented and alert    IV Access:  - None    Nursing Mobility/ADLs:  Walking   Dependent  Transfer  Dependent  Bathing  Dependent  Dressing  Dependent  Toileting  Dependent  Feeding  Dependent  Med Admin  Dependent  Med Delivery   crushed and through peg    Wound Care Documentation and Therapy:  Wound 24 Thigh Distal;Left;Anterior healing amputation (Active)   Number of days: 94       Wound 24 Sacrum (Active)   Number of days: 94        Elimination:  Continence:   Bowel: No  Bladder: No  Urinary Catheter: Insertion Date: 24    Colostomy/Ileostomy/Ileal Conduit: No       Date of Last BM:

## 2024-04-17 NOTE — PROGRESS NOTES
INPATIENT CARDIOLOGY FOLLOW-UP    Name: Effie Portillo    Age: 79 y.o.    Date of Admission: 4/13/2024 12:57 AM    Date of Service: 4/14/2024    Primary Cardiologist: Someone at [facility]    Chief Complaint: Follow-up for elevated troponin    Interim History:  Unable to obtain history from patient.  Remains awake and tries to communicate but she is not able to vocalize given vented via trach.  Seems to nod yes to chest pain.    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    Elevated troponin    Acute on chronic respiratory failure with hypoxia (HCC)    COPD exacerbation ([facility])    PAF (paroxysmal atrial fibrillation) ([facility])    Sinus tachycardia    Ventilator dependent ([facility])       Current Medications:    Current Facility-Administered Medications:     sodium chloride flush 0.9 % injection 5-40 mL, 5-40 mL, IntraVENous, 2 times per day, Lisbet Garcia, , 10 mL at 04/14/24 0040    sodium chloride flush 0.9 % injection 5-40 mL, 5-40 mL, IntraVENous, PRN, Lisbet Garcia, DO    0.9 % sodium chloride infusion, , IntraVENous, PRN, Lisbet Garcia, DO    acetaminophen (TYLENOL) tablet 650 mg, 650 mg, Oral, Q6H PRN, 650 mg at 04/13/24 1211 **OR** acetaminophen (TYLENOL) suppository 650 mg, 650 mg, Rectal, Q6H PRN, Lisbet Garcia, DO    ipratropium 0.5 mg-albuterol 2.5 mg (DUONEB) nebulizer solution 1 Dose, 1 Dose, Inhalation, Q4H 
    INPATIENT CARDIOLOGY FOLLOW-UP    Name: Effie Portillo    Age: 79 y.o.    Date of Admission: 4/13/2024 12:57 AM    Date of Service: 4/15/2024    Primary Cardiologist: Someone at [place]    Chief Complaint: Follow-up for elevated troponin    Interim History:  Unable to obtain history from patient.  Remains awake and tries to communicate but she is not able to vocalize given vented via trach.      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    Elevated troponin    Acute on chronic respiratory failure with hypoxia (HCC)    COPD exacerbation (HCC)    PAF (paroxysmal atrial fibrillation) (HCC)    Sinus tachycardia    Ventilator dependent (HCC)       Current Medications:    Current Facility-Administered Medications:     sodium chloride flush 0.9 % injection 5-40 mL, 5-40 mL, IntraVENous, 2 times per day, Lisbet Garcia, , 10 mL at 04/14/24 2124    sodium chloride flush 0.9 % injection 5-40 mL, 5-40 mL, IntraVENous, PRN, Lisbet Garcia, DO    0.9 % sodium chloride infusion, , IntraVENous, PRN, Lisbet Garcia, DO    acetaminophen (TYLENOL) tablet 650 mg, 650 mg, Oral, Q6H PRN, 650 mg at 04/13/24 1211 **OR** acetaminophen (TYLENOL) suppository 650 mg, 650 mg, Rectal, Q6H PRN, Lisbet Garcia, DO    ipratropium 0.5 mg-albuterol 2.5 mg (DUONEB) nebulizer solution 1 Dose, 1 Dose, Inhalation, Q4H WA RT, Lisbet Garcia DO, 1 Dose at 04/15/24 0658    arformoterol tartrate 
    INPATIENT CARDIOLOGY FOLLOW-UP    Name: Effie Portillo    Age: 79 y.o.    Date of Admission: 4/13/2024 12:57 AM    Date of Service: 4/16/2024    Primary Cardiologist: Known to Dr. Gandara most recently seen in consult 1/18/2024 at [facility]    Chief Complaint: Follow-up for elevated troponin    Interim History:  Unable to obtain history from patient.  Remains awake and tries to communicate but she is not able to vocalize given vented via trach.      Some records from [facility] received and reviewed.  She did have an anterior STEMI in 2023 had stents to the LAD as well as a large OM, has known history of ischemic cardiomyopathy with a LV apical aneurysm explaining her chronic ST elevations.    Review of Systems:   Unable to obtain    Problem List:  Patient Active Problem List   Diagnosis    Chronic respiratory failure with hypoxia (HCC)    Primary hypertension    History of stroke with residual deficit    History of DVT (deep vein thrombosis)    Coronary artery disease involving native coronary artery of native heart without angina pectoris    Ischemic cardiomyopathy    Subacute osteomyelitis of left foot ([facility])    Hypernatremia    Severe protein-energy malnutrition (HCC)    Hx of AKA (above knee amputation), left ([facility])    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    Elevated troponin    Acute on chronic respiratory failure with hypoxia (HCC)    COPD exacerbation ([facility])    PAF (paroxysmal atrial fibrillation) (HCC)    Sinus tachycardia    Ventilator dependent (HCC)       Current Medications:    Current Facility-Administered Medications:     carvedilol (COREG) tablet 25 mg, 25 mg, PEG Tube, BID , Joel Rob MD, 25 mg at 04/16/24 0812    sacubitril-valsartan (ENTRESTO) 24-26 MG per tablet 1 tablet, 1 tablet, PEG Tube, BID, Joel Rob MD, 1 tablet at 04/16/24 0812    perflutren lipid microspheres (DEFINITY) injection 1.5 mL, 1.5 mL, IntraVENous, ONCE PRN, Joel Rob MD    sodium chloride 
4 Eyes Skin Assessment     NAME:  Effie Portillo  YOB: 1945  MEDICAL RECORD NUMBER:  [id number]    The patient is being assessed for  Admission    I agree that at least one RN has performed a thorough Head to Toe Skin Assessment on the patient. ALL assessment sites listed below have been assessed.      Areas assessed by both nurses:    Head, Face, Ears, Shoulders, Back, Chest, Arms, Elbows, Hands, Sacrum. Buttock, Coccyx, Ischium, and Legs. Feet and Heels        Does the Patient have a Wound? No noted wound(s)       Branden Prevention initiated by RN: Yes  Wound Care Orders initiated by RN: No    Pressure Injury (Stage 3,4, Unstageable, DTI, NWPT, and Complex wounds) if present, place Wound referral order by RN under : No    New Ostomies, if present place, Ostomy referral order under : No     Nurse 1 eSignature: Electronically signed by Charlie Pan RN on 4/14/24 at 2:24 AM EDT    **SHARE this note so that the co-signing nurse can place an eSignature**    Nurse 2 eSignature: Electronically signed by Radha Grey RN on 4/14/24 at 2:50 AM EDT   
Admitting Date and Time: 4/13/2024 12:57 AM  Admit Dx: Elevated troponin [R79.89]  COPD exacerbation ([facility]) [J44.1]  Acute on chronic respiratory failure with hypoxia ([facility]) [J96.21]  Dyspnea, unspecified type [R06.00]    Subjective:    4/14 Pt is  nonverbal  Per RN: no new issues  4/15 , pt is awake , on vent and trach , but awake and talks with no voice , follows commands   4/16 , pt is non verbal , trach in place , on ventilator      carvedilol  25 mg PEG Tube BID WC    sacubitril-valsartan  1 tablet PEG Tube BID    furosemide  20 mg IntraVENous Once    [START ON 4/17/2024] empagliflozin  10 mg Oral Daily    furosemide  20 mg Oral Daily    sodium chloride flush  5-40 mL IntraVENous 2 times per day    ipratropium 0.5 mg-albuterol 2.5 mg  1 Dose Inhalation Q4H WA RT    arformoterol tartrate  15 mcg Nebulization BID RT    budesonide  0.5 mg Nebulization BID RT    apixaban  2.5 mg Per G Tube BID    ARIPiprazole  15 mg PEG Tube Daily    atorvastatin  10 mg PEG Tube Nightly    clopidogrel  75 mg PEG Tube Daily    ferrous Sulfate  300 mg Per G Tube Daily    lansoprazole  30 mg Per G Tube QAM AC    sucralfate  1 g Oral 2 times per day    venlafaxine  25 mg PEG Tube TID WC     perflutren lipid microspheres, 1.5 mL, ONCE PRN  sodium chloride flush, 5-40 mL, PRN  sodium chloride, , PRN  acetaminophen, 650 mg, Q6H PRN   Or  acetaminophen, 650 mg, Q6H PRN  guaiFENesin, 200 mg, Q6H PRN         Objective:    /63   Pulse (!) 105   Temp 99 °F (37.2 °C) (Oral)   Resp 18   Ht 1.524 m (5')   Wt 53.5 kg (118 lb)   SpO2 99%   BMI 23.05 kg/m²   General Appearance: alert , verbal with no voice  trach in place , on vent   Skin: warm and dry  Head: normocephalic and atraumatic  Eyes: pupils equal, round, and reactive to light, extraocular eye movements intact, conjunctivae normal  Neck: neck supple and non tender without mass , trach in place   Pulmonary/Chest: , CTAB   Cardiovascular: normal rate, normal S1 and S2 and no 
Admitting Date and Time: 4/13/2024 12:57 AM  Admit Dx: Elevated troponin [R79.89]  COPD exacerbation (HCC) [J44.1]  Acute on chronic respiratory failure with hypoxia (HCC) [J96.21]  Dyspnea, unspecified type [R06.00]    Subjective:    4/14 Pt is  nonverbal  Per RN: no new issues  4/15 , pt is awake , on vent and trach , but awake and talks with no voice , follows commands   ROS: denies fever, chills, cp, sob, n/v, HA unless stated above.     sodium chloride flush  5-40 mL IntraVENous 2 times per day    ipratropium 0.5 mg-albuterol 2.5 mg  1 Dose Inhalation Q4H WA RT    arformoterol tartrate  15 mcg Nebulization BID RT    budesonide  0.5 mg Nebulization BID RT    amLODIPine  5 mg PEG Tube Daily    apixaban  2.5 mg Per G Tube BID    ARIPiprazole  15 mg PEG Tube Daily    atorvastatin  10 mg PEG Tube Nightly    clopidogrel  75 mg PEG Tube Daily    ferrous Sulfate  300 mg Per G Tube Daily    isosorbide mononitrate  30 mg Oral Daily    metoprolol tartrate  100 mg PEG Tube BID    lansoprazole  30 mg Per G Tube QAM AC    sucralfate  1 g Oral 2 times per day    venlafaxine  25 mg PEG Tube TID WC     perflutren lipid microspheres, 1.5 mL, ONCE PRN  sodium chloride flush, 5-40 mL, PRN  sodium chloride, , PRN  acetaminophen, 650 mg, Q6H PRN   Or  acetaminophen, 650 mg, Q6H PRN  guaiFENesin, 200 mg, Q6H PRN         Objective:    BP (!) 116/54   Pulse (!) 104   Temp 98.5 °F (36.9 °C) (Axillary)   Resp 19   Ht 1.524 m (5')   Wt 53.5 kg (118 lb)   SpO2 95%   BMI 23.05 kg/m²   General Appearance: alert , verbal with no voice  trach in place , on vent   Skin: warm and dry  Head: normocephalic and atraumatic  Eyes: pupils equal, round, and reactive to light, extraocular eye movements intact, conjunctivae normal  Neck: neck supple and non tender without mass , trach in place   Pulmonary/Chest: Transmitted vent sounds  Cardiovascular: normal rate, normal S1 and S2 and no carotid bruits  Abdomen: soft, non-tender, non-distended, 
[facility]  [facility]  Progress Note    CC: C. Auris exposure   Face to face encounter  SUBJECTIVE:  4/16/2024  Patient in bed- afebrile on vent.  30 % FIO2   No new issues overnight     4/15/2024  Patient has been afebrile- remains on 30 % FIO2   No new issues overnight.     4/14/2024-  Tmax- 99.1   Patient in bed- on vent- 30 % FIO2    Patient is tolerating medications. No reported adverse drug reactions.  No nausea, vomiting, diarrhea.    Medications:  Scheduled Meds:   carvedilol  25 mg PEG Tube BID WC    sacubitril-valsartan  1 tablet PEG Tube BID    furosemide  20 mg IntraVENous Once    [START ON 4/17/2024] empagliflozin  10 mg Oral Daily    furosemide  20 mg Oral Daily    sodium chloride flush  5-40 mL IntraVENous 2 times per day    ipratropium 0.5 mg-albuterol 2.5 mg  1 Dose Inhalation Q4H WA RT    arformoterol tartrate  15 mcg Nebulization BID RT    budesonide  0.5 mg Nebulization BID RT    apixaban  2.5 mg Per G Tube BID    ARIPiprazole  15 mg PEG Tube Daily    atorvastatin  10 mg PEG Tube Nightly    clopidogrel  75 mg PEG Tube Daily    ferrous Sulfate  300 mg Per G Tube Daily    lansoprazole  30 mg Per G Tube QAM AC    sucralfate  1 g Oral 2 times per day    venlafaxine  25 mg PEG Tube TID WC     Continuous Infusions:   sodium chloride       PRN Meds:perflutren lipid microspheres, sodium chloride flush, sodium chloride, acetaminophen **OR** acetaminophen, guaiFENesin  OBJECTIVE:  Patient Vitals for the past 24 hrs:   BP Temp Temp src Pulse Resp SpO2   04/16/24 0645 119/63 99 °F (37.2 °C) Oral (!) 105 18 --   04/16/24 0130 122/61 98.4 °F (36.9 °C) Axillary 98 20 99 %   04/15/24 2153 -- -- -- -- 18 --   04/15/24 2123 -- -- -- -- 20 --   04/15/24 2115 (!) 132/58 98.9 °F (37.2 °C) Oral (!) 108 18 --   04/15/24 2004 (!) 132/58 98.6 °F (37 °C) Oral (!) 108 14 98 %   04/15/24 1453 (!) 117/48 99.1 °F (37.3 °C) Oral 86 15 95 %       Constitutional: The patient is resting in bed. On 
Notified CT of new order. Tech wanted to wait till morning when there is more staff for the vent patient since the order is placed as routine and patient is on Eliquis.   
Nurse to Nurse called to [facility].   
Patient has complaints of pain. Previous nurse spoke to facility patient came from and nurse stated patient takes Norcos routined daily. Dr Hartley contacted and new order for Norco 5/325 TID PRN  
[facility]  [facility]  Progress Note    CC: C. Auris exposure   Face to face encounter  SUBJECTIVE:  4/17/2024  Patient on vent- 30 % no changes in status overnight.   Tmax- 99.5   On tube feeds    4/16/04  Patient in bed- afebrile on vent.  30 % FIO2   No new issues overnight     4/15/2024  Patient has been afebrile- remains on 30 % FIO2   No new issues overnight.     4/14/2024-  Tmax- 99.1   Patient in bed- on vent- 30 % FIO2    Patient is tolerating medications. No reported adverse drug reactions.  No nausea, vomiting, diarrhea.    Medications:  Scheduled Meds:   carvedilol  25 mg PEG Tube BID WC    sacubitril-valsartan  1 tablet PEG Tube BID    empagliflozin  10 mg Oral Daily    furosemide  20 mg Oral Daily    sodium chloride flush  5-40 mL IntraVENous 2 times per day    ipratropium 0.5 mg-albuterol 2.5 mg  1 Dose Inhalation Q4H WA RT    arformoterol tartrate  15 mcg Nebulization BID RT    budesonide  0.5 mg Nebulization BID RT    apixaban  2.5 mg Per G Tube BID    ARIPiprazole  15 mg PEG Tube Daily    atorvastatin  10 mg PEG Tube Nightly    clopidogrel  75 mg PEG Tube Daily    ferrous Sulfate  300 mg Per G Tube Daily    lansoprazole  30 mg Per G Tube QAM AC    sucralfate  1 g Oral 2 times per day    venlafaxine  25 mg PEG Tube TID WC     Continuous Infusions:   sodium chloride       PRN Meds:HYDROcodone 5 mg - acetaminophen, perflutren lipid microspheres, sodium chloride flush, sodium chloride, acetaminophen **OR** acetaminophen, guaiFENesin  OBJECTIVE:  Patient Vitals for the past 24 hrs:   BP Temp Temp src Pulse Resp SpO2   04/17/24 0717 (!) 109/48 97.4 °F (36.3 °C) Axillary 99 22 95 %   04/17/24 0438 (!) 100/59 99.5 °F (37.5 °C) Axillary 93 17 97 %   04/16/24 2212 -- -- -- 86 -- --   04/16/24 2115 -- -- -- -- 24 --   04/16/24 1944 (!) 111/56 98.4 °F (36.9 °C) Oral 100 28 98 %   04/16/24 1805 133/69 98.4 °F (36.9 °C) Axillary 91 18 98 %   04/16/24 1709 (!) 118/36 98.5 °F (36.9 
Physical Therapy  Physical Therapy Initial Evaluation/Plan of Care    Room #:  0640/0640-01  Patient Name: Effie Portillo  YOB: 1945  MRN: [id number]    Date of Service: 4/15/2024     Tentative placement recommendation: Skilled Nursing Facility   Equipment recommendation: Patient has needed equipment       Evaluating Physical Therapist: Nahun Fernandez, PT, DPT #[id number]      Specific Provider Orders/Date/Referring Provider :     04/13/24 0545    PT evaluation and treat  Start:  04/13/24 0545,   End:  04/13/24 0545,   ONE TIME,   Standing Count:  1 Occurrences,   R       Lisbet Garcia, DO Acknowledge New    Admitting Diagnosis:   Elevated troponin [R79.89]  COPD exacerbation (HCC) [J44.1]  Acute on chronic respiratory failure with hypoxia (HCC) [J96.21]  Dyspnea, unspecified type [R06.00]      Surgery: none  Visit Diagnoses         Codes    Dyspnea, unspecified type     R06.00            Patient Active Problem List   Diagnosis    Chronic respiratory failure with hypoxia (HCC)    Primary hypertension    History of stroke with residual deficit    History of DVT (deep vein thrombosis)    Coronary artery disease involving native coronary artery of native heart without angina pectoris    Ischemic cardiomyopathy    Subacute osteomyelitis of left foot (HCC)    Hypernatremia    Severe protein-energy malnutrition (HCC)    Hx of AKA (above knee amputation), left (HCC)    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 Pt stated that she does not sit EOB anymore bc of previous falls and is currently bed bound. Pt agreeable to supine exercises during session with 
This patient was admitted by my colleague on this calender day, a few hours before my shift began.    History of present illness from History and Physical:  79 y.o. female with a history of AFib, HTN, anemia, chronic trach vent, CVA, CAD, left BKA presents with SOB at [facility] which resolved prior to EMS arriving.  Vent at facility is pressure support with 1-10L O2 bleed-in, EMS told ED that O2 had been increased from 2L to 4L prior to their arrival.  Workup in ED significant for K 5.2, pro-BNP 9844 (at baseline), troponin 101 and 108 (chronically elevated but this is higher than prior), hgb 9.6.  ABG showed pH 7.589, pCO2 21.2, pO2 102.9.  CXR shows COPD, no acute finding.  Given bolus, duoneb and solumedrol in ED.  Tachycardic in ED, did have desaturation to mid 80s on FiO2 40%.       1.  COPD exacerbation chronically vented due to chronic respiratory failure with trach and PEG she is not strict isolation due to C aureus exposure.  Further antibiotics per ID  2. rule out pulmonary embolism D-dimer already collected but not resulted thus I spoke to ED RN to get this done  3. No change to outpatient treatment regimen for the existing stable combordities including: Hypertension paroxysmal atrial fibrillation ischemic cardiomyopathy previous stroke previous DVT depression  4.  Dysphagia with PEG in place for tube feedings which we will continue per dietitian  5.  DNR CCA  6.  DVT prophylaxis Eliquis  7.  Disposition likely back to same facility in 2 to 3 days        
      Constitutional: The patient is resting in bed. On vent/ trach   Skin: Warm and dry. No rashes were noted.   Head: Eyes show round, and reactive pupils. No jaundice.   Mouth: closed   Neck: Supple to movements. Trach   Chest: No use of accessory muscles to breathe. Symmetrical expansion. Auscultation reveals diminished breath sounds.   Cardiovascular: S1 and S2 are rhythmic and regular.   Abdomen: Positive bowel sounds to auscultation. Benign to palpation.  PEG  Extremities: No clubbing, no cyanosis, no edema. Left AKA   PIV  [place]     Laboratory and Tests Review:  Lab Results   Component Value Date    WBC 8.2 04/14/2024    WBC 8.2 04/13/2024    WBC 6.4 01/16/2024    HGB 8.4 (L) 04/14/2024    HCT 26.8 (L) 04/14/2024    MCV 86.5 04/14/2024     01/16/2024     Lab Results   Component Value Date    NEUTROABS 6.68 04/14/2024    NEUTROABS 7.56 (H) 04/13/2024    NEUTROABS 5.04 01/16/2024     Lab Results   Component Value Date    CRP 48.0 (H) 09/12/2023     Lab Results   Component Value Date    SEDRATE 55 (H) 09/12/2023     Lab Results   Component Value Date    ALT 12 04/13/2024    AST 16 04/13/2024    ALKPHOS 172 (H) 04/13/2024    BILITOT 0.2 04/13/2024     Lab Results   Component Value Date/Time     04/14/2024 07:30 AM    K 5.0 04/14/2024 07:30 AM     04/14/2024 07:30 AM    CO2 18 04/14/2024 07:30 AM    BUN 54 04/14/2024 07:30 AM    CREATININE 0.9 04/14/2024 07:30 AM    LABGLOM 64 04/14/2024 07:30 AM    GLUCOSE 90 04/14/2024 07:30 AM    PROT 8.4 04/13/2024 02:45 AM    LABALBU 4.1 04/13/2024 02:45 AM    CALCIUM 9.5 04/14/2024 07:30 AM    BILITOT 0.2 04/13/2024 02:45 AM    ALKPHOS 172 04/13/2024 02:45 AM    AST 16 04/13/2024 02:45 AM    ALT 12 04/13/2024 02:45 AM     Radiology:    Impression:       1. No acute cardiopulmonary process.  2. COPD.     Microbiology:   4/13/2024- urine culture- enterococcus species > 100,000- VRE  4/13/2024- respiratory culture- acinetobacter baumannii   4/14/2024- PEG 
21.2, pO2 102.9.  CXR shows COPD, no acute finding.  Given bolus, duoneb and solumedrol in ED.  Tachycardic in ED, did have desaturation to mid 80s on FiO2 40%.         1.  COPD exacerbation chronically vented due to chronic respiratory failure with trach and PEG she is not strict isolation due to C aureus exposure.  Further antibiotics per ID  2. ruled out pulmonary embolism (+) D-dimer cta (-) for pe  Oppna: abx per id  3.cad a fib htn: per cardio     4.  Dysphagia with PEG in place for tube feedings which we will continue per dietitian  5.  No change to outpatient treatment regimen for the existing stable combordities including:  previous stroke previous DVT depression  6 DNR CCA  7.  DVT prophylaxis Eliquis  8.  Disposition likely back to same facility in 1 to 2 days      NOTE: This report was transcribed using voice recognition software. Every effort was made to ensure accuracy; however, inadvertent computerized transcription errors may be present.     Electronically signed by OMI NICE MD on 4/14/2024 at 5:50 PM    
172 (H) 04/13/2024    BILITOT 0.2 04/13/2024     Lab Results   Component Value Date/Time     04/14/2024 07:30 AM    K 5.0 04/14/2024 07:30 AM     04/14/2024 07:30 AM    CO2 18 04/14/2024 07:30 AM    BUN 54 04/14/2024 07:30 AM    CREATININE 0.9 04/14/2024 07:30 AM    LABGLOM 64 04/14/2024 07:30 AM    GLUCOSE 90 04/14/2024 07:30 AM    PROT 8.4 04/13/2024 02:45 AM    LABALBU 4.1 04/13/2024 02:45 AM    CALCIUM 9.5 04/14/2024 07:30 AM    BILITOT 0.2 04/13/2024 02:45 AM    ALKPHOS 172 04/13/2024 02:45 AM    AST 16 04/13/2024 02:45 AM    ALT 12 04/13/2024 02:45 AM     Radiology:    Impression:       1. No acute cardiopulmonary process.  2. COPD.     Microbiology:   4/13/2024- urine culture- enterococcus species > 100,000  4/13/2024- respiratory culture- GNR  4/14/2024- PEG site- rare gram positive rods resembling diptheroids   4/13/2024- blood cx- no growth to date     ASSESSMENT:  Candida auris exposure   Bacteruria enterococcus   History of c.diff   Chronic respiratory failure- on vent     PLAN:  Discussed with Dr. Villegas   Continue to follow- off antibiotics for now  Await final cultures  Check cultures  Monitor labs- WBC- 8.2   Procal- 0.63     Matthew Branch, APRN - CNS  11:19 AM  4/14/2024      
c/o numbness or tingling  Tone: WFL   Edema: None    Comments: Nursing approved therapy session. Upon arrival the patient was supine with HOB elevated - Patient leaning on R side- Repositioned with wedge for optimal positioning.  At end of session, patient was supine with HOB elevated with call light and phone within reach, all lines and tubes intact.  Overall patient demonstrated decreased independence and safety during completion of ADL/functional transfer/mobility tasks.  Pt would benefit from continued skilled OT to increase safety and independence with completion of ADL/IADL tasks for functional independence and quality of life.    Treatment: OT treatment provided this date includes:   Instruction/training on safety and adapted techniques for completion of ADLs   Instruction/training on safe functional mobility/transfer techniques   Instruction/training on energy conservation/work simplification for completion of ADLs   Instruction/training on proper positioning/alignment to prevent contractures    Neuromuscular Reeducation to facilitate balance/righting reactions for increased function with ADLs tasks    - BUE PROM: 10 reps x 2 sets (4 second hold) in all planes of movement to increase ROM required for functional in ADLs participation. Exercises completed in shoulder and elbow flexion/extension, internal/external rotation, abduction/adduction, supination/pronation, digit and wrist flexion/extension, abduction/adduction and digit opposition.       Rehab Potential: Good for established goals.      Patient / Family Goal: Return to [facility]      Patient and/or family were instructed on functional diagnosis, prognosis/goals and OT plan of care. Demonstrated good understanding.     Eval Complexity: Low  History: Brief review of medical records and additional review of physical, cognitive, or psychosocial history related to current functional performance  Exam: 3+ performance deficits  Assistance/Modification: Mod

## 2024-04-17 NOTE — PLAN OF CARE
Problem: Discharge Planning  Goal: Discharge to home or other facility with appropriate resources  Outcome: Progressing  Flowsheets (Taken 4/16/2024 2000)  Discharge to home or other facility with appropriate resources:   Identify barriers to discharge with patient and caregiver   Arrange for needed discharge resources and transportation as appropriate   Identify discharge learning needs (meds, wound care, etc)     Problem: Pain  Goal: Verbalizes/displays adequate comfort level or baseline comfort level  Outcome: Progressing     Problem: Safety - Adult  Goal: Free from fall injury  Outcome: Progressing     Problem: Skin/Tissue Integrity  Goal: Absence of new skin breakdown  Description: 1.  Monitor for areas of redness and/or skin breakdown  2.  Assess vascular access sites hourly  3.  Every 4-6 hours minimum:  Change oxygen saturation probe site  4.  Every 4-6 hours:  If on nasal continuous positive airway pressure, respiratory therapy assess nares and determine need for appliance change or resting period.  Outcome: Progressing     Problem: ABCDS Injury Assessment  Goal: Absence of physical injury  Outcome: Progressing     Problem: Nutrition Deficit:  Goal: Optimize nutritional status  Outcome: Progressing

## 2024-04-18 LAB
MICROORGANISM SPEC CULT: NORMAL
MICROORGANISM SPEC CULT: NORMAL
SEND OUT REPORT: NORMAL
SERVICE CMNT-IMP: NORMAL
SERVICE CMNT-IMP: NORMAL
SPECIMEN DESCRIPTION: NORMAL
SPECIMEN DESCRIPTION: NORMAL
TEST NAME: NORMAL

## 2024-04-19 LAB
MICROORGANISM SPEC CULT: ABNORMAL
SPECIMEN DESCRIPTION: ABNORMAL

## 2024-04-23 LAB
SEND OUT REPORT: NORMAL
TEST NAME: NORMAL

## 2024-05-13 PROBLEM — R79.89 ELEVATED TROPONIN: Status: RESOLVED | Noted: 2024-04-13 | Resolved: 2024-05-13

## 2024-06-15 ENCOUNTER — APPOINTMENT (OUTPATIENT)
Dept: GENERAL RADIOLOGY | Age: 79
DRG: 870 | End: 2024-06-15
Payer: MEDICARE

## 2024-06-15 ENCOUNTER — HOSPITAL ENCOUNTER (INPATIENT)
Age: 79
LOS: 11 days | Discharge: NURSING, REHAB OR LONG-TERM CARE | DRG: 870 | End: 2024-06-26
Attending: EMERGENCY MEDICINE | Admitting: FAMILY MEDICINE
Payer: MEDICARE

## 2024-06-15 DIAGNOSIS — J69.0 ASPIRATION PNEUMONIA OF BOTH LUNGS, UNSPECIFIED ASPIRATION PNEUMONIA TYPE, UNSPECIFIED PART OF LUNG (HCC): Primary | ICD-10-CM

## 2024-06-15 DIAGNOSIS — N28.9 ACUTE RENAL INSUFFICIENCY: ICD-10-CM

## 2024-06-15 DIAGNOSIS — K92.2 GASTROINTESTINAL HEMORRHAGE, UNSPECIFIED GASTROINTESTINAL HEMORRHAGE TYPE: ICD-10-CM

## 2024-06-15 DIAGNOSIS — E87.5 HYPERKALEMIA: ICD-10-CM

## 2024-06-15 DIAGNOSIS — A41.9 SEPTICEMIA (HCC): ICD-10-CM

## 2024-06-15 DIAGNOSIS — J95.811 POSTPROCEDURAL PNEUMOTHORAX: ICD-10-CM

## 2024-06-15 PROBLEM — N17.9 ACUTE RENAL FAILURE (ARF) (HCC): Status: ACTIVE | Noted: 2024-06-15

## 2024-06-15 LAB
ALBUMIN SERPL-MCNC: 2.9 G/DL (ref 3.5–5.2)
ALP SERPL-CCNC: 150 U/L (ref 35–104)
ALT SERPL-CCNC: 12 U/L (ref 0–32)
ANION GAP SERPL CALCULATED.3IONS-SCNC: 11 MMOL/L (ref 7–16)
ANION GAP SERPL CALCULATED.3IONS-SCNC: 9 MMOL/L (ref 7–16)
ANION GAP SERPL CALCULATED.3IONS-SCNC: 9 MMOL/L (ref 7–16)
AST SERPL-CCNC: 18 U/L (ref 0–31)
BACTERIA URNS QL MICRO: ABNORMAL
BASOPHILS # BLD: 0.02 K/UL (ref 0–0.2)
BASOPHILS NFR BLD: 0 % (ref 0–2)
BILIRUB SERPL-MCNC: 0.3 MG/DL (ref 0–1.2)
BILIRUB UR QL STRIP: NEGATIVE
BNP SERPL-MCNC: 4054 PG/ML (ref 0–450)
BUN SERPL-MCNC: 101 MG/DL (ref 6–23)
BUN SERPL-MCNC: 105 MG/DL (ref 6–23)
BUN SERPL-MCNC: 91 MG/DL (ref 6–23)
CALCIUM SERPL-MCNC: 8.3 MG/DL (ref 8.6–10.2)
CALCIUM SERPL-MCNC: 8.4 MG/DL (ref 8.6–10.2)
CALCIUM SERPL-MCNC: 9 MG/DL (ref 8.6–10.2)
CHLORIDE SERPL-SCNC: 103 MMOL/L (ref 98–107)
CHLORIDE SERPL-SCNC: 107 MMOL/L (ref 98–107)
CHLORIDE SERPL-SCNC: 107 MMOL/L (ref 98–107)
CLARITY UR: CLEAR
CO2 SERPL-SCNC: 24 MMOL/L (ref 22–29)
CO2 SERPL-SCNC: 25 MMOL/L (ref 22–29)
CO2 SERPL-SCNC: 27 MMOL/L (ref 22–29)
COLOR UR: YELLOW
CREAT SERPL-MCNC: 1.4 MG/DL (ref 0.5–1)
CREAT SERPL-MCNC: 1.9 MG/DL (ref 0.5–1)
CREAT SERPL-MCNC: 1.9 MG/DL (ref 0.5–1)
EOSINOPHIL # BLD: 0.01 K/UL (ref 0.05–0.5)
EOSINOPHILS RELATIVE PERCENT: 0 % (ref 0–6)
EPI CELLS #/AREA URNS HPF: ABNORMAL /HPF
ERYTHROCYTE [DISTWIDTH] IN BLOOD BY AUTOMATED COUNT: 15.9 % (ref 11.5–15)
GFR, ESTIMATED: 27 ML/MIN/1.73M2
GFR, ESTIMATED: 27 ML/MIN/1.73M2
GFR, ESTIMATED: 39 ML/MIN/1.73M2
GLUCOSE SERPL-MCNC: 110 MG/DL (ref 74–99)
GLUCOSE SERPL-MCNC: 124 MG/DL (ref 74–99)
GLUCOSE SERPL-MCNC: 159 MG/DL (ref 74–99)
GLUCOSE UR STRIP-MCNC: 250 MG/DL
HCT VFR BLD AUTO: 22.5 % (ref 34–48)
HGB BLD-MCNC: 7.2 G/DL (ref 11.5–15.5)
HGB UR QL STRIP.AUTO: ABNORMAL
IMM GRANULOCYTES # BLD AUTO: 0.05 K/UL (ref 0–0.58)
IMM GRANULOCYTES NFR BLD: 1 % (ref 0–5)
KETONES UR STRIP-MCNC: NEGATIVE MG/DL
LACTATE BLDV-SCNC: 1.3 MMOL/L (ref 0.5–2.2)
LACTATE BLDV-SCNC: 1.5 MMOL/L (ref 0.5–1.9)
LACTATE BLDV-SCNC: 1.5 MMOL/L (ref 0.5–1.9)
LEUKOCYTE ESTERASE UR QL STRIP: ABNORMAL
LYMPHOCYTES NFR BLD: 0.69 K/UL (ref 1.5–4)
LYMPHOCYTES RELATIVE PERCENT: 7 % (ref 20–42)
MAGNESIUM SERPL-MCNC: 2.2 MG/DL (ref 1.6–2.6)
MCH RBC QN AUTO: 26.2 PG (ref 26–35)
MCHC RBC AUTO-ENTMCNC: 32 G/DL (ref 32–34.5)
MCV RBC AUTO: 81.8 FL (ref 80–99.9)
MONOCYTES NFR BLD: 0.91 K/UL (ref 0.1–0.95)
MONOCYTES NFR BLD: 9 % (ref 2–12)
NEUTROPHILS NFR BLD: 83 % (ref 43–80)
NEUTS SEG NFR BLD: 8.41 K/UL (ref 1.8–7.3)
NITRITE UR QL STRIP: NEGATIVE
PH UR STRIP: 6.5 [PH] (ref 5–9)
PHOSPHATE SERPL-MCNC: 3.8 MG/DL (ref 2.5–4.5)
PLATELET # BLD AUTO: 219 K/UL (ref 130–450)
PMV BLD AUTO: 11.5 FL (ref 7–12)
POTASSIUM SERPL-SCNC: 4.2 MMOL/L (ref 3.5–5)
POTASSIUM SERPL-SCNC: 5.5 MMOL/L (ref 3.5–5)
POTASSIUM SERPL-SCNC: 5.9 MMOL/L (ref 3.5–5)
PROCALCITONIN SERPL-MCNC: 0.33 NG/ML (ref 0–0.08)
PROCALCITONIN SERPL-MCNC: 0.36 NG/ML (ref 0–0.08)
PROT SERPL-MCNC: 6.4 G/DL (ref 6.4–8.3)
PROT UR STRIP-MCNC: 30 MG/DL
RBC # BLD AUTO: 2.75 M/UL (ref 3.5–5.5)
RBC #/AREA URNS HPF: ABNORMAL /HPF
SARS-COV-2 RDRP RESP QL NAA+PROBE: NOT DETECTED
SODIUM SERPL-SCNC: 139 MMOL/L (ref 132–146)
SODIUM SERPL-SCNC: 140 MMOL/L (ref 132–146)
SODIUM SERPL-SCNC: 143 MMOL/L (ref 132–146)
SP GR UR STRIP: 1.01 (ref 1–1.03)
SPECIMEN DESCRIPTION: NORMAL
TROPONIN I SERPL HS-MCNC: 36 NG/L (ref 0–9)
TROPONIN I SERPL HS-MCNC: 41 NG/L (ref 0–9)
TROPONIN I SERPL HS-MCNC: 47 NG/L (ref 0–9)
UROBILINOGEN UR STRIP-ACNC: 0.2 EU/DL (ref 0–1)
WBC #/AREA URNS HPF: ABNORMAL /HPF
WBC OTHER # BLD: 10.1 K/UL (ref 4.5–11.5)

## 2024-06-15 PROCEDURE — 51702 INSERT TEMP BLADDER CATH: CPT

## 2024-06-15 PROCEDURE — 87086 URINE CULTURE/COLONY COUNT: CPT

## 2024-06-15 PROCEDURE — 87040 BLOOD CULTURE FOR BACTERIA: CPT

## 2024-06-15 PROCEDURE — 96361 HYDRATE IV INFUSION ADD-ON: CPT

## 2024-06-15 PROCEDURE — 36592 COLLECT BLOOD FROM PICC: CPT

## 2024-06-15 PROCEDURE — 93005 ELECTROCARDIOGRAM TRACING: CPT

## 2024-06-15 PROCEDURE — 87102 FUNGUS ISOLATION CULTURE: CPT

## 2024-06-15 PROCEDURE — 2580000003 HC RX 258: Performed by: INTERNAL MEDICINE

## 2024-06-15 PROCEDURE — 94640 AIRWAY INHALATION TREATMENT: CPT

## 2024-06-15 PROCEDURE — 6360000002 HC RX W HCPCS: Performed by: EMERGENCY MEDICINE

## 2024-06-15 PROCEDURE — C9113 INJ PANTOPRAZOLE SODIUM, VIA: HCPCS | Performed by: EMERGENCY MEDICINE

## 2024-06-15 PROCEDURE — 3E033XZ INTRODUCTION OF VASOPRESSOR INTO PERIPHERAL VEIN, PERCUTANEOUS APPROACH: ICD-10-PCS | Performed by: INTERNAL MEDICINE

## 2024-06-15 PROCEDURE — 71045 X-RAY EXAM CHEST 1 VIEW: CPT

## 2024-06-15 PROCEDURE — 87077 CULTURE AEROBIC IDENTIFY: CPT

## 2024-06-15 PROCEDURE — 6360000002 HC RX W HCPCS: Performed by: INTERNAL MEDICINE

## 2024-06-15 PROCEDURE — 2000000000 HC ICU R&B

## 2024-06-15 PROCEDURE — 6360000002 HC RX W HCPCS: Performed by: SPECIALIST

## 2024-06-15 PROCEDURE — 99291 CRITICAL CARE FIRST HOUR: CPT | Performed by: INTERNAL MEDICINE

## 2024-06-15 PROCEDURE — 2580000003 HC RX 258: Performed by: EMERGENCY MEDICINE

## 2024-06-15 PROCEDURE — 84145 PROCALCITONIN (PCT): CPT

## 2024-06-15 PROCEDURE — 87899 AGENT NOS ASSAY W/OPTIC: CPT

## 2024-06-15 PROCEDURE — 87635 SARS-COV-2 COVID-19 AMP PRB: CPT

## 2024-06-15 PROCEDURE — 84484 ASSAY OF TROPONIN QUANT: CPT

## 2024-06-15 PROCEDURE — 84100 ASSAY OF PHOSPHORUS: CPT

## 2024-06-15 PROCEDURE — 99285 EMERGENCY DEPT VISIT HI MDM: CPT

## 2024-06-15 PROCEDURE — 96365 THER/PROPH/DIAG IV INF INIT: CPT

## 2024-06-15 PROCEDURE — 2500000003 HC RX 250 WO HCPCS: Performed by: EMERGENCY MEDICINE

## 2024-06-15 PROCEDURE — 83880 ASSAY OF NATRIURETIC PEPTIDE: CPT

## 2024-06-15 PROCEDURE — 96375 TX/PRO/DX INJ NEW DRUG ADDON: CPT

## 2024-06-15 PROCEDURE — 6370000000 HC RX 637 (ALT 250 FOR IP): Performed by: INTERNAL MEDICINE

## 2024-06-15 PROCEDURE — 87081 CULTURE SCREEN ONLY: CPT

## 2024-06-15 PROCEDURE — 83605 ASSAY OF LACTIC ACID: CPT

## 2024-06-15 PROCEDURE — 87070 CULTURE OTHR SPECIMN AEROBIC: CPT

## 2024-06-15 PROCEDURE — 80053 COMPREHEN METABOLIC PANEL: CPT

## 2024-06-15 PROCEDURE — 80048 BASIC METABOLIC PNL TOTAL CA: CPT

## 2024-06-15 PROCEDURE — 5A1955Z RESPIRATORY VENTILATION, GREATER THAN 96 CONSECUTIVE HOURS: ICD-10-PCS | Performed by: INTERNAL MEDICINE

## 2024-06-15 PROCEDURE — 81001 URINALYSIS AUTO W/SCOPE: CPT

## 2024-06-15 PROCEDURE — 2500000003 HC RX 250 WO HCPCS: Performed by: INTERNAL MEDICINE

## 2024-06-15 PROCEDURE — 86403 PARTICLE AGGLUT ANTBDY SCRN: CPT

## 2024-06-15 PROCEDURE — 2500000003 HC RX 250 WO HCPCS: Performed by: SPECIALIST

## 2024-06-15 PROCEDURE — 94002 VENT MGMT INPAT INIT DAY: CPT

## 2024-06-15 PROCEDURE — 85025 COMPLETE CBC W/AUTO DIFF WBC: CPT

## 2024-06-15 PROCEDURE — 05HA33Z INSERTION OF INFUSION DEVICE INTO LEFT BRACHIAL VEIN, PERCUTANEOUS APPROACH: ICD-10-PCS | Performed by: INTERNAL MEDICINE

## 2024-06-15 PROCEDURE — 99223 1ST HOSP IP/OBS HIGH 75: CPT | Performed by: FAMILY MEDICINE

## 2024-06-15 PROCEDURE — 87106 FUNGI IDENTIFICATION YEAST: CPT

## 2024-06-15 PROCEDURE — 83735 ASSAY OF MAGNESIUM: CPT

## 2024-06-15 PROCEDURE — 2580000003 HC RX 258: Performed by: SPECIALIST

## 2024-06-15 PROCEDURE — 87205 SMEAR GRAM STAIN: CPT

## 2024-06-15 PROCEDURE — 87449 NOS EACH ORGANISM AG IA: CPT

## 2024-06-15 RX ORDER — BUDESONIDE 0.5 MG/2ML
500 INHALANT ORAL EVERY 12 HOURS
Status: DISCONTINUED | OUTPATIENT
Start: 2024-06-15 | End: 2024-06-26 | Stop reason: HOSPADM

## 2024-06-15 RX ORDER — ATORVASTATIN CALCIUM 10 MG/1
10 TABLET, FILM COATED ORAL NIGHTLY
Status: DISCONTINUED | OUTPATIENT
Start: 2024-06-15 | End: 2024-06-26 | Stop reason: HOSPADM

## 2024-06-15 RX ORDER — 0.9 % SODIUM CHLORIDE 0.9 %
1000 INTRAVENOUS SOLUTION INTRAVENOUS ONCE
Status: COMPLETED | OUTPATIENT
Start: 2024-06-15 | End: 2024-06-15

## 2024-06-15 RX ORDER — SODIUM POLYSTYRENE SULFONATE 15 G/60ML
15 SUSPENSION ORAL; RECTAL DAILY
COMMUNITY
Start: 2024-06-15 | End: 2024-06-20

## 2024-06-15 RX ORDER — ACETAMINOPHEN 325 MG/1
650 TABLET ORAL EVERY 6 HOURS PRN
Status: DISCONTINUED | OUTPATIENT
Start: 2024-06-15 | End: 2024-06-26 | Stop reason: HOSPADM

## 2024-06-15 RX ORDER — CLOPIDOGREL BISULFATE 75 MG/1
75 TABLET ORAL DAILY
Status: DISCONTINUED | OUTPATIENT
Start: 2024-06-15 | End: 2024-06-24

## 2024-06-15 RX ORDER — ONDANSETRON 4 MG/1
4 TABLET, FILM COATED ORAL EVERY 8 HOURS PRN
COMMUNITY

## 2024-06-15 RX ORDER — PANTOPRAZOLE SODIUM 40 MG/10ML
40 INJECTION, POWDER, LYOPHILIZED, FOR SOLUTION INTRAVENOUS ONCE
Status: COMPLETED | OUTPATIENT
Start: 2024-06-15 | End: 2024-06-15

## 2024-06-15 RX ORDER — METRONIDAZOLE 500 MG/100ML
500 INJECTION, SOLUTION INTRAVENOUS ONCE
Status: DISCONTINUED | OUTPATIENT
Start: 2024-06-15 | End: 2024-06-15

## 2024-06-15 RX ORDER — PREDNISONE 5 MG/1
5 TABLET ORAL DAILY
COMMUNITY

## 2024-06-15 RX ORDER — POLYETHYLENE GLYCOL 3350 17 G/17G
17 POWDER, FOR SOLUTION ORAL DAILY PRN
Status: DISCONTINUED | OUTPATIENT
Start: 2024-06-15 | End: 2024-06-26 | Stop reason: HOSPADM

## 2024-06-15 RX ORDER — CALCIUM GLUCONATE 20 MG/ML
1000 INJECTION, SOLUTION INTRAVENOUS ONCE
Status: COMPLETED | OUTPATIENT
Start: 2024-06-15 | End: 2024-06-15

## 2024-06-15 RX ORDER — BISACODYL 10 MG
10 SUPPOSITORY, RECTAL RECTAL DAILY PRN
Status: DISCONTINUED | OUTPATIENT
Start: 2024-06-15 | End: 2024-06-26 | Stop reason: HOSPADM

## 2024-06-15 RX ORDER — TOBRAMYCIN INHALATION SOLUTION 300 MG/5ML
300 INHALANT RESPIRATORY (INHALATION)
Status: DISCONTINUED | OUTPATIENT
Start: 2024-06-15 | End: 2024-06-26 | Stop reason: HOSPADM

## 2024-06-15 RX ORDER — MEROPENEM 1 G/1
1000 INJECTION, POWDER, FOR SOLUTION INTRAVENOUS EVERY 8 HOURS
Status: ON HOLD | COMMUNITY
Start: 2024-06-12 | End: 2024-06-25 | Stop reason: HOSPADM

## 2024-06-15 RX ORDER — IPRATROPIUM BROMIDE AND ALBUTEROL SULFATE 2.5; .5 MG/3ML; MG/3ML
1 SOLUTION RESPIRATORY (INHALATION)
Status: DISCONTINUED | OUTPATIENT
Start: 2024-06-15 | End: 2024-06-17

## 2024-06-15 RX ORDER — TOBRAMYCIN INHALATION SOLUTION 300 MG/5ML
300 INHALANT RESPIRATORY (INHALATION)
Status: ON HOLD | COMMUNITY
Start: 2024-06-10 | End: 2024-06-25

## 2024-06-15 RX ORDER — NOREPINEPHRINE BITARTRATE 0.06 MG/ML
1-100 INJECTION, SOLUTION INTRAVENOUS CONTINUOUS
Status: DISCONTINUED | OUTPATIENT
Start: 2024-06-15 | End: 2024-06-18

## 2024-06-15 RX ADMIN — ANTI-FUNGAL POWDER MICONAZOLE NITRATE TALC FREE: 1.42 POWDER TOPICAL at 20:43

## 2024-06-15 RX ADMIN — ATORVASTATIN CALCIUM 10 MG: 10 TABLET, FILM COATED ORAL at 20:44

## 2024-06-15 RX ADMIN — CEFEPIME 2000 MG: 2 INJECTION, POWDER, FOR SOLUTION INTRAVENOUS at 12:06

## 2024-06-15 RX ADMIN — METRONIDAZOLE 500 MG: 500 INJECTION, SOLUTION INTRAVENOUS at 12:43

## 2024-06-15 RX ADMIN — IPRATROPIUM BROMIDE AND ALBUTEROL SULFATE 1 DOSE: .5; 2.5 SOLUTION RESPIRATORY (INHALATION) at 16:04

## 2024-06-15 RX ADMIN — Medication: at 15:42

## 2024-06-15 RX ADMIN — PANTOPRAZOLE SODIUM 40 MG: 40 INJECTION, POWDER, FOR SOLUTION INTRAVENOUS at 11:42

## 2024-06-15 RX ADMIN — BUDESONIDE INHALATION 500 MCG: 0.5 SUSPENSION RESPIRATORY (INHALATION) at 16:04

## 2024-06-15 RX ADMIN — SODIUM CHLORIDE 1000 ML: 9 INJECTION, SOLUTION INTRAVENOUS at 10:21

## 2024-06-15 RX ADMIN — ANTI-FUNGAL POWDER MICONAZOLE NITRATE TALC FREE: 1.42 POWDER TOPICAL at 15:39

## 2024-06-15 RX ADMIN — CALCIUM GLUCONATE 1000 MG: 20 INJECTION, SOLUTION INTRAVENOUS at 12:10

## 2024-06-15 RX ADMIN — SODIUM CHLORIDE 1000 ML: 9 INJECTION, SOLUTION INTRAVENOUS at 11:46

## 2024-06-15 RX ADMIN — Medication 5 MCG/MIN: at 18:37

## 2024-06-15 RX ADMIN — CLOPIDOGREL BISULFATE 75 MG: 75 TABLET ORAL at 15:57

## 2024-06-15 RX ADMIN — TOBRAMYCIN 300 MG: 300 SOLUTION RESPIRATORY (INHALATION) at 13:46

## 2024-06-15 RX ADMIN — MEROPENEM 1000 MG: 1 INJECTION INTRAVENOUS at 14:10

## 2024-06-15 ASSESSMENT — PULMONARY FUNCTION TESTS
PIF_VALUE: 26
PIF_VALUE: 23
PIF_VALUE: 31
PIF_VALUE: 24
PIF_VALUE: 27
PIF_VALUE: 25
PIF_VALUE: 25
PIF_VALUE: 24
PIF_VALUE: 26
PIF_VALUE: 24
PIF_VALUE: 25
PIF_VALUE: 24
PIF_VALUE: 26
PIF_VALUE: 23
PIF_VALUE: 25
PIF_VALUE: 26
PIF_VALUE: 23
PIF_VALUE: 24
PIF_VALUE: 29
PIF_VALUE: 24
PIF_VALUE: 24

## 2024-06-15 NOTE — ED PROVIDER NOTES
Patient presents emergency department via EMS from [facility] and rehab.  Patient is vent dependent.  Patient had an episode of emesis last night there was concern for aspiration she was taken to [facility] where she was evaluated.  Patient sent back to the nursing home.  Issues with keeping her oxygen saturations above 85% this morning.  Respiratory did suctioning and increased her trach vent to 20 L of oxygen.  EMS states that upon their arrival she was satting 95% and has been consistently that high in route.  They decreased her oxygen to 15 L.  No reported fevers.  Patient per EMS is at her neurologic baseline.    The history is provided by the EMS personnel.       Review of Systems   Unable to perform ROS: Patient nonverbal       Physical Exam  Vitals and nursing note reviewed.   Constitutional:       General: She is not in acute distress.     Appearance: She is normal weight. She is not toxic-appearing.   HENT:      Head: Normocephalic and atraumatic.   Eyes:      Pupils: Pupils are equal, round, and reactive to light.   Cardiovascular:      Rate and Rhythm: Normal rate and regular rhythm.      Heart sounds: Normal heart sounds. No murmur heard.  Pulmonary:      Effort: Pulmonary effort is normal. No respiratory distress.      Breath sounds: Normal breath sounds. No decreased breath sounds, wheezing or rales.   Chest:      Chest wall: No tenderness.   Abdominal:      General: Bowel sounds are normal.      Palpations: Abdomen is soft.      Tenderness: There is no abdominal tenderness. There is no guarding or rebound.   Musculoskeletal:      Cervical back: Normal range of motion and neck supple.      Right lower leg: Edema (1+) present.      Comments: Left AKA   Skin:     General: Skin is warm and dry.      Capillary Refill: Capillary refill takes 2 to 3 seconds.      Findings: No erythema.   Neurological:      Cranial Nerves: No cranial nerve deficit.      Coordination: Coordination

## 2024-06-15 NOTE — H&P
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  She went to [facility] emergency room last evening they discharged her back.  Then this morning the facility had trouble keeping her O2 saturations above 85% so sent her to our ER.  Respiratory is suctioning increased her trach to 20 L of oxygen.  EMS decreased her oxygen to 15 L en route.    She has been stuporous, with altered mental status so he cannot get review of systems.  The patient is nonverbal for us.    In the ER the patient received 1000 mg calcium gluconate IV x 1, Maxipime 2000 mg IV x 1, Flagyl 5 mg IV x 1, Protonix 40 mg IV x 1, 2 L of normal saline boluses.    She was on our service in April of this year for CHF exacerbation.    She did test positive for Candida auris of the skin.    Informant(s) for H&P:  Chart, ER physician    REVIEW OF SYSTEMS:  A comprehensive review of systems was negative except for: what is in the HPI  No reported fever, positive shortness of breath, positive increased secretions, no reported diarrhea, no reported urinary symptoms --unable to obtain the rest of review of systems from the patient, she is nonverbal    PMH:  Past Medical History:   Diagnosis Date    Atrial fibrillation (HCC)     Hypertension     MI (myocardial infarction) (HCC)        Surgical History:  No past surgical history on file.    Medications Prior to Admission:    Prior to Admission medications    Medication Sig Start

## 2024-06-16 LAB
ANION GAP SERPL CALCULATED.3IONS-SCNC: 7 MMOL/L (ref 7–16)
BASOPHILS # BLD: 0.01 K/UL (ref 0–0.2)
BASOPHILS NFR BLD: 0 % (ref 0–2)
BNP SERPL-MCNC: 4390 PG/ML (ref 0–450)
BUN SERPL-MCNC: 74 MG/DL (ref 6–23)
CALCIUM SERPL-MCNC: 8.1 MG/DL (ref 8.6–10.2)
CHLORIDE SERPL-SCNC: 108 MMOL/L (ref 98–107)
CO2 SERPL-SCNC: 30 MMOL/L (ref 22–29)
CREAT SERPL-MCNC: 1.1 MG/DL (ref 0.5–1)
EOSINOPHIL # BLD: 0.04 K/UL (ref 0.05–0.5)
EOSINOPHILS RELATIVE PERCENT: 1 % (ref 0–6)
ERYTHROCYTE [DISTWIDTH] IN BLOOD BY AUTOMATED COUNT: 15.9 % (ref 11.5–15)
GFR, ESTIMATED: 52 ML/MIN/1.73M2
GLUCOSE SERPL-MCNC: 94 MG/DL (ref 74–99)
HCT VFR BLD AUTO: 20.5 % (ref 34–48)
HCT VFR BLD AUTO: 21.4 % (ref 34–48)
HCT VFR BLD AUTO: 22.7 % (ref 34–48)
HGB BLD-MCNC: 6.5 G/DL (ref 11.5–15.5)
HGB BLD-MCNC: 7.1 G/DL (ref 11.5–15.5)
HGB BLD-MCNC: 7.5 G/DL (ref 11.5–15.5)
IMM GRANULOCYTES # BLD AUTO: 0.04 K/UL (ref 0–0.58)
IMM GRANULOCYTES NFR BLD: 1 % (ref 0–5)
L PNEUMO1 AG UR QL IA.RAPID: NEGATIVE
LYMPHOCYTES NFR BLD: 0.84 K/UL (ref 1.5–4)
LYMPHOCYTES RELATIVE PERCENT: 17 % (ref 20–42)
MAGNESIUM SERPL-MCNC: 2.1 MG/DL (ref 1.6–2.6)
MCH RBC QN AUTO: 25.3 PG (ref 26–35)
MCHC RBC AUTO-ENTMCNC: 31.7 G/DL (ref 32–34.5)
MCV RBC AUTO: 79.8 FL (ref 80–99.9)
MONOCYTES NFR BLD: 0.51 K/UL (ref 0.1–0.95)
MONOCYTES NFR BLD: 11 % (ref 2–12)
NEUTROPHILS NFR BLD: 71 % (ref 43–80)
NEUTS SEG NFR BLD: 3.44 K/UL (ref 1.8–7.3)
PHOSPHATE SERPL-MCNC: 2.7 MG/DL (ref 2.5–4.5)
PLATELET # BLD AUTO: 171 K/UL (ref 130–450)
PMV BLD AUTO: 11.4 FL (ref 7–12)
POTASSIUM SERPL-SCNC: 3.8 MMOL/L (ref 3.5–5)
RBC # BLD AUTO: 2.57 M/UL (ref 3.5–5.5)
RBC # BLD: ABNORMAL 10*6/UL
S PNEUM AG SPEC QL: NEGATIVE
SODIUM SERPL-SCNC: 145 MMOL/L (ref 132–146)
SPECIMEN SOURCE: NORMAL
TROPONIN I SERPL HS-MCNC: 34 NG/L (ref 0–9)
WBC OTHER # BLD: 4.9 K/UL (ref 4.5–11.5)

## 2024-06-16 PROCEDURE — 2000000000 HC ICU R&B

## 2024-06-16 PROCEDURE — 82746 ASSAY OF FOLIC ACID SERUM: CPT

## 2024-06-16 PROCEDURE — 2580000003 HC RX 258: Performed by: SPECIALIST

## 2024-06-16 PROCEDURE — 6360000002 HC RX W HCPCS

## 2024-06-16 PROCEDURE — 85025 COMPLETE CBC W/AUTO DIFF WBC: CPT

## 2024-06-16 PROCEDURE — 83735 ASSAY OF MAGNESIUM: CPT

## 2024-06-16 PROCEDURE — 86900 BLOOD TYPING SEROLOGIC ABO: CPT

## 2024-06-16 PROCEDURE — 6360000002 HC RX W HCPCS: Performed by: SPECIALIST

## 2024-06-16 PROCEDURE — 94003 VENT MGMT INPAT SUBQ DAY: CPT

## 2024-06-16 PROCEDURE — 86923 COMPATIBILITY TEST ELECTRIC: CPT

## 2024-06-16 PROCEDURE — 82728 ASSAY OF FERRITIN: CPT

## 2024-06-16 PROCEDURE — 30233N1 TRANSFUSION OF NONAUTOLOGOUS RED BLOOD CELLS INTO PERIPHERAL VEIN, PERCUTANEOUS APPROACH: ICD-10-PCS | Performed by: INTERNAL MEDICINE

## 2024-06-16 PROCEDURE — 99291 CRITICAL CARE FIRST HOUR: CPT | Performed by: INTERNAL MEDICINE

## 2024-06-16 PROCEDURE — 85018 HEMOGLOBIN: CPT

## 2024-06-16 PROCEDURE — 80048 BASIC METABOLIC PNL TOTAL CA: CPT

## 2024-06-16 PROCEDURE — 84100 ASSAY OF PHOSPHORUS: CPT

## 2024-06-16 PROCEDURE — 83550 IRON BINDING TEST: CPT

## 2024-06-16 PROCEDURE — 2500000003 HC RX 250 WO HCPCS: Performed by: INTERNAL MEDICINE

## 2024-06-16 PROCEDURE — 86850 RBC ANTIBODY SCREEN: CPT

## 2024-06-16 PROCEDURE — 84484 ASSAY OF TROPONIN QUANT: CPT

## 2024-06-16 PROCEDURE — 6370000000 HC RX 637 (ALT 250 FOR IP): Performed by: INTERNAL MEDICINE

## 2024-06-16 PROCEDURE — 6360000002 HC RX W HCPCS: Performed by: INTERNAL MEDICINE

## 2024-06-16 PROCEDURE — 36430 TRANSFUSION BLD/BLD COMPNT: CPT

## 2024-06-16 PROCEDURE — C9113 INJ PANTOPRAZOLE SODIUM, VIA: HCPCS | Performed by: INTERNAL MEDICINE

## 2024-06-16 PROCEDURE — P9016 RBC LEUKOCYTES REDUCED: HCPCS

## 2024-06-16 PROCEDURE — 85014 HEMATOCRIT: CPT

## 2024-06-16 PROCEDURE — 94640 AIRWAY INHALATION TREATMENT: CPT

## 2024-06-16 PROCEDURE — 99232 SBSQ HOSP IP/OBS MODERATE 35: CPT | Performed by: FAMILY MEDICINE

## 2024-06-16 PROCEDURE — 83540 ASSAY OF IRON: CPT

## 2024-06-16 PROCEDURE — 83880 ASSAY OF NATRIURETIC PEPTIDE: CPT

## 2024-06-16 PROCEDURE — 82607 VITAMIN B-12: CPT

## 2024-06-16 PROCEDURE — 2580000003 HC RX 258: Performed by: INTERNAL MEDICINE

## 2024-06-16 PROCEDURE — 86901 BLOOD TYPING SEROLOGIC RH(D): CPT

## 2024-06-16 PROCEDURE — 36415 COLL VENOUS BLD VENIPUNCTURE: CPT

## 2024-06-16 RX ORDER — METOPROLOL TARTRATE 1 MG/ML
2.5 INJECTION, SOLUTION INTRAVENOUS EVERY 4 HOURS PRN
Status: DISCONTINUED | OUTPATIENT
Start: 2024-06-16 | End: 2024-06-26 | Stop reason: HOSPADM

## 2024-06-16 RX ORDER — SODIUM CHLORIDE, SODIUM LACTATE, POTASSIUM CHLORIDE, CALCIUM CHLORIDE 600; 310; 30; 20 MG/100ML; MG/100ML; MG/100ML; MG/100ML
INJECTION, SOLUTION INTRAVENOUS CONTINUOUS
Status: DISCONTINUED | OUTPATIENT
Start: 2024-06-16 | End: 2024-06-24

## 2024-06-16 RX ORDER — SODIUM CHLORIDE 9 MG/ML
INJECTION, SOLUTION INTRAVENOUS PRN
Status: DISCONTINUED | OUTPATIENT
Start: 2024-06-16 | End: 2024-06-18

## 2024-06-16 RX ORDER — CARVEDILOL 6.25 MG/1
12.5 TABLET ORAL 2 TIMES DAILY WITH MEALS
Status: DISCONTINUED | OUTPATIENT
Start: 2024-06-16 | End: 2024-06-17

## 2024-06-16 RX ORDER — POTASSIUM CHLORIDE 7.45 MG/ML
10 INJECTION INTRAVENOUS
Status: DISPENSED | OUTPATIENT
Start: 2024-06-16 | End: 2024-06-16

## 2024-06-16 RX ORDER — POTASSIUM CHLORIDE 7.45 MG/ML
10 INJECTION INTRAVENOUS ONCE
Status: COMPLETED | OUTPATIENT
Start: 2024-06-16 | End: 2024-06-16

## 2024-06-16 RX ADMIN — IPRATROPIUM BROMIDE AND ALBUTEROL SULFATE 1 DOSE: .5; 2.5 SOLUTION RESPIRATORY (INHALATION) at 13:11

## 2024-06-16 RX ADMIN — CARVEDILOL 12.5 MG: 6.25 TABLET, FILM COATED ORAL at 17:05

## 2024-06-16 RX ADMIN — ACETAMINOPHEN 650 MG: 325 TABLET ORAL at 17:04

## 2024-06-16 RX ADMIN — SODIUM CHLORIDE, POTASSIUM CHLORIDE, SODIUM LACTATE AND CALCIUM CHLORIDE: 600; 310; 30; 20 INJECTION, SOLUTION INTRAVENOUS at 12:12

## 2024-06-16 RX ADMIN — ANTI-FUNGAL POWDER MICONAZOLE NITRATE TALC FREE: 1.42 POWDER TOPICAL at 08:23

## 2024-06-16 RX ADMIN — ANTI-FUNGAL POWDER MICONAZOLE NITRATE TALC FREE: 1.42 POWDER TOPICAL at 20:04

## 2024-06-16 RX ADMIN — MEROPENEM 1000 MG: 1 INJECTION INTRAVENOUS at 01:40

## 2024-06-16 RX ADMIN — TOBRAMYCIN 300 MG: 300 SOLUTION RESPIRATORY (INHALATION) at 04:00

## 2024-06-16 RX ADMIN — MEROPENEM 1000 MG: 1 INJECTION INTRAVENOUS at 12:16

## 2024-06-16 RX ADMIN — Medication: at 01:35

## 2024-06-16 RX ADMIN — IPRATROPIUM BROMIDE AND ALBUTEROL SULFATE 1 DOSE: .5; 2.5 SOLUTION RESPIRATORY (INHALATION) at 17:03

## 2024-06-16 RX ADMIN — POTASSIUM CHLORIDE 10 MEQ: 7.46 INJECTION, SOLUTION INTRAVENOUS at 08:19

## 2024-06-16 RX ADMIN — ATORVASTATIN CALCIUM 10 MG: 10 TABLET, FILM COATED ORAL at 20:04

## 2024-06-16 RX ADMIN — SODIUM CHLORIDE, PRESERVATIVE FREE 40 MG: 5 INJECTION INTRAVENOUS at 08:24

## 2024-06-16 RX ADMIN — CARVEDILOL 12.5 MG: 6.25 TABLET, FILM COATED ORAL at 12:20

## 2024-06-16 RX ADMIN — IPRATROPIUM BROMIDE AND ALBUTEROL SULFATE 1 DOSE: .5; 2.5 SOLUTION RESPIRATORY (INHALATION) at 04:00

## 2024-06-16 RX ADMIN — BUDESONIDE INHALATION 500 MCG: 0.5 SUSPENSION RESPIRATORY (INHALATION) at 17:03

## 2024-06-16 RX ADMIN — SODIUM CHLORIDE, PRESERVATIVE FREE 40 MG: 5 INJECTION INTRAVENOUS at 20:03

## 2024-06-16 RX ADMIN — POTASSIUM CHLORIDE 10 MEQ: 7.46 INJECTION, SOLUTION INTRAVENOUS at 06:41

## 2024-06-16 RX ADMIN — IPRATROPIUM BROMIDE AND ALBUTEROL SULFATE 1 DOSE: .5; 2.5 SOLUTION RESPIRATORY (INHALATION) at 09:09

## 2024-06-16 RX ADMIN — ANTI-FUNGAL POWDER MICONAZOLE NITRATE TALC FREE: 1.42 POWDER TOPICAL at 08:22

## 2024-06-16 RX ADMIN — BUDESONIDE INHALATION 500 MCG: 0.5 SUSPENSION RESPIRATORY (INHALATION) at 04:00

## 2024-06-16 RX ADMIN — TOBRAMYCIN 300 MG: 300 SOLUTION RESPIRATORY (INHALATION) at 17:03

## 2024-06-16 ASSESSMENT — PULMONARY FUNCTION TESTS
PIF_VALUE: 26
PIF_VALUE: 20
PIF_VALUE: 30
PIF_VALUE: 21
PIF_VALUE: 26
PIF_VALUE: 27
PIF_VALUE: 27
PIF_VALUE: 24
PIF_VALUE: 25
PIF_VALUE: 27
PIF_VALUE: 26
PIF_VALUE: 25
PIF_VALUE: 33
PIF_VALUE: 26
PIF_VALUE: 29
PIF_VALUE: 25
PIF_VALUE: 37
PIF_VALUE: 24
PIF_VALUE: 27
PIF_VALUE: 31
PIF_VALUE: 23
PIF_VALUE: 21
PIF_VALUE: 30
PIF_VALUE: 22
PIF_VALUE: 24
PIF_VALUE: 25
PIF_VALUE: 27
PIF_VALUE: 27
PIF_VALUE: 25
PIF_VALUE: 26
PIF_VALUE: 31
PIF_VALUE: 24
PIF_VALUE: 22
PIF_VALUE: 24
PIF_VALUE: 28
PIF_VALUE: 24
PIF_VALUE: 25
PIF_VALUE: 27
PIF_VALUE: 33
PIF_VALUE: 25
PIF_VALUE: 25
PIF_VALUE: 22
PIF_VALUE: 28
PIF_VALUE: 23
PIF_VALUE: 24
PIF_VALUE: 23
PIF_VALUE: 34
PIF_VALUE: 27
PIF_VALUE: 31
PIF_VALUE: 23
PIF_VALUE: 26
PIF_VALUE: 27
PIF_VALUE: 27
PIF_VALUE: 28
PIF_VALUE: 25
PIF_VALUE: 27
PIF_VALUE: 36
PIF_VALUE: 27
PIF_VALUE: 25
PIF_VALUE: 32
PIF_VALUE: 27
PIF_VALUE: 23
PIF_VALUE: 25
PIF_VALUE: 24
PIF_VALUE: 22
PIF_VALUE: 25
PIF_VALUE: 25
PIF_VALUE: 30
PIF_VALUE: 27
PIF_VALUE: 20

## 2024-06-17 ENCOUNTER — APPOINTMENT (OUTPATIENT)
Dept: CT IMAGING | Age: 79
DRG: 870 | End: 2024-06-17
Payer: MEDICARE

## 2024-06-17 ENCOUNTER — APPOINTMENT (OUTPATIENT)
Dept: GENERAL RADIOLOGY | Age: 79
DRG: 870 | End: 2024-06-17
Payer: MEDICARE

## 2024-06-17 PROBLEM — Z71.89 ACP (ADVANCE CARE PLANNING): Status: ACTIVE | Noted: 2024-06-17

## 2024-06-17 LAB
AADO2: 141.6 MMHG
ANION GAP SERPL CALCULATED.3IONS-SCNC: 11 MMOL/L (ref 7–16)
B.E.: 3.7 MMOL/L (ref -3–3)
BASOPHILS # BLD: 0.03 K/UL (ref 0–0.2)
BASOPHILS NFR BLD: 0 % (ref 0–2)
BUN SERPL-MCNC: 55 MG/DL (ref 6–23)
CALCIUM SERPL-MCNC: 8.9 MG/DL (ref 8.6–10.2)
CHLORIDE SERPL-SCNC: 110 MMOL/L (ref 98–107)
CO2 SERPL-SCNC: 25 MMOL/L (ref 22–29)
COHB: 1.5 % (ref 0–1.5)
CREAT SERPL-MCNC: 0.9 MG/DL (ref 0.5–1)
CRITICAL: ABNORMAL
DATE ANALYZED: ABNORMAL
DATE OF COLLECTION: ABNORMAL
EOSINOPHIL # BLD: 0.03 K/UL (ref 0.05–0.5)
EOSINOPHILS RELATIVE PERCENT: 0 % (ref 0–6)
ERYTHROCYTE [DISTWIDTH] IN BLOOD BY AUTOMATED COUNT: 15.4 % (ref 11.5–15)
FERRITIN SERPL-MCNC: 360 NG/ML
FIO2: 35 %
FOLATE SERPL-MCNC: 16.5 NG/ML (ref 4.8–24.2)
GFR, ESTIMATED: 63 ML/MIN/1.73M2
GLUCOSE SERPL-MCNC: 76 MG/DL (ref 74–99)
HCO3: 26.4 MMOL/L (ref 22–26)
HCT VFR BLD AUTO: 26.8 % (ref 34–48)
HCT VFR BLD AUTO: 27.8 % (ref 34–48)
HEMOCCULT SP1 STL QL: POSITIVE
HGB BLD-MCNC: 9 G/DL (ref 11.5–15.5)
HGB BLD-MCNC: 9.3 G/DL (ref 11.5–15.5)
HHB: 6.3 % (ref 0–5)
IMM GRANULOCYTES # BLD AUTO: 0.05 K/UL (ref 0–0.58)
IMM GRANULOCYTES NFR BLD: 1 % (ref 0–5)
IRON SATN MFR SERPL: 11 % (ref 15–50)
IRON SERPL-MCNC: 24 UG/DL (ref 37–145)
LAB: ABNORMAL
LYMPHOCYTES NFR BLD: 0.82 K/UL (ref 1.5–4)
LYMPHOCYTES RELATIVE PERCENT: 12 % (ref 20–42)
Lab: 432
MAGNESIUM SERPL-MCNC: 2.1 MG/DL (ref 1.6–2.6)
MCH RBC QN AUTO: 27.3 PG (ref 26–35)
MCHC RBC AUTO-ENTMCNC: 33.6 G/DL (ref 32–34.5)
MCV RBC AUTO: 81.2 FL (ref 80–99.9)
METHB: 0.3 % (ref 0–1.5)
MICROORGANISM SPEC CULT: ABNORMAL
MICROORGANISM SPEC CULT: ABNORMAL
MICROORGANISM SPEC CULT: NO GROWTH
MICROORGANISM SPEC CULT: NORMAL
MICROORGANISM/AGENT SPEC: ABNORMAL
MODE: AC
MONOCYTES NFR BLD: 0.52 K/UL (ref 0.1–0.95)
MONOCYTES NFR BLD: 8 % (ref 2–12)
NEUTROPHILS NFR BLD: 79 % (ref 43–80)
NEUTS SEG NFR BLD: 5.38 K/UL (ref 1.8–7.3)
O2 CONTENT: 12.3 ML/DL
O2 SATURATION: 93.6 % (ref 92–98.5)
O2HB: 91.9 % (ref 94–97)
OPERATOR ID: ABNORMAL
PATIENT TEMP: 37 C
PCO2: 32.5 MMHG (ref 35–45)
PEEP/CPAP: 10 CMH2O
PFO2: 1.75 MMHG/%
PH BLOOD GAS: 7.53 (ref 7.35–7.45)
PHOSPHATE SERPL-MCNC: 3.3 MG/DL (ref 2.5–4.5)
PLATELET # BLD AUTO: 194 K/UL (ref 130–450)
PMV BLD AUTO: 11.7 FL (ref 7–12)
PO2: 61.4 MMHG (ref 75–100)
POTASSIUM SERPL-SCNC: 4.2 MMOL/L (ref 3.5–5)
RBC # BLD AUTO: 3.3 M/UL (ref 3.5–5.5)
RI(T): 2.31
RR MECHANICAL: 20 B/MIN
SERVICE CMNT-IMP: NORMAL
SODIUM SERPL-SCNC: 146 MMOL/L (ref 132–146)
SOURCE, BLOOD GAS: ABNORMAL
SPECIMEN DESCRIPTION: ABNORMAL
SPECIMEN DESCRIPTION: NORMAL
SPECIMEN DESCRIPTION: NORMAL
THB: 9.5 G/DL (ref 11.5–16.5)
TIBC SERPL-MCNC: 218 UG/DL (ref 250–450)
TIME ANALYZED: 438
TROPONIN I SERPL HS-MCNC: 27 NG/L (ref 0–9)
VIT B12 SERPL-MCNC: 1154 PG/ML (ref 211–946)
VT MECHANICAL: 300 ML
WBC OTHER # BLD: 6.8 K/UL (ref 4.5–11.5)

## 2024-06-17 PROCEDURE — 87070 CULTURE OTHR SPECIMN AEROBIC: CPT

## 2024-06-17 PROCEDURE — 2580000003 HC RX 258: Performed by: INTERNAL MEDICINE

## 2024-06-17 PROCEDURE — 99233 SBSQ HOSP IP/OBS HIGH 50: CPT | Performed by: INTERNAL MEDICINE

## 2024-06-17 PROCEDURE — 80048 BASIC METABOLIC PNL TOTAL CA: CPT

## 2024-06-17 PROCEDURE — 6370000000 HC RX 637 (ALT 250 FOR IP)

## 2024-06-17 PROCEDURE — 6360000002 HC RX W HCPCS: Performed by: SPECIALIST

## 2024-06-17 PROCEDURE — 71045 X-RAY EXAM CHEST 1 VIEW: CPT

## 2024-06-17 PROCEDURE — 82805 BLOOD GASES W/O2 SATURATION: CPT

## 2024-06-17 PROCEDURE — 85014 HEMATOCRIT: CPT

## 2024-06-17 PROCEDURE — 83735 ASSAY OF MAGNESIUM: CPT

## 2024-06-17 PROCEDURE — 6360000002 HC RX W HCPCS: Performed by: INTERNAL MEDICINE

## 2024-06-17 PROCEDURE — 99221 1ST HOSP IP/OBS SF/LOW 40: CPT | Performed by: NURSE PRACTITIONER

## 2024-06-17 PROCEDURE — 2000000000 HC ICU R&B

## 2024-06-17 PROCEDURE — 36415 COLL VENOUS BLD VENIPUNCTURE: CPT

## 2024-06-17 PROCEDURE — 6370000000 HC RX 637 (ALT 250 FOR IP): Performed by: INTERNAL MEDICINE

## 2024-06-17 PROCEDURE — 87102 FUNGUS ISOLATION CULTURE: CPT

## 2024-06-17 PROCEDURE — 99291 CRITICAL CARE FIRST HOUR: CPT | Performed by: INTERNAL MEDICINE

## 2024-06-17 PROCEDURE — P9016 RBC LEUKOCYTES REDUCED: HCPCS

## 2024-06-17 PROCEDURE — 94003 VENT MGMT INPAT SUBQ DAY: CPT

## 2024-06-17 PROCEDURE — 83615 LACTATE (LD) (LDH) ENZYME: CPT

## 2024-06-17 PROCEDURE — 82270 OCCULT BLOOD FECES: CPT

## 2024-06-17 PROCEDURE — 36430 TRANSFUSION BLD/BLD COMPNT: CPT

## 2024-06-17 PROCEDURE — 84157 ASSAY OF PROTEIN OTHER: CPT

## 2024-06-17 PROCEDURE — 71250 CT THORAX DX C-: CPT

## 2024-06-17 PROCEDURE — 99497 ADVNCD CARE PLAN 30 MIN: CPT | Performed by: NURSE PRACTITIONER

## 2024-06-17 PROCEDURE — 84484 ASSAY OF TROPONIN QUANT: CPT

## 2024-06-17 PROCEDURE — 84100 ASSAY OF PHOSPHORUS: CPT

## 2024-06-17 PROCEDURE — 82945 GLUCOSE OTHER FLUID: CPT

## 2024-06-17 PROCEDURE — 94640 AIRWAY INHALATION TREATMENT: CPT

## 2024-06-17 PROCEDURE — C9113 INJ PANTOPRAZOLE SODIUM, VIA: HCPCS | Performed by: INTERNAL MEDICINE

## 2024-06-17 PROCEDURE — 85018 HEMOGLOBIN: CPT

## 2024-06-17 PROCEDURE — 85025 COMPLETE CBC W/AUTO DIFF WBC: CPT

## 2024-06-17 PROCEDURE — 2580000003 HC RX 258: Performed by: SPECIALIST

## 2024-06-17 PROCEDURE — 87205 SMEAR GRAM STAIN: CPT

## 2024-06-17 RX ORDER — SODIUM CHLORIDE 9 MG/ML
INJECTION, SOLUTION INTRAVENOUS PRN
Status: DISCONTINUED | OUTPATIENT
Start: 2024-06-17 | End: 2024-06-18

## 2024-06-17 RX ORDER — SODIUM CHLORIDE 0.9 % (FLUSH) 0.9 %
5-40 SYRINGE (ML) INJECTION EVERY 12 HOURS SCHEDULED
Status: DISCONTINUED | OUTPATIENT
Start: 2024-06-17 | End: 2024-06-26 | Stop reason: HOSPADM

## 2024-06-17 RX ORDER — SODIUM CHLORIDE 0.9 % (FLUSH) 0.9 %
5-40 SYRINGE (ML) INJECTION PRN
Status: DISCONTINUED | OUTPATIENT
Start: 2024-06-17 | End: 2024-06-26 | Stop reason: HOSPADM

## 2024-06-17 RX ORDER — SODIUM CHLORIDE 9 MG/ML
INJECTION, SOLUTION INTRAVENOUS PRN
Status: DISCONTINUED | OUTPATIENT
Start: 2024-06-17 | End: 2024-06-26 | Stop reason: HOSPADM

## 2024-06-17 RX ORDER — ENOXAPARIN SODIUM 100 MG/ML
40 INJECTION SUBCUTANEOUS DAILY
Status: DISCONTINUED | OUTPATIENT
Start: 2024-06-17 | End: 2024-06-20

## 2024-06-17 RX ORDER — CARVEDILOL 25 MG/1
25 TABLET ORAL 2 TIMES DAILY WITH MEALS
Status: DISCONTINUED | OUTPATIENT
Start: 2024-06-17 | End: 2024-06-25

## 2024-06-17 RX ORDER — IPRATROPIUM BROMIDE AND ALBUTEROL SULFATE 2.5; .5 MG/3ML; MG/3ML
1 SOLUTION RESPIRATORY (INHALATION)
Status: DISCONTINUED | OUTPATIENT
Start: 2024-06-17 | End: 2024-06-26 | Stop reason: HOSPADM

## 2024-06-17 RX ADMIN — SODIUM CHLORIDE, PRESERVATIVE FREE 40 MG: 5 INJECTION INTRAVENOUS at 21:42

## 2024-06-17 RX ADMIN — IPRATROPIUM BROMIDE AND ALBUTEROL SULFATE 1 DOSE: .5; 2.5 SOLUTION RESPIRATORY (INHALATION) at 17:38

## 2024-06-17 RX ADMIN — TOBRAMYCIN 300 MG: 300 SOLUTION RESPIRATORY (INHALATION) at 07:09

## 2024-06-17 RX ADMIN — BUDESONIDE INHALATION 500 MCG: 0.5 SUSPENSION RESPIRATORY (INHALATION) at 04:09

## 2024-06-17 RX ADMIN — CARVEDILOL 25 MG: 25 TABLET, FILM COATED ORAL at 17:08

## 2024-06-17 RX ADMIN — SODIUM CHLORIDE, PRESERVATIVE FREE 10 ML: 5 INJECTION INTRAVENOUS at 21:42

## 2024-06-17 RX ADMIN — SODIUM CHLORIDE, PRESERVATIVE FREE 10 ML: 5 INJECTION INTRAVENOUS at 17:12

## 2024-06-17 RX ADMIN — IPRATROPIUM BROMIDE AND ALBUTEROL SULFATE 1 DOSE: .5; 2.5 SOLUTION RESPIRATORY (INHALATION) at 14:41

## 2024-06-17 RX ADMIN — IPRATROPIUM BROMIDE AND ALBUTEROL SULFATE 1 DOSE: .5; 2.5 SOLUTION RESPIRATORY (INHALATION) at 21:34

## 2024-06-17 RX ADMIN — MEROPENEM 1000 MG: 1 INJECTION INTRAVENOUS at 01:05

## 2024-06-17 RX ADMIN — IPRATROPIUM BROMIDE AND ALBUTEROL SULFATE 1 DOSE: .5; 2.5 SOLUTION RESPIRATORY (INHALATION) at 04:09

## 2024-06-17 RX ADMIN — CARVEDILOL 12.5 MG: 6.25 TABLET, FILM COATED ORAL at 08:50

## 2024-06-17 RX ADMIN — ATORVASTATIN CALCIUM 10 MG: 10 TABLET, FILM COATED ORAL at 21:42

## 2024-06-17 RX ADMIN — ANTI-FUNGAL POWDER MICONAZOLE NITRATE TALC FREE: 1.42 POWDER TOPICAL at 08:52

## 2024-06-17 RX ADMIN — ANTI-FUNGAL POWDER MICONAZOLE NITRATE TALC FREE: 1.42 POWDER TOPICAL at 21:43

## 2024-06-17 RX ADMIN — SODIUM CHLORIDE, PRESERVATIVE FREE 10 ML: 5 INJECTION INTRAVENOUS at 17:14

## 2024-06-17 RX ADMIN — SODIUM CHLORIDE, POTASSIUM CHLORIDE, SODIUM LACTATE AND CALCIUM CHLORIDE: 600; 310; 30; 20 INJECTION, SOLUTION INTRAVENOUS at 09:17

## 2024-06-17 RX ADMIN — ENOXAPARIN SODIUM 40 MG: 100 INJECTION SUBCUTANEOUS at 13:44

## 2024-06-17 RX ADMIN — SODIUM CHLORIDE, PRESERVATIVE FREE 40 MG: 5 INJECTION INTRAVENOUS at 08:50

## 2024-06-17 RX ADMIN — TOBRAMYCIN 300 MG: 300 SOLUTION RESPIRATORY (INHALATION) at 18:14

## 2024-06-17 RX ADMIN — MEROPENEM 1000 MG: 1 INJECTION INTRAVENOUS at 13:39

## 2024-06-17 RX ADMIN — IPRATROPIUM BROMIDE AND ALBUTEROL SULFATE 1 DOSE: .5; 2.5 SOLUTION RESPIRATORY (INHALATION) at 10:09

## 2024-06-17 RX ADMIN — BUDESONIDE INHALATION 500 MCG: 0.5 SUSPENSION RESPIRATORY (INHALATION) at 17:38

## 2024-06-17 RX ADMIN — SODIUM CHLORIDE, PRESERVATIVE FREE 10 ML: 5 INJECTION INTRAVENOUS at 17:13

## 2024-06-17 ASSESSMENT — PULMONARY FUNCTION TESTS
PIF_VALUE: 25
PIF_VALUE: 23
PIF_VALUE: 25
PIF_VALUE: 22
PIF_VALUE: 24
PIF_VALUE: 23
PIF_VALUE: 24
PIF_VALUE: 22
PIF_VALUE: 20
PIF_VALUE: 23
PIF_VALUE: 25
PIF_VALUE: 22
PIF_VALUE: 23
PIF_VALUE: 20
PIF_VALUE: 23
PIF_VALUE: 26
PIF_VALUE: 21
PIF_VALUE: 25
PIF_VALUE: 24
PIF_VALUE: 24
PIF_VALUE: 21
PIF_VALUE: 26
PIF_VALUE: 21
PIF_VALUE: 24
PIF_VALUE: 32
PIF_VALUE: 28
PIF_VALUE: 25
PIF_VALUE: 25
PIF_VALUE: 22

## 2024-06-17 ASSESSMENT — PAIN SCALES - GENERAL
PAINLEVEL_OUTOF10: 0
PAINLEVEL_OUTOF10: 0

## 2024-06-17 NOTE — ACP (ADVANCE CARE PLANNING)
Advance Care Planning      Palliative Medicine Provider (MD/NP)  Advance Care Planning (ACP) Conversation      Date of Conversation: 06/17/24  The patient and/or authorized decision maker consented to a voluntary Advance Care Planning conversation.   Individuals present for the conversation:   Matt Tuttle    Legal Healthcare Agent(s):    Primary Decision Maker: PARAG TORRES - Child - [phone]    ACP documents available in EMR prior to discussion:  -None    Primary Palliative Diagnosis(es):  Heart failure with moderately reduced ejection fraction (EF 30 to 35% January 2024)  CAD s/p MI  Hx CVA  COPD tracheostomy     Conversation Summary:   Patient seen at the bedside, alert, nonverbal, unable to follow commands, nonpurposeful movement.  Patient unable to partake in meaningful conversation and unable to make decisions for herself. Spoke with the patient's son, Parag.  Parag states that the patient is  and her only child making him the primary decision maker. The patient is unable to complete advanced directives due to encephalopathy.     Resuscitation Status:    Code Status: DNR-CCA    Outcomes / Completed Documentation:  An explanation of advance directives and their importance was provided and the following forms completed:    -No new documents completed.    If new document completed, original was provided to patient and/or family member.    Copy was placed for scanning into the [facility] EMR.      I spent 30 minutes providing separately identifiable ACP services with the patient and/or surrogate decision maker in a voluntary, in-person conversation discussing the patient's wishes and goals as detailed in the above note.       Rachel Smallwood, APRN - CNP

## 2024-06-17 NOTE — DISCHARGE INSTR - COC
SECTION    Prognosis: Fair    Condition at Discharge: Stable    Rehab Potential (if transferring to Rehab): Fair    Recommended Labs or Other Treatments After Discharge: monitor BP, on midodrine. Repeat BMP for hyperkalemia, continue treatment if needed. Continue TF.    Physician Certification: I certify the above information and transfer of Effie Portillo  is necessary for the continuing treatment of the diagnosis listed and that she requires Intermediate Nursing Care for greater 30 days.     Update Admission H&P: No change in H&P    PHYSICIAN SIGNATURE:  Electronically signed by Karrie Joe MD on 6/26/24 at 12:19 PM EDT

## 2024-06-17 NOTE — ACP (ADVANCE CARE PLANNING)
Advance Care Planning   Healthcare Decision Maker:    Primary Decision Maker: PARAG TORRES - Child - [phone]    No other siblings, per Parag    Today we documented Decision Maker(s) consistent with Legal Next of Kin hierarchy.    Electronically signed by Laura Lucas RN-BC on 6/17/2024 at 11:32 AM

## 2024-06-18 ENCOUNTER — APPOINTMENT (OUTPATIENT)
Dept: GENERAL RADIOLOGY | Age: 79
DRG: 870 | End: 2024-06-18
Payer: MEDICARE

## 2024-06-18 ENCOUNTER — APPOINTMENT (OUTPATIENT)
Dept: INTERVENTIONAL RADIOLOGY/VASCULAR | Age: 79
DRG: 870 | End: 2024-06-18
Payer: MEDICARE

## 2024-06-18 LAB
AADO2: 130.8 MMHG
ABO/RH: NORMAL
ALBUMIN SERPL-MCNC: 2.6 G/DL (ref 3.5–5.2)
ALP SERPL-CCNC: 109 U/L (ref 35–104)
ALT SERPL-CCNC: 8 U/L (ref 0–32)
ANION GAP SERPL CALCULATED.3IONS-SCNC: 8 MMOL/L (ref 7–16)
ANTIBODY SCREEN: NEGATIVE
APPEARANCE FLD: CLEAR
ARM BAND NUMBER: NORMAL
AST SERPL-CCNC: 9 U/L (ref 0–31)
B.E.: 3 MMOL/L (ref -3–3)
BASOPHILS # BLD: 0.01 K/UL (ref 0–0.2)
BASOPHILS NFR BLD: 0 % (ref 0–2)
BILIRUB SERPL-MCNC: 0.5 MG/DL (ref 0–1.2)
BLOOD BANK BLOOD PRODUCT EXPIRATION DATE: NORMAL
BLOOD BANK BLOOD PRODUCT EXPIRATION DATE: NORMAL
BLOOD BANK DISPENSE STATUS: NORMAL
BLOOD BANK DISPENSE STATUS: NORMAL
BLOOD BANK ISBT PRODUCT BLOOD TYPE: 5100
BLOOD BANK ISBT PRODUCT BLOOD TYPE: 5100
BLOOD BANK PRODUCT CODE: NORMAL
BLOOD BANK PRODUCT CODE: NORMAL
BLOOD BANK SAMPLE EXPIRATION: NORMAL
BLOOD BANK UNIT TYPE AND RH: NORMAL
BLOOD BANK UNIT TYPE AND RH: NORMAL
BODY FLD TYPE: NORMAL
BPU ID: NORMAL
BPU ID: NORMAL
BUN SERPL-MCNC: 43 MG/DL (ref 6–23)
CALCIUM SERPL-MCNC: 8.7 MG/DL (ref 8.6–10.2)
CHLORIDE SERPL-SCNC: 112 MMOL/L (ref 98–107)
CLOT CHECK: NORMAL
CO2 SERPL-SCNC: 25 MMOL/L (ref 22–29)
COHB: 0.7 % (ref 0–1.5)
COLOR FLD: YELLOW
COMPONENT: NORMAL
COMPONENT: NORMAL
CREAT SERPL-MCNC: 0.8 MG/DL (ref 0.5–1)
CRITICAL: ABNORMAL
CROSSMATCH RESULT: NORMAL
CROSSMATCH RESULT: NORMAL
DATE ANALYZED: ABNORMAL
DATE OF COLLECTION: ABNORMAL
EOSINOPHIL # BLD: 0.05 K/UL (ref 0.05–0.5)
EOSINOPHILS RELATIVE PERCENT: 1 % (ref 0–6)
ERYTHROCYTE [DISTWIDTH] IN BLOOD BY AUTOMATED COUNT: 15.5 % (ref 11.5–15)
FIO2: 35 %
GFR, ESTIMATED: 71 ML/MIN/1.73M2
GLUCOSE FLD-MCNC: 18 MG/DL
GLUCOSE SERPL-MCNC: 111 MG/DL (ref 74–99)
HCO3: 25.7 MMOL/L (ref 22–26)
HCT VFR BLD AUTO: 27.5 % (ref 34–48)
HGB BLD-MCNC: 9 G/DL (ref 11.5–15.5)
HHB: 4.8 % (ref 0–5)
IMM GRANULOCYTES # BLD AUTO: 0.08 K/UL (ref 0–0.58)
IMM GRANULOCYTES NFR BLD: 1 % (ref 0–5)
INR PPP: 1.3
LAB: ABNORMAL
LDH FLD L TO P-CCNC: 437 U/L
LDH SERPL-CCNC: 140 U/L (ref 135–214)
LYMPHOCYTES NFR BLD: 0.83 K/UL (ref 1.5–4)
LYMPHOCYTES RELATIVE PERCENT: 15 % (ref 20–42)
Lab: 705
MAGNESIUM SERPL-MCNC: 2 MG/DL (ref 1.6–2.6)
MCH RBC QN AUTO: 26.5 PG (ref 26–35)
MCHC RBC AUTO-ENTMCNC: 32.7 G/DL (ref 32–34.5)
MCV RBC AUTO: 80.9 FL (ref 80–99.9)
METHB: 0.2 % (ref 0–1.5)
MODE: AC
MONOCYTES NFR BLD: 0.51 K/UL (ref 0.1–0.95)
MONOCYTES NFR BLD: 9 % (ref 2–12)
MONOCYTES NFR FLD: 19 %
NEUTROPHILS NFR BLD: 74 % (ref 43–80)
NEUTROPHILS NFR FLD: 81 %
NEUTS SEG NFR BLD: 4.25 K/UL (ref 1.8–7.3)
O2 CONTENT: 13.2 ML/DL
O2 SATURATION: 95.2 % (ref 92–98.5)
O2HB: 94.3 % (ref 94–97)
OPERATOR ID: ABNORMAL
PATIENT TEMP: 37 C
PCO2: 32.3 MMHG (ref 35–45)
PEEP/CPAP: 10 CMH2O
PFO2: 2.07 MMHG/%
PH BLOOD GAS: 7.52 (ref 7.35–7.45)
PHOSPHATE SERPL-MCNC: 2.8 MG/DL (ref 2.5–4.5)
PLATELET # BLD AUTO: 192 K/UL (ref 130–450)
PMV BLD AUTO: 11.4 FL (ref 7–12)
PO2: 72.5 MMHG (ref 75–100)
POTASSIUM SERPL-SCNC: 3.7 MMOL/L (ref 3.5–5)
PROT FLD-MCNC: 1.8 G/DL
PROT SERPL-MCNC: 5.8 G/DL (ref 6.4–8.3)
PROTHROMBIN TIME: 13.9 SEC (ref 9.3–12.4)
RBC # BLD AUTO: 3.4 M/UL (ref 3.5–5.5)
RBC # FLD: 6000 CELLS/UL
RI(T): 1.8
RR MECHANICAL: 20 B/MIN
SODIUM SERPL-SCNC: 145 MMOL/L (ref 132–146)
SOURCE, BLOOD GAS: ABNORMAL
SPECIMEN TYPE: NORMAL
THB: 9.9 G/DL (ref 11.5–16.5)
TIME ANALYZED: 711
TRANSFUSION STATUS: NORMAL
TRANSFUSION STATUS: NORMAL
UNIT DIVISION: 0
UNIT DIVISION: 0
UNIT ISSUE DATE/TIME: NORMAL
UNIT ISSUE DATE/TIME: NORMAL
VT MECHANICAL: 300 ML
WBC # FLD: 26 CELLS/UL
WBC OTHER # BLD: 5.7 K/UL (ref 4.5–11.5)

## 2024-06-18 PROCEDURE — 71045 X-RAY EXAM CHEST 1 VIEW: CPT

## 2024-06-18 PROCEDURE — 2580000003 HC RX 258: Performed by: SPECIALIST

## 2024-06-18 PROCEDURE — 2500000003 HC RX 250 WO HCPCS: Performed by: INTERNAL MEDICINE

## 2024-06-18 PROCEDURE — 80053 COMPREHEN METABOLIC PANEL: CPT

## 2024-06-18 PROCEDURE — 89051 BODY FLUID CELL COUNT: CPT

## 2024-06-18 PROCEDURE — 83615 LACTATE (LD) (LDH) ENZYME: CPT

## 2024-06-18 PROCEDURE — 6360000002 HC RX W HCPCS: Performed by: SPECIALIST

## 2024-06-18 PROCEDURE — 36415 COLL VENOUS BLD VENIPUNCTURE: CPT

## 2024-06-18 PROCEDURE — 2709999900 IR GUIDED THORACENTESIS PLEURAL

## 2024-06-18 PROCEDURE — 88112 CYTOPATH CELL ENHANCE TECH: CPT

## 2024-06-18 PROCEDURE — 94640 AIRWAY INHALATION TREATMENT: CPT

## 2024-06-18 PROCEDURE — 82805 BLOOD GASES W/O2 SATURATION: CPT

## 2024-06-18 PROCEDURE — 6370000000 HC RX 637 (ALT 250 FOR IP): Performed by: INTERNAL MEDICINE

## 2024-06-18 PROCEDURE — 6370000000 HC RX 637 (ALT 250 FOR IP)

## 2024-06-18 PROCEDURE — 99291 CRITICAL CARE FIRST HOUR: CPT | Performed by: INTERNAL MEDICINE

## 2024-06-18 PROCEDURE — 32555 ASPIRATE PLEURA W/ IMAGING: CPT

## 2024-06-18 PROCEDURE — 83735 ASSAY OF MAGNESIUM: CPT

## 2024-06-18 PROCEDURE — 2000000000 HC ICU R&B

## 2024-06-18 PROCEDURE — 85025 COMPLETE CBC W/AUTO DIFF WBC: CPT

## 2024-06-18 PROCEDURE — 6360000002 HC RX W HCPCS: Performed by: INTERNAL MEDICINE

## 2024-06-18 PROCEDURE — C9113 INJ PANTOPRAZOLE SODIUM, VIA: HCPCS | Performed by: INTERNAL MEDICINE

## 2024-06-18 PROCEDURE — 2500000003 HC RX 250 WO HCPCS: Performed by: SPECIALIST

## 2024-06-18 PROCEDURE — 2580000003 HC RX 258: Performed by: INTERNAL MEDICINE

## 2024-06-18 PROCEDURE — 94003 VENT MGMT INPAT SUBQ DAY: CPT

## 2024-06-18 PROCEDURE — 0W9B3ZZ DRAINAGE OF LEFT PLEURAL CAVITY, PERCUTANEOUS APPROACH: ICD-10-PCS | Performed by: INTERNAL MEDICINE

## 2024-06-18 PROCEDURE — 99232 SBSQ HOSP IP/OBS MODERATE 35: CPT | Performed by: FAMILY MEDICINE

## 2024-06-18 PROCEDURE — 02HV33Z INSERTION OF INFUSION DEVICE INTO SUPERIOR VENA CAVA, PERCUTANEOUS APPROACH: ICD-10-PCS | Performed by: INTERNAL MEDICINE

## 2024-06-18 PROCEDURE — C1751 CATH, INF, PER/CENT/MIDLINE: HCPCS

## 2024-06-18 PROCEDURE — 85610 PROTHROMBIN TIME: CPT

## 2024-06-18 PROCEDURE — 84100 ASSAY OF PHOSPHORUS: CPT

## 2024-06-18 PROCEDURE — 6370000000 HC RX 637 (ALT 250 FOR IP): Performed by: SPECIALIST

## 2024-06-18 PROCEDURE — 88305 TISSUE EXAM BY PATHOLOGIST: CPT

## 2024-06-18 PROCEDURE — 36573 INSJ PICC RS&I 5 YR+: CPT

## 2024-06-18 RX ADMIN — IPRATROPIUM BROMIDE AND ALBUTEROL SULFATE 1 DOSE: .5; 2.5 SOLUTION RESPIRATORY (INHALATION) at 20:08

## 2024-06-18 RX ADMIN — IPRATROPIUM BROMIDE AND ALBUTEROL SULFATE 1 DOSE: .5; 2.5 SOLUTION RESPIRATORY (INHALATION) at 01:20

## 2024-06-18 RX ADMIN — SODIUM CHLORIDE, PRESERVATIVE FREE 40 MG: 5 INJECTION INTRAVENOUS at 08:13

## 2024-06-18 RX ADMIN — MEROPENEM 1000 MG: 1 INJECTION INTRAVENOUS at 00:33

## 2024-06-18 RX ADMIN — TOBRAMYCIN 300 MG: 300 SOLUTION RESPIRATORY (INHALATION) at 16:23

## 2024-06-18 RX ADMIN — IPRATROPIUM BROMIDE AND ALBUTEROL SULFATE 1 DOSE: .5; 2.5 SOLUTION RESPIRATORY (INHALATION) at 16:22

## 2024-06-18 RX ADMIN — BUDESONIDE INHALATION 500 MCG: 0.5 SUSPENSION RESPIRATORY (INHALATION) at 16:22

## 2024-06-18 RX ADMIN — ACETAMINOPHEN 650 MG: 325 TABLET ORAL at 11:16

## 2024-06-18 RX ADMIN — CARVEDILOL 25 MG: 25 TABLET, FILM COATED ORAL at 08:14

## 2024-06-18 RX ADMIN — SODIUM CHLORIDE, PRESERVATIVE FREE 10 ML: 5 INJECTION INTRAVENOUS at 21:45

## 2024-06-18 RX ADMIN — MEROPENEM 1000 MG: 1 INJECTION INTRAVENOUS at 13:57

## 2024-06-18 RX ADMIN — ACETAMINOPHEN 650 MG: 325 TABLET ORAL at 21:47

## 2024-06-18 RX ADMIN — ANTI-FUNGAL POWDER MICONAZOLE NITRATE TALC FREE: 1.42 POWDER TOPICAL at 21:45

## 2024-06-18 RX ADMIN — MICONAZOLE NITRATE: 20 OINTMENT TOPICAL at 23:22

## 2024-06-18 RX ADMIN — ACETAMINOPHEN 650 MG: 325 TABLET ORAL at 00:28

## 2024-06-18 RX ADMIN — IPRATROPIUM BROMIDE AND ALBUTEROL SULFATE 1 DOSE: .5; 2.5 SOLUTION RESPIRATORY (INHALATION) at 12:09

## 2024-06-18 RX ADMIN — SODIUM CHLORIDE, PRESERVATIVE FREE 40 MG: 5 INJECTION INTRAVENOUS at 20:48

## 2024-06-18 RX ADMIN — METOPROLOL TARTRATE 2.5 MG: 5 INJECTION INTRAVENOUS at 23:19

## 2024-06-18 RX ADMIN — BUDESONIDE INHALATION 500 MCG: 0.5 SUSPENSION RESPIRATORY (INHALATION) at 05:07

## 2024-06-18 RX ADMIN — SODIUM CHLORIDE, PRESERVATIVE FREE 10 ML: 5 INJECTION INTRAVENOUS at 08:15

## 2024-06-18 RX ADMIN — IPRATROPIUM BROMIDE AND ALBUTEROL SULFATE 1 DOSE: .5; 2.5 SOLUTION RESPIRATORY (INHALATION) at 08:24

## 2024-06-18 RX ADMIN — SULFAMETHOXAZOLE AND TRIMETHOPRIM 208 MG: 80; 16 INJECTION, SOLUTION, CONCENTRATE INTRAVENOUS at 21:45

## 2024-06-18 RX ADMIN — ANTI-FUNGAL POWDER MICONAZOLE NITRATE TALC FREE: 1.42 POWDER TOPICAL at 08:14

## 2024-06-18 RX ADMIN — TOBRAMYCIN 300 MG: 300 SOLUTION RESPIRATORY (INHALATION) at 05:23

## 2024-06-18 RX ADMIN — POTASSIUM BICARBONATE 40 MEQ: 782 TABLET, EFFERVESCENT ORAL at 08:13

## 2024-06-18 RX ADMIN — SULFAMETHOXAZOLE AND TRIMETHOPRIM 208 MG: 80; 16 INJECTION, SOLUTION, CONCENTRATE INTRAVENOUS at 09:16

## 2024-06-18 RX ADMIN — IPRATROPIUM BROMIDE AND ALBUTEROL SULFATE 1 DOSE: .5; 2.5 SOLUTION RESPIRATORY (INHALATION) at 05:07

## 2024-06-18 RX ADMIN — ATORVASTATIN CALCIUM 10 MG: 10 TABLET, FILM COATED ORAL at 20:49

## 2024-06-18 RX ADMIN — CARVEDILOL 25 MG: 25 TABLET, FILM COATED ORAL at 17:43

## 2024-06-18 RX ADMIN — SODIUM CHLORIDE, POTASSIUM CHLORIDE, SODIUM LACTATE AND CALCIUM CHLORIDE: 600; 310; 30; 20 INJECTION, SOLUTION INTRAVENOUS at 05:17

## 2024-06-18 ASSESSMENT — PULMONARY FUNCTION TESTS
PIF_VALUE: 21
PIF_VALUE: 23
PIF_VALUE: 25
PIF_VALUE: 21
PIF_VALUE: 23
PIF_VALUE: 17
PIF_VALUE: 27
PIF_VALUE: 35
PIF_VALUE: 25
PIF_VALUE: 24
PIF_VALUE: 33
PIF_VALUE: 25
PIF_VALUE: 25
PIF_VALUE: 24
PIF_VALUE: 25
PIF_VALUE: 24
PIF_VALUE: 18
PIF_VALUE: 22
PIF_VALUE: 23
PIF_VALUE: 24
PIF_VALUE: 29
PIF_VALUE: 27
PIF_VALUE: 26
PIF_VALUE: 23
PIF_VALUE: 17
PIF_VALUE: 24
PIF_VALUE: 20
PIF_VALUE: 25
PIF_VALUE: 24
PIF_VALUE: 29
PIF_VALUE: 26
PIF_VALUE: 25

## 2024-06-18 ASSESSMENT — PAIN SCALES - GENERAL: PAINLEVEL_OUTOF10: 0

## 2024-06-19 ENCOUNTER — APPOINTMENT (OUTPATIENT)
Dept: GENERAL RADIOLOGY | Age: 79
DRG: 870 | End: 2024-06-19
Payer: MEDICARE

## 2024-06-19 LAB
ALBUMIN SERPL-MCNC: 2.8 G/DL (ref 3.5–5.2)
ALP SERPL-CCNC: 123 U/L (ref 35–104)
ALT SERPL-CCNC: 10 U/L (ref 0–32)
ANION GAP SERPL CALCULATED.3IONS-SCNC: 8 MMOL/L (ref 7–16)
AST SERPL-CCNC: 12 U/L (ref 0–31)
BASOPHILS # BLD: 0.02 K/UL (ref 0–0.2)
BASOPHILS NFR BLD: 0 % (ref 0–2)
BILIRUB SERPL-MCNC: 0.3 MG/DL (ref 0–1.2)
BILIRUB UR QL STRIP: NEGATIVE
BUN SERPL-MCNC: 32 MG/DL (ref 6–23)
CALCIUM SERPL-MCNC: 8.7 MG/DL (ref 8.6–10.2)
CHLORIDE SERPL-SCNC: 104 MMOL/L (ref 98–107)
CLARITY UR: CLEAR
CO2 SERPL-SCNC: 27 MMOL/L (ref 22–29)
COLOR UR: YELLOW
CREAT SERPL-MCNC: 0.8 MG/DL (ref 0.5–1)
EOSINOPHIL # BLD: 0.02 K/UL (ref 0.05–0.5)
EOSINOPHILS RELATIVE PERCENT: 0 % (ref 0–6)
ERYTHROCYTE [DISTWIDTH] IN BLOOD BY AUTOMATED COUNT: 15.7 % (ref 11.5–15)
GFR, ESTIMATED: 78 ML/MIN/1.73M2
GLUCOSE SERPL-MCNC: 91 MG/DL (ref 74–99)
GLUCOSE UR STRIP-MCNC: NEGATIVE MG/DL
HCT VFR BLD AUTO: 29.5 % (ref 34–48)
HGB BLD-MCNC: 9.4 G/DL (ref 11.5–15.5)
HGB UR QL STRIP.AUTO: ABNORMAL
IMM GRANULOCYTES # BLD AUTO: 0.17 K/UL (ref 0–0.58)
IMM GRANULOCYTES NFR BLD: 2 % (ref 0–5)
KETONES UR STRIP-MCNC: NEGATIVE MG/DL
LEUKOCYTE ESTERASE UR QL STRIP: NEGATIVE
LYMPHOCYTES NFR BLD: 0.64 K/UL (ref 1.5–4)
LYMPHOCYTES RELATIVE PERCENT: 6 % (ref 20–42)
MAGNESIUM SERPL-MCNC: 1.8 MG/DL (ref 1.6–2.6)
MCH RBC QN AUTO: 26.6 PG (ref 26–35)
MCHC RBC AUTO-ENTMCNC: 31.9 G/DL (ref 32–34.5)
MCV RBC AUTO: 83.3 FL (ref 80–99.9)
MICROORGANISM SPEC CULT: ABNORMAL
MICROORGANISM/AGENT SPEC: ABNORMAL
MONOCYTES NFR BLD: 0.76 K/UL (ref 0.1–0.95)
MONOCYTES NFR BLD: 7 % (ref 2–12)
NEUTROPHILS NFR BLD: 86 % (ref 43–80)
NEUTS SEG NFR BLD: 9.68 K/UL (ref 1.8–7.3)
NITRITE UR QL STRIP: NEGATIVE
PH UR STRIP: 6 [PH] (ref 5–9)
PHOSPHATE SERPL-MCNC: 2.3 MG/DL (ref 2.5–4.5)
PLATELET # BLD AUTO: 189 K/UL (ref 130–450)
PMV BLD AUTO: 11 FL (ref 7–12)
POTASSIUM SERPL-SCNC: 4.1 MMOL/L (ref 3.5–5)
PROT SERPL-MCNC: 6.1 G/DL (ref 6.4–8.3)
PROT UR STRIP-MCNC: 30 MG/DL
RBC # BLD AUTO: 3.54 M/UL (ref 3.5–5.5)
RBC #/AREA URNS HPF: ABNORMAL /HPF
SODIUM SERPL-SCNC: 139 MMOL/L (ref 132–146)
SP GR UR STRIP: 1.02 (ref 1–1.03)
SPECIMEN DESCRIPTION: ABNORMAL
SPECIMEN DESCRIPTION: ABNORMAL
UROBILINOGEN UR STRIP-ACNC: 0.2 EU/DL (ref 0–1)
WBC #/AREA URNS HPF: ABNORMAL /HPF
WBC OTHER # BLD: 11.3 K/UL (ref 4.5–11.5)

## 2024-06-19 PROCEDURE — 87070 CULTURE OTHR SPECIMN AEROBIC: CPT

## 2024-06-19 PROCEDURE — 2000000000 HC ICU R&B

## 2024-06-19 PROCEDURE — 2580000003 HC RX 258: Performed by: INTERNAL MEDICINE

## 2024-06-19 PROCEDURE — 99232 SBSQ HOSP IP/OBS MODERATE 35: CPT | Performed by: FAMILY MEDICINE

## 2024-06-19 PROCEDURE — 80053 COMPREHEN METABOLIC PANEL: CPT

## 2024-06-19 PROCEDURE — 94003 VENT MGMT INPAT SUBQ DAY: CPT

## 2024-06-19 PROCEDURE — 6370000000 HC RX 637 (ALT 250 FOR IP)

## 2024-06-19 PROCEDURE — 6360000002 HC RX W HCPCS: Performed by: INTERNAL MEDICINE

## 2024-06-19 PROCEDURE — 71045 X-RAY EXAM CHEST 1 VIEW: CPT

## 2024-06-19 PROCEDURE — 2500000003 HC RX 250 WO HCPCS: Performed by: INTERNAL MEDICINE

## 2024-06-19 PROCEDURE — 6360000002 HC RX W HCPCS: Performed by: SPECIALIST

## 2024-06-19 PROCEDURE — 6360000002 HC RX W HCPCS

## 2024-06-19 PROCEDURE — 87086 URINE CULTURE/COLONY COUNT: CPT

## 2024-06-19 PROCEDURE — 83735 ASSAY OF MAGNESIUM: CPT

## 2024-06-19 PROCEDURE — 84100 ASSAY OF PHOSPHORUS: CPT

## 2024-06-19 PROCEDURE — 87205 SMEAR GRAM STAIN: CPT

## 2024-06-19 PROCEDURE — 87449 NOS EACH ORGANISM AG IA: CPT

## 2024-06-19 PROCEDURE — 87077 CULTURE AEROBIC IDENTIFY: CPT

## 2024-06-19 PROCEDURE — 2580000003 HC RX 258: Performed by: SPECIALIST

## 2024-06-19 PROCEDURE — 36592 COLLECT BLOOD FROM PICC: CPT

## 2024-06-19 PROCEDURE — 99231 SBSQ HOSP IP/OBS SF/LOW 25: CPT | Performed by: NURSE PRACTITIONER

## 2024-06-19 PROCEDURE — 81001 URINALYSIS AUTO W/SCOPE: CPT

## 2024-06-19 PROCEDURE — C9113 INJ PANTOPRAZOLE SODIUM, VIA: HCPCS | Performed by: INTERNAL MEDICINE

## 2024-06-19 PROCEDURE — 85025 COMPLETE CBC W/AUTO DIFF WBC: CPT

## 2024-06-19 PROCEDURE — 2500000003 HC RX 250 WO HCPCS: Performed by: SPECIALIST

## 2024-06-19 PROCEDURE — 87040 BLOOD CULTURE FOR BACTERIA: CPT

## 2024-06-19 PROCEDURE — 6370000000 HC RX 637 (ALT 250 FOR IP): Performed by: INTERNAL MEDICINE

## 2024-06-19 PROCEDURE — 94640 AIRWAY INHALATION TREATMENT: CPT

## 2024-06-19 RX ORDER — FENTANYL CITRATE 0.05 MG/ML
25 INJECTION, SOLUTION INTRAMUSCULAR; INTRAVENOUS ONCE
Status: COMPLETED | OUTPATIENT
Start: 2024-06-19 | End: 2024-06-19

## 2024-06-19 RX ORDER — MAGNESIUM SULFATE 1 G/100ML
1000 INJECTION INTRAVENOUS ONCE
Status: COMPLETED | OUTPATIENT
Start: 2024-06-19 | End: 2024-06-19

## 2024-06-19 RX ORDER — HYDRALAZINE HYDROCHLORIDE 20 MG/ML
5 INJECTION INTRAMUSCULAR; INTRAVENOUS ONCE
Status: COMPLETED | OUTPATIENT
Start: 2024-06-19 | End: 2024-06-19

## 2024-06-19 RX ADMIN — TOBRAMYCIN 300 MG: 300 SOLUTION RESPIRATORY (INHALATION) at 05:19

## 2024-06-19 RX ADMIN — CARVEDILOL 25 MG: 25 TABLET, FILM COATED ORAL at 08:31

## 2024-06-19 RX ADMIN — SODIUM CHLORIDE, POTASSIUM CHLORIDE, SODIUM LACTATE AND CALCIUM CHLORIDE: 600; 310; 30; 20 INJECTION, SOLUTION INTRAVENOUS at 00:53

## 2024-06-19 RX ADMIN — IPRATROPIUM BROMIDE AND ALBUTEROL SULFATE 1 DOSE: .5; 2.5 SOLUTION RESPIRATORY (INHALATION) at 19:19

## 2024-06-19 RX ADMIN — ACETAMINOPHEN 650 MG: 325 TABLET ORAL at 08:31

## 2024-06-19 RX ADMIN — ANTI-FUNGAL POWDER MICONAZOLE NITRATE TALC FREE: 1.42 POWDER TOPICAL at 20:59

## 2024-06-19 RX ADMIN — IPRATROPIUM BROMIDE AND ALBUTEROL SULFATE 1 DOSE: .5; 2.5 SOLUTION RESPIRATORY (INHALATION) at 05:03

## 2024-06-19 RX ADMIN — SODIUM CHLORIDE, PRESERVATIVE FREE 10 ML: 5 INJECTION INTRAVENOUS at 21:00

## 2024-06-19 RX ADMIN — SODIUM CHLORIDE, PRESERVATIVE FREE 10 ML: 5 INJECTION INTRAVENOUS at 08:31

## 2024-06-19 RX ADMIN — BUDESONIDE INHALATION 500 MCG: 0.5 SUSPENSION RESPIRATORY (INHALATION) at 05:03

## 2024-06-19 RX ADMIN — IPRATROPIUM BROMIDE AND ALBUTEROL SULFATE 1 DOSE: .5; 2.5 SOLUTION RESPIRATORY (INHALATION) at 10:11

## 2024-06-19 RX ADMIN — IPRATROPIUM BROMIDE AND ALBUTEROL SULFATE 1 DOSE: .5; 2.5 SOLUTION RESPIRATORY (INHALATION) at 15:41

## 2024-06-19 RX ADMIN — MEROPENEM 1000 MG: 1 INJECTION INTRAVENOUS at 13:11

## 2024-06-19 RX ADMIN — HYDRALAZINE HYDROCHLORIDE 5 MG: 20 INJECTION, SOLUTION INTRAMUSCULAR; INTRAVENOUS at 23:16

## 2024-06-19 RX ADMIN — METOPROLOL TARTRATE 2.5 MG: 5 INJECTION INTRAVENOUS at 22:18

## 2024-06-19 RX ADMIN — ENOXAPARIN SODIUM 40 MG: 100 INJECTION SUBCUTANEOUS at 08:31

## 2024-06-19 RX ADMIN — IPRATROPIUM BROMIDE AND ALBUTEROL SULFATE 1 DOSE: .5; 2.5 SOLUTION RESPIRATORY (INHALATION) at 22:05

## 2024-06-19 RX ADMIN — SODIUM CHLORIDE, PRESERVATIVE FREE 40 MG: 5 INJECTION INTRAVENOUS at 20:54

## 2024-06-19 RX ADMIN — SODIUM CHLORIDE, POTASSIUM CHLORIDE, SODIUM LACTATE AND CALCIUM CHLORIDE: 600; 310; 30; 20 INJECTION, SOLUTION INTRAVENOUS at 20:54

## 2024-06-19 RX ADMIN — SULFAMETHOXAZOLE AND TRIMETHOPRIM 260.8 MG: 80; 16 INJECTION, SOLUTION, CONCENTRATE INTRAVENOUS at 16:37

## 2024-06-19 RX ADMIN — ATORVASTATIN CALCIUM 10 MG: 10 TABLET, FILM COATED ORAL at 20:54

## 2024-06-19 RX ADMIN — TOBRAMYCIN 300 MG: 300 SOLUTION RESPIRATORY (INHALATION) at 19:48

## 2024-06-19 RX ADMIN — MEROPENEM 1000 MG: 1 INJECTION INTRAVENOUS at 00:54

## 2024-06-19 RX ADMIN — METOPROLOL TARTRATE 2.5 MG: 5 INJECTION INTRAVENOUS at 09:02

## 2024-06-19 RX ADMIN — ANTI-FUNGAL POWDER MICONAZOLE NITRATE TALC FREE: 1.42 POWDER TOPICAL at 08:32

## 2024-06-19 RX ADMIN — BUDESONIDE INHALATION 500 MCG: 0.5 SUSPENSION RESPIRATORY (INHALATION) at 19:19

## 2024-06-19 RX ADMIN — MAGNESIUM SULFATE IN DEXTROSE 1000 MG: 10 INJECTION, SOLUTION INTRAVENOUS at 08:46

## 2024-06-19 RX ADMIN — SULFAMETHOXAZOLE AND TRIMETHOPRIM 208 MG: 80; 16 INJECTION, SOLUTION, CONCENTRATE INTRAVENOUS at 08:49

## 2024-06-19 RX ADMIN — IPRATROPIUM BROMIDE AND ALBUTEROL SULFATE 1 DOSE: .5; 2.5 SOLUTION RESPIRATORY (INHALATION) at 01:11

## 2024-06-19 RX ADMIN — MICONAZOLE NITRATE: 20 OINTMENT TOPICAL at 08:32

## 2024-06-19 RX ADMIN — SODIUM CHLORIDE, PRESERVATIVE FREE 40 MG: 5 INJECTION INTRAVENOUS at 08:30

## 2024-06-19 RX ADMIN — MICONAZOLE NITRATE: 20 OINTMENT TOPICAL at 20:59

## 2024-06-19 RX ADMIN — METOPROLOL TARTRATE 2.5 MG: 5 INJECTION INTRAVENOUS at 04:14

## 2024-06-19 RX ADMIN — CARVEDILOL 25 MG: 25 TABLET, FILM COATED ORAL at 16:35

## 2024-06-19 RX ADMIN — FENTANYL CITRATE 25 MCG: 0.05 INJECTION, SOLUTION INTRAMUSCULAR; INTRAVENOUS at 01:15

## 2024-06-19 RX ADMIN — SODIUM PHOSPHATE, MONOBASIC, MONOHYDRATE AND SODIUM PHOSPHATE, DIBASIC, ANHYDROUS 15 MMOL: 142; 276 INJECTION, SOLUTION INTRAVENOUS at 14:12

## 2024-06-19 ASSESSMENT — PULMONARY FUNCTION TESTS
PIF_VALUE: 24
PIF_VALUE: 25
PIF_VALUE: 23
PIF_VALUE: 22
PIF_VALUE: 24
PIF_VALUE: 15
PIF_VALUE: 24
PIF_VALUE: 22
PIF_VALUE: 27
PIF_VALUE: 15
PIF_VALUE: 27
PIF_VALUE: 26
PIF_VALUE: 23
PIF_VALUE: 23
PIF_VALUE: 29
PIF_VALUE: 31
PIF_VALUE: 21
PIF_VALUE: 28
PIF_VALUE: 25
PIF_VALUE: 31
PIF_VALUE: 29
PIF_VALUE: 25
PIF_VALUE: 26
PIF_VALUE: 26
PIF_VALUE: 31
PIF_VALUE: 13
PIF_VALUE: 21
PIF_VALUE: 25

## 2024-06-19 ASSESSMENT — PAIN SCALES - GENERAL
PAINLEVEL_OUTOF10: 6
PAINLEVEL_OUTOF10: 0

## 2024-06-20 ENCOUNTER — APPOINTMENT (OUTPATIENT)
Dept: GENERAL RADIOLOGY | Age: 79
DRG: 870 | End: 2024-06-20
Payer: MEDICARE

## 2024-06-20 LAB
1,3 BETA GLUCAN SER-MCNC: 66 PG/ML
1,3 BETA-D-GLUCAN INTERP: ABNORMAL
ALBUMIN SERPL-MCNC: 3 G/DL (ref 3.5–5.2)
ALP SERPL-CCNC: 134 U/L (ref 35–104)
ALT SERPL-CCNC: 9 U/L (ref 0–32)
ANION GAP SERPL CALCULATED.3IONS-SCNC: 8 MMOL/L (ref 7–16)
AST SERPL-CCNC: 12 U/L (ref 0–31)
BASOPHILS # BLD: 0 K/UL (ref 0–0.2)
BASOPHILS NFR BLD: 0 % (ref 0–2)
BILIRUB SERPL-MCNC: 0.2 MG/DL (ref 0–1.2)
BUN SERPL-MCNC: 29 MG/DL (ref 6–23)
CALCIUM SERPL-MCNC: 8.5 MG/DL (ref 8.6–10.2)
CHLORIDE SERPL-SCNC: 100 MMOL/L (ref 98–107)
CO2 SERPL-SCNC: 26 MMOL/L (ref 22–29)
CREAT SERPL-MCNC: 0.7 MG/DL (ref 0.5–1)
EOSINOPHIL # BLD: 0 K/UL (ref 0.05–0.5)
EOSINOPHILS RELATIVE PERCENT: 0 % (ref 0–6)
ERYTHROCYTE [DISTWIDTH] IN BLOOD BY AUTOMATED COUNT: 16 % (ref 11.5–15)
GFR, ESTIMATED: 89 ML/MIN/1.73M2
GLUCOSE SERPL-MCNC: 173 MG/DL (ref 74–99)
HCT VFR BLD AUTO: 32 % (ref 34–48)
HGB BLD-MCNC: 10 G/DL (ref 11.5–15.5)
LYMPHOCYTES NFR BLD: 0.38 K/UL (ref 1.5–4)
LYMPHOCYTES RELATIVE PERCENT: 2 % (ref 20–42)
MAGNESIUM SERPL-MCNC: 2 MG/DL (ref 1.6–2.6)
MCH RBC QN AUTO: 26.6 PG (ref 26–35)
MCHC RBC AUTO-ENTMCNC: 31.3 G/DL (ref 32–34.5)
MCV RBC AUTO: 85.1 FL (ref 80–99.9)
METAMYELOCYTES ABSOLUTE COUNT: 0.19 K/UL (ref 0–0.12)
METAMYELOCYTES: 1 % (ref 0–1)
MICROORGANISM SPEC CULT: NO GROWTH
MICROORGANISM SPEC CULT: NO GROWTH
MICROORGANISM SPEC CULT: NORMAL
MICROORGANISM SPEC CULT: NORMAL
MICROORGANISM/AGENT SPEC: NORMAL
MONOCYTES NFR BLD: 1.13 K/UL (ref 0.1–0.95)
MONOCYTES NFR BLD: 5 % (ref 2–12)
NEUTROPHILS NFR BLD: 92 % (ref 43–80)
NEUTS SEG NFR BLD: 19.91 K/UL (ref 1.8–7.3)
NON-GYN CYTOLOGY REPORT: NORMAL
PHOSPHATE SERPL-MCNC: 3.2 MG/DL (ref 2.5–4.5)
PLATELET # BLD AUTO: 265 K/UL (ref 130–450)
PMV BLD AUTO: 10.9 FL (ref 7–12)
POTASSIUM SERPL-SCNC: 4.3 MMOL/L (ref 3.5–5)
PROT SERPL-MCNC: 6.7 G/DL (ref 6.4–8.3)
RBC # BLD AUTO: 3.76 M/UL (ref 3.5–5.5)
RBC # BLD: ABNORMAL 10*6/UL
SERVICE CMNT-IMP: NORMAL
SODIUM SERPL-SCNC: 134 MMOL/L (ref 132–146)
SPECIMEN DESCRIPTION: NORMAL
WBC OTHER # BLD: 21.6 K/UL (ref 4.5–11.5)

## 2024-06-20 PROCEDURE — 3609027000 HC BRONCHOSCOPY: Performed by: INTERNAL MEDICINE

## 2024-06-20 PROCEDURE — 0BC68ZZ EXTIRPATION OF MATTER FROM RIGHT LOWER LOBE BRONCHUS, VIA NATURAL OR ARTIFICIAL OPENING ENDOSCOPIC: ICD-10-PCS | Performed by: INTERNAL MEDICINE

## 2024-06-20 PROCEDURE — 2500000003 HC RX 250 WO HCPCS: Performed by: INTERNAL MEDICINE

## 2024-06-20 PROCEDURE — 2580000003 HC RX 258: Performed by: INTERNAL MEDICINE

## 2024-06-20 PROCEDURE — 6370000000 HC RX 637 (ALT 250 FOR IP): Performed by: INTERNAL MEDICINE

## 2024-06-20 PROCEDURE — 2000000000 HC ICU R&B

## 2024-06-20 PROCEDURE — 94003 VENT MGMT INPAT SUBQ DAY: CPT

## 2024-06-20 PROCEDURE — 0BC58ZZ EXTIRPATION OF MATTER FROM RIGHT MIDDLE LOBE BRONCHUS, VIA NATURAL OR ARTIFICIAL OPENING ENDOSCOPIC: ICD-10-PCS | Performed by: INTERNAL MEDICINE

## 2024-06-20 PROCEDURE — 31615 TRCHEOBRNCHSC EST TRACHS INC: CPT | Performed by: INTERNAL MEDICINE

## 2024-06-20 PROCEDURE — 0BC48ZZ EXTIRPATION OF MATTER FROM RIGHT UPPER LOBE BRONCHUS, VIA NATURAL OR ARTIFICIAL OPENING ENDOSCOPIC: ICD-10-PCS | Performed by: INTERNAL MEDICINE

## 2024-06-20 PROCEDURE — 94640 AIRWAY INHALATION TREATMENT: CPT

## 2024-06-20 PROCEDURE — C9113 INJ PANTOPRAZOLE SODIUM, VIA: HCPCS | Performed by: INTERNAL MEDICINE

## 2024-06-20 PROCEDURE — 6370000000 HC RX 637 (ALT 250 FOR IP)

## 2024-06-20 PROCEDURE — 0BC88ZZ EXTIRPATION OF MATTER FROM LEFT UPPER LOBE BRONCHUS, VIA NATURAL OR ARTIFICIAL OPENING ENDOSCOPIC: ICD-10-PCS | Performed by: INTERNAL MEDICINE

## 2024-06-20 PROCEDURE — 99233 SBSQ HOSP IP/OBS HIGH 50: CPT | Performed by: INTERNAL MEDICINE

## 2024-06-20 PROCEDURE — 71045 X-RAY EXAM CHEST 1 VIEW: CPT

## 2024-06-20 PROCEDURE — 6360000002 HC RX W HCPCS: Performed by: SPECIALIST

## 2024-06-20 PROCEDURE — 83735 ASSAY OF MAGNESIUM: CPT

## 2024-06-20 PROCEDURE — 6360000002 HC RX W HCPCS: Performed by: INTERNAL MEDICINE

## 2024-06-20 PROCEDURE — 0BC38ZZ EXTIRPATION OF MATTER FROM RIGHT MAIN BRONCHUS, VIA NATURAL OR ARTIFICIAL OPENING ENDOSCOPIC: ICD-10-PCS | Performed by: INTERNAL MEDICINE

## 2024-06-20 PROCEDURE — 0BC78ZZ EXTIRPATION OF MATTER FROM LEFT MAIN BRONCHUS, VIA NATURAL OR ARTIFICIAL OPENING ENDOSCOPIC: ICD-10-PCS | Performed by: INTERNAL MEDICINE

## 2024-06-20 PROCEDURE — 6360000002 HC RX W HCPCS

## 2024-06-20 PROCEDURE — 80053 COMPREHEN METABOLIC PANEL: CPT

## 2024-06-20 PROCEDURE — 2709999900 HC NON-CHARGEABLE SUPPLY: Performed by: INTERNAL MEDICINE

## 2024-06-20 PROCEDURE — 36592 COLLECT BLOOD FROM PICC: CPT

## 2024-06-20 PROCEDURE — 85025 COMPLETE CBC W/AUTO DIFF WBC: CPT

## 2024-06-20 PROCEDURE — 0BCB8ZZ EXTIRPATION OF MATTER FROM LEFT LOWER LOBE BRONCHUS, VIA NATURAL OR ARTIFICIAL OPENING ENDOSCOPIC: ICD-10-PCS | Performed by: INTERNAL MEDICINE

## 2024-06-20 PROCEDURE — 84100 ASSAY OF PHOSPHORUS: CPT

## 2024-06-20 PROCEDURE — 2580000003 HC RX 258: Performed by: SPECIALIST

## 2024-06-20 PROCEDURE — 99291 CRITICAL CARE FIRST HOUR: CPT | Performed by: INTERNAL MEDICINE

## 2024-06-20 RX ORDER — METHYLPREDNISOLONE 4 MG/1
8 TABLET ORAL NIGHTLY
Status: COMPLETED | OUTPATIENT
Start: 2024-06-21 | End: 2024-06-21

## 2024-06-20 RX ORDER — METHYLPREDNISOLONE 4 MG/1
4 TABLET ORAL
Status: COMPLETED | OUTPATIENT
Start: 2024-06-21 | End: 2024-06-22

## 2024-06-20 RX ORDER — METHYLPREDNISOLONE 4 MG/1
4 TABLET ORAL NIGHTLY
Status: DISCONTINUED | OUTPATIENT
Start: 2024-06-22 | End: 2024-06-24

## 2024-06-20 RX ORDER — PREDNISONE 5 MG/1
5 TABLET ORAL DAILY
Status: DISCONTINUED | OUTPATIENT
Start: 2024-06-25 | End: 2024-06-20

## 2024-06-20 RX ORDER — FUROSEMIDE 10 MG/ML
20 INJECTION INTRAMUSCULAR; INTRAVENOUS DAILY
Status: DISCONTINUED | OUTPATIENT
Start: 2024-06-20 | End: 2024-06-24

## 2024-06-20 RX ORDER — FENTANYL CITRATE 0.05 MG/ML
25 INJECTION, SOLUTION INTRAMUSCULAR; INTRAVENOUS ONCE
Status: COMPLETED | OUTPATIENT
Start: 2024-06-20 | End: 2024-06-20

## 2024-06-20 RX ORDER — PREDNISONE 5 MG/1
5 TABLET ORAL DAILY
Status: DISCONTINUED | OUTPATIENT
Start: 2024-06-26 | End: 2024-06-24

## 2024-06-20 RX ORDER — METHYLPREDNISOLONE 4 MG/1
4 TABLET ORAL
Status: COMPLETED | OUTPATIENT
Start: 2024-06-21 | End: 2024-06-23

## 2024-06-20 RX ORDER — METHYLPREDNISOLONE 4 MG/1
4 TABLET ORAL
Status: DISCONTINUED | OUTPATIENT
Start: 2024-06-21 | End: 2024-06-24

## 2024-06-20 RX ORDER — MIDAZOLAM HYDROCHLORIDE 1 MG/ML
5 INJECTION INTRAMUSCULAR; INTRAVENOUS ONCE
Status: DISCONTINUED | OUTPATIENT
Start: 2024-06-20 | End: 2024-06-20 | Stop reason: CLARIF

## 2024-06-20 RX ORDER — MIDAZOLAM HYDROCHLORIDE 5 MG/ML
5 INJECTION INTRAMUSCULAR; INTRAVENOUS ONCE
Status: COMPLETED | OUTPATIENT
Start: 2024-06-20 | End: 2024-06-20

## 2024-06-20 RX ORDER — METHYLPREDNISOLONE 4 MG/1
24 TABLET ORAL ONCE
Status: COMPLETED | OUTPATIENT
Start: 2024-06-20 | End: 2024-06-20

## 2024-06-20 RX ADMIN — METHYLPREDNISOLONE 24 MG: 4 TABLET ORAL at 14:20

## 2024-06-20 RX ADMIN — SULFAMETHOXAZOLE AND TRIMETHOPRIM 260.8 MG: 80; 16 INJECTION, SOLUTION, CONCENTRATE INTRAVENOUS at 01:33

## 2024-06-20 RX ADMIN — IPRATROPIUM BROMIDE AND ALBUTEROL SULFATE 1 DOSE: .5; 2.5 SOLUTION RESPIRATORY (INHALATION) at 17:31

## 2024-06-20 RX ADMIN — CARVEDILOL 25 MG: 25 TABLET, FILM COATED ORAL at 07:52

## 2024-06-20 RX ADMIN — SULFAMETHOXAZOLE AND TRIMETHOPRIM 260.8 MG: 80; 16 INJECTION, SOLUTION, CONCENTRATE INTRAVENOUS at 17:45

## 2024-06-20 RX ADMIN — BUDESONIDE INHALATION 500 MCG: 0.5 SUSPENSION RESPIRATORY (INHALATION) at 04:14

## 2024-06-20 RX ADMIN — ANTI-FUNGAL POWDER MICONAZOLE NITRATE TALC FREE: 1.42 POWDER TOPICAL at 07:49

## 2024-06-20 RX ADMIN — MIDAZOLAM HYDROCHLORIDE 5 MG: 5 INJECTION, SOLUTION INTRAMUSCULAR; INTRAVENOUS at 13:40

## 2024-06-20 RX ADMIN — IPRATROPIUM BROMIDE AND ALBUTEROL SULFATE 1 DOSE: .5; 2.5 SOLUTION RESPIRATORY (INHALATION) at 01:27

## 2024-06-20 RX ADMIN — FENTANYL CITRATE 25 MCG: 0.05 INJECTION, SOLUTION INTRAMUSCULAR; INTRAVENOUS at 02:34

## 2024-06-20 RX ADMIN — TOBRAMYCIN 300 MG: 300 SOLUTION RESPIRATORY (INHALATION) at 19:04

## 2024-06-20 RX ADMIN — FUROSEMIDE 20 MG: 10 INJECTION, SOLUTION INTRAMUSCULAR; INTRAVENOUS at 14:26

## 2024-06-20 RX ADMIN — MEROPENEM 1000 MG: 1 INJECTION INTRAVENOUS at 01:34

## 2024-06-20 RX ADMIN — SODIUM CHLORIDE, PRESERVATIVE FREE 10 ML: 5 INJECTION INTRAVENOUS at 07:52

## 2024-06-20 RX ADMIN — SACUBITRIL AND VALSARTAN 1 TABLET: 24; 26 TABLET, FILM COATED ORAL at 19:59

## 2024-06-20 RX ADMIN — ATORVASTATIN CALCIUM 10 MG: 10 TABLET, FILM COATED ORAL at 20:02

## 2024-06-20 RX ADMIN — SODIUM CHLORIDE, PRESERVATIVE FREE 40 MG: 5 INJECTION INTRAVENOUS at 20:02

## 2024-06-20 RX ADMIN — IPRATROPIUM BROMIDE AND ALBUTEROL SULFATE 1 DOSE: .5; 2.5 SOLUTION RESPIRATORY (INHALATION) at 09:03

## 2024-06-20 RX ADMIN — WATER 40 MG: 1 INJECTION INTRAMUSCULAR; INTRAVENOUS; SUBCUTANEOUS at 07:49

## 2024-06-20 RX ADMIN — APIXABAN 2.5 MG: 2.5 TABLET, FILM COATED ORAL at 19:59

## 2024-06-20 RX ADMIN — MEROPENEM 1000 MG: 1 INJECTION INTRAVENOUS at 14:22

## 2024-06-20 RX ADMIN — TOBRAMYCIN 300 MG: 300 SOLUTION RESPIRATORY (INHALATION) at 04:13

## 2024-06-20 RX ADMIN — SODIUM CHLORIDE, PRESERVATIVE FREE 10 ML: 5 INJECTION INTRAVENOUS at 20:01

## 2024-06-20 RX ADMIN — ENOXAPARIN SODIUM 40 MG: 100 INJECTION SUBCUTANEOUS at 07:53

## 2024-06-20 RX ADMIN — IPRATROPIUM BROMIDE AND ALBUTEROL SULFATE 1 DOSE: .5; 2.5 SOLUTION RESPIRATORY (INHALATION) at 22:13

## 2024-06-20 RX ADMIN — IPRATROPIUM BROMIDE AND ALBUTEROL SULFATE 1 DOSE: .5; 2.5 SOLUTION RESPIRATORY (INHALATION) at 13:24

## 2024-06-20 RX ADMIN — BUDESONIDE INHALATION 500 MCG: 0.5 SUSPENSION RESPIRATORY (INHALATION) at 17:31

## 2024-06-20 RX ADMIN — MICONAZOLE NITRATE: 20 OINTMENT TOPICAL at 19:59

## 2024-06-20 RX ADMIN — MEROPENEM 1000 MG: 1 INJECTION INTRAVENOUS at 20:25

## 2024-06-20 RX ADMIN — SODIUM CHLORIDE, PRESERVATIVE FREE 40 MG: 5 INJECTION INTRAVENOUS at 07:49

## 2024-06-20 RX ADMIN — MICONAZOLE NITRATE: 20 OINTMENT TOPICAL at 07:50

## 2024-06-20 RX ADMIN — CARVEDILOL 25 MG: 25 TABLET, FILM COATED ORAL at 17:45

## 2024-06-20 RX ADMIN — SACUBITRIL AND VALSARTAN 1 TABLET: 24; 26 TABLET, FILM COATED ORAL at 13:40

## 2024-06-20 RX ADMIN — ANTI-FUNGAL POWDER MICONAZOLE NITRATE TALC FREE: 1.42 POWDER TOPICAL at 19:59

## 2024-06-20 RX ADMIN — SODIUM CHLORIDE, POTASSIUM CHLORIDE, SODIUM LACTATE AND CALCIUM CHLORIDE: 600; 310; 30; 20 INJECTION, SOLUTION INTRAVENOUS at 17:44

## 2024-06-20 RX ADMIN — IPRATROPIUM BROMIDE AND ALBUTEROL SULFATE 1 DOSE: .5; 2.5 SOLUTION RESPIRATORY (INHALATION) at 04:14

## 2024-06-20 RX ADMIN — SULFAMETHOXAZOLE AND TRIMETHOPRIM 260.8 MG: 80; 16 INJECTION, SOLUTION, CONCENTRATE INTRAVENOUS at 09:29

## 2024-06-20 ASSESSMENT — PULMONARY FUNCTION TESTS
PIF_VALUE: 27
PIF_VALUE: 30
PIF_VALUE: 28
PIF_VALUE: 29
PIF_VALUE: 50
PIF_VALUE: 46
PIF_VALUE: 27
PIF_VALUE: 29
PIF_VALUE: 31
PIF_VALUE: 32
PIF_VALUE: 50
PIF_VALUE: 50
PIF_VALUE: 30
PIF_VALUE: 32
PIF_VALUE: 50
PIF_VALUE: 29
PIF_VALUE: 31
PIF_VALUE: 25
PIF_VALUE: 30
PIF_VALUE: 37
PIF_VALUE: 39
PIF_VALUE: 34
PIF_VALUE: 28
PIF_VALUE: 33
PIF_VALUE: 29
PIF_VALUE: 33
PIF_VALUE: 29
PIF_VALUE: 50
PIF_VALUE: 21
PIF_VALUE: 41
PIF_VALUE: 27
PIF_VALUE: 34
PIF_VALUE: 27
PIF_VALUE: 35
PIF_VALUE: 32
PIF_VALUE: 44

## 2024-06-20 ASSESSMENT — PAIN SCALES - GENERAL
PAINLEVEL_OUTOF10: 0
PAINLEVEL_OUTOF10: 9

## 2024-06-20 NOTE — PROCEDURES
PROCEDURE NOTE  Date: 6/20/2024   Name: Effie Portillo  YOB: 1945    Procedures: Fiberoptic bronchoscopy with airway lavage    Reason for procedure: Acute hypoxemic respiratory failure superimposed on chronic respiratory failure, retained secretions, severe COPD    Description of procedure:    With the patient stable on the ventilator using an FiO2 100% and no PEEP, (permission obtained from power of ), the bronchoscope adapter was put in place in line with the tracheostomy tube and the 4.9 mm videobronchoscope was inserted via the tracheostomy tube into the distal trachea.    Thick tenacious secretions were removed in the context of edematous and erythematous mucosa distributed throughout the right and left mainstem bronchi, right upper lobe, right middle lobe, right lower lobe, left upper lobe, left lower lobe, and accompanying segmental subsegmental bronchi.    The largest amount of secretions were seen pooled in the distal left lower lobe bronchus which is the area where the suction catheter is least effective.    No distinct endobronchial lesions or tumors were seen.    A total of 20 cc of whitish-yellow secretions were removed.    The patient tolerated this procedure well and will remain on an FiO2 of 100% for perhaps 30 minutes before being placed back on her prior FiO2.  All vital signs remained within acceptable limits.

## 2024-06-21 ENCOUNTER — APPOINTMENT (OUTPATIENT)
Dept: GENERAL RADIOLOGY | Age: 79
DRG: 870 | End: 2024-06-21
Payer: MEDICARE

## 2024-06-21 PROBLEM — N17.9 AKI (ACUTE KIDNEY INJURY) (HCC): Status: ACTIVE | Noted: 2024-06-21

## 2024-06-21 PROBLEM — G93.41 ACUTE METABOLIC ENCEPHALOPATHY: Status: ACTIVE | Noted: 2024-06-21

## 2024-06-21 LAB
AADO2: 298.4 MMHG
ALBUMIN SERPL-MCNC: 2.4 G/DL (ref 3.5–5.2)
ALBUMIN SERPL-MCNC: 2.6 G/DL (ref 3.5–5.2)
ALP SERPL-CCNC: 108 U/L (ref 35–104)
ALP SERPL-CCNC: 109 U/L (ref 35–104)
ALT SERPL-CCNC: 10 U/L (ref 0–32)
ALT SERPL-CCNC: 11 U/L (ref 0–32)
ANION GAP SERPL CALCULATED.3IONS-SCNC: 10 MMOL/L (ref 7–16)
ANION GAP SERPL CALCULATED.3IONS-SCNC: 8 MMOL/L (ref 7–16)
AST SERPL-CCNC: 12 U/L (ref 0–31)
AST SERPL-CCNC: 15 U/L (ref 0–31)
B.E.: 2.3 MMOL/L (ref -3–3)
BASOPHILS # BLD: 0 K/UL (ref 0–0.2)
BASOPHILS NFR BLD: 0 % (ref 0–2)
BILIRUB SERPL-MCNC: <0.2 MG/DL (ref 0–1.2)
BILIRUB SERPL-MCNC: <0.2 MG/DL (ref 0–1.2)
BUN SERPL-MCNC: 27 MG/DL (ref 6–23)
BUN SERPL-MCNC: 28 MG/DL (ref 6–23)
CALCIUM SERPL-MCNC: 8.2 MG/DL (ref 8.6–10.2)
CALCIUM SERPL-MCNC: 8.2 MG/DL (ref 8.6–10.2)
CHLORIDE SERPL-SCNC: 98 MMOL/L (ref 98–107)
CHLORIDE SERPL-SCNC: 98 MMOL/L (ref 98–107)
CO2 SERPL-SCNC: 24 MMOL/L (ref 22–29)
CO2 SERPL-SCNC: 27 MMOL/L (ref 22–29)
COHB: 0.7 % (ref 0–1.5)
CREAT SERPL-MCNC: 0.7 MG/DL (ref 0.5–1)
CREAT SERPL-MCNC: 0.7 MG/DL (ref 0.5–1)
CRITICAL: ABNORMAL
DATE ANALYZED: ABNORMAL
DATE OF COLLECTION: ABNORMAL
EOSINOPHIL # BLD: 0 K/UL (ref 0.05–0.5)
EOSINOPHILS RELATIVE PERCENT: 0 % (ref 0–6)
ERYTHROCYTE [DISTWIDTH] IN BLOOD BY AUTOMATED COUNT: 16.1 % (ref 11.5–15)
FIO2: 60 %
GFR, ESTIMATED: 84 ML/MIN/1.73M2
GFR, ESTIMATED: 85 ML/MIN/1.73M2
GLUCOSE SERPL-MCNC: 82 MG/DL (ref 74–99)
GLUCOSE SERPL-MCNC: 86 MG/DL (ref 74–99)
HCO3: 26.2 MMOL/L (ref 22–26)
HCT VFR BLD AUTO: 21.8 % (ref 34–48)
HCT VFR BLD AUTO: 23.9 % (ref 34–48)
HCT VFR BLD AUTO: 25.8 % (ref 34–48)
HGB BLD-MCNC: 6.9 G/DL (ref 11.5–15.5)
HGB BLD-MCNC: 7.6 G/DL (ref 11.5–15.5)
HGB BLD-MCNC: 8.5 G/DL (ref 11.5–15.5)
HHB: 5.3 % (ref 0–5)
LAB: ABNORMAL
LYMPHOCYTES NFR BLD: 0.22 K/UL (ref 1.5–4)
LYMPHOCYTES RELATIVE PERCENT: 3 % (ref 20–42)
Lab: 530
MAGNESIUM SERPL-MCNC: 1.7 MG/DL (ref 1.6–2.6)
MAGNESIUM SERPL-MCNC: 1.8 MG/DL (ref 1.6–2.6)
MCH RBC QN AUTO: 26.1 PG (ref 26–35)
MCHC RBC AUTO-ENTMCNC: 31.8 G/DL (ref 32–34.5)
MCV RBC AUTO: 82.1 FL (ref 80–99.9)
METHB: 0.1 % (ref 0–1.5)
MICROORGANISM SPEC CULT: ABNORMAL
MICROORGANISM/AGENT SPEC: ABNORMAL
MODE: ABNORMAL
MONOCYTES NFR BLD: 0.07 K/UL (ref 0.1–0.95)
MONOCYTES NFR BLD: 1 % (ref 2–12)
NEUTROPHILS NFR BLD: 97 % (ref 43–80)
NEUTS SEG NFR BLD: 8.2 K/UL (ref 1.8–7.3)
O2 CONTENT: 11.6 ML/DL
O2 SATURATION: 94.7 % (ref 92–98.5)
O2HB: 93.9 % (ref 94–97)
OPERATOR ID: 8219
PATIENT TEMP: 37 C
PCO2: 38.1 MMHG (ref 35–45)
PEEP/CPAP: 10 CMH2O
PFO2: 1.21 MMHG/%
PH BLOOD GAS: 7.46 (ref 7.35–7.45)
PHOSPHATE SERPL-MCNC: 1.9 MG/DL (ref 2.5–4.5)
PHOSPHATE SERPL-MCNC: 1.9 MG/DL (ref 2.5–4.5)
PIP: 18 CMH2O
PLATELET # BLD AUTO: 180 K/UL (ref 130–450)
PMV BLD AUTO: 11.3 FL (ref 7–12)
PO2: 72.5 MMHG (ref 75–100)
POTASSIUM SERPL-SCNC: 4.7 MMOL/L (ref 3.5–5)
POTASSIUM SERPL-SCNC: 4.8 MMOL/L (ref 3.5–5)
PROT SERPL-MCNC: 5.5 G/DL (ref 6.4–8.3)
PROT SERPL-MCNC: 5.6 G/DL (ref 6.4–8.3)
RBC # BLD AUTO: 2.91 M/UL (ref 3.5–5.5)
RBC # BLD: NORMAL 10*6/UL
RI(T): 4.12
RR MECHANICAL: 20 B/MIN
SODIUM SERPL-SCNC: 132 MMOL/L (ref 132–146)
SODIUM SERPL-SCNC: 133 MMOL/L (ref 132–146)
SOURCE, BLOOD GAS: ABNORMAL
SPECIMEN DESCRIPTION: ABNORMAL
THB: 8.7 G/DL (ref 11.5–16.5)
TIME ANALYZED: 538
WBC OTHER # BLD: 8.5 K/UL (ref 4.5–11.5)

## 2024-06-21 PROCEDURE — 86850 RBC ANTIBODY SCREEN: CPT

## 2024-06-21 PROCEDURE — 99291 CRITICAL CARE FIRST HOUR: CPT | Performed by: INTERNAL MEDICINE

## 2024-06-21 PROCEDURE — 80053 COMPREHEN METABOLIC PANEL: CPT

## 2024-06-21 PROCEDURE — C9113 INJ PANTOPRAZOLE SODIUM, VIA: HCPCS | Performed by: INTERNAL MEDICINE

## 2024-06-21 PROCEDURE — 6360000002 HC RX W HCPCS

## 2024-06-21 PROCEDURE — 2500000003 HC RX 250 WO HCPCS: Performed by: INTERNAL MEDICINE

## 2024-06-21 PROCEDURE — 2000000000 HC ICU R&B

## 2024-06-21 PROCEDURE — 84100 ASSAY OF PHOSPHORUS: CPT

## 2024-06-21 PROCEDURE — 94640 AIRWAY INHALATION TREATMENT: CPT

## 2024-06-21 PROCEDURE — 6370000000 HC RX 637 (ALT 250 FOR IP): Performed by: INTERNAL MEDICINE

## 2024-06-21 PROCEDURE — 82805 BLOOD GASES W/O2 SATURATION: CPT

## 2024-06-21 PROCEDURE — 6360000002 HC RX W HCPCS: Performed by: SPECIALIST

## 2024-06-21 PROCEDURE — 86901 BLOOD TYPING SEROLOGIC RH(D): CPT

## 2024-06-21 PROCEDURE — 36430 TRANSFUSION BLD/BLD COMPNT: CPT

## 2024-06-21 PROCEDURE — 83735 ASSAY OF MAGNESIUM: CPT

## 2024-06-21 PROCEDURE — 99232 SBSQ HOSP IP/OBS MODERATE 35: CPT | Performed by: INTERNAL MEDICINE

## 2024-06-21 PROCEDURE — 2580000003 HC RX 258: Performed by: INTERNAL MEDICINE

## 2024-06-21 PROCEDURE — 2580000003 HC RX 258: Performed by: SPECIALIST

## 2024-06-21 PROCEDURE — 2709999900 HC NON-CHARGEABLE SUPPLY: Performed by: INTERNAL MEDICINE

## 2024-06-21 PROCEDURE — 85014 HEMATOCRIT: CPT

## 2024-06-21 PROCEDURE — 0DJ08ZZ INSPECTION OF UPPER INTESTINAL TRACT, VIA NATURAL OR ARTIFICIAL OPENING ENDOSCOPIC: ICD-10-PCS | Performed by: INTERNAL MEDICINE

## 2024-06-21 PROCEDURE — 6360000002 HC RX W HCPCS: Performed by: HOSPITALIST

## 2024-06-21 PROCEDURE — 74018 RADEX ABDOMEN 1 VIEW: CPT

## 2024-06-21 PROCEDURE — P9016 RBC LEUKOCYTES REDUCED: HCPCS

## 2024-06-21 PROCEDURE — 85025 COMPLETE CBC W/AUTO DIFF WBC: CPT

## 2024-06-21 PROCEDURE — 71045 X-RAY EXAM CHEST 1 VIEW: CPT

## 2024-06-21 PROCEDURE — 6360000002 HC RX W HCPCS: Performed by: INTERNAL MEDICINE

## 2024-06-21 PROCEDURE — 86923 COMPATIBILITY TEST ELECTRIC: CPT

## 2024-06-21 PROCEDURE — 94003 VENT MGMT INPAT SUBQ DAY: CPT

## 2024-06-21 PROCEDURE — 85018 HEMOGLOBIN: CPT

## 2024-06-21 PROCEDURE — 6370000000 HC RX 637 (ALT 250 FOR IP)

## 2024-06-21 PROCEDURE — 86900 BLOOD TYPING SEROLOGIC ABO: CPT

## 2024-06-21 PROCEDURE — 3609017100 HC EGD: Performed by: INTERNAL MEDICINE

## 2024-06-21 RX ORDER — MIDAZOLAM HYDROCHLORIDE 1 MG/ML
5 INJECTION INTRAMUSCULAR; INTRAVENOUS ONCE
Status: COMPLETED | OUTPATIENT
Start: 2024-06-21 | End: 2024-06-21

## 2024-06-21 RX ORDER — ONDANSETRON 2 MG/ML
4 INJECTION INTRAMUSCULAR; INTRAVENOUS EVERY 6 HOURS PRN
Status: DISCONTINUED | OUTPATIENT
Start: 2024-06-21 | End: 2024-06-26 | Stop reason: HOSPADM

## 2024-06-21 RX ORDER — MAGNESIUM SULFATE IN WATER 40 MG/ML
2000 INJECTION, SOLUTION INTRAVENOUS ONCE
Status: COMPLETED | OUTPATIENT
Start: 2024-06-21 | End: 2024-06-21

## 2024-06-21 RX ORDER — SODIUM CHLORIDE 9 MG/ML
INJECTION, SOLUTION INTRAVENOUS PRN
Status: DISCONTINUED | OUTPATIENT
Start: 2024-06-21 | End: 2024-06-26 | Stop reason: HOSPADM

## 2024-06-21 RX ORDER — METOCLOPRAMIDE HYDROCHLORIDE 5 MG/ML
10 INJECTION INTRAMUSCULAR; INTRAVENOUS ONCE
Status: COMPLETED | OUTPATIENT
Start: 2024-06-21 | End: 2024-06-21

## 2024-06-21 RX ADMIN — MIDAZOLAM 1 MG: 1 INJECTION INTRAMUSCULAR; INTRAVENOUS at 15:41

## 2024-06-21 RX ADMIN — APIXABAN 2.5 MG: 2.5 TABLET, FILM COATED ORAL at 09:24

## 2024-06-21 RX ADMIN — MEROPENEM 1000 MG: 1 INJECTION INTRAVENOUS at 20:10

## 2024-06-21 RX ADMIN — METHYLPREDNISOLONE 4 MG: 4 TABLET ORAL at 12:00

## 2024-06-21 RX ADMIN — TOBRAMYCIN 300 MG: 300 SOLUTION RESPIRATORY (INHALATION) at 05:17

## 2024-06-21 RX ADMIN — IPRATROPIUM BROMIDE AND ALBUTEROL SULFATE 1 DOSE: .5; 2.5 SOLUTION RESPIRATORY (INHALATION) at 16:26

## 2024-06-21 RX ADMIN — ANTI-FUNGAL POWDER MICONAZOLE NITRATE TALC FREE: 1.42 POWDER TOPICAL at 09:25

## 2024-06-21 RX ADMIN — CARVEDILOL 25 MG: 25 TABLET, FILM COATED ORAL at 17:05

## 2024-06-21 RX ADMIN — TOBRAMYCIN 300 MG: 300 SOLUTION RESPIRATORY (INHALATION) at 16:27

## 2024-06-21 RX ADMIN — SODIUM CHLORIDE, PRESERVATIVE FREE 10 ML: 5 INJECTION INTRAVENOUS at 09:26

## 2024-06-21 RX ADMIN — CARVEDILOL 25 MG: 25 TABLET, FILM COATED ORAL at 08:15

## 2024-06-21 RX ADMIN — FUROSEMIDE 20 MG: 10 INJECTION, SOLUTION INTRAMUSCULAR; INTRAVENOUS at 09:25

## 2024-06-21 RX ADMIN — SULFAMETHOXAZOLE AND TRIMETHOPRIM 260.8 MG: 80; 16 INJECTION, SOLUTION, CONCENTRATE INTRAVENOUS at 10:39

## 2024-06-21 RX ADMIN — CLOPIDOGREL BISULFATE 75 MG: 75 TABLET ORAL at 09:24

## 2024-06-21 RX ADMIN — SODIUM CHLORIDE, PRESERVATIVE FREE 10 ML: 5 INJECTION INTRAVENOUS at 20:01

## 2024-06-21 RX ADMIN — SACUBITRIL AND VALSARTAN 1 TABLET: 24; 26 TABLET, FILM COATED ORAL at 09:24

## 2024-06-21 RX ADMIN — BUDESONIDE INHALATION 500 MCG: 0.5 SUSPENSION RESPIRATORY (INHALATION) at 05:17

## 2024-06-21 RX ADMIN — IPRATROPIUM BROMIDE AND ALBUTEROL SULFATE 1 DOSE: .5; 2.5 SOLUTION RESPIRATORY (INHALATION) at 23:09

## 2024-06-21 RX ADMIN — MAGNESIUM SULFATE HEPTAHYDRATE 2000 MG: 40 INJECTION, SOLUTION INTRAVENOUS at 10:35

## 2024-06-21 RX ADMIN — SODIUM PHOSPHATE, MONOBASIC, MONOHYDRATE AND SODIUM PHOSPHATE, DIBASIC, ANHYDROUS 30 MMOL: 142; 276 INJECTION, SOLUTION INTRAVENOUS at 10:41

## 2024-06-21 RX ADMIN — MICONAZOLE NITRATE: 20 OINTMENT TOPICAL at 20:02

## 2024-06-21 RX ADMIN — BUDESONIDE INHALATION 500 MCG: 0.5 SUSPENSION RESPIRATORY (INHALATION) at 16:26

## 2024-06-21 RX ADMIN — ONDANSETRON 4 MG: 2 INJECTION INTRAMUSCULAR; INTRAVENOUS at 00:28

## 2024-06-21 RX ADMIN — MEROPENEM 1000 MG: 1 INJECTION INTRAVENOUS at 14:06

## 2024-06-21 RX ADMIN — METOCLOPRAMIDE 10 MG: 5 INJECTION, SOLUTION INTRAMUSCULAR; INTRAVENOUS at 08:17

## 2024-06-21 RX ADMIN — METHYLPREDNISOLONE 8 MG: 4 TABLET ORAL at 20:00

## 2024-06-21 RX ADMIN — SULFAMETHOXAZOLE AND TRIMETHOPRIM 260.8 MG: 80; 16 INJECTION, SOLUTION, CONCENTRATE INTRAVENOUS at 17:00

## 2024-06-21 RX ADMIN — IPRATROPIUM BROMIDE AND ALBUTEROL SULFATE 1 DOSE: .5; 2.5 SOLUTION RESPIRATORY (INHALATION) at 05:17

## 2024-06-21 RX ADMIN — MEROPENEM 1000 MG: 1 INJECTION INTRAVENOUS at 05:12

## 2024-06-21 RX ADMIN — METHYLPREDNISOLONE 4 MG: 4 TABLET ORAL at 05:11

## 2024-06-21 RX ADMIN — SULFAMETHOXAZOLE AND TRIMETHOPRIM 260.8 MG: 80; 16 INJECTION, SOLUTION, CONCENTRATE INTRAVENOUS at 00:44

## 2024-06-21 RX ADMIN — MICONAZOLE NITRATE: 20 OINTMENT TOPICAL at 09:24

## 2024-06-21 RX ADMIN — SODIUM CHLORIDE, PRESERVATIVE FREE 40 MG: 5 INJECTION INTRAVENOUS at 08:18

## 2024-06-21 RX ADMIN — IPRATROPIUM BROMIDE AND ALBUTEROL SULFATE 1 DOSE: .5; 2.5 SOLUTION RESPIRATORY (INHALATION) at 08:56

## 2024-06-21 RX ADMIN — SODIUM CHLORIDE, POTASSIUM CHLORIDE, SODIUM LACTATE AND CALCIUM CHLORIDE: 600; 310; 30; 20 INJECTION, SOLUTION INTRAVENOUS at 13:59

## 2024-06-21 RX ADMIN — ANTI-FUNGAL POWDER MICONAZOLE NITRATE TALC FREE: 1.42 POWDER TOPICAL at 20:01

## 2024-06-21 RX ADMIN — SODIUM CHLORIDE, PRESERVATIVE FREE 40 MG: 5 INJECTION INTRAVENOUS at 20:01

## 2024-06-21 RX ADMIN — ATORVASTATIN CALCIUM 10 MG: 10 TABLET, FILM COATED ORAL at 20:01

## 2024-06-21 RX ADMIN — IPRATROPIUM BROMIDE AND ALBUTEROL SULFATE 1 DOSE: .5; 2.5 SOLUTION RESPIRATORY (INHALATION) at 02:18

## 2024-06-21 RX ADMIN — SACUBITRIL AND VALSARTAN 1 TABLET: 24; 26 TABLET, FILM COATED ORAL at 20:01

## 2024-06-21 RX ADMIN — METHYLPREDNISOLONE 4 MG: 4 TABLET ORAL at 17:04

## 2024-06-21 RX ADMIN — IPRATROPIUM BROMIDE AND ALBUTEROL SULFATE 1 DOSE: .5; 2.5 SOLUTION RESPIRATORY (INHALATION) at 12:30

## 2024-06-21 ASSESSMENT — PULMONARY FUNCTION TESTS
PIF_VALUE: 30
PIF_VALUE: 32
PIF_VALUE: 34
PIF_VALUE: 33
PIF_VALUE: 35
PIF_VALUE: 32
PIF_VALUE: 32
PIF_VALUE: 31
PIF_VALUE: 31
PIF_VALUE: 32
PIF_VALUE: 31
PIF_VALUE: 32
PIF_VALUE: 32
PIF_VALUE: 31
PIF_VALUE: 35
PIF_VALUE: 35
PIF_VALUE: 32
PIF_VALUE: 33
PIF_VALUE: 32
PIF_VALUE: 33
PIF_VALUE: 31
PIF_VALUE: 31
PIF_VALUE: 32
PIF_VALUE: 32
PIF_VALUE: 31
PIF_VALUE: 31
PIF_VALUE: 34
PIF_VALUE: 32
PIF_VALUE: 32
PIF_VALUE: 35
PIF_VALUE: 32
PIF_VALUE: 33
PIF_VALUE: 33
PIF_VALUE: 32

## 2024-06-21 ASSESSMENT — PAIN SCALES - GENERAL
PAINLEVEL_OUTOF10: 0

## 2024-06-21 NOTE — OP NOTE
Operative Note      Patient: Effie Portillo  YOB: 1945  MRN: [id number]    Date of Procedure: 6/21/2024    Pre-Op Diagnosis Codes:     * Acute anemia [D64.9]    Post-Op Diagnosis: Anatomic study, normal status post PEG.       Procedure(s):  ESOPHAGOGASTRODUODENOSCOPY (BEDSIDE ICU 10)    Surgeon(s):  Jelani Smith MD    Assistant:   Surgical Assistant: Lisbet Snell RN    Anesthesia: Local    Estimated Blood Loss (mL): None    Complications: None    Specimens:   * No specimens in log *    Implants:  * No implants in log *      Drains:   Gastrostomy/Enterostomy/Jejunostomy Tube Percutaneous Endoscopic Gastrostomy (PEG) LUQ 1 (Active)       Gastrostomy/Enterostomy/Jejunostomy Tube LUQ (Active)   Drainage Appearance Yellow;Tan;Thin 06/21/24 1200   Site Description Reddened;Intact 06/21/24 1200   J Port Status Clamped 06/21/24 1200   G Port Status Clamped 06/21/24 1200   Surrounding Skin Reddened 06/21/24 1200   Dressing Status New dressing applied 06/21/24 1200   Dressing Type Split gauze 06/21/24 1200   G-Tube Care Completed Yes 06/21/24 1200   Tube Feeding Standard with Fiber 06/21/24 1200   Tube feeding/verify rate (mL/hr) 0 mL/hr 06/21/24 0000   Tube Feeding Intake (mL) 0 ml 06/21/24 0800   Free Water/Flush (mL) 60 mL 06/21/24 1000   Action Taken Placement verified (comment) 06/21/24 1200   Residual Volume (ml) 10 ml 06/21/24 0800       Urinary Catheter 06/15/24 (Active)   $ Urethral catheter insertion $ Not inserted for procedure 06/15/24 2000   Catheter Indications Prolonged immobilization (e.g. unstable thoracic or lumbar spine, multiple traumatic injuries such as pelvic fractures) 06/21/24 1200   Site Assessment No urethral drainage;Moist 06/21/24 1200   Urine Color Yellow 06/21/24 1200   Urine Appearance Clear 06/21/24 1200   Urine Odor Other (Comment) 06/21/24 1200   Collection Container Standard 06/21/24 1200   Securement Method Securing device (Describe) 06/21/24 1200   Catheter Care

## 2024-06-22 ENCOUNTER — APPOINTMENT (OUTPATIENT)
Dept: GENERAL RADIOLOGY | Age: 79
DRG: 870 | End: 2024-06-22
Payer: MEDICARE

## 2024-06-22 PROBLEM — G93.40 ENCEPHALOPATHY: Status: ACTIVE | Noted: 2024-06-22

## 2024-06-22 PROBLEM — J69.0 ASPIRATION PNEUMONIA (HCC): Status: ACTIVE | Noted: 2024-06-22

## 2024-06-22 PROBLEM — D64.9 ANEMIA: Status: ACTIVE | Noted: 2024-06-22

## 2024-06-22 PROBLEM — J96.01 ACUTE RESPIRATORY FAILURE WITH HYPOXIA (HCC): Status: ACTIVE | Noted: 2024-06-22

## 2024-06-22 LAB
AADO2: 258.7 MMHG
ABO/RH: NORMAL
ALBUMIN SERPL-MCNC: 2.7 G/DL (ref 3.5–5.2)
ALP SERPL-CCNC: 119 U/L (ref 35–104)
ALT SERPL-CCNC: 11 U/L (ref 0–32)
ANION GAP SERPL CALCULATED.3IONS-SCNC: 10 MMOL/L (ref 7–16)
ANTIBODY SCREEN: NEGATIVE
ARM BAND NUMBER: NORMAL
AST SERPL-CCNC: 12 U/L (ref 0–31)
B.E.: 0.4 MMOL/L (ref -3–3)
BASOPHILS # BLD: 0 K/UL (ref 0–0.2)
BASOPHILS NFR BLD: 0 % (ref 0–2)
BILIRUB SERPL-MCNC: <0.2 MG/DL (ref 0–1.2)
BLOOD BANK BLOOD PRODUCT EXPIRATION DATE: NORMAL
BLOOD BANK DISPENSE STATUS: NORMAL
BLOOD BANK ISBT PRODUCT BLOOD TYPE: 5100
BLOOD BANK PRODUCT CODE: NORMAL
BLOOD BANK SAMPLE EXPIRATION: NORMAL
BLOOD BANK UNIT TYPE AND RH: NORMAL
BPU ID: NORMAL
BUN SERPL-MCNC: 22 MG/DL (ref 6–23)
CALCIUM SERPL-MCNC: 8 MG/DL (ref 8.6–10.2)
CHLORIDE SERPL-SCNC: 97 MMOL/L (ref 98–107)
CO2 SERPL-SCNC: 25 MMOL/L (ref 22–29)
COHB: 0.6 % (ref 0–1.5)
COMPONENT: NORMAL
CREAT SERPL-MCNC: 0.8 MG/DL (ref 0.5–1)
CRITICAL: ABNORMAL
CROSSMATCH RESULT: NORMAL
DATE ANALYZED: ABNORMAL
DATE OF COLLECTION: ABNORMAL
EKG ATRIAL RATE: 103 BPM
EKG P AXIS: 73 DEGREES
EKG P-R INTERVAL: 128 MS
EKG Q-T INTERVAL: 302 MS
EKG QRS DURATION: 90 MS
EKG QTC CALCULATION (BAZETT): 395 MS
EKG R AXIS: 20 DEGREES
EKG T AXIS: 129 DEGREES
EKG VENTRICULAR RATE: 103 BPM
EOSINOPHIL # BLD: 0 K/UL (ref 0.05–0.5)
EOSINOPHILS RELATIVE PERCENT: 0 % (ref 0–6)
ERYTHROCYTE [DISTWIDTH] IN BLOOD BY AUTOMATED COUNT: 16 % (ref 11.5–15)
FIO2: 50 %
GFR, ESTIMATED: 80 ML/MIN/1.73M2
GLUCOSE SERPL-MCNC: 87 MG/DL (ref 74–99)
HCO3: 23.3 MMOL/L (ref 22–26)
HCT VFR BLD AUTO: 25.3 % (ref 34–48)
HCT VFR BLD AUTO: 26.5 % (ref 34–48)
HCT VFR BLD AUTO: 26.5 % (ref 34–48)
HCT VFR BLD AUTO: 26.8 % (ref 34–48)
HGB BLD-MCNC: 8.5 G/DL (ref 11.5–15.5)
HGB BLD-MCNC: 9 G/DL (ref 11.5–15.5)
HGB BLD-MCNC: 9.1 G/DL (ref 11.5–15.5)
HGB BLD-MCNC: 9.2 G/DL (ref 11.5–15.5)
HHB: 14.1 % (ref 0–5)
LAB: ABNORMAL
LYMPHOCYTES NFR BLD: 0.47 K/UL (ref 1.5–4)
LYMPHOCYTES RELATIVE PERCENT: 7 % (ref 20–42)
Lab: 428
MAGNESIUM SERPL-MCNC: 2.1 MG/DL (ref 1.6–2.6)
MCH RBC QN AUTO: 27.7 PG (ref 26–35)
MCHC RBC AUTO-ENTMCNC: 34.3 G/DL (ref 32–34.5)
MCV RBC AUTO: 80.5 FL (ref 80–99.9)
METHB: 0.3 % (ref 0–1.5)
MICROORGANISM SPEC CULT: ABNORMAL
MICROORGANISM SPEC CULT: ABNORMAL
MODE: AC
MONOCYTES NFR BLD: 0.36 K/UL (ref 0.1–0.95)
MONOCYTES NFR BLD: 6 % (ref 2–12)
NEUTROPHILS NFR BLD: 87 % (ref 43–80)
NEUTS SEG NFR BLD: 5.22 K/UL (ref 1.8–7.3)
O2 CONTENT: 12.8 ML/DL
O2 SATURATION: 85.8 % (ref 92–98.5)
O2HB: 85 % (ref 94–97)
OPERATOR ID: 5134
PATIENT TEMP: 37 C
PCO2: 31.7 MMHG (ref 35–45)
PEEP/CPAP: 10 CMH2O
PFO2: 0.99 MMHG/%
PH BLOOD GAS: 7.49 (ref 7.35–7.45)
PHOSPHATE SERPL-MCNC: 2.8 MG/DL (ref 2.5–4.5)
PLATELET # BLD AUTO: 172 K/UL (ref 130–450)
PMV BLD AUTO: 11.9 FL (ref 7–12)
PO2: 49.6 MMHG (ref 75–100)
POTASSIUM SERPL-SCNC: 4.6 MMOL/L (ref 3.5–5)
PROT SERPL-MCNC: 5.5 G/DL (ref 6.4–8.3)
PS: 18 CMH20
RBC # BLD AUTO: 3.29 M/UL (ref 3.5–5.5)
RBC # BLD: ABNORMAL 10*6/UL
RI(T): 5.22
RR MECHANICAL: 20 B/MIN
SODIUM SERPL-SCNC: 132 MMOL/L (ref 132–146)
SOURCE, BLOOD GAS: ABNORMAL
SPECIMEN DESCRIPTION: ABNORMAL
THB: 10.7 G/DL (ref 11.5–16.5)
TIME ANALYZED: 436
TRANSFUSION STATUS: NORMAL
UNIT DIVISION: 0
UNIT ISSUE DATE/TIME: NORMAL
WBC OTHER # BLD: 6 K/UL (ref 4.5–11.5)

## 2024-06-22 PROCEDURE — 36592 COLLECT BLOOD FROM PICC: CPT

## 2024-06-22 PROCEDURE — 85018 HEMOGLOBIN: CPT

## 2024-06-22 PROCEDURE — 94640 AIRWAY INHALATION TREATMENT: CPT

## 2024-06-22 PROCEDURE — 85025 COMPLETE CBC W/AUTO DIFF WBC: CPT

## 2024-06-22 PROCEDURE — 94003 VENT MGMT INPAT SUBQ DAY: CPT

## 2024-06-22 PROCEDURE — 2580000003 HC RX 258: Performed by: INTERNAL MEDICINE

## 2024-06-22 PROCEDURE — 6370000000 HC RX 637 (ALT 250 FOR IP): Performed by: INTERNAL MEDICINE

## 2024-06-22 PROCEDURE — 6360000002 HC RX W HCPCS: Performed by: INTERNAL MEDICINE

## 2024-06-22 PROCEDURE — 99232 SBSQ HOSP IP/OBS MODERATE 35: CPT | Performed by: INTERNAL MEDICINE

## 2024-06-22 PROCEDURE — 80053 COMPREHEN METABOLIC PANEL: CPT

## 2024-06-22 PROCEDURE — 6360000002 HC RX W HCPCS: Performed by: SPECIALIST

## 2024-06-22 PROCEDURE — 99291 CRITICAL CARE FIRST HOUR: CPT | Performed by: INTERNAL MEDICINE

## 2024-06-22 PROCEDURE — 2580000003 HC RX 258: Performed by: SPECIALIST

## 2024-06-22 PROCEDURE — C9113 INJ PANTOPRAZOLE SODIUM, VIA: HCPCS | Performed by: INTERNAL MEDICINE

## 2024-06-22 PROCEDURE — 82805 BLOOD GASES W/O2 SATURATION: CPT

## 2024-06-22 PROCEDURE — 2000000000 HC ICU R&B

## 2024-06-22 PROCEDURE — 85014 HEMATOCRIT: CPT

## 2024-06-22 PROCEDURE — 84100 ASSAY OF PHOSPHORUS: CPT

## 2024-06-22 PROCEDURE — 83735 ASSAY OF MAGNESIUM: CPT

## 2024-06-22 PROCEDURE — 6360000002 HC RX W HCPCS

## 2024-06-22 PROCEDURE — 71045 X-RAY EXAM CHEST 1 VIEW: CPT

## 2024-06-22 PROCEDURE — 2500000003 HC RX 250 WO HCPCS: Performed by: INTERNAL MEDICINE

## 2024-06-22 PROCEDURE — 6370000000 HC RX 637 (ALT 250 FOR IP)

## 2024-06-22 RX ADMIN — ONDANSETRON 4 MG: 2 INJECTION INTRAMUSCULAR; INTRAVENOUS at 18:37

## 2024-06-22 RX ADMIN — BUDESONIDE INHALATION 500 MCG: 0.5 SUSPENSION RESPIRATORY (INHALATION) at 04:45

## 2024-06-22 RX ADMIN — CARVEDILOL 25 MG: 25 TABLET, FILM COATED ORAL at 08:59

## 2024-06-22 RX ADMIN — IPRATROPIUM BROMIDE AND ALBUTEROL SULFATE 1 DOSE: .5; 2.5 SOLUTION RESPIRATORY (INHALATION) at 08:52

## 2024-06-22 RX ADMIN — METHYLPREDNISOLONE 4 MG: 4 TABLET ORAL at 20:37

## 2024-06-22 RX ADMIN — MICONAZOLE NITRATE: 20 OINTMENT TOPICAL at 20:37

## 2024-06-22 RX ADMIN — IPRATROPIUM BROMIDE AND ALBUTEROL SULFATE 1 DOSE: .5; 2.5 SOLUTION RESPIRATORY (INHALATION) at 17:14

## 2024-06-22 RX ADMIN — SULFAMETHOXAZOLE AND TRIMETHOPRIM 260.8 MG: 80; 16 INJECTION, SOLUTION, CONCENTRATE INTRAVENOUS at 01:22

## 2024-06-22 RX ADMIN — SACUBITRIL AND VALSARTAN 1 TABLET: 24; 26 TABLET, FILM COATED ORAL at 20:37

## 2024-06-22 RX ADMIN — ANTI-FUNGAL POWDER MICONAZOLE NITRATE TALC FREE: 1.42 POWDER TOPICAL at 09:12

## 2024-06-22 RX ADMIN — ACETAMINOPHEN 650 MG: 325 TABLET ORAL at 10:31

## 2024-06-22 RX ADMIN — METHYLPREDNISOLONE 4 MG: 4 TABLET ORAL at 06:23

## 2024-06-22 RX ADMIN — IPRATROPIUM BROMIDE AND ALBUTEROL SULFATE 1 DOSE: .5; 2.5 SOLUTION RESPIRATORY (INHALATION) at 04:46

## 2024-06-22 RX ADMIN — IPRATROPIUM BROMIDE AND ALBUTEROL SULFATE 1 DOSE: .5; 2.5 SOLUTION RESPIRATORY (INHALATION) at 21:14

## 2024-06-22 RX ADMIN — CARVEDILOL 25 MG: 25 TABLET, FILM COATED ORAL at 16:45

## 2024-06-22 RX ADMIN — ATORVASTATIN CALCIUM 10 MG: 10 TABLET, FILM COATED ORAL at 20:37

## 2024-06-22 RX ADMIN — MEROPENEM 1000 MG: 1 INJECTION INTRAVENOUS at 06:23

## 2024-06-22 RX ADMIN — FUROSEMIDE 20 MG: 10 INJECTION, SOLUTION INTRAMUSCULAR; INTRAVENOUS at 09:11

## 2024-06-22 RX ADMIN — SODIUM CHLORIDE, PRESERVATIVE FREE 40 MG: 5 INJECTION INTRAVENOUS at 09:11

## 2024-06-22 RX ADMIN — TOBRAMYCIN 300 MG: 300 SOLUTION RESPIRATORY (INHALATION) at 17:14

## 2024-06-22 RX ADMIN — SODIUM CHLORIDE, POTASSIUM CHLORIDE, SODIUM LACTATE AND CALCIUM CHLORIDE: 600; 310; 30; 20 INJECTION, SOLUTION INTRAVENOUS at 09:44

## 2024-06-22 RX ADMIN — MICONAZOLE NITRATE: 20 OINTMENT TOPICAL at 09:13

## 2024-06-22 RX ADMIN — SODIUM CHLORIDE, PRESERVATIVE FREE 10 ML: 5 INJECTION INTRAVENOUS at 09:14

## 2024-06-22 RX ADMIN — SULFAMETHOXAZOLE AND TRIMETHOPRIM 260.8 MG: 80; 16 INJECTION, SOLUTION, CONCENTRATE INTRAVENOUS at 09:42

## 2024-06-22 RX ADMIN — ACETAMINOPHEN 650 MG: 325 TABLET ORAL at 18:37

## 2024-06-22 RX ADMIN — SODIUM CHLORIDE, PRESERVATIVE FREE 10 ML: 5 INJECTION INTRAVENOUS at 20:38

## 2024-06-22 RX ADMIN — ANTI-FUNGAL POWDER MICONAZOLE NITRATE TALC FREE: 1.42 POWDER TOPICAL at 20:38

## 2024-06-22 RX ADMIN — SODIUM CHLORIDE, PRESERVATIVE FREE 40 MG: 5 INJECTION INTRAVENOUS at 20:38

## 2024-06-22 RX ADMIN — METHYLPREDNISOLONE 4 MG: 4 TABLET ORAL at 12:59

## 2024-06-22 RX ADMIN — IPRATROPIUM BROMIDE AND ALBUTEROL SULFATE 1 DOSE: .5; 2.5 SOLUTION RESPIRATORY (INHALATION) at 13:23

## 2024-06-22 RX ADMIN — BUDESONIDE INHALATION 500 MCG: 0.5 SUSPENSION RESPIRATORY (INHALATION) at 17:14

## 2024-06-22 RX ADMIN — TOBRAMYCIN 300 MG: 300 SOLUTION RESPIRATORY (INHALATION) at 04:48

## 2024-06-22 RX ADMIN — METHYLPREDNISOLONE 4 MG: 4 TABLET ORAL at 16:45

## 2024-06-22 RX ADMIN — SACUBITRIL AND VALSARTAN 1 TABLET: 24; 26 TABLET, FILM COATED ORAL at 08:59

## 2024-06-22 RX ADMIN — IPRATROPIUM BROMIDE AND ALBUTEROL SULFATE 1 DOSE: .5; 2.5 SOLUTION RESPIRATORY (INHALATION) at 01:54

## 2024-06-22 RX ADMIN — SULFAMETHOXAZOLE AND TRIMETHOPRIM 260.8 MG: 80; 16 INJECTION, SOLUTION, CONCENTRATE INTRAVENOUS at 16:48

## 2024-06-22 ASSESSMENT — PULMONARY FUNCTION TESTS
PIF_VALUE: 32
PIF_VALUE: 30
PIF_VALUE: 31
PIF_VALUE: 32
PIF_VALUE: 37
PIF_VALUE: 36
PIF_VALUE: 33
PIF_VALUE: 31
PIF_VALUE: 36
PIF_VALUE: 35
PIF_VALUE: 39
PIF_VALUE: 30
PIF_VALUE: 36
PIF_VALUE: 38
PIF_VALUE: 35
PIF_VALUE: 32
PIF_VALUE: 35
PIF_VALUE: 30
PIF_VALUE: 32
PIF_VALUE: 33
PIF_VALUE: 32
PIF_VALUE: 35
PIF_VALUE: 33
PIF_VALUE: 32
PIF_VALUE: 40
PIF_VALUE: 31
PIF_VALUE: 29
PIF_VALUE: 35
PIF_VALUE: 37

## 2024-06-22 ASSESSMENT — PAIN SCALES - GENERAL
PAINLEVEL_OUTOF10: 0

## 2024-06-22 ASSESSMENT — PAIN SCALES - WONG BAKER: WONGBAKER_NUMERICALRESPONSE: NO HURT

## 2024-06-22 ASSESSMENT — PAIN DESCRIPTION - LOCATION: LOCATION: GENERALIZED

## 2024-06-23 ENCOUNTER — APPOINTMENT (OUTPATIENT)
Dept: GENERAL RADIOLOGY | Age: 79
DRG: 870 | End: 2024-06-23
Payer: MEDICARE

## 2024-06-23 LAB
AADO2: 234.1 MMHG
ALBUMIN SERPL-MCNC: 2.6 G/DL (ref 3.5–5.2)
ALP SERPL-CCNC: 109 U/L (ref 35–104)
ALT SERPL-CCNC: 11 U/L (ref 0–32)
ANION GAP SERPL CALCULATED.3IONS-SCNC: 10 MMOL/L (ref 7–16)
AST SERPL-CCNC: 11 U/L (ref 0–31)
B.E.: 0.8 MMOL/L (ref -3–3)
BASOPHILS # BLD: 0 K/UL (ref 0–0.2)
BASOPHILS NFR BLD: 0 % (ref 0–2)
BILIRUB SERPL-MCNC: <0.2 MG/DL (ref 0–1.2)
BUN SERPL-MCNC: 19 MG/DL (ref 6–23)
CALCIUM SERPL-MCNC: 8.2 MG/DL (ref 8.6–10.2)
CHLORIDE SERPL-SCNC: 94 MMOL/L (ref 98–107)
CO2 SERPL-SCNC: 24 MMOL/L (ref 22–29)
COHB: 0.9 % (ref 0–1.5)
CREAT SERPL-MCNC: 0.8 MG/DL (ref 0.5–1)
CRITICAL: ABNORMAL
DATE ANALYZED: ABNORMAL
DATE OF COLLECTION: ABNORMAL
EOSINOPHIL # BLD: 0 K/UL (ref 0.05–0.5)
EOSINOPHILS RELATIVE PERCENT: 0 % (ref 0–6)
ERYTHROCYTE [DISTWIDTH] IN BLOOD BY AUTOMATED COUNT: 16.6 % (ref 11.5–15)
FIO2: 50 %
GFR, ESTIMATED: 75 ML/MIN/1.73M2
GLUCOSE SERPL-MCNC: 73 MG/DL (ref 74–99)
HCO3: 23.4 MMOL/L (ref 22–26)
HCT VFR BLD AUTO: 27.1 % (ref 34–48)
HGB BLD-MCNC: 9.1 G/DL (ref 11.5–15.5)
HHB: 4.8 % (ref 0–5)
LAB: ABNORMAL
LYMPHOCYTES NFR BLD: 0.12 K/UL (ref 1.5–4)
LYMPHOCYTES RELATIVE PERCENT: 3 % (ref 20–42)
Lab: 405
MAGNESIUM SERPL-MCNC: 1.9 MG/DL (ref 1.6–2.6)
MCH RBC QN AUTO: 26.8 PG (ref 26–35)
MCHC RBC AUTO-ENTMCNC: 33.6 G/DL (ref 32–34.5)
MCV RBC AUTO: 79.7 FL (ref 80–99.9)
METHB: 0 % (ref 0–1.5)
MODE: ABNORMAL
MONOCYTES NFR BLD: 0.19 K/UL (ref 0.1–0.95)
MONOCYTES NFR BLD: 5 % (ref 2–12)
NEUTROPHILS NFR BLD: 93 % (ref 43–80)
NEUTS SEG NFR BLD: 3.99 K/UL (ref 1.8–7.3)
O2 CONTENT: 13.5 ML/DL
O2 SATURATION: 95.2 % (ref 92–98.5)
O2HB: 94.3 % (ref 94–97)
OPERATOR ID: ABNORMAL
PATIENT TEMP: 37 C
PAW: 18
PCO2: 30.3 MMHG (ref 35–45)
PEEP/CPAP: 10 CMH2O
PFO2: 1.52 MMHG/%
PH BLOOD GAS: 7.51 (ref 7.35–7.45)
PHOSPHATE SERPL-MCNC: 2.6 MG/DL (ref 2.5–4.5)
PLATELET # BLD AUTO: 185 K/UL (ref 130–450)
PMV BLD AUTO: 11.7 FL (ref 7–12)
PO2: 75.8 MMHG (ref 75–100)
POTASSIUM SERPL-SCNC: 4.5 MMOL/L (ref 3.5–5)
PROT SERPL-MCNC: 5.2 G/DL (ref 6.4–8.3)
RBC # BLD AUTO: 3.4 M/UL (ref 3.5–5.5)
RBC # BLD: ABNORMAL 10*6/UL
RBC # BLD: ABNORMAL 10*6/UL
RI(T): 3.09
RR MECHANICAL: 20 B/MIN
SODIUM SERPL-SCNC: 128 MMOL/L (ref 132–146)
SOURCE, BLOOD GAS: ABNORMAL
THB: 10.1 G/DL (ref 11.5–16.5)
TIME ANALYZED: 421
WBC OTHER # BLD: 4.3 K/UL (ref 4.5–11.5)

## 2024-06-23 PROCEDURE — 84100 ASSAY OF PHOSPHORUS: CPT

## 2024-06-23 PROCEDURE — 6370000000 HC RX 637 (ALT 250 FOR IP)

## 2024-06-23 PROCEDURE — 6360000002 HC RX W HCPCS: Performed by: INTERNAL MEDICINE

## 2024-06-23 PROCEDURE — 82805 BLOOD GASES W/O2 SATURATION: CPT

## 2024-06-23 PROCEDURE — 2500000003 HC RX 250 WO HCPCS: Performed by: INTERNAL MEDICINE

## 2024-06-23 PROCEDURE — 71045 X-RAY EXAM CHEST 1 VIEW: CPT

## 2024-06-23 PROCEDURE — 6370000000 HC RX 637 (ALT 250 FOR IP): Performed by: INTERNAL MEDICINE

## 2024-06-23 PROCEDURE — 2000000000 HC ICU R&B

## 2024-06-23 PROCEDURE — 99232 SBSQ HOSP IP/OBS MODERATE 35: CPT | Performed by: INTERNAL MEDICINE

## 2024-06-23 PROCEDURE — C9113 INJ PANTOPRAZOLE SODIUM, VIA: HCPCS | Performed by: INTERNAL MEDICINE

## 2024-06-23 PROCEDURE — 99291 CRITICAL CARE FIRST HOUR: CPT | Performed by: INTERNAL MEDICINE

## 2024-06-23 PROCEDURE — 94640 AIRWAY INHALATION TREATMENT: CPT

## 2024-06-23 PROCEDURE — 6360000002 HC RX W HCPCS: Performed by: SPECIALIST

## 2024-06-23 PROCEDURE — 80053 COMPREHEN METABOLIC PANEL: CPT

## 2024-06-23 PROCEDURE — 2580000003 HC RX 258: Performed by: INTERNAL MEDICINE

## 2024-06-23 PROCEDURE — 83735 ASSAY OF MAGNESIUM: CPT

## 2024-06-23 PROCEDURE — 85025 COMPLETE CBC W/AUTO DIFF WBC: CPT

## 2024-06-23 PROCEDURE — 94003 VENT MGMT INPAT SUBQ DAY: CPT

## 2024-06-23 PROCEDURE — 6360000002 HC RX W HCPCS

## 2024-06-23 RX ORDER — MAGNESIUM SULFATE 1 G/100ML
1000 INJECTION INTRAVENOUS ONCE
Status: COMPLETED | OUTPATIENT
Start: 2024-06-23 | End: 2024-06-23

## 2024-06-23 RX ADMIN — SODIUM CHLORIDE, POTASSIUM CHLORIDE, SODIUM LACTATE AND CALCIUM CHLORIDE: 600; 310; 30; 20 INJECTION, SOLUTION INTRAVENOUS at 01:19

## 2024-06-23 RX ADMIN — TOBRAMYCIN 300 MG: 300 SOLUTION RESPIRATORY (INHALATION) at 04:35

## 2024-06-23 RX ADMIN — FUROSEMIDE 20 MG: 10 INJECTION, SOLUTION INTRAMUSCULAR; INTRAVENOUS at 10:09

## 2024-06-23 RX ADMIN — APIXABAN 2.5 MG: 2.5 TABLET, FILM COATED ORAL at 12:50

## 2024-06-23 RX ADMIN — APIXABAN 2.5 MG: 2.5 TABLET, FILM COATED ORAL at 21:08

## 2024-06-23 RX ADMIN — IPRATROPIUM BROMIDE AND ALBUTEROL SULFATE 1 DOSE: .5; 2.5 SOLUTION RESPIRATORY (INHALATION) at 13:09

## 2024-06-23 RX ADMIN — MAGNESIUM SULFATE IN DEXTROSE 1000 MG: 10 INJECTION, SOLUTION INTRAVENOUS at 06:40

## 2024-06-23 RX ADMIN — SODIUM CHLORIDE, PRESERVATIVE FREE 10 ML: 5 INJECTION INTRAVENOUS at 10:13

## 2024-06-23 RX ADMIN — ACETAMINOPHEN 650 MG: 325 TABLET ORAL at 12:29

## 2024-06-23 RX ADMIN — SACUBITRIL AND VALSARTAN 1 TABLET: 24; 26 TABLET, FILM COATED ORAL at 21:30

## 2024-06-23 RX ADMIN — METHYLPREDNISOLONE 4 MG: 4 TABLET ORAL at 05:38

## 2024-06-23 RX ADMIN — IPRATROPIUM BROMIDE AND ALBUTEROL SULFATE 1 DOSE: .5; 2.5 SOLUTION RESPIRATORY (INHALATION) at 04:35

## 2024-06-23 RX ADMIN — SODIUM CHLORIDE, PRESERVATIVE FREE 40 MG: 5 INJECTION INTRAVENOUS at 10:09

## 2024-06-23 RX ADMIN — SULFAMETHOXAZOLE AND TRIMETHOPRIM 260.8 MG: 80; 16 INJECTION, SOLUTION, CONCENTRATE INTRAVENOUS at 17:14

## 2024-06-23 RX ADMIN — ANTI-FUNGAL POWDER MICONAZOLE NITRATE TALC FREE: 1.42 POWDER TOPICAL at 10:10

## 2024-06-23 RX ADMIN — CARVEDILOL 25 MG: 25 TABLET, FILM COATED ORAL at 10:09

## 2024-06-23 RX ADMIN — SODIUM CHLORIDE, PRESERVATIVE FREE 10 ML: 5 INJECTION INTRAVENOUS at 21:09

## 2024-06-23 RX ADMIN — CARVEDILOL 25 MG: 25 TABLET, FILM COATED ORAL at 16:33

## 2024-06-23 RX ADMIN — IPRATROPIUM BROMIDE AND ALBUTEROL SULFATE 1 DOSE: .5; 2.5 SOLUTION RESPIRATORY (INHALATION) at 09:03

## 2024-06-23 RX ADMIN — SACUBITRIL AND VALSARTAN 1 TABLET: 24; 26 TABLET, FILM COATED ORAL at 10:09

## 2024-06-23 RX ADMIN — BUDESONIDE INHALATION 500 MCG: 0.5 SUSPENSION RESPIRATORY (INHALATION) at 04:35

## 2024-06-23 RX ADMIN — BUDESONIDE INHALATION 500 MCG: 0.5 SUSPENSION RESPIRATORY (INHALATION) at 16:40

## 2024-06-23 RX ADMIN — SULFAMETHOXAZOLE AND TRIMETHOPRIM 260.8 MG: 80; 16 INJECTION, SOLUTION, CONCENTRATE INTRAVENOUS at 10:40

## 2024-06-23 RX ADMIN — IPRATROPIUM BROMIDE AND ALBUTEROL SULFATE 1 DOSE: .5; 2.5 SOLUTION RESPIRATORY (INHALATION) at 01:13

## 2024-06-23 RX ADMIN — TOBRAMYCIN 300 MG: 300 SOLUTION RESPIRATORY (INHALATION) at 16:40

## 2024-06-23 RX ADMIN — MICONAZOLE NITRATE: 20 OINTMENT TOPICAL at 10:11

## 2024-06-23 RX ADMIN — METHYLPREDNISOLONE 4 MG: 4 TABLET ORAL at 10:09

## 2024-06-23 RX ADMIN — SULFAMETHOXAZOLE AND TRIMETHOPRIM 260.8 MG: 80; 16 INJECTION, SOLUTION, CONCENTRATE INTRAVENOUS at 01:22

## 2024-06-23 RX ADMIN — METHYLPREDNISOLONE 4 MG: 4 TABLET ORAL at 21:08

## 2024-06-23 RX ADMIN — ATORVASTATIN CALCIUM 10 MG: 10 TABLET, FILM COATED ORAL at 21:08

## 2024-06-23 RX ADMIN — SODIUM CHLORIDE, PRESERVATIVE FREE 40 MG: 5 INJECTION INTRAVENOUS at 21:08

## 2024-06-23 RX ADMIN — IPRATROPIUM BROMIDE AND ALBUTEROL SULFATE 1 DOSE: .5; 2.5 SOLUTION RESPIRATORY (INHALATION) at 22:16

## 2024-06-23 RX ADMIN — IPRATROPIUM BROMIDE AND ALBUTEROL SULFATE 1 DOSE: .5; 2.5 SOLUTION RESPIRATORY (INHALATION) at 16:40

## 2024-06-23 ASSESSMENT — PULMONARY FUNCTION TESTS
PIF_VALUE: 31
PIF_VALUE: 31
PIF_VALUE: 37
PIF_VALUE: 31
PIF_VALUE: 36
PIF_VALUE: 35
PIF_VALUE: 37
PIF_VALUE: 36
PIF_VALUE: 31
PIF_VALUE: 36
PIF_VALUE: 35
PIF_VALUE: 31
PIF_VALUE: 35
PIF_VALUE: 30
PIF_VALUE: 31
PIF_VALUE: 36
PIF_VALUE: 43
PIF_VALUE: 31
PIF_VALUE: 38
PIF_VALUE: 37
PIF_VALUE: 35
PIF_VALUE: 35

## 2024-06-23 ASSESSMENT — PAIN SCALES - GENERAL
PAINLEVEL_OUTOF10: 0

## 2024-06-23 ASSESSMENT — PAIN SCALES - WONG BAKER: WONGBAKER_NUMERICALRESPONSE: NO HURT

## 2024-06-24 PROBLEM — E87.1 HYPONATREMIA: Status: ACTIVE | Noted: 2024-06-24

## 2024-06-24 LAB
ALBUMIN SERPL-MCNC: 2.7 G/DL (ref 3.5–5.2)
ALP SERPL-CCNC: 122 U/L (ref 35–104)
ALT SERPL-CCNC: 12 U/L (ref 0–32)
ANION GAP SERPL CALCULATED.3IONS-SCNC: 6 MMOL/L (ref 7–16)
AST SERPL-CCNC: 11 U/L (ref 0–31)
BASOPHILS # BLD: 0 K/UL (ref 0–0.2)
BASOPHILS NFR BLD: 0 % (ref 0–2)
BILIRUB SERPL-MCNC: <0.2 MG/DL (ref 0–1.2)
BUN SERPL-MCNC: 19 MG/DL (ref 6–23)
CALCIUM SERPL-MCNC: 8.1 MG/DL (ref 8.6–10.2)
CHLORIDE SERPL-SCNC: 93 MMOL/L (ref 98–107)
CO2 SERPL-SCNC: 27 MMOL/L (ref 22–29)
CREAT SERPL-MCNC: 0.8 MG/DL (ref 0.5–1)
EOSINOPHIL # BLD: 0.03 K/UL (ref 0.05–0.5)
EOSINOPHILS RELATIVE PERCENT: 1 % (ref 0–6)
ERYTHROCYTE [DISTWIDTH] IN BLOOD BY AUTOMATED COUNT: 16.9 % (ref 11.5–15)
GFR, ESTIMATED: 74 ML/MIN/1.73M2
GLUCOSE SERPL-MCNC: 111 MG/DL (ref 74–99)
HCT VFR BLD AUTO: 29.6 % (ref 34–48)
HGB BLD-MCNC: 9.7 G/DL (ref 11.5–15.5)
IMM GRANULOCYTES # BLD AUTO: 0.07 K/UL (ref 0–0.58)
IMM GRANULOCYTES NFR BLD: 1 % (ref 0–5)
LYMPHOCYTES NFR BLD: 0.61 K/UL (ref 1.5–4)
LYMPHOCYTES RELATIVE PERCENT: 11 % (ref 20–42)
MAGNESIUM SERPL-MCNC: 1.9 MG/DL (ref 1.6–2.6)
MCH RBC QN AUTO: 26.6 PG (ref 26–35)
MCHC RBC AUTO-ENTMCNC: 32.8 G/DL (ref 32–34.5)
MCV RBC AUTO: 81.3 FL (ref 80–99.9)
MICROORGANISM SPEC CULT: NORMAL
MICROORGANISM SPEC CULT: NORMAL
MONOCYTES NFR BLD: 0.45 K/UL (ref 0.1–0.95)
MONOCYTES NFR BLD: 8 % (ref 2–12)
NEUTROPHILS NFR BLD: 78 % (ref 43–80)
NEUTS SEG NFR BLD: 4.21 K/UL (ref 1.8–7.3)
PHOSPHATE SERPL-MCNC: 2.3 MG/DL (ref 2.5–4.5)
PLATELET # BLD AUTO: 204 K/UL (ref 130–450)
PMV BLD AUTO: 11.2 FL (ref 7–12)
POTASSIUM SERPL-SCNC: 5 MMOL/L (ref 3.5–5)
PROT SERPL-MCNC: 5.5 G/DL (ref 6.4–8.3)
RBC # BLD AUTO: 3.64 M/UL (ref 3.5–5.5)
SERVICE CMNT-IMP: NORMAL
SERVICE CMNT-IMP: NORMAL
SODIUM SERPL-SCNC: 126 MMOL/L (ref 132–146)
SPECIMEN DESCRIPTION: NORMAL
SPECIMEN DESCRIPTION: NORMAL
WBC OTHER # BLD: 5.4 K/UL (ref 4.5–11.5)

## 2024-06-24 PROCEDURE — 6360000002 HC RX W HCPCS: Performed by: SPECIALIST

## 2024-06-24 PROCEDURE — 99291 CRITICAL CARE FIRST HOUR: CPT | Performed by: INTERNAL MEDICINE

## 2024-06-24 PROCEDURE — 85025 COMPLETE CBC W/AUTO DIFF WBC: CPT

## 2024-06-24 PROCEDURE — 84100 ASSAY OF PHOSPHORUS: CPT

## 2024-06-24 PROCEDURE — 80053 COMPREHEN METABOLIC PANEL: CPT

## 2024-06-24 PROCEDURE — 6370000000 HC RX 637 (ALT 250 FOR IP): Performed by: INTERNAL MEDICINE

## 2024-06-24 PROCEDURE — 6360000002 HC RX W HCPCS: Performed by: INTERNAL MEDICINE

## 2024-06-24 PROCEDURE — 94640 AIRWAY INHALATION TREATMENT: CPT

## 2024-06-24 PROCEDURE — 2500000003 HC RX 250 WO HCPCS: Performed by: INTERNAL MEDICINE

## 2024-06-24 PROCEDURE — C9113 INJ PANTOPRAZOLE SODIUM, VIA: HCPCS | Performed by: INTERNAL MEDICINE

## 2024-06-24 PROCEDURE — 94003 VENT MGMT INPAT SUBQ DAY: CPT

## 2024-06-24 PROCEDURE — 6370000000 HC RX 637 (ALT 250 FOR IP)

## 2024-06-24 PROCEDURE — 2580000003 HC RX 258: Performed by: INTERNAL MEDICINE

## 2024-06-24 PROCEDURE — 6360000002 HC RX W HCPCS

## 2024-06-24 PROCEDURE — 83735 ASSAY OF MAGNESIUM: CPT

## 2024-06-24 PROCEDURE — 99232 SBSQ HOSP IP/OBS MODERATE 35: CPT | Performed by: INTERNAL MEDICINE

## 2024-06-24 PROCEDURE — 2000000000 HC ICU R&B

## 2024-06-24 RX ORDER — CLOPIDOGREL BISULFATE 75 MG/1
75 TABLET ORAL DAILY
Status: DISCONTINUED | OUTPATIENT
Start: 2024-06-24 | End: 2024-06-26 | Stop reason: HOSPADM

## 2024-06-24 RX ORDER — PREDNISONE 5 MG/1
5 TABLET ORAL DAILY
Status: DISCONTINUED | OUTPATIENT
Start: 2024-06-24 | End: 2024-06-26 | Stop reason: HOSPADM

## 2024-06-24 RX ORDER — MAGNESIUM SULFATE 1 G/100ML
1000 INJECTION INTRAVENOUS ONCE
Status: COMPLETED | OUTPATIENT
Start: 2024-06-24 | End: 2024-06-24

## 2024-06-24 RX ORDER — FUROSEMIDE 10 MG/ML
20 INJECTION INTRAMUSCULAR; INTRAVENOUS 2 TIMES DAILY
Status: DISCONTINUED | OUTPATIENT
Start: 2024-06-24 | End: 2024-06-26 | Stop reason: HOSPADM

## 2024-06-24 RX ADMIN — IPRATROPIUM BROMIDE AND ALBUTEROL SULFATE 1 DOSE: .5; 2.5 SOLUTION RESPIRATORY (INHALATION) at 06:56

## 2024-06-24 RX ADMIN — SULFAMETHOXAZOLE AND TRIMETHOPRIM 260.8 MG: 80; 16 INJECTION, SOLUTION, CONCENTRATE INTRAVENOUS at 20:46

## 2024-06-24 RX ADMIN — METHYLPREDNISOLONE 4 MG: 4 TABLET ORAL at 07:06

## 2024-06-24 RX ADMIN — CLOPIDOGREL BISULFATE 75 MG: 75 TABLET ORAL at 15:31

## 2024-06-24 RX ADMIN — SODIUM CHLORIDE, PRESERVATIVE FREE 40 MG: 5 INJECTION INTRAVENOUS at 10:04

## 2024-06-24 RX ADMIN — MICONAZOLE NITRATE: 20 OINTMENT TOPICAL at 03:38

## 2024-06-24 RX ADMIN — MAGNESIUM SULFATE IN DEXTROSE 1000 MG: 10 INJECTION, SOLUTION INTRAVENOUS at 07:10

## 2024-06-24 RX ADMIN — CARVEDILOL 25 MG: 25 TABLET, FILM COATED ORAL at 10:03

## 2024-06-24 RX ADMIN — FUROSEMIDE 20 MG: 10 INJECTION, SOLUTION INTRAMUSCULAR; INTRAVENOUS at 18:33

## 2024-06-24 RX ADMIN — ACETAMINOPHEN 650 MG: 325 TABLET ORAL at 10:03

## 2024-06-24 RX ADMIN — IPRATROPIUM BROMIDE AND ALBUTEROL SULFATE 1 DOSE: .5; 2.5 SOLUTION RESPIRATORY (INHALATION) at 01:51

## 2024-06-24 RX ADMIN — SACUBITRIL AND VALSARTAN 1 TABLET: 24; 26 TABLET, FILM COATED ORAL at 23:00

## 2024-06-24 RX ADMIN — BUDESONIDE INHALATION 500 MCG: 0.5 SUSPENSION RESPIRATORY (INHALATION) at 18:47

## 2024-06-24 RX ADMIN — ANTI-FUNGAL POWDER MICONAZOLE NITRATE TALC FREE: 1.42 POWDER TOPICAL at 23:01

## 2024-06-24 RX ADMIN — SODIUM CHLORIDE, PRESERVATIVE FREE 40 MG: 5 INJECTION INTRAVENOUS at 23:01

## 2024-06-24 RX ADMIN — IPRATROPIUM BROMIDE AND ALBUTEROL SULFATE 1 DOSE: .5; 2.5 SOLUTION RESPIRATORY (INHALATION) at 18:47

## 2024-06-24 RX ADMIN — ACETAMINOPHEN 650 MG: 325 TABLET ORAL at 18:33

## 2024-06-24 RX ADMIN — TOBRAMYCIN 300 MG: 300 SOLUTION RESPIRATORY (INHALATION) at 18:48

## 2024-06-24 RX ADMIN — IPRATROPIUM BROMIDE AND ALBUTEROL SULFATE 1 DOSE: .5; 2.5 SOLUTION RESPIRATORY (INHALATION) at 22:18

## 2024-06-24 RX ADMIN — SODIUM CHLORIDE, PRESERVATIVE FREE 10 ML: 5 INJECTION INTRAVENOUS at 23:02

## 2024-06-24 RX ADMIN — IPRATROPIUM BROMIDE AND ALBUTEROL SULFATE 1 DOSE: .5; 2.5 SOLUTION RESPIRATORY (INHALATION) at 14:03

## 2024-06-24 RX ADMIN — SACUBITRIL AND VALSARTAN 1 TABLET: 24; 26 TABLET, FILM COATED ORAL at 10:04

## 2024-06-24 RX ADMIN — MICONAZOLE NITRATE: 20 OINTMENT TOPICAL at 23:02

## 2024-06-24 RX ADMIN — MICONAZOLE NITRATE: 20 OINTMENT TOPICAL at 10:05

## 2024-06-24 RX ADMIN — ATORVASTATIN CALCIUM 10 MG: 10 TABLET, FILM COATED ORAL at 23:00

## 2024-06-24 RX ADMIN — FUROSEMIDE 20 MG: 10 INJECTION, SOLUTION INTRAMUSCULAR; INTRAVENOUS at 10:04

## 2024-06-24 RX ADMIN — IPRATROPIUM BROMIDE AND ALBUTEROL SULFATE 1 DOSE: .5; 2.5 SOLUTION RESPIRATORY (INHALATION) at 09:54

## 2024-06-24 RX ADMIN — CARVEDILOL 25 MG: 25 TABLET, FILM COATED ORAL at 18:33

## 2024-06-24 RX ADMIN — PREDNISONE 5 MG: 5 TABLET ORAL at 15:31

## 2024-06-24 RX ADMIN — SODIUM CHLORIDE, PRESERVATIVE FREE 10 ML: 5 INJECTION INTRAVENOUS at 10:05

## 2024-06-24 RX ADMIN — SULFAMETHOXAZOLE AND TRIMETHOPRIM 260.8 MG: 80; 16 INJECTION, SOLUTION, CONCENTRATE INTRAVENOUS at 04:03

## 2024-06-24 RX ADMIN — ANTI-FUNGAL POWDER MICONAZOLE NITRATE TALC FREE: 1.42 POWDER TOPICAL at 03:38

## 2024-06-24 RX ADMIN — APIXABAN 2.5 MG: 2.5 TABLET, FILM COATED ORAL at 10:03

## 2024-06-24 RX ADMIN — TOBRAMYCIN 300 MG: 300 SOLUTION RESPIRATORY (INHALATION) at 06:55

## 2024-06-24 RX ADMIN — ANTI-FUNGAL POWDER MICONAZOLE NITRATE TALC FREE: 1.42 POWDER TOPICAL at 10:05

## 2024-06-24 RX ADMIN — APIXABAN 2.5 MG: 2.5 TABLET, FILM COATED ORAL at 23:01

## 2024-06-24 RX ADMIN — SULFAMETHOXAZOLE AND TRIMETHOPRIM 260.8 MG: 80; 16 INJECTION, SOLUTION, CONCENTRATE INTRAVENOUS at 12:33

## 2024-06-24 RX ADMIN — BUDESONIDE INHALATION 500 MCG: 0.5 SUSPENSION RESPIRATORY (INHALATION) at 06:55

## 2024-06-24 ASSESSMENT — PULMONARY FUNCTION TESTS
PIF_VALUE: 30
PIF_VALUE: 31
PIF_VALUE: 32
PIF_VALUE: 30
PIF_VALUE: 32
PIF_VALUE: 33
PIF_VALUE: 30
PIF_VALUE: 30
PIF_VALUE: 32
PIF_VALUE: 31
PIF_VALUE: 31
PIF_VALUE: 30
PIF_VALUE: 31
PIF_VALUE: 31
PIF_VALUE: 29
PIF_VALUE: 31
PIF_VALUE: 32
PIF_VALUE: 30
PIF_VALUE: 31
PIF_VALUE: 31
PIF_VALUE: 32
PIF_VALUE: 31
PIF_VALUE: 31
PIF_VALUE: 32
PIF_VALUE: 33
PIF_VALUE: 30
PIF_VALUE: 31

## 2024-06-24 ASSESSMENT — PAIN SCALES - GENERAL
PAINLEVEL_OUTOF10: 0

## 2024-06-24 ASSESSMENT — PAIN SCALES - WONG BAKER: WONGBAKER_NUMERICALRESPONSE: NO HURT

## 2024-06-24 NOTE — CONSULTS
Palliative Care Department  [phone]  Palliative Care Initial Consult  Provider TROY Coelho CNP     Effie Portillo  [id number]  Hospital Day: 3  Date of Initial Consult: 6/17/2024  Referring Provider: Nidia Flores APRN - CNP  Palliative Medicine was consulted for assistance with: Goals of care, end-stage disease    HPI:   Effie Portillo is a 79 y.o. 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  She has history of Candida auris colonization.  She was recently evaluated at [facility] for hypoxemia and was discharged.  Patient is nonverbal at baseline.  She was sent back to [facility] due to persistent hypoxemia and was found to be hypotensive. Respiratory is suctioning increased her trach to 20 L of oxygen.  EMS decreased her oxygen to 15 L en route.  Patient received fluid resuscitation in the emergency room and continued to be hypotensive.  GI and ID consulted.  Palliative medicine consulted for further assistance.    ASSESSMENT/PLAN:     Pertinent Hospital Diagnoses     Severe sepsis with septic shock  UTI  EVELIA  Acute blood loss anemia  GI bleeding  Chronic encephalopathy  Heart failure  Left-sided pleural effusion      Palliative Care Encounter / Counseling Regarding Goals of Care  Please see detailed goals of care discussion as below  At this time, Effie Portillo, Does Not have capacity for medical decision-making.  Capacity is time limited and situation/question specific  During encounter Parag was surrogate medical decision-maker  Outcome of goals of care meeting: 
Assessment and Plan  Patient is a 79 y.o. female with the following medical Problems:   Severe sepsis with septic shock.  Urinary tract infection.  Acute kidney injury  Hyperkalemia  Acute blood loss anemia  GI bleeding  Chronic encephalopathy  Aspiration pneumonia  Left-sided pleural effusion    Plan of care:  Fluid resuscitation with 30 mill per KG.  Maintenance IV fluid with sodium bicarbonate drip.  Midodrine 15 mg 3 times daily as a vasosparing agent  Will hold with chronic on Eliquis which was held.  SCDs were ordered  GI prophylaxis  Reconcile home medications  Levophed to keep MAP above 65  Continue with broad-spectrum antibiotic as per ID team recommendations.  Goals of care discussion.  Dietitian consult for tube feeding  Hold antihypertensive medications.  Continue with mechanical ventilation.    History of Present Illness:   History is limited due to patient's medical condition.  History is mostly obtained from the chart.  Patient is a 79-year-old woman who is a chronic resident of [facility].  She has history of Candida auris colonization.  She was recently evaluated at [facility] for hypoxemia and was discharged.  Patient is nonverbal at baseline.  She was sent back to [facility] due to persistent hypoxemia and was found to be hypotensive.  Patient received fluid resuscitation in the emergency room and continued to be hypotensive.  ID team addressed antimicrobial therapy and patient was admitted to the ICU.  Lactic acid was 1.5.    Past Medical History:  Past Medical History:   Diagnosis Date    Atrial fibrillation (HCC)     Hypertension     MI (myocardial infarction) (HCC)       No past surgical history on file.    Family History:   No family history on file.     Allergies:         Penicillins    Social history:  Social History     Socioeconomic History    Marital status:      Spouse name: Not on file    Number of children: Not on file    Years of 
Assessment and Plan  Patient is a 79 y.o. female with the following medical Problems:   Severe sepsis with septic shock.  Urinary tract infection.  Acute kidney injury  Hyperkalemia  Acute blood loss anemia  GI bleeding  Chronic encephalopathy  Aspiration pneumonia  Left-sided pleural effusion  Chronic hypoxic respiratory failure on mechanical ventilation (S/P tracheostomy and PEG)  Hyponatremia    Plan of care:  Continue with mechanical ventilation  Reconcile home medications  Continue with broad-spectrum antibiotic as per ID team recommendations.  Patient is currently on Bactrim and inhaled tobramycin.  Goals of care discussion.  Patient is doing RCCA  Dietitian consult for tube feeding  Nephrology is managing hyponatremia.  Continue with mechanical ventilation.  DVT prophylaxis.  Patient is currently on Eliquis 2.5 mg twice daily.    History of Present Illness:   History is limited due to patient's medical condition.  History is mostly obtained from the chart.  Patient is a 79-year-old woman who is a chronic resident of [facility].  She has history of Candida auris colonization.  She was recently evaluated at [facility] for hypoxemia and was discharged.  Patient is nonverbal at baseline.  She was sent back to [facility] due to persistent hypoxemia and was found to be hypotensive.  Patient received fluid resuscitation in the emergency room and continued to be hypotensive.  ID team addressed antimicrobial therapy and patient was admitted to the ICU.  Lactic acid was 1.5.    Daily progress:  June 16, 2024: Patient has been maintained on mechanical ventilation.  She has been weaned off Levophed however she remains lethargic.  She opens eyes but does not follow commands.  She continues to be in isolation due to Candida aureus.  Patient has been weak and debilitated.  Goals of care were discussed with the patient's son.  Patient had no evidence of GI bleeding however, 
Department of Internal Medicine  Nephrology Attending Consult Note      Reason for Consult: Hyponatremia  Requesting Physician:  Sagar Anna DO     CHIEF COMPLAINT: Concerns of hypotension    History Obtained From:  electronic medical record    HISTORY OF PRESENT ILLNESS:  Briefly Mrs. Portillo is a 79-year-old female with history of HTN, CAD status post MI, HFrEF 35%, PAF, emphysema with chronic respiratory failure ventilator dependent, status post tracheostomy, pulmonary hypertension, status post PEG tube placement, Candida aureus colonization, who was admitted on Amy 15, 2024 after he was referred to the ER via EMS from the nursing home due to concerns of aspiration.  Chest x-ray showed right lower lobe airspace disease, small left pleural effusion, CT of the chest on 217 shows small right and moderate left pleural effusions.  Patient was admitted to MICU with a diagnosis of severe sepsis with septic shock, aspiration pneumonia, UTI and severe anemia.  Her creatinine level on admission was 1.1 and since then has improved down to 0.8 mg/dL, her sodium level on admission was 145 mEq/L but in the last 48 hours sodium level has decreased down to 126 mEq/L reason for this consultation.  Since admission she is has received significant amount of volume resuscitation with positive fluid balance >11 L at the same time in the last 72 hours her urine output has improved up to 3.7 L in the last 24 hours.  She has received Lasix 20 mg IV daily since June 20, and she has been receiving Bactrim IV and D5W 500 cc every 8 hours.  Other pertinent medications are Entresto 24-26 mg twice daily     Past Medical History:        Diagnosis Date    Atrial fibrillation (HCC)     Bipolar disorder ([facility])     Candida auris colonization 01/16/2024    wound    Cerebral artery occlusion with cerebral infarction ([facility])     CHF (congestive heart failure) ([facility])     COPD (chronic obstructive pulmonary disease) ([facility])     COVID-19     Depression  
NPO  Average Supplements Intake: NPO  ADULT TUBE FEEDING; PEG; Standard with Fiber; Continuous; 20; Yes; 10; Q 6 hours; 60; 100; Q 4 hours    Anthropometric Measures:  Height: 157.5 cm (5' 2.01\")  Ideal Body Weight (IBW): 110 lbs (50 kg)    Admission Body Weight: 50.6 kg (111 lb 8 oz) (6/16 bed scale)  Current Body Weight: 54.9 kg (121 lb 0.5 oz) (6/17/24 bed scale), 110 % IBW. Weight Source: Bed Scale  Current BMI (kg/m2): 22.1  Usual Body Weight: 50.1 kg (110 lb 6 oz) (9/11/23)  % Weight Change (Calculated): 9.7  Weight Adjustment For: Amputation  Total Adjusted Percentage (Calculated): 10.1  Adjusted Ideal Body Weight (lbs) (Calculated): 98.9 lbs  Adjusted Ideal Body Weight (kg) (Calculated): 44.95 kg  Adjusted % Ideal Body Weight (Calculated): 122.4  Adjusted BMI (kg/m2) (Calculated): 24.3  BMI Categories: Normal Weight (BMI 22.0 to 24.9) age over 65    Estimated Daily Nutrient Needs:  Energy Requirements Based On: Kcal/kg  Weight Used for Energy Requirements: Current  Energy (kcal/day): 1500-1800kcal/day (28-32kcal/kg CBW)  Weight Used for Protein Requirements: Current  Protein (g/day): 70-85g/day (1.3-1.5g/kg CBW)  Method Used for Fluid Requirements: 1 ml/kcal  Fluid (ml/day): 1500-1800ml/day    Nutrition Diagnosis:   Inadequate oral intake related to swallowing difficulty (dysphagia s/p PEG) as evidenced by NPO or clear liquid status due to medical condition, nutrition support - enteral nutrition    Nutrition Interventions:   Food and/or Nutrient Delivery: Continue NPO, Start Tube Feeding (Recommend Jevity 1.5 @ 60ml/h with 100ml flush q4h)  Nutrition Education/Counseling: Education not indicated  Coordination of Nutrition Care: Continue to monitor while inpatient     Goals:    Goals: Tolerate nutrition support at goal rate, Initiate nutrition support, by next RD assessment     Nutrition Monitoring and Evaluation:   Behavioral-Environmental Outcomes: None Identified  Food/Nutrient Intake Outcomes: Enteral 
respiratory failure with hypoxia ([facility]) J96.21    COPD exacerbation ([facility]) J44.1    PAF (paroxysmal atrial fibrillation) ([facility]) I48.0    Sinus tachycardia R00.0    Ventilator dependent ([facility]) Z99.11       ASSESSMENT AND PLAN   Sepsis  Respiratory failure   RLL infiltrate concern for aspiration   Groin rash ?tinea skin care antifungal   Acinetobacter baumannii colonized  C auris exposure   Cont meropenem and tobra neb  Check procal resp xc  Check blood cx    meropenem (MERREM) 1,000 mg in sodium chloride 0.9 % 100 mL IVPB ([id number]), Q12H  tobramycin (PF) (CHERELLE) nebulizer solution 300 mg, BID RT  miconazole (MICOTIN) 2 % powder, BID        Thank you Nicole Vasquez,* for allowing me to participate in this patient's care.    Electronically signed by Allie Villegas MD on 6/15/24 at 10:44 AM EDT                
Data  Recent Results (from the past 24 hour(s))   Culture, Wound    Collection Time: 06/15/24  6:44 PM    Specimen: Abdomen   Result Value Ref Range    Specimen Description .ABDOMEN     Direct Exam       Swab collections are low-yield and rarely indicated. Generally, the specimen volume when collected by swab is small, reducing the probability of isolating organisms: many organisms adhere to the fibers of the swab, which reduces the opportunity or recovering organisms. Interpret results with caution.       Direct Exam RARE Polymorphonuclear leukocytes     Direct Exam FEW EPITHELIAL CELLS     Direct Exam RARE GRAM POSITIVE COCCI     Direct Exam RARE GRAM POSITIVE COCCOBACILLI     Culture PENDING    Culture, Respiratory    Collection Time: 06/15/24  6:46 PM    Specimen: Tracheal Aspirate   Result Value Ref Range    Specimen Description .TRACHEAL ASPIRATE     Direct Exam FEW Polymorphonuclear leukocytes     Direct Exam RARE EPITHELIAL CELLS     Direct Exam FEW GRAM NEGATIVE RODS     Culture PENDING    LEGIONELLA ANTIGEN, URINE    Collection Time: 06/15/24  6:55 PM    Specimen: Urine   Result Value Ref Range    Legionella Pneumophilia Ag, Urine NEGATIVE NEGATIVE   STREP PNEUMONIAE ANTIGEN    Collection Time: 06/15/24  6:55 PM    Specimen: Urine, clean catch   Result Value Ref Range    Source .CLEAN CATCH URINE     Strep pneumo Ag NEGATIVE NEGATIVE   Basic Metabolic Panel    Collection Time: 06/15/24  8:01 PM   Result Value Ref Range    Sodium 143 132 - 146 mmol/L    Potassium 4.2 3.5 - 5.0 mmol/L    Chloride 107 98 - 107 mmol/L    CO2 27 22 - 29 mmol/L    Anion Gap 9 7 - 16 mmol/L    Glucose 110 (H) 74 - 99 mg/dL    BUN 91 (H) 6 - 23 mg/dL    Creatinine 1.4 (H) 0.50 - 1.00 mg/dL    Est, Glom Filt Rate 39 (L) >60 mL/min/1.73m2    Calcium 8.4 (L) 8.6 - 10.2 mg/dL   Magnesium    Collection Time: 06/15/24  8:01 PM   Result Value Ref Range    Magnesium 2.2 1.6 - 2.6 mg/dL   Phosphorus    Collection Time: 06/15/24  8:01 PM

## 2024-06-25 ENCOUNTER — APPOINTMENT (OUTPATIENT)
Dept: GENERAL RADIOLOGY | Age: 79
DRG: 870 | End: 2024-06-25
Payer: MEDICARE

## 2024-06-25 LAB
ALBUMIN SERPL-MCNC: 2.7 G/DL (ref 3.5–5.2)
ALP SERPL-CCNC: 130 U/L (ref 35–104)
ALT SERPL-CCNC: 11 U/L (ref 0–32)
ANION GAP SERPL CALCULATED.3IONS-SCNC: 5 MMOL/L (ref 7–16)
ANION GAP SERPL CALCULATED.3IONS-SCNC: 6 MMOL/L (ref 7–16)
ANION GAP SERPL CALCULATED.3IONS-SCNC: 8 MMOL/L (ref 7–16)
AST SERPL-CCNC: 10 U/L (ref 0–31)
BASOPHILS # BLD: 0.01 K/UL (ref 0–0.2)
BASOPHILS NFR BLD: 0 % (ref 0–2)
BILIRUB SERPL-MCNC: <0.2 MG/DL (ref 0–1.2)
BUN SERPL-MCNC: 23 MG/DL (ref 6–23)
BUN SERPL-MCNC: 26 MG/DL (ref 6–23)
BUN SERPL-MCNC: 26 MG/DL (ref 6–23)
CALCIUM SERPL-MCNC: 8.4 MG/DL (ref 8.6–10.2)
CALCIUM SERPL-MCNC: 8.5 MG/DL (ref 8.6–10.2)
CALCIUM SERPL-MCNC: 8.6 MG/DL (ref 8.6–10.2)
CHLORIDE SERPL-SCNC: 93 MMOL/L (ref 98–107)
CHLORIDE SERPL-SCNC: 93 MMOL/L (ref 98–107)
CHLORIDE SERPL-SCNC: 95 MMOL/L (ref 98–107)
CO2 SERPL-SCNC: 28 MMOL/L (ref 22–29)
CO2 SERPL-SCNC: 29 MMOL/L (ref 22–29)
CO2 SERPL-SCNC: 30 MMOL/L (ref 22–29)
CREAT SERPL-MCNC: 0.8 MG/DL (ref 0.5–1)
CREAT SERPL-MCNC: 0.8 MG/DL (ref 0.5–1)
CREAT SERPL-MCNC: 0.9 MG/DL (ref 0.5–1)
EOSINOPHIL # BLD: 0.07 K/UL (ref 0.05–0.5)
EOSINOPHILS RELATIVE PERCENT: 1 % (ref 0–6)
ERYTHROCYTE [DISTWIDTH] IN BLOOD BY AUTOMATED COUNT: 17.2 % (ref 11.5–15)
GFR, ESTIMATED: 70 ML/MIN/1.73M2
GFR, ESTIMATED: 71 ML/MIN/1.73M2
GFR, ESTIMATED: 74 ML/MIN/1.73M2
GLUCOSE SERPL-MCNC: 110 MG/DL (ref 74–99)
GLUCOSE SERPL-MCNC: 111 MG/DL (ref 74–99)
GLUCOSE SERPL-MCNC: 119 MG/DL (ref 74–99)
HCT VFR BLD AUTO: 29.6 % (ref 34–48)
HGB BLD-MCNC: 9.7 G/DL (ref 11.5–15.5)
IMM GRANULOCYTES # BLD AUTO: 0.1 K/UL (ref 0–0.58)
IMM GRANULOCYTES NFR BLD: 2 % (ref 0–5)
LYMPHOCYTES NFR BLD: 0.8 K/UL (ref 1.5–4)
LYMPHOCYTES RELATIVE PERCENT: 12 % (ref 20–42)
MAGNESIUM SERPL-MCNC: 2 MG/DL (ref 1.6–2.6)
MCH RBC QN AUTO: 26.6 PG (ref 26–35)
MCHC RBC AUTO-ENTMCNC: 32.8 G/DL (ref 32–34.5)
MCV RBC AUTO: 81.3 FL (ref 80–99.9)
MONOCYTES NFR BLD: 0.49 K/UL (ref 0.1–0.95)
MONOCYTES NFR BLD: 7 % (ref 2–12)
NEUTROPHILS NFR BLD: 78 % (ref 43–80)
NEUTS SEG NFR BLD: 5.17 K/UL (ref 1.8–7.3)
PHOSPHATE SERPL-MCNC: 2.5 MG/DL (ref 2.5–4.5)
PLATELET # BLD AUTO: 223 K/UL (ref 130–450)
PMV BLD AUTO: 11.2 FL (ref 7–12)
POTASSIUM SERPL-SCNC: 5.6 MMOL/L (ref 3.5–5)
POTASSIUM SERPL-SCNC: 5.7 MMOL/L (ref 3.5–5)
POTASSIUM SERPL-SCNC: 6 MMOL/L (ref 3.5–5)
PROT SERPL-MCNC: 5.8 G/DL (ref 6.4–8.3)
RBC # BLD AUTO: 3.64 M/UL (ref 3.5–5.5)
SODIUM SERPL-SCNC: 128 MMOL/L (ref 132–146)
SODIUM SERPL-SCNC: 128 MMOL/L (ref 132–146)
SODIUM SERPL-SCNC: 131 MMOL/L (ref 132–146)
WBC OTHER # BLD: 6.6 K/UL (ref 4.5–11.5)

## 2024-06-25 PROCEDURE — 83735 ASSAY OF MAGNESIUM: CPT

## 2024-06-25 PROCEDURE — 6360000002 HC RX W HCPCS: Performed by: INTERNAL MEDICINE

## 2024-06-25 PROCEDURE — 6370000000 HC RX 637 (ALT 250 FOR IP): Performed by: INTERNAL MEDICINE

## 2024-06-25 PROCEDURE — 2000000000 HC ICU R&B

## 2024-06-25 PROCEDURE — 2580000003 HC RX 258: Performed by: INTERNAL MEDICINE

## 2024-06-25 PROCEDURE — 2500000003 HC RX 250 WO HCPCS: Performed by: INTERNAL MEDICINE

## 2024-06-25 PROCEDURE — 99291 CRITICAL CARE FIRST HOUR: CPT | Performed by: INTERNAL MEDICINE

## 2024-06-25 PROCEDURE — 85025 COMPLETE CBC W/AUTO DIFF WBC: CPT

## 2024-06-25 PROCEDURE — 99232 SBSQ HOSP IP/OBS MODERATE 35: CPT | Performed by: INTERNAL MEDICINE

## 2024-06-25 PROCEDURE — 80053 COMPREHEN METABOLIC PANEL: CPT

## 2024-06-25 PROCEDURE — 6360000002 HC RX W HCPCS: Performed by: SPECIALIST

## 2024-06-25 PROCEDURE — 94003 VENT MGMT INPAT SUBQ DAY: CPT

## 2024-06-25 PROCEDURE — 36592 COLLECT BLOOD FROM PICC: CPT

## 2024-06-25 PROCEDURE — C9113 INJ PANTOPRAZOLE SODIUM, VIA: HCPCS | Performed by: INTERNAL MEDICINE

## 2024-06-25 PROCEDURE — 94640 AIRWAY INHALATION TREATMENT: CPT

## 2024-06-25 PROCEDURE — 80048 BASIC METABOLIC PNL TOTAL CA: CPT

## 2024-06-25 PROCEDURE — 71045 X-RAY EXAM CHEST 1 VIEW: CPT

## 2024-06-25 PROCEDURE — 6370000000 HC RX 637 (ALT 250 FOR IP)

## 2024-06-25 PROCEDURE — 84100 ASSAY OF PHOSPHORUS: CPT

## 2024-06-25 RX ORDER — TOBRAMYCIN INHALATION SOLUTION 300 MG/5ML
300 INHALANT RESPIRATORY (INHALATION)
Qty: 140 ML | Refills: 0 | Status: SHIPPED | OUTPATIENT
Start: 2024-06-25 | End: 2024-07-09

## 2024-06-25 RX ORDER — MIDODRINE HYDROCHLORIDE 5 MG/1
15 TABLET ORAL
Status: DISCONTINUED | OUTPATIENT
Start: 2024-06-25 | End: 2024-06-26 | Stop reason: HOSPADM

## 2024-06-25 RX ORDER — SULFAMETHOXAZOLE AND TRIMETHOPRIM 200; 40 MG/5ML; MG/5ML
160 SUSPENSION ORAL EVERY 12 HOURS
Qty: 280 ML | Refills: 0 | Status: SHIPPED | OUTPATIENT
Start: 2024-06-25 | End: 2024-07-02

## 2024-06-25 RX ORDER — CARVEDILOL 6.25 MG/1
12.5 TABLET ORAL 2 TIMES DAILY WITH MEALS
Status: DISCONTINUED | OUTPATIENT
Start: 2024-06-25 | End: 2024-06-26 | Stop reason: HOSPADM

## 2024-06-25 RX ORDER — SULFAMETHOXAZOLE AND TRIMETHOPRIM 200; 40 MG/5ML; MG/5ML
160 SUSPENSION ORAL EVERY 12 HOURS
Status: DISCONTINUED | OUTPATIENT
Start: 2024-06-25 | End: 2024-06-26 | Stop reason: HOSPADM

## 2024-06-25 RX ORDER — FLUDROCORTISONE ACETATE 0.1 MG/1
0.1 TABLET ORAL ONCE
Status: COMPLETED | OUTPATIENT
Start: 2024-06-25 | End: 2024-06-25

## 2024-06-25 RX ADMIN — SACUBITRIL AND VALSARTAN 1 TABLET: 24; 26 TABLET, FILM COATED ORAL at 09:19

## 2024-06-25 RX ADMIN — CLOPIDOGREL BISULFATE 75 MG: 75 TABLET ORAL at 09:19

## 2024-06-25 RX ADMIN — PREDNISONE 5 MG: 5 TABLET ORAL at 09:19

## 2024-06-25 RX ADMIN — IPRATROPIUM BROMIDE AND ALBUTEROL SULFATE 1 DOSE: .5; 2.5 SOLUTION RESPIRATORY (INHALATION) at 06:34

## 2024-06-25 RX ADMIN — ANTI-FUNGAL POWDER MICONAZOLE NITRATE TALC FREE: 1.42 POWDER TOPICAL at 09:28

## 2024-06-25 RX ADMIN — TOBRAMYCIN 300 MG: 300 SOLUTION RESPIRATORY (INHALATION) at 18:45

## 2024-06-25 RX ADMIN — IPRATROPIUM BROMIDE AND ALBUTEROL SULFATE 1 DOSE: .5; 2.5 SOLUTION RESPIRATORY (INHALATION) at 10:03

## 2024-06-25 RX ADMIN — BUDESONIDE INHALATION 500 MCG: 0.5 SUSPENSION RESPIRATORY (INHALATION) at 17:35

## 2024-06-25 RX ADMIN — IPRATROPIUM BROMIDE AND ALBUTEROL SULFATE 1 DOSE: .5; 2.5 SOLUTION RESPIRATORY (INHALATION) at 01:54

## 2024-06-25 RX ADMIN — APIXABAN 2.5 MG: 2.5 TABLET, FILM COATED ORAL at 09:19

## 2024-06-25 RX ADMIN — FUROSEMIDE 20 MG: 10 INJECTION, SOLUTION INTRAMUSCULAR; INTRAVENOUS at 18:40

## 2024-06-25 RX ADMIN — APIXABAN 2.5 MG: 2.5 TABLET, FILM COATED ORAL at 21:19

## 2024-06-25 RX ADMIN — FUROSEMIDE 20 MG: 10 INJECTION, SOLUTION INTRAMUSCULAR; INTRAVENOUS at 09:19

## 2024-06-25 RX ADMIN — CARVEDILOL 25 MG: 25 TABLET, FILM COATED ORAL at 09:19

## 2024-06-25 RX ADMIN — BUDESONIDE INHALATION 500 MCG: 0.5 SUSPENSION RESPIRATORY (INHALATION) at 06:34

## 2024-06-25 RX ADMIN — SULFAMETHOXAZOLE AND TRIMETHOPRIM 260.8 MG: 80; 16 INJECTION, SOLUTION, CONCENTRATE INTRAVENOUS at 06:11

## 2024-06-25 RX ADMIN — ANTI-FUNGAL POWDER MICONAZOLE NITRATE TALC FREE: 1.42 POWDER TOPICAL at 21:23

## 2024-06-25 RX ADMIN — MIDODRINE HYDROCHLORIDE 15 MG: 5 TABLET ORAL at 18:40

## 2024-06-25 RX ADMIN — ATORVASTATIN CALCIUM 10 MG: 10 TABLET, FILM COATED ORAL at 21:19

## 2024-06-25 RX ADMIN — SODIUM CHLORIDE, PRESERVATIVE FREE: 5 INJECTION INTRAVENOUS at 21:23

## 2024-06-25 RX ADMIN — SULFAMETHOXAZOLE AND TRIMETHOPRIM 20 ML: 200; 40 SUSPENSION ORAL at 18:41

## 2024-06-25 RX ADMIN — CARVEDILOL 12.5 MG: 6.25 TABLET, FILM COATED ORAL at 18:40

## 2024-06-25 RX ADMIN — TOBRAMYCIN 300 MG: 300 SOLUTION RESPIRATORY (INHALATION) at 07:20

## 2024-06-25 RX ADMIN — FLUDROCORTISONE ACETATE 0.1 MG: 0.1 TABLET ORAL at 21:19

## 2024-06-25 RX ADMIN — IPRATROPIUM BROMIDE AND ALBUTEROL SULFATE 1 DOSE: .5; 2.5 SOLUTION RESPIRATORY (INHALATION) at 17:35

## 2024-06-25 RX ADMIN — IPRATROPIUM BROMIDE AND ALBUTEROL SULFATE 1 DOSE: .5; 2.5 SOLUTION RESPIRATORY (INHALATION) at 14:21

## 2024-06-25 RX ADMIN — MIDODRINE HYDROCHLORIDE 15 MG: 5 TABLET ORAL at 13:17

## 2024-06-25 RX ADMIN — ACETAMINOPHEN 650 MG: 325 TABLET ORAL at 21:19

## 2024-06-25 RX ADMIN — ACETAMINOPHEN 650 MG: 325 TABLET ORAL at 09:19

## 2024-06-25 RX ADMIN — IPRATROPIUM BROMIDE AND ALBUTEROL SULFATE 1 DOSE: .5; 2.5 SOLUTION RESPIRATORY (INHALATION) at 22:13

## 2024-06-25 RX ADMIN — SODIUM CHLORIDE, PRESERVATIVE FREE 10 ML: 5 INJECTION INTRAVENOUS at 09:27

## 2024-06-25 RX ADMIN — MICONAZOLE NITRATE: 20 OINTMENT TOPICAL at 09:28

## 2024-06-25 RX ADMIN — SODIUM CHLORIDE, PRESERVATIVE FREE 40 MG: 5 INJECTION INTRAVENOUS at 09:19

## 2024-06-25 RX ADMIN — SODIUM CHLORIDE, PRESERVATIVE FREE 10 ML: 5 INJECTION INTRAVENOUS at 21:25

## 2024-06-25 RX ADMIN — MICONAZOLE NITRATE: 20 OINTMENT TOPICAL at 21:23

## 2024-06-25 ASSESSMENT — PULMONARY FUNCTION TESTS
PIF_VALUE: 32
PIF_VALUE: 34
PIF_VALUE: 32
PIF_VALUE: 32
PIF_VALUE: 30
PIF_VALUE: 31
PIF_VALUE: 32
PIF_VALUE: 32
PIF_VALUE: 30
PIF_VALUE: 32
PIF_VALUE: 30
PIF_VALUE: 33
PIF_VALUE: 32
PIF_VALUE: 33
PIF_VALUE: 31
PIF_VALUE: 31
PIF_VALUE: 32
PIF_VALUE: 30
PIF_VALUE: 33
PIF_VALUE: 32
PIF_VALUE: 34
PIF_VALUE: 34
PIF_VALUE: 32
PIF_VALUE: 31
PIF_VALUE: 30
PIF_VALUE: 34
PIF_VALUE: 38

## 2024-06-25 ASSESSMENT — PAIN SCALES - GENERAL
PAINLEVEL_OUTOF10: 0
PAINLEVEL_OUTOF10: 5
PAINLEVEL_OUTOF10: 0

## 2024-06-25 ASSESSMENT — PAIN SCALES - WONG BAKER: WONGBAKER_NUMERICALRESPONSE: NO HURT

## 2024-06-26 VITALS
WEIGHT: 132.5 LBS | SYSTOLIC BLOOD PRESSURE: 130 MMHG | RESPIRATION RATE: 27 BRPM | HEART RATE: 72 BPM | BODY MASS INDEX: 24.38 KG/M2 | TEMPERATURE: 97.8 F | HEIGHT: 62 IN | DIASTOLIC BLOOD PRESSURE: 58 MMHG | OXYGEN SATURATION: 98 %

## 2024-06-26 LAB
25(OH)D3 SERPL-MCNC: 22.5 NG/ML (ref 30–100)
ANION GAP SERPL CALCULATED.3IONS-SCNC: 5 MMOL/L (ref 7–16)
BASOPHILS # BLD: 0 K/UL (ref 0–0.2)
BASOPHILS NFR BLD: 0 % (ref 0–2)
BNP SERPL-MCNC: ABNORMAL PG/ML (ref 0–450)
BUN SERPL-MCNC: 31 MG/DL (ref 6–23)
CALCIUM SERPL-MCNC: 8.7 MG/DL (ref 8.6–10.2)
CHLORIDE SERPL-SCNC: 95 MMOL/L (ref 98–107)
CO2 SERPL-SCNC: 30 MMOL/L (ref 22–29)
CREAT SERPL-MCNC: 0.9 MG/DL (ref 0.5–1)
EOSINOPHIL # BLD: 0.1 K/UL (ref 0.05–0.5)
EOSINOPHILS RELATIVE PERCENT: 2 % (ref 0–6)
ERYTHROCYTE [DISTWIDTH] IN BLOOD BY AUTOMATED COUNT: 17.2 % (ref 11.5–15)
GFR, ESTIMATED: 70 ML/MIN/1.73M2
GLUCOSE SERPL-MCNC: 80 MG/DL (ref 74–99)
HCT VFR BLD AUTO: 26.5 % (ref 34–48)
HGB BLD-MCNC: 8.9 G/DL (ref 11.5–15.5)
IMM GRANULOCYTES # BLD AUTO: 0.06 K/UL (ref 0–0.58)
IMM GRANULOCYTES NFR BLD: 1 % (ref 0–5)
LYMPHOCYTES NFR BLD: 0.74 K/UL (ref 1.5–4)
LYMPHOCYTES RELATIVE PERCENT: 11 % (ref 20–42)
MAGNESIUM SERPL-MCNC: 1.9 MG/DL (ref 1.6–2.6)
MCH RBC QN AUTO: 27.6 PG (ref 26–35)
MCHC RBC AUTO-ENTMCNC: 33.6 G/DL (ref 32–34.5)
MCV RBC AUTO: 82.3 FL (ref 80–99.9)
MONOCYTES NFR BLD: 0.48 K/UL (ref 0.1–0.95)
MONOCYTES NFR BLD: 7 % (ref 2–12)
NEUTROPHILS NFR BLD: 80 % (ref 43–80)
NEUTS SEG NFR BLD: 5.38 K/UL (ref 1.8–7.3)
PHOSPHATE SERPL-MCNC: 2.7 MG/DL (ref 2.5–4.5)
PLATELET # BLD AUTO: 211 K/UL (ref 130–450)
PMV BLD AUTO: 11.3 FL (ref 7–12)
POTASSIUM SERPL-SCNC: 5.7 MMOL/L (ref 3.5–5)
RBC # BLD AUTO: 3.22 M/UL (ref 3.5–5.5)
SODIUM SERPL-SCNC: 130 MMOL/L (ref 132–146)
WBC OTHER # BLD: 6.8 K/UL (ref 4.5–11.5)

## 2024-06-26 PROCEDURE — 6360000002 HC RX W HCPCS

## 2024-06-26 PROCEDURE — 82306 VITAMIN D 25 HYDROXY: CPT

## 2024-06-26 PROCEDURE — 80048 BASIC METABOLIC PNL TOTAL CA: CPT

## 2024-06-26 PROCEDURE — 6360000002 HC RX W HCPCS: Performed by: INTERNAL MEDICINE

## 2024-06-26 PROCEDURE — 94003 VENT MGMT INPAT SUBQ DAY: CPT

## 2024-06-26 PROCEDURE — C9113 INJ PANTOPRAZOLE SODIUM, VIA: HCPCS | Performed by: INTERNAL MEDICINE

## 2024-06-26 PROCEDURE — 83735 ASSAY OF MAGNESIUM: CPT

## 2024-06-26 PROCEDURE — 2580000003 HC RX 258: Performed by: INTERNAL MEDICINE

## 2024-06-26 PROCEDURE — 99239 HOSP IP/OBS DSCHRG MGMT >30: CPT | Performed by: INTERNAL MEDICINE

## 2024-06-26 PROCEDURE — 83880 ASSAY OF NATRIURETIC PEPTIDE: CPT

## 2024-06-26 PROCEDURE — 99291 CRITICAL CARE FIRST HOUR: CPT | Performed by: INTERNAL MEDICINE

## 2024-06-26 PROCEDURE — 6370000000 HC RX 637 (ALT 250 FOR IP): Performed by: INTERNAL MEDICINE

## 2024-06-26 PROCEDURE — 94640 AIRWAY INHALATION TREATMENT: CPT

## 2024-06-26 PROCEDURE — 6370000000 HC RX 637 (ALT 250 FOR IP)

## 2024-06-26 PROCEDURE — 85025 COMPLETE CBC W/AUTO DIFF WBC: CPT

## 2024-06-26 PROCEDURE — 36592 COLLECT BLOOD FROM PICC: CPT

## 2024-06-26 PROCEDURE — 84100 ASSAY OF PHOSPHORUS: CPT

## 2024-06-26 PROCEDURE — 6360000002 HC RX W HCPCS: Performed by: SPECIALIST

## 2024-06-26 RX ORDER — BUDESONIDE 0.5 MG/2ML
1 INHALANT ORAL EVERY 12 HOURS
Qty: 60 EACH | Refills: 3 | DISCHARGE
Start: 2024-06-26

## 2024-06-26 RX ORDER — MIDODRINE HYDROCHLORIDE 5 MG/1
15 TABLET ORAL
Qty: 90 TABLET | Refills: 3 | DISCHARGE
Start: 2024-06-26

## 2024-06-26 RX ORDER — MAGNESIUM SULFATE 1 G/100ML
1000 INJECTION INTRAVENOUS ONCE
Status: COMPLETED | OUTPATIENT
Start: 2024-06-26 | End: 2024-06-26

## 2024-06-26 RX ORDER — CARVEDILOL 12.5 MG/1
12.5 TABLET ORAL 2 TIMES DAILY WITH MEALS
Qty: 60 TABLET | Refills: 3 | DISCHARGE
Start: 2024-06-26

## 2024-06-26 RX ADMIN — SODIUM CHLORIDE, PRESERVATIVE FREE 10 ML: 5 INJECTION INTRAVENOUS at 09:00

## 2024-06-26 RX ADMIN — IPRATROPIUM BROMIDE AND ALBUTEROL SULFATE 1 DOSE: .5; 2.5 SOLUTION RESPIRATORY (INHALATION) at 12:08

## 2024-06-26 RX ADMIN — MAGNESIUM SULFATE IN DEXTROSE 1000 MG: 10 INJECTION, SOLUTION INTRAVENOUS at 08:58

## 2024-06-26 RX ADMIN — BUDESONIDE INHALATION 500 MCG: 0.5 SUSPENSION RESPIRATORY (INHALATION) at 16:11

## 2024-06-26 RX ADMIN — SODIUM CHLORIDE, PRESERVATIVE FREE 40 MG: 5 INJECTION INTRAVENOUS at 09:01

## 2024-06-26 RX ADMIN — CLOPIDOGREL BISULFATE 75 MG: 75 TABLET ORAL at 09:00

## 2024-06-26 RX ADMIN — MICONAZOLE NITRATE: 20 OINTMENT TOPICAL at 08:59

## 2024-06-26 RX ADMIN — MIDODRINE HYDROCHLORIDE 15 MG: 5 TABLET ORAL at 12:38

## 2024-06-26 RX ADMIN — APIXABAN 2.5 MG: 2.5 TABLET, FILM COATED ORAL at 09:00

## 2024-06-26 RX ADMIN — TOBRAMYCIN 300 MG: 300 SOLUTION RESPIRATORY (INHALATION) at 16:12

## 2024-06-26 RX ADMIN — CARVEDILOL 12.5 MG: 6.25 TABLET, FILM COATED ORAL at 16:21

## 2024-06-26 RX ADMIN — IPRATROPIUM BROMIDE AND ALBUTEROL SULFATE 1 DOSE: .5; 2.5 SOLUTION RESPIRATORY (INHALATION) at 16:11

## 2024-06-26 RX ADMIN — IPRATROPIUM BROMIDE AND ALBUTEROL SULFATE 1 DOSE: .5; 2.5 SOLUTION RESPIRATORY (INHALATION) at 01:45

## 2024-06-26 RX ADMIN — ANTI-FUNGAL POWDER MICONAZOLE NITRATE TALC FREE: 1.42 POWDER TOPICAL at 08:59

## 2024-06-26 RX ADMIN — MIDODRINE HYDROCHLORIDE 15 MG: 5 TABLET ORAL at 09:00

## 2024-06-26 RX ADMIN — BUDESONIDE INHALATION 500 MCG: 0.5 SUSPENSION RESPIRATORY (INHALATION) at 05:23

## 2024-06-26 RX ADMIN — MIDODRINE HYDROCHLORIDE 15 MG: 5 TABLET ORAL at 16:21

## 2024-06-26 RX ADMIN — IPRATROPIUM BROMIDE AND ALBUTEROL SULFATE 1 DOSE: .5; 2.5 SOLUTION RESPIRATORY (INHALATION) at 05:23

## 2024-06-26 RX ADMIN — SODIUM ZIRCONIUM CYCLOSILICATE 10 G: 10 POWDER, FOR SUSPENSION ORAL at 12:38

## 2024-06-26 RX ADMIN — CARVEDILOL 12.5 MG: 6.25 TABLET, FILM COATED ORAL at 09:00

## 2024-06-26 RX ADMIN — TOBRAMYCIN 300 MG: 300 SOLUTION RESPIRATORY (INHALATION) at 05:23

## 2024-06-26 RX ADMIN — IPRATROPIUM BROMIDE AND ALBUTEROL SULFATE 1 DOSE: .5; 2.5 SOLUTION RESPIRATORY (INHALATION) at 08:44

## 2024-06-26 RX ADMIN — PREDNISONE 5 MG: 5 TABLET ORAL at 09:00

## 2024-06-26 ASSESSMENT — PULMONARY FUNCTION TESTS
PIF_VALUE: 34
PIF_VALUE: 32
PIF_VALUE: 32
PIF_VALUE: 33
PIF_VALUE: 37
PIF_VALUE: 32
PIF_VALUE: 33
PIF_VALUE: 32
PIF_VALUE: 33
PIF_VALUE: 31
PIF_VALUE: 35
PIF_VALUE: 34
PIF_VALUE: 32
PIF_VALUE: 31
PIF_VALUE: 33
PIF_VALUE: 33
PIF_VALUE: 32
PIF_VALUE: 33
PIF_VALUE: 33
PIF_VALUE: 34
PIF_VALUE: 34
PIF_VALUE: 32
PIF_VALUE: 33
PIF_VALUE: 33

## 2024-06-26 ASSESSMENT — PAIN SCALES - GENERAL
PAINLEVEL_OUTOF10: 0

## 2024-06-26 NOTE — DISCHARGE SUMMARY
morning and 20 mLs in the evening. Do all this for 7 days.           * This list has 2 medication(s) that are the same as other medications prescribed for you. Read the directions carefully, and ask your doctor or other care provider to review them with you.                CHANGE how you take these medications      carvedilol 12.5 MG tablet  Commonly known as: COREG  Take 1 tablet by mouth 2 times daily (with meals)  What changed:   medication strength  how much to take  how to take this            CONTINUE taking these medications      atorvastatin 10 MG tablet  Commonly known as: LIPITOR     bisacodyl 10 MG suppository  Commonly known as: DULCOLAX     Brovana 15 MCG/2ML Nebu  Generic drug: arformoterol tartrate     budesonide 0.5 MG/2ML nebulizer suspension  Commonly known as: PULMICORT  Take 2 mLs by nebulization in the morning and 2 mLs in the evening.     clopidogrel 75 MG tablet  Commonly known as: PLAVIX     Eliquis 5 MG Tabs tablet  Generic drug: apixaban     empagliflozin 10 MG tablet  Commonly known as: JARDIANCE  1 tablet by PEG Tube route daily     ferrous sulfate 300 (60 Fe) MG/5ML syrup     guaiFENesin 100 MG/5ML liquid  Commonly known as: ROBITUSSIN     HYDROcodone-acetaminophen 5-325 MG per tablet  Commonly known as: NORCO     ipratropium 0.5 mg-albuterol 2.5 mg 0.5-2.5 (3) MG/3ML Soln nebulizer solution  Commonly known as: DUONEB     isosorbide mononitrate 30 MG extended release tablet  Commonly known as: IMDUR     nitroGLYCERIN 0.4 MG SL tablet  Commonly known as: NITROSTAT     omeprazole 2 MG/ML Susp 2 mg/mL oral suspension  Commonly known as: PriLOSEC     ondansetron 4 MG tablet  Commonly known as: ZOFRAN     polyethylene glycol 17 g packet  Commonly known as: GLYCOLAX     predniSONE 5 MG tablet  Commonly known as: DELTASONE     sodium polystyrene 15 GM/60ML suspension  Commonly known as: KAYEXALATE     sucralfate 1 GM/10ML suspension  Commonly known as: CARAFATE     tobramycin (PF) 300 MG/5ML

## 2024-06-26 NOTE — PLAN OF CARE
Problem: Discharge Planning  Goal: Discharge to home or other facility with appropriate resources  Outcome: Not Progressing     Problem: Nutrition Deficit:  Goal: Optimize nutritional status  Outcome: Not Progressing     Problem: Musculoskeletal - Adult  Goal: Return mobility to safest level of function  Outcome: Not Progressing     Problem: Safety - Adult  Goal: Free from fall injury  Outcome: Progressing     Problem: ABCDS Injury Assessment  Goal: Absence of physical injury  6/17/2024 1429 by Eleanor Gregg RN  Outcome: Progressing     Problem: Neurosensory - Adult  Goal: Achieves stable or improved neurological status  Outcome: Progressing     Problem: Respiratory - Adult  Goal: Achieves optimal ventilation and oxygenation  Outcome: Progressing     Problem: Skin/Tissue Integrity - Adult  Goal: Skin integrity remains intact  Outcome: Progressing     Problem: Gastrointestinal - Adult  Goal: Minimal or absence of nausea and vomiting  Outcome: Progressing     Problem: Discharge Planning  Goal: Discharge to home or other facility with appropriate resources  Outcome: Not Progressing     Problem: Nutrition Deficit:  Goal: Optimize nutritional status  Outcome: Not Progressing     Problem: Musculoskeletal - Adult  Goal: Return mobility to safest level of function  Outcome: Not Progressing     
  Problem: Pain  Goal: Verbalizes/displays adequate comfort level or baseline comfort level  Outcome: Progressing     Problem: Skin/Tissue Integrity  Goal: Absence of new skin breakdown  Description: 1.  Monitor for areas of redness and/or skin breakdown  2.  Assess vascular access sites hourly  3.  Every 4-6 hours minimum:  Change oxygen saturation probe site  4.  Every 4-6 hours:  If on nasal continuous positive airway pressure, respiratory therapy assess nares and determine need for appliance change or resting period.  Outcome: Progressing     Problem: ABCDS Injury Assessment  Goal: Absence of physical injury  Outcome: Progressing     
  Problem: Pain  Goal: Verbalizes/displays adequate comfort level or baseline comfort level  Outcome: Progressing     Problem: Skin/Tissue Integrity  Goal: Absence of new skin breakdown  Description: 1.  Monitor for areas of redness and/or skin breakdown  2.  Assess vascular access sites hourly  3.  Every 4-6 hours minimum:  Change oxygen saturation probe site  4.  Every 4-6 hours:  If on nasal continuous positive airway pressure, respiratory therapy assess nares and determine need for appliance change or resting period.  Outcome: Progressing     Problem: ABCDS Injury Assessment  Goal: Absence of physical injury  Outcome: Progressing     
  Problem: Safety - Adult  Goal: Free from fall injury  6/17/2024 2137 by Hernando Gray RN  Outcome: Progressing     Problem: Pain  Goal: Verbalizes/displays adequate comfort level or baseline comfort level  Outcome: Progressing  Flowsheets (Taken 6/17/2024 1600)  Verbalizes/displays adequate comfort level or baseline comfort level: Assess pain using appropriate pain scale     Problem: Skin/Tissue Integrity  Goal: Absence of new skin breakdown  Description: 1.  Monitor for areas of redness and/or skin breakdown  2.  Assess vascular access sites hourly  3.  Every 4-6 hours minimum:  Change oxygen saturation probe site  4.  Every 4-6 hours:  If on nasal continuous positive airway pressure, respiratory therapy assess nares and determine need for appliance change or resting period.  6/17/2024 2137 by Hernando Gray RN  Outcome: Progressing     Problem: ABCDS Injury Assessment  Goal: Absence of physical injury  6/17/2024 2137 by Hernando Gray RN  Outcome: Progressing     Problem: Nutrition Deficit:  Goal: Optimize nutritional status  6/17/2024 2137 by Hernando Gray RN  Outcome: Progressing     Problem: Respiratory - Adult  Goal: Achieves optimal ventilation and oxygenation  6/17/2024 2137 by Hernando Gray RN  Outcome: Progressing     Problem: Skin/Tissue Integrity - Adult  Goal: Skin integrity remains intact  6/17/2024 2137 by Hernando Gray RN  Outcome: Progressing     Problem: Skin/Tissue Integrity - Adult  Goal: Incisions, wounds, or drain sites healing without S/S of infection  Outcome: Progressing     Problem: Skin/Tissue Integrity - Adult  Goal: Oral mucous membranes remain intact  Outcome: Progressing     Problem: Musculoskeletal - Adult  Goal: Maintain proper alignment of affected body part  Outcome: Progressing     Problem: Gastrointestinal - Adult  Goal: Minimal or absence of nausea and vomiting  6/17/2024 1642 by Hernando Gray, RN  Outcome: Completed     Problem: 
  Problem: Safety - Adult  Goal: Free from fall injury  6/18/2024 0149 by Estrellita Soler RN  Outcome: Progressing     Problem: Skin/Tissue Integrity  Goal: Absence of new skin breakdown  Description: 1.  Monitor for areas of redness and/or skin breakdown  2.  Assess vascular access sites hourly  3.  Every 4-6 hours minimum:  Change oxygen saturation probe site  4.  Every 4-6 hours:  If on nasal continuous positive airway pressure, respiratory therapy assess nares and determine need for appliance change or resting period.  6/18/2024 0149 by Estrellita Soler RN  Outcome: Progressing     Problem: ABCDS Injury Assessment  Goal: Absence of physical injury  6/18/2024 0149 by Estrellita Soler RN  Outcome: Progressing     Problem: Nutrition Deficit:  Goal: Optimize nutritional status  6/18/2024 0149 by Estrellita Soler RN  Outcome: Progressing     Problem: Discharge Planning  Goal: Discharge to home or other facility with appropriate resources  6/17/2024 1429 by Eleanor Gregg RN  Outcome: Not Progressing     Problem: Nutrition Deficit:  Goal: Optimize nutritional status  6/18/2024 0149 by Estrellita Soler RN  Outcome: Progressing  6/17/2024 2137 by Hernando Gray RN  Outcome: Progressing  6/17/2024 1429 by Eleanor Gregg RN  Outcome: Not Progressing     Problem: Musculoskeletal - Adult  Goal: Return mobility to safest level of function  6/17/2024 1429 by Eleanor Gregg RN  Outcome: Not Progressing     
  Problem: Safety - Adult  Goal: Free from fall injury  6/18/2024 0924 by Ciera Ramos RN  Outcome: Progressing  6/18/2024 0149 by Estrellita Soler RN  Outcome: Progressing  6/17/2024 2137 by Hernando Gray RN  Outcome: Progressing     Problem: Discharge Planning  Goal: Discharge to home or other facility with appropriate resources  Outcome: Progressing     Problem: Pain  Goal: Verbalizes/displays adequate comfort level or baseline comfort level  6/18/2024 0924 by Ciera Ramos RN  Outcome: Progressing  6/17/2024 2137 by Hernando Gray RN  Outcome: Progressing  Flowsheets (Taken 6/17/2024 1600)  Verbalizes/displays adequate comfort level or baseline comfort level: Assess pain using appropriate pain scale     Problem: Skin/Tissue Integrity  Goal: Absence of new skin breakdown  Description: 1.  Monitor for areas of redness and/or skin breakdown  2.  Assess vascular access sites hourly  3.  Every 4-6 hours minimum:  Change oxygen saturation probe site  4.  Every 4-6 hours:  If on nasal continuous positive airway pressure, respiratory therapy assess nares and determine need for appliance change or resting period.  6/18/2024 0924 by Ciera Ramos RN  Outcome: Progressing  6/18/2024 0149 by Estrellita Soler RN  Outcome: Progressing  6/17/2024 2137 by Hernando Gray RN  Outcome: Progressing     Problem: ABCDS Injury Assessment  Goal: Absence of physical injury  6/18/2024 0924 by Ciera Ramos RN  Outcome: Progressing  6/18/2024 0149 by Estrellita Soler RN  Outcome: Progressing  6/17/2024 2137 by Hernando Gray RN  Outcome: Progressing     Problem: Nutrition Deficit:  Goal: Optimize nutritional status  6/18/2024 0924 by Ciera Ramos RN  Outcome: Progressing  6/18/2024 0149 by Estrellita Soler RN  Outcome: Progressing  6/17/2024 2137 by Hernando Gray RN  Outcome: Progressing     Problem: Neurosensory - Adult  Goal: Achieves stable or improved neurological status  Outcome: 
  Problem: Safety - Adult  Goal: Free from fall injury  6/19/2024 0943 by Ciera Ramos RN  Outcome: Progressing  6/19/2024 0015 by Pamela Godfrey RN  Outcome: Progressing     Problem: Discharge Planning  Goal: Discharge to home or other facility with appropriate resources  6/19/2024 0943 by Ciera Ramos RN  Outcome: Progressing  6/19/2024 0015 by Pamela Godfrey RN  Outcome: Progressing     Problem: Pain  Goal: Verbalizes/displays adequate comfort level or baseline comfort level  6/19/2024 0943 by Ciera Ramos RN  Outcome: Progressing  6/19/2024 0015 by Pamela Godfrey RN  Outcome: Progressing     Problem: Skin/Tissue Integrity  Goal: Absence of new skin breakdown  Description: 1.  Monitor for areas of redness and/or skin breakdown  2.  Assess vascular access sites hourly  3.  Every 4-6 hours minimum:  Change oxygen saturation probe site  4.  Every 4-6 hours:  If on nasal continuous positive airway pressure, respiratory therapy assess nares and determine need for appliance change or resting period.  6/19/2024 0943 by Ciera Ramos RN  Outcome: Progressing  6/19/2024 0015 by Pamela Godfrey RN  Outcome: Progressing     Problem: ABCDS Injury Assessment  Goal: Absence of physical injury  6/19/2024 0943 by Ciera Ramos RN  Outcome: Progressing  6/19/2024 0015 by Pamela Godfrey RN  Outcome: Progressing     Problem: Nutrition Deficit:  Goal: Optimize nutritional status  Outcome: Progressing     Problem: Neurosensory - Adult  Goal: Achieves stable or improved neurological status  Outcome: Progressing     Problem: Respiratory - Adult  Goal: Achieves optimal ventilation and oxygenation  Outcome: Progressing     Problem: Skin/Tissue Integrity - Adult  Goal: Skin integrity remains intact  Outcome: Progressing  Goal: Incisions, wounds, or drain sites healing without S/S of infection  Outcome: Progressing  Goal: Oral mucous membranes remain intact  Outcome: Progressing     Problem: 
  Problem: Safety - Adult  Goal: Free from fall injury  6/20/2024 0850 by Ciera Ramos RN  Outcome: Progressing  6/20/2024 0106 by Pamela Godfrey RN  Outcome: Progressing     Problem: Discharge Planning  Goal: Discharge to home or other facility with appropriate resources  6/20/2024 0850 by Ciera Ramos RN  Outcome: Progressing  6/20/2024 0106 by Pamela Godfrey RN  Outcome: Progressing     Problem: Pain  Goal: Verbalizes/displays adequate comfort level or baseline comfort level  6/20/2024 0850 by Ciera Ramos RN  Outcome: Progressing  6/20/2024 0106 by Pamela Godfrey RN  Outcome: Progressing     Problem: Skin/Tissue Integrity  Goal: Absence of new skin breakdown  Description: 1.  Monitor for areas of redness and/or skin breakdown  2.  Assess vascular access sites hourly  3.  Every 4-6 hours minimum:  Change oxygen saturation probe site  4.  Every 4-6 hours:  If on nasal continuous positive airway pressure, respiratory therapy assess nares and determine need for appliance change or resting period.  Outcome: Progressing     Problem: ABCDS Injury Assessment  Goal: Absence of physical injury  6/20/2024 0850 by Ciera Ramos RN  Outcome: Progressing  6/20/2024 0106 by Pamela Godfrey RN  Outcome: Progressing     Problem: Nutrition Deficit:  Goal: Optimize nutritional status  Outcome: Progressing     Problem: Neurosensory - Adult  Goal: Achieves stable or improved neurological status  Outcome: Progressing     Problem: Respiratory - Adult  Goal: Achieves optimal ventilation and oxygenation  Outcome: Progressing     Problem: Skin/Tissue Integrity - Adult  Goal: Skin integrity remains intact  Outcome: Progressing  Goal: Incisions, wounds, or drain sites healing without S/S of infection  Outcome: Progressing  Goal: Oral mucous membranes remain intact  Outcome: Progressing     Problem: Musculoskeletal - Adult  Goal: Return mobility to safest level of function  Outcome: Progressing  Goal: Maintain 
  Problem: Safety - Adult  Goal: Free from fall injury  6/23/2024 1325 by Shena Shine RN  Outcome: Progressing  6/23/2024 1325 by Shena Shine RN  Outcome: Progressing  6/23/2024 0315 by Jennifer Sosa RN  Outcome: Progressing  Flowsheets (Taken 6/23/2024 0300)  Free From Fall Injury: Instruct family/caregiver on patient safety     Problem: Discharge Planning  Goal: Discharge to home or other facility with appropriate resources  6/23/2024 0315 by Jennifer Sosa RN  Outcome: Progressing  Flowsheets (Taken 6/22/2024 2000)  Discharge to home or other facility with appropriate resources: Identify barriers to discharge with patient and caregiver     Problem: Pain  Goal: Verbalizes/displays adequate comfort level or baseline comfort level  6/23/2024 1325 by Shena Shine RN  Outcome: Progressing  6/23/2024 0315 by Jennifer Sosa RN  Outcome: Progressing     Problem: Skin/Tissue Integrity  Goal: Absence of new skin breakdown  Description: 1.  Monitor for areas of redness and/or skin breakdown  2.  Assess vascular access sites hourly  3.  Every 4-6 hours minimum:  Change oxygen saturation probe site  4.  Every 4-6 hours:  If on nasal continuous positive airway pressure, respiratory therapy assess nares and determine need for appliance change or resting period.  Outcome: Progressing     Problem: ABCDS Injury Assessment  Goal: Absence of physical injury  Outcome: Progressing  Flowsheets (Taken 6/23/2024 0300 by Jennifer Sosa RN)  Absence of Physical Injury: Implement safety measures based on patient assessment     Problem: Neurosensory - Adult  Goal: Achieves stable or improved neurological status  6/23/2024 0315 by Jennifer Sosa RN  Outcome: Progressing  Flowsheets (Taken 6/22/2024 2000)  Achieves stable or improved neurological status: Assess for and report changes in neurological status     
  Problem: Safety - Adult  Goal: Free from fall injury  6/26/2024 0856 by Karrie Muhammad, RN  Outcome: Progressing  6/26/2024 0632 by Tara Bolaños RN  Outcome: Progressing     Problem: Discharge Planning  Goal: Discharge to home or other facility with appropriate resources  Outcome: Progressing     Problem: Pain  Goal: Verbalizes/displays adequate comfort level or baseline comfort level  Outcome: Progressing     Problem: Skin/Tissue Integrity  Goal: Absence of new skin breakdown  Description: 1.  Monitor for areas of redness and/or skin breakdown  2.  Assess vascular access sites hourly  3.  Every 4-6 hours minimum:  Change oxygen saturation probe site  4.  Every 4-6 hours:  If on nasal continuous positive airway pressure, respiratory therapy assess nares and determine need for appliance change or resting period.  Outcome: Progressing     Problem: ABCDS Injury Assessment  Goal: Absence of physical injury  Outcome: Progressing     Problem: Nutrition Deficit:  Goal: Optimize nutritional status  Outcome: Progressing     Problem: Neurosensory - Adult  Goal: Achieves stable or improved neurological status  Outcome: Progressing     Problem: Respiratory - Adult  Goal: Achieves optimal ventilation and oxygenation  Outcome: Progressing     Problem: Skin/Tissue Integrity - Adult  Goal: Skin integrity remains intact  Outcome: Progressing  Goal: Incisions, wounds, or drain sites healing without S/S of infection  Outcome: Progressing  Goal: Oral mucous membranes remain intact  Outcome: Progressing     Problem: Musculoskeletal - Adult  Goal: Return mobility to safest level of function  Outcome: Progressing  Goal: Maintain proper alignment of affected body part  Outcome: Progressing     Problem: Gastrointestinal - Adult  Goal: Maintains or returns to baseline bowel function  Outcome: Progressing  Goal: Maintains adequate nutritional intake  Outcome: Progressing     Problem: Infection - Adult  Goal: Absence of infection at 
  Problem: Safety - Adult  Goal: Free from fall injury  Outcome: Progressing     
  Problem: Safety - Adult  Goal: Free from fall injury  Outcome: Progressing     
  Problem: Safety - Adult  Goal: Free from fall injury  Outcome: Progressing     Problem: Discharge Planning  Goal: Discharge to home or other facility with appropriate resources  Outcome: Progressing     Problem: Gastrointestinal - Adult  Goal: Maintains or returns to baseline bowel function  Outcome: Progressing     Problem: Infection - Adult  Goal: Absence of infection at discharge  Outcome: Progressing     Problem: Hematologic - Adult  Goal: Maintains hematologic stability  Outcome: Progressing     
  Problem: Safety - Adult  Goal: Free from fall injury  Outcome: Progressing     Problem: Discharge Planning  Goal: Discharge to home or other facility with appropriate resources  Outcome: Progressing     Problem: Pain  Goal: Verbalizes/displays adequate comfort level or baseline comfort level  Outcome: Progressing     Problem: ABCDS Injury Assessment  Goal: Absence of physical injury  Outcome: Progressing     
  Problem: Safety - Adult  Goal: Free from fall injury  Outcome: Progressing     Problem: Discharge Planning  Goal: Discharge to home or other facility with appropriate resources  Outcome: Progressing     Problem: Pain  Goal: Verbalizes/displays adequate comfort level or baseline comfort level  Outcome: Progressing     Problem: Skin/Tissue Integrity  Goal: Absence of new skin breakdown  Description: 1.  Monitor for areas of redness and/or skin breakdown  2.  Assess vascular access sites hourly  3.  Every 4-6 hours minimum:  Change oxygen saturation probe site  4.  Every 4-6 hours:  If on nasal continuous positive airway pressure, respiratory therapy assess nares and determine need for appliance change or resting period.  Outcome: Progressing     Problem: ABCDS Injury Assessment  Goal: Absence of physical injury  Outcome: Progressing     
  Problem: Safety - Adult  Goal: Free from fall injury  Outcome: Progressing     Problem: Discharge Planning  Goal: Discharge to home or other facility with appropriate resources  Outcome: Progressing     Problem: Skin/Tissue Integrity  Goal: Absence of new skin breakdown  Description: 1.  Monitor for areas of redness and/or skin breakdown  2.  Assess vascular access sites hourly  3.  Every 4-6 hours minimum:  Change oxygen saturation probe site  4.  Every 4-6 hours:  If on nasal continuous positive airway pressure, respiratory therapy assess nares and determine need for appliance change or resting period.  Outcome: Progressing     Problem: Nutrition Deficit:  Goal: Optimize nutritional status  Outcome: Progressing     
  Problem: Safety - Adult  Goal: Free from fall injury  Outcome: Progressing     Problem: Skin/Tissue Integrity  Goal: Absence of new skin breakdown  Description: 1.  Monitor for areas of redness and/or skin breakdown  2.  Assess vascular access sites hourly  3.  Every 4-6 hours minimum:  Change oxygen saturation probe site  4.  Every 4-6 hours:  If on nasal continuous positive airway pressure, respiratory therapy assess nares and determine need for appliance change or resting period.  Outcome: Progressing     Problem: ABCDS Injury Assessment  Goal: Absence of physical injury  Outcome: Progressing     Problem: Neurosensory - Adult  Goal: Achieves stable or improved neurological status  Outcome: Progressing     Problem: Respiratory - Adult  Goal: Achieves optimal ventilation and oxygenation  Outcome: Progressing     Problem: Skin/Tissue Integrity - Adult  Goal: Skin integrity remains intact  Outcome: Progressing  Goal: Incisions, wounds, or drain sites healing without S/S of infection  Outcome: Progressing  Goal: Oral mucous membranes remain intact  Outcome: Progressing     Problem: Musculoskeletal - Adult  Goal: Return mobility to safest level of function  Outcome: Progressing  Goal: Maintain proper alignment of affected body part  Outcome: Progressing     Problem: Metabolic/Fluid and Electrolytes - Adult  Goal: Electrolytes maintained within normal limits  Outcome: Progressing  Goal: Hemodynamic stability and optimal renal function maintained  Outcome: Progressing     Problem: Hematologic - Adult  Goal: Maintains hematologic stability  Outcome: Progressing     Problem: Genitourinary - Adult  Goal: Urinary catheter remains patent  Outcome: Progressing     Problem: Cardiovascular - Adult  Goal: Maintains optimal cardiac output and hemodynamic stability  Outcome: Progressing  Goal: Absence of cardiac dysrhythmias or at baseline  Outcome: Progressing     
  Problem: Safety - Adult  Goal: Free from fall injury  Outcome: Progressing  Flowsheets (Taken 6/23/2024 0300)  Free From Fall Injury: Instruct family/caregiver on patient safety     Problem: Discharge Planning  Goal: Discharge to home or other facility with appropriate resources  Outcome: Progressing  Flowsheets (Taken 6/22/2024 2000)  Discharge to home or other facility with appropriate resources: Identify barriers to discharge with patient and caregiver     Problem: Pain  Goal: Verbalizes/displays adequate comfort level or baseline comfort level  Outcome: Progressing     Problem: ABCDS Injury Assessment  Goal: Absence of physical injury  Recent Flowsheet Documentation  Taken 6/23/2024 0300 by Jennifer Sosa RN  Absence of Physical Injury: Implement safety measures based on patient assessment     Problem: Neurosensory - Adult  Goal: Achieves stable or improved neurological status  Outcome: Progressing  Flowsheets (Taken 6/22/2024 2000)  Achieves stable or improved neurological status: Assess for and report changes in neurological status     Problem: Respiratory - Adult  Goal: Achieves optimal ventilation and oxygenation  Outcome: Progressing     Problem: Skin/Tissue Integrity - Adult  Goal: Skin integrity remains intact  Outcome: Progressing  Flowsheets  Taken 6/23/2024 0300  Skin Integrity Remains Intact: Monitor for areas of redness and/or skin breakdown  Taken 6/22/2024 2000  Skin Integrity Remains Intact: Monitor for areas of redness and/or skin breakdown  Goal: Incisions, wounds, or drain sites healing without S/S of infection  Outcome: Progressing  Flowsheets  Taken 6/23/2024 0300  Incisions, Wounds, or Drain Sites Healing Without Sign and Symptoms of Infection: TWICE DAILY: Assess and document skin integrity  Taken 6/22/2024 2000  Incisions, Wounds, or Drain Sites Healing Without Sign and Symptoms of Infection: TWICE DAILY: Assess and document skin integrity  Goal: Oral mucous membranes remain 
ARTHUR Rodriguez  Outcome: Progressing  Flowsheets (Taken 6/21/2024 2000)  Hemodynamic stability and optimal renal function maintained: Monitor labs and assess for signs and symptoms of volume excess or deficit  6/21/2024 1438 by Robert Anna RN  Outcome: Progressing     Problem: Hematologic - Adult  Goal: Maintains hematologic stability  6/21/2024 2113 by Jennifer Sosa RN  Outcome: Progressing  Flowsheets (Taken 6/21/2024 2000)  Maintains hematologic stability: Assess for signs and symptoms of bleeding or hemorrhage  6/21/2024 1438 by Robert Anna RN  Outcome: Progressing     Problem: Genitourinary - Adult  Goal: Urinary catheter remains patent  6/21/2024 2113 by Jennifer Sosa RN  Outcome: Progressing  Flowsheets (Taken 6/21/2024 2000)  Urinary catheter remains patent: Assess patency of urinary catheter  6/21/2024 1438 by Robert Anna RN  Outcome: Progressing     Problem: Cardiovascular - Adult  Goal: Maintains optimal cardiac output and hemodynamic stability  6/21/2024 2113 by Jennifer Sosa RN  Outcome: Progressing  Flowsheets (Taken 6/21/2024 2000)  Maintains optimal cardiac output and hemodynamic stability: Monitor blood pressure and heart rate  6/21/2024 1438 by Robert Anna RN  Outcome: Progressing  Goal: Absence of cardiac dysrhythmias or at baseline  6/21/2024 2113 by Jennifer Sosa RN  Outcome: Progressing  Flowsheets (Taken 6/21/2024 2000)  Absence of cardiac dysrhythmias or at baseline: Monitor cardiac rate and rhythm  6/21/2024 1438 by Robert Anna RN  Outcome: Progressing     Problem: Chronic Conditions and Co-morbidities  Goal: Patient's chronic conditions and co-morbidity symptoms are monitored and maintained or improved  Recent Flowsheet Documentation  Taken 6/21/2024 2000 by Jennifer Sosa RN  Care Plan - Patient's Chronic Conditions and Co-Morbidity Symptoms are Monitored and Maintained or Improved: Monitor and assess patient's chronic conditions and comorbid symptoms for 
stability  Outcome: Progressing  Flowsheets (Taken 6/20/2024 2000)  Maintains optimal cardiac output and hemodynamic stability: Monitor blood pressure and heart rate  Goal: Absence of cardiac dysrhythmias or at baseline  Outcome: Progressing  Flowsheets (Taken 6/20/2024 2000)  Absence of cardiac dysrhythmias or at baseline: Monitor cardiac rate and rhythm     Problem: Chronic Conditions and Co-morbidities  Goal: Patient's chronic conditions and co-morbidity symptoms are monitored and maintained or improved  Recent Flowsheet Documentation  Taken 6/20/2024 2000 by Jennifer Sosa RN  Care Plan - Patient's Chronic Conditions and Co-Morbidity Symptoms are Monitored and Maintained or Improved: Monitor and assess patient's chronic conditions and comorbid symptoms for stability, deterioration, or improvement  6/20/2024 0850 by Ciera Ramos RN  Outcome: Progressing     Problem: Anxiety  Goal: Will report anxiety at manageable levels  Description: INTERVENTIONS:  1. Administer medication as ordered  2. Teach and rehearse alternative coping skills  3. Provide emotional support with 1:1 interaction with staff  Outcome: Progressing  Flowsheets (Taken 6/20/2024 2000)  Will report anxiety at manageable levels: Administer medication as ordered     Problem: Coping  Goal: Pt/Family able to verbalize concerns and demonstrate effective coping strategies  Description: INTERVENTIONS:  1. Assist patient/family to identify coping skills, available support systems and cultural and spiritual values  2. Provide emotional support, including active listening and acknowledgement of concerns of patient and caregivers  3. Reduce environmental stimuli, as able  4. Instruct patient/family in relaxation techniques, as appropriate  5. Assess for spiritual pain/suffering and initiate Spiritual Care, Psychosocial Clinical Specialist consults as needed  Outcome: Progressing  Flowsheets (Taken 6/20/2024 2000)  Patient/family able to verbalize

## 2024-06-26 NOTE — CARE COORDINATION
delay- Cm spoke to Denisse in dispatch with [facility] [phone] - 90 minutes due to another pt delay. Nursing updated.  Electronically signed by Laura Lucas RN-BC on 6/26/2024 at 2:54 PM  
6/18/2024 ICU, Baseline Trach/peg. Strict Isolation to rule out exposure at SNF to Candida Auris. IV Merrem, IVF, Protonix, Bactrim, Picc line. Pt for Thoracentesis today.  pt is from SNF-[facility]. NO PRECERT, Needs signed LEOBARDO at discharge. Abby JOHNSON at [facility] updated. Palliative care following. [phone], Vent/Trache, AMS- minimally responsive- baseline. Strict Isolation- for contact with Anni Auris. Ambulance form in soft chart. Electronically signed by Laura Lucas RN-BC on 6/18/2024 at 2:56 PM    
6/19/2024 Planning discharge return to SNF tomorrow Thursday or Friday per notes. Baseline Trach/peg, pt is from SNF-[facility]. NO PRECERT, Needs signed LEOBARDO at discharge. Abby JOHNSON at SNF following. Pt is currently on LR, Merrem, Protonix, Prn Lopressor, Bactrim IV. Ambulance form in soft chart. Placed in Will Call- [facility] [phone], Vent/Trache, AMS- minimally responsive- baseline. Strict Isolation- for contact with Anni Auris. PICC heplocked, PEG- nothing infusing for transport. Ambulance form in soft chart. Will need to call vinnie Tuttle with [facility] when pt is ready for discharge. UNclear discharge antibiotic needs. Palliative, Pastoral Care and  following. Electronically signed by Laura Lucas RN-BC on 6/19/2024 at 2:34 PM  
6/20/2024 Planning for potential discharge return to SNF tomorrow 6/21/24 per Dr. Burgos. [facility]ACH updated.  Baseline Trach/peg, pt is from SNF-[facility]. NO PRECERT, Needs signed LEOBARDO at discharge. Abby JOHNSON at [facility] updated today. Placed in Will Call- [facility] [phone], Vent/Trache, AMS- minimally responsive- baseline. Strict Isolation- for contact with Anni Auris. PICC heplocked, PEG- nothing infusing for transport. Ambulance form in soft chart. Electronically signed by Laura Lucas RN-BC on 6/20/2024 at 12:03 PM  
6/25/2024 ICU, Trache/Vent, Peg, Lasix, Protonix, Bactrim- for another week from 6/24/2024- pt with PICC line. ID ok for pt discharge to SNF. 6/18/24 Thoracentesis, 6/20/24 FOB. Baseline Trach/peg, pt is from [facility]. NO PRECERT, Needs signed LEOBARDO at discharge. Placed in Will Call- Tara--[phone], Vent/Trache, AMS- minimally responsive- baseline. Strict Isolation- for contact with Anni Auris. PICC heplocked, PEG- nothing infusing for transport. Ambulance form in soft chart. Electronically signed by Laura Lucas RN-BC on 6/25/2024 at 11:57 AM  
6/26/2024 Discharge return to SNF and 2nd [facility] letter reviewed with son Parag Portillo and informed him of the 4 hour Medicare appeal ruling and Medicare number to call if any concerns.  Electronically signed by Laura Lucas RN on 6/26/2024 at 12:27 PM    
6/26/2024 Discharge return to SNF. Updated Dr. Villegas- no further Iv Antibiotics needed. Discharge return to SNF and 2nd [facility] letter reviewed with son Parag Portillo and informed him of the 4 hour Medicare appeal ruling and Medicare number to call if any concerns. AMS minimally responsive. Baseline Trach/peg, pt is from SNF-[facility]. NO PRECERT, Needs signed LEOBARDO at discharge. Jossie [phone]  time- soonest available @ 14:00 pm, Abby JOHNSON at SNF aware of discharge and  time.  Strict Isolation- for contact with Anni Auris.  Ambulance form in soft chart. Electronically signed by Laura Lucas RN on 6/26/2024 at 12:10 PM  
level: Assistance with the following:, Other (see comment), Mobility, Bathing, Dressing, Toileting, Feeding, Cooking, Housework, Shopping (completely dependent)      Family can provide assistance at DC: No (SNF return)  Would you like Case Management to discuss the discharge plan with any other family members/significant others, and if so, who? Yes (pt non-communitive- spoke to son Parag)  Plans to Return to Present Housing: Yes  Other Identified Issues/Barriers to RETURNING to current housing: SNF  Potential Assistance needed at discharge: Skilled Nursing Facility            Potential DME:  SNF  Patient expects to discharge to: Skilled nursing facility  Plan for transportation at discharge:  Ambulance- to SNF    Financial    Payor: MEDICARE / Plan: MEDICARE PART A AND B / Product Type: *No Product type* /       [facility] - [place] - [address] - P [phone] - F [phone]  [address]  [place] [address]  Phone: [phone] Fax: [phone]      Notes:    Factors facilitating achievement of predicted outcomes: Family support and Caregiver support    Barriers to discharge: completely dependent on caregivers, resides in SNF, Trache/Vent/peg    Additional Case Management Notes: 6/17/2024 Strict Isolation- due to SNF- ruling out on patient. ICU, Vent/Trache- dependent completely, eye contact, contractures.  LR, Merrem, Protonix. CM interviewed son, Palliative spoke to him, as well. Left AKA, Baseline Trach/peg- Lived in [place], had trache for 2 months over two years ago. 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 t She is from [facility]. NO PRECERT, Needs signed LEOBARDO at discharge. Abby JOHNSON at SNF updated. Palliative, Pastoral  care following. llk      The Plan for Transition of Care is related to the following treatment goals: SNF    The Patient and/or patient representative Parag-son was provided with a choice of provider and agrees   with

## 2024-06-26 NOTE — PROGRESS NOTES
Patient - Effie Portillo,  Age - 79 y.o.    - 1945      Room Number - IC10/IC10-01   N -  [id number]   [facility] # - [id number]  Date of Admission -  6/15/2024  9:06 AM    Problem list of patient:     Hospital Problems             Last Modified POA    * (Principal) Sepsis (HCC) 6/15/2024 Yes    Acute renal failure (ARF) ([facility]) 6/15/2024 Yes    Aspiration pneumonia of both lungs ([facility]) 6/15/2024 Yes    ACP (advance care planning) 2024 Yes   ASSESSMENT/PLAN   Sepsis  Respiratory failure chronic trach vent   RLL infiltrate concern for aspiration    Cx A baumanii (S) bactrim   Left pleural effusion   Left Groin rash ?tinea skin care antifungal   Acinetobacter baumannii colonized  Yash resolved   C auris exposure with  previous + screen 2024 repeat  screening cx     Check pleural fluid cx ngtd   Blood cx ngtd   miconazole nitrate 2 % ointment, BID  sulfamethoxazole-trimethoprim (BACTRIM) 208 mg in dextrose 5 % 500 mL IVPB, Q12H  meropenem (MERREM) 1,000 mg in sodium chloride 0.9 % 100 mL IVPB ([id number]), Q12H  tobramycin (PF) (CHERELLE) nebulizer solution 300 mg, BID RT  miconazole (MICOTIN) 2 % powder, BID      SUBJECTIVE:   DOS:  24  in bed awake trach vent afebrile    awake and alert responds has diarrhea tmax99.3  cr0.8 wbc5.7    trach vent awake responds ired457.6 currently afebrile    afebrile ac35% cr1.1 wbc4.9 pt is more awake and repsonds     OBJECTIVE   VITALS    height is 1.575 m (5' 2.01\") and weight is 54.9 kg (121 lb 0.5 oz). Her bladder temperature is 97.5 °F (36.4 °C). Her blood pressure is 167/77 (abnormal) and her pulse is 98. Her respiration is 16 and oxygen saturation is 96%.       General Appearance:  awake responds   HEENT: at/nc   Neck -   trach vent  Lungs: dec bs ant no wheeze ac40  Cardiovascular:   s1/s2   Abdomen: peg dry dstended nt   : espinoza   Neurologic:  awake   Skin :  no rash   Extremities: 
                                                                       Patient - Effie Portillo,  Age - 79 y.o.    - 1945      Room Number - IC10/IC10-01   N -  [id number]   [facility] # - [id number]  Date of Admission -  6/15/2024  9:06 AM    Problem list of patient:     Hospital Problems             Last Modified POA    * (Principal) Sepsis (HCC) 6/15/2024 Yes    Acute renal failure (ARF) ([facility]) 6/15/2024 Yes    Aspiration pneumonia of both lungs ([facility]) 6/15/2024 Yes    ACP (advance care planning) 2024 Yes   ASSESSMENT/PLAN   Sepsis  Respiratory failure chronic trach vent   RLL infiltrate concern for aspiration    Cx A baumanii (S) bactrim   Left pleural effusion   Abd distended/emesis sfor KUB and followed by GI   Left Groin rash ?tinea skin care antifungal   Acinetobacter baumannii colonized  Yash resolved   C auris exposure with  previous + screen 2024 repeat  screening cx 6/15 posiitve    Cont strict isolation      Check pleural fluid cx ngtd   Blood cx ngtd   miconazole nitrate 2 % ointment, BID  sulfamethoxazole-trimethoprim (BACTRIM) 208 mg in dextrose 5 % 500 mL IVPB, Q12H  meropenem (MERREM) 1,000 mg in sodium chloride 0.9 % 100 mL IVPB ([id number]), Q12H  tobramycin (PF) (CHERELLE) nebulizer solution 300 mg, BID RT  miconazole (MICOTIN) 2 % powder, BID      SUBJECTIVE:   DOS:  24  in bed arousable spoke with nurse had emesis last night afebrile    Cxry Some improvement seen in the aeration of the lung bases with residual   markings remaining with bilateral pleural effusions left greater than right.    in icu less alert trach vent    in bed awake trach vent afebrile    awake and alert responds has diarrhea tmax99.3  cr0.8 wbc5.7    trach vent awake responds qycs136.6 currently afebrile    afebrile ac35% cr1.1 wbc4.9 pt is more awake and repsonds     OBJECTIVE   VITALS    height is 1.575 m (5' 2.01\") and weight is 63.1 kg (139 lb 3.2 oz). Her bladder temperature is 97.9 °F 
                                                                       Patient - Effie Portillo,  Age - 79 y.o.    - 1945      Room Number - IC10/IC10-01   N -  [id number]   [facility] # - [id number]  Date of Admission -  6/15/2024  9:06 AM    Problem list of patient:     Hospital Problems             Last Modified POA    * (Principal) Sepsis ([facility]) 6/15/2024 Yes    Acute renal failure (ARF) ([facility]) 6/15/2024 Yes    Aspiration pneumonia of both lungs ([facility]) 6/15/2024 Yes     ASSESSMENT/PLAN   Sepsis  Respiratory failure chronic trach vent   RLL infiltrate concern for aspiration    Cx A baumanii   Left Groin rash ?tinea skin care antifungal   Acinetobacter baumannii colonized  Yash better   C auris exposure check screening cx  Cont meropenem and tobra neb  Check procal resp xc  Check blood cx  SUBJECTIVE:   DOS:  24  afebrile ac35% cr1.1 wbc4.9 pt is more awake and repsonds     OBJECTIVE   VITALS    height is 1.575 m (5' 2\") and weight is 50.6 kg (111 lb 8 oz). Her temperature is 99 °F (37.2 °C). Her blood pressure is 112/63 and her pulse is 86. Her respiration is 25 and oxygen saturation is 97%.       General Appearance:  awake and alert   HEENT: at/nc   Neck - Supple, no mass trach   Lungs: dec bs ant no wheeze ac355  Cardiovascular: hr 86 s1/s2   Abdomen: peg dry dstended nt   : espinoza   Neurologic: awake   Skin :  no rash   Extremities: edema hands contracted left aka left groin rash   Lines:   Peripheral Intravenous Line:  Peripheral IV 06/15/24 Distal;Left Forearm (Active)   Site Assessment Clean, dry & intact 24 08   Line Status Intermittent infusions 24 09   Line Care Connections checked and tightened;Ports disinfected 24 08   Phlebitis Assessment No symptoms 24 08   Infiltration Assessment 0 24 08   Alcohol Cap Used Yes 24 08   Dressing Status Clean, dry & intact 24   Dressing Type Transparent 24   Dressing Intervention Other 
                                                                       Patient - Effie Portillo,  Age - 79 y.o.    - 1945      Room Number - IC10/IC10-01   N -  [id number]   [facility] # - [id number]  Date of Admission -  6/15/2024  9:06 AM    Problem list of patient:     Hospital Problems             Last Modified POA    * (Principal) Sepsis (HCC) 6/15/2024 Yes    Acute renal failure (ARF) ([facility]) 6/15/2024 Yes    Aspiration pneumonia of both lungs ([facility]) 6/15/2024 Yes    ACP (advance care planning) 2024 Yes    Acute metabolic encephalopathy 2024 Yes    EVELIA (acute kidney injury) ([facility]) 2024 Yes    Anemia 2024 Yes    Encephalopathy 2024 Yes    Acute respiratory failure with hypoxia ([facility]) 2024 Yes    Aspiration pneumonia ([facility]) 2024 Yes   ASSESSMENT/PLAN   Sepsis leukopenai follow   Respiratory failure chronic trach vent   RLL infiltrate concern for aspiration    Cx A baumanii (S) bactrim   Left pleural effusion   Abd distended/emesis sfor KUB and followed by GI   Left Groin rash ?tinea skin care antifungal   Acinetobacter baumannii colonized  Evelia resolved   C auris exposure with  previous + screen 2024, 6/15/2024     Cont strict isolation      pleural fluid cx ngtd   Blood cx ngtd         Day 6 sulfamethoxazole-trimethoprim (BACTRIM) 260.8 mg in dextrose 5 % 500 mL IVPB, Q8H plan another 8 days  miconazole nitrate 2 % ointment, BID  tobramycin (PF) (CHERELLE) nebulizer solution 300 mg, BID RT  miconazole (MICOTIN) 2 % powder, BID          SUBJECTIVE:   DOS:  24 seen this am c/o headache trach vent    in icu awake nad dionne overall     in bed arousable spoke with nurse had emesis last night afebrile    Cxry Some improvement seen in the aeration of the lung bases with residual   markings remaining with bilateral pleural effusions left greater than right.    in icu less alert trach vent    in bed awake trach vent afebrile    awake and alert responds has 
                                                                       Patient - Effie Portillo,  Age - 79 y.o.    - 1945      Room Number - IC10/IC10-01   N -  [id number]   [facility] # - [id number]  Date of Admission -  6/15/2024  9:06 AM    Problem list of patient:     Hospital Problems             Last Modified POA    * (Principal) Sepsis (HCC) 6/15/2024 Yes    Acute renal failure (ARF) ([facility]) 6/15/2024 Yes    Aspiration pneumonia of both lungs ([facility]) 6/15/2024 Yes    ACP (advance care planning) 2024 Yes    Acute metabolic encephalopathy 2024 Yes    EVELIA (acute kidney injury) ([facility]) 2024 Yes    Anemia 2024 Yes    Encephalopathy 2024 Yes    Acute respiratory failure with hypoxia ([facility]) 2024 Yes    Aspiration pneumonia ([facility]) 2024 Yes    Hyponatremia 2024 Yes   ASSESSMENT/PLAN   Sepsis leukopenai follow   Respiratory failure chronic trach vent   RLL infiltrate concern for aspiration    Cx A baumanii (S) bactrim   Left pleural effusion   Abd distended/emesis sfor KUB and followed by GI   Left Groin rash ?tinea skin care antifungal   Acinetobacter baumannii colonized  Evelia resolved   C auris exposure with  previous + screen 2024, 6/15/2024     Cont strict isolation      pleural fluid cx ngtd   Blood cx ngtd     sulfamethoxazole-trimethoprim (BACTRIM) 260.8 mg in dextrose 5 % 500 mL IVPB, Q8H plan another week  miconazole nitrate 2 % ointment, BID  tobramycin (PF) (CHERELLE) nebulizer solution 300 mg, BID RT  miconazole (MICOTIN) 2 % powder, BID  Can d/c form ID Pov  SUBJECTIVE:   DOS:  24 awkae and alert nad    seen this am c/o headache trach vent    in icu awake nad dionne overall     in bed arousable spoke with nurse had emesis last night afebrile    Cxry Some improvement seen in the aeration of the lung bases with residual   markings remaining with bilateral pleural effusions left greater than right.    in icu less alert trach vent    in bed awake trach 
       [facility] Hospitalist Progress Note    Admitting Date and Time: 6/15/2024  9:06 AM  Admit Dx: Hyperkalemia [E87.5]  Septicemia (HCC) [A41.9]  Acute renal insufficiency [N28.9]  Sepsis (HCC) [A41.9]  Gastrointestinal hemorrhage, unspecified gastrointestinal hemorrhage type [K92.2]  Aspiration pneumonia of both lungs, unspecified aspiration pneumonia type, unspecified part of lung (HCC) [J69.0]    Subjective:  Patient is being followed for Hyperkalemia [E87.5]  Septicemia (HCC) [A41.9]  Acute renal insufficiency [N28.9]  Sepsis (HCC) [A41.9]  Gastrointestinal hemorrhage, unspecified gastrointestinal hemorrhage type [K92.2]  Aspiration pneumonia of both lungs, unspecified aspiration pneumonia type, unspecified part of lung (HCC) [J69.0]     Pt having anxiety attack -- has them occasionally per nurse  S/p IR drainage yesterday left possible empyema  No fevers  No events overnight  Got fentanyl X 1 last evening for dysynchrony with the vent  Continue suctioning for secretions    Per RN: pt received fentanyl last night, helped with anxiety attack    ROS: unable to obtained -- on ventilator     sulfamethoxazole-trimethoprim (BACTRIM) 260.8 mg in dextrose 5 % 500 mL IVPB  5 mg/kg (Adjusted) IntraVENous Q8H    sodium phosphate IVPB (CENTRAL line)  15 mmol IntraVENous Once    miconazole nitrate   Topical BID    ipratropium 0.5 mg-albuterol 2.5 mg  1 Dose Inhalation Q4H RT    enoxaparin  40 mg SubCUTAneous Daily    carvedilol  25 mg Oral BID WC    sodium chloride flush  5-40 mL IntraVENous 2 times per day    pantoprazole (PROTONIX) 40 mg in sodium chloride (PF) 0.9 % 10 mL injection  40 mg IntraVENous Q12H    meropenem  1,000 mg IntraVENous Q12H    tobramycin (PF)  300 mg Nebulization BID RT    miconazole   Topical BID    atorvastatin  10 mg PEG Tube Nightly    budesonide  500 mcg Nebulization Q12H    [Held by provider] clopidogrel  75 mg PEG Tube Daily    [Held by provider] apixaban  2.5 mg Oral BID     sodium 
       [facility] Hospitalist Progress Note    Admitting Date and Time: 6/15/2024  9:06 AM  Admit Dx: Hyperkalemia [E87.5]  Septicemia (HCC) [A41.9]  Acute renal insufficiency [N28.9]  Sepsis (HCC) [A41.9]  Gastrointestinal hemorrhage, unspecified gastrointestinal hemorrhage type [K92.2]  Aspiration pneumonia of both lungs, unspecified aspiration pneumonia type, unspecified part of lung (HCC) [J69.0]    Subjective:  Patient is being followed for Hyperkalemia [E87.5]  Septicemia (HCC) [A41.9]  Acute renal insufficiency [N28.9]  Sepsis (HCC) [A41.9]  Gastrointestinal hemorrhage, unspecified gastrointestinal hemorrhage type [K92.2]  Aspiration pneumonia of both lungs, unspecified aspiration pneumonia type, unspecified part of lung (HCC) [J69.0]     6/16 Pt had no fevers overnight  No events    Per RN: suctioning a lot of mucous  Has mostly been somnolent    ROS: unable to obtain from patient -- non-verbal    6/17 pt is awake and eyes open , on the vent and she doesn't follow commands , contracted both UL      ipratropium 0.5 mg-albuterol 2.5 mg  1 Dose Inhalation Q4H RT    carvedilol  12.5 mg Oral BID WC    pantoprazole (PROTONIX) 40 mg in sodium chloride (PF) 0.9 % 10 mL injection  40 mg IntraVENous Q12H    meropenem  1,000 mg IntraVENous Q12H    tobramycin (PF)  300 mg Nebulization BID RT    miconazole   Topical BID    atorvastatin  10 mg PEG Tube Nightly    budesonide  500 mcg Nebulization Q12H    [Held by provider] clopidogrel  75 mg PEG Tube Daily    miconazole   Topical BID    [Held by provider] apixaban  2.5 mg Oral BID     sodium chloride, , PRN  sodium chloride, , PRN  metoprolol, 2.5 mg, Q4H PRN  acetaminophen, 650 mg, Q6H PRN  bisacodyl, 10 mg, Daily PRN  polyethylene glycol, 17 g, Daily PRN         Objective:    BP (!) 155/93   Pulse 89   Temp 98.1 °F (36.7 °C) (Bladder)   Resp 28   Ht 1.575 m (5' 2.01\")   Wt 54.9 kg (121 lb)   SpO2 95%   BMI 22.13 kg/m²     General Appearance: alert, unable to 
       [facility]    Admitting Date and Time: 6/15/2024  9:06 AM  Admit Dx: Hyperkalemia [E87.5]  Septicemia (HCC) [A41.9]  Acute renal insufficiency [N28.9]  Sepsis (HCC) [A41.9]  Gastrointestinal hemorrhage, unspecified gastrointestinal hemorrhage type [K92.2]  Aspiration pneumonia of both lungs, unspecified aspiration pneumonia type, unspecified part of lung (HCC) [J69.0]    Subjective:    6/20: Pt alert and only responds to simple commands  Per RN, patient had bronchoscopy earlier this afternoon.    6/21: Patient lying in bed.  Eyes open but not really following commands    Per RN: Patient to be transfused a unit of packed red cells.  EGD is planned for this afternoon. Vomited overnight twice with concern for aspiration.       sodium phosphate IVPB (CENTRAL line)  30 mmol IntraVENous Once    meropenem  1,000 mg IntraVENous Q8H    furosemide  20 mg IntraVENous Daily    sacubitril-valsartan  1 tablet Oral BID    methylPREDNISolone  4 mg Oral QAM AC    methylPREDNISolone  4 mg Oral Lunch    methylPREDNISolone  4 mg Oral Dinner    methylPREDNISolone  8 mg Oral Nightly    [START ON 6/22/2024] methylPREDNISolone  4 mg Oral Nightly    [START ON 6/26/2024] predniSONE  5 mg Oral Daily    sulfamethoxazole-trimethoprim (BACTRIM) 260.8 mg in dextrose 5 % 500 mL IVPB  5 mg/kg (Adjusted) IntraVENous Q8H    miconazole nitrate   Topical BID    ipratropium 0.5 mg-albuterol 2.5 mg  1 Dose Inhalation Q4H RT    carvedilol  25 mg Oral BID WC    sodium chloride flush  5-40 mL IntraVENous 2 times per day    pantoprazole (PROTONIX) 40 mg in sodium chloride (PF) 0.9 % 10 mL injection  40 mg IntraVENous Q12H    tobramycin (PF)  300 mg Nebulization BID RT    miconazole   Topical BID    atorvastatin  10 mg PEG Tube Nightly    budesonide  500 mcg Nebulization Q12H    [Held by provider] clopidogrel  75 mg PEG Tube Daily    [Held by provider] apixaban  2.5 mg Oral BID     ondansetron, 4 mg, Q6H 
       [facility] Hospitalist Progress Note    Admitting Date and Time: 6/15/2024  9:06 AM  Admit Dx: Hyperkalemia [E87.5]  Septicemia (HCC) [A41.9]  Acute renal insufficiency [N28.9]  Sepsis (HCC) [A41.9]  Gastrointestinal hemorrhage, unspecified gastrointestinal hemorrhage type [K92.2]  Aspiration pneumonia of both lungs, unspecified aspiration pneumonia type, unspecified part of lung (HCC) [J69.0]    Subjective:  Patient is being followed for Hyperkalemia [E87.5]  Septicemia (HCC) [A41.9]  Acute renal insufficiency [N28.9]  Sepsis (HCC) [A41.9]  Gastrointestinal hemorrhage, unspecified gastrointestinal hemorrhage type [K92.2]  Aspiration pneumonia of both lungs, unspecified aspiration pneumonia type, unspecified part of lung (HCC) [J69.0]     Pt had no fevers overnight  No events    Per RN: suctioning a lot of mucous  Has mostly been somnolent    ROS: unable to obtain from patient -- non-verbal     carvedilol  12.5 mg Oral BID WC    pantoprazole (PROTONIX) 40 mg in sodium chloride (PF) 0.9 % 10 mL injection  40 mg IntraVENous Q12H    meropenem  1,000 mg IntraVENous Q12H    tobramycin (PF)  300 mg Nebulization BID RT    miconazole   Topical BID    atorvastatin  10 mg PEG Tube Nightly    budesonide  500 mcg Nebulization Q12H    [Held by provider] clopidogrel  75 mg PEG Tube Daily    ipratropium 0.5 mg-albuterol 2.5 mg  1 Dose Inhalation Q4H WA RT    miconazole   Topical BID    [Held by provider] apixaban  2.5 mg Oral BID     sodium chloride, , PRN  metoprolol, 2.5 mg, Q4H PRN  acetaminophen, 650 mg, Q6H PRN  bisacodyl, 10 mg, Daily PRN  polyethylene glycol, 17 g, Daily PRN         Objective:    BP (!) 141/60   Pulse 94   Temp 99 °F (37.2 °C)   Resp 27   Ht 1.575 m (5' 2\")   Wt 50.6 kg (111 lb 8 oz)   SpO2 92%   BMI 20.39 kg/m²     General Appearance: alert, unable to assess orientation, not in distress  Skin: warm and dry, good turgor, no rashes, no jaundice  Eyes: conjunctivae normal  Mouth: clear, moist, 
       [facility] Hospitalist Progress Note    Admitting Date and Time: 6/15/2024  9:06 AM  Admit Dx: Hyperkalemia [E87.5]  Septicemia (HCC) [A41.9]  Acute renal insufficiency [N28.9]  Sepsis (HCC) [A41.9]  Gastrointestinal hemorrhage, unspecified gastrointestinal hemorrhage type [K92.2]  Aspiration pneumonia of both lungs, unspecified aspiration pneumonia type, unspecified part of lung (HCC) [J69.0]    Subjective:  Patient is being followed for Hyperkalemia [E87.5]  Septicemia (HCC) [A41.9]  Acute renal insufficiency [N28.9]  Sepsis (HCC) [A41.9]  Gastrointestinal hemorrhage, unspecified gastrointestinal hemorrhage type [K92.2]  Aspiration pneumonia of both lungs, unspecified aspiration pneumonia type, unspecified part of lung (HCC) [J69.0]     Pt seen in ICU, in isolation  FiO2 35%  No events overnight  No bleeding  Tmax 100.2    Per RN: nothing to report    ROS: unable to obtain from the patient -- somnolent and non-verbal on the vent     ipratropium 0.5 mg-albuterol 2.5 mg  1 Dose Inhalation Q4H RT    enoxaparin  40 mg SubCUTAneous Daily    carvedilol  25 mg Oral BID WC    sodium chloride flush  5-40 mL IntraVENous 2 times per day    sulfamethoxazole-trimethoprim (BACTRIM) 208 mg in dextrose 5 % 500 mL IVPB  4 mg/kg (Adjusted) IntraVENous Q12H    pantoprazole (PROTONIX) 40 mg in sodium chloride (PF) 0.9 % 10 mL injection  40 mg IntraVENous Q12H    meropenem  1,000 mg IntraVENous Q12H    tobramycin (PF)  300 mg Nebulization BID RT    miconazole   Topical BID    atorvastatin  10 mg PEG Tube Nightly    budesonide  500 mcg Nebulization Q12H    [Held by provider] clopidogrel  75 mg PEG Tube Daily    [Held by provider] apixaban  2.5 mg Oral BID     sodium chloride flush, 5-40 mL, PRN  sodium chloride, , PRN  metoprolol, 2.5 mg, Q4H PRN  acetaminophen, 650 mg, Q6H PRN  bisacodyl, 10 mg, Daily PRN  polyethylene glycol, 17 g, Daily PRN         Objective:    /63   Pulse 72   Temp 99.1 °F (37.3 °C) (Bladder)   
       [facility] Hospitalist Progress Note    Admitting Date and Time: 6/15/2024  9:06 AM  Admit Dx: Hyperkalemia [E87.5]  Septicemia (HCC) [A41.9]  Acute renal insufficiency [N28.9]  Sepsis (HCC) [A41.9]  Gastrointestinal hemorrhage, unspecified gastrointestinal hemorrhage type [K92.2]  Aspiration pneumonia of both lungs, unspecified aspiration pneumonia type, unspecified part of lung (HCC) [J69.0]    Subjective:  Patient is being followed for Hyperkalemia [E87.5]  Septicemia (HCC) [A41.9]  Acute renal insufficiency [N28.9]  Sepsis (HCC) [A41.9]  Gastrointestinal hemorrhage, unspecified gastrointestinal hemorrhage type [K92.2]  Aspiration pneumonia of both lungs, unspecified aspiration pneumonia type, unspecified part of lung (HCC) [J69.0]   Patient seen and examined.  Intubated  Events reviewed.     ROS: unable to obtain      carvedilol  12.5 mg Oral BID WC    midodrine  15 mg Oral TID WC    sulfamethoxazole-trimethoprim  160 mg Oral Q12H    predniSONE  5 mg Oral Daily    clopidogrel  75 mg PEG Tube Daily    furosemide  20 mg IntraVENous BID    apixaban  2.5 mg Oral BID    sacubitril-valsartan  1 tablet Oral BID    miconazole nitrate   Topical BID    ipratropium 0.5 mg-albuterol 2.5 mg  1 Dose Inhalation Q4H RT    sodium chloride flush  5-40 mL IntraVENous 2 times per day    pantoprazole (PROTONIX) 40 mg in sodium chloride (PF) 0.9 % 10 mL injection  40 mg IntraVENous Q12H    tobramycin (PF)  300 mg Nebulization BID RT    miconazole   Topical BID    atorvastatin  10 mg PEG Tube Nightly    budesonide  500 mcg Nebulization Q12H     ondansetron, 4 mg, Q6H PRN  sodium chloride, , PRN  sodium chloride flush, 5-40 mL, PRN  sodium chloride, , PRN  metoprolol, 2.5 mg, Q4H PRN  acetaminophen, 650 mg, Q6H PRN  bisacodyl, 10 mg, Daily PRN  polyethylene glycol, 17 g, Daily PRN         Objective:    BP (!) 145/45   Pulse 73   Temp 97.5 °F (36.4 °C) (Oral)   Resp 23   Ht 1.575 m (5' 2.01\")   Wt 61.4 kg (135 lb 6 oz)   
     [facility] Hospitalist   Progress Note    Admitting Date and Time: 6/15/2024  9:06 AM  Admit Dx: Hyperkalemia [E87.5]  Septicemia (HCC) [A41.9]  Acute renal insufficiency [N28.9]  Sepsis (HCC) [A41.9]  Gastrointestinal hemorrhage, unspecified gastrointestinal hemorrhage type [K92.2]  Aspiration pneumonia of both lungs, unspecified aspiration pneumonia type, unspecified part of lung (HCC) [J69.0]    Subjective:    Patient was admitted with Hyperkalemia [E87.5]  Septicemia (HCC) [A41.9]  Acute renal insufficiency [N28.9]  Sepsis (HCC) [A41.9]  Gastrointestinal hemorrhage, unspecified gastrointestinal hemorrhage type [K92.2]  Aspiration pneumonia of both lungs, unspecified aspiration pneumonia type, unspecified part of lung (HCC) [J69.0]. Patient denies fever, chills, cp, sob, n/v.     apixaban  2.5 mg Oral BID    furosemide  20 mg IntraVENous Daily    sacubitril-valsartan  1 tablet Oral BID    methylPREDNISolone  4 mg Oral QAM AC    methylPREDNISolone  4 mg Oral Nightly    [START ON 6/26/2024] predniSONE  5 mg Oral Daily    sulfamethoxazole-trimethoprim (BACTRIM) 260.8 mg in dextrose 5 % 500 mL IVPB  5 mg/kg (Adjusted) IntraVENous Q8H    miconazole nitrate   Topical BID    ipratropium 0.5 mg-albuterol 2.5 mg  1 Dose Inhalation Q4H RT    carvedilol  25 mg Oral BID WC    sodium chloride flush  5-40 mL IntraVENous 2 times per day    pantoprazole (PROTONIX) 40 mg in sodium chloride (PF) 0.9 % 10 mL injection  40 mg IntraVENous Q12H    tobramycin (PF)  300 mg Nebulization BID RT    miconazole   Topical BID    atorvastatin  10 mg PEG Tube Nightly    budesonide  500 mcg Nebulization Q12H    [Held by provider] clopidogrel  75 mg PEG Tube Daily     ondansetron, 4 mg, Q6H PRN  sodium chloride, , PRN  sodium chloride flush, 5-40 mL, PRN  sodium chloride, , PRN  metoprolol, 2.5 mg, Q4H PRN  acetaminophen, 650 mg, Q6H PRN  bisacodyl, 10 mg, Daily PRN  polyethylene glycol, 17 g, Daily PRN         Objective:    BP 
    This patient is on medication that requires renal, weight, and/or indication dose adjustment.      Date Body Weight IBW  Adjusted BW SCr  CrCl Dialysis status BMI   6/20/2024 53.7 kg (118 lb 6.2 oz) Ideal body weight: 50.1 kg (110 lb 7.9 oz)  Adjusted ideal body weight: 51.6 kg (113 lb 10.4 oz) Serum creatinine: 0.7 mg/dL 06/20/24 0356  Estimated creatinine clearance: 52 mL/min N/a Body mass index is 21.65 kg/m².       Pharmacy has dose-adjusted the following medication(s):    Ordered Medication: Meropenem 1000mg q12h     Order Changed/converted to: Meropenem 1000mg q8h    These changes were made per protocol according to the [facility] Dose Adjustment Policy.     *Please note this dose may need readjusted if patient's condition changes.    Please contact pharmacy with any questions regarding these changes.    Kallie Gonzalez, PharmD.  6/20/2024 12:40 PM    SJW: [phone]    
   06/17/24 1037   Encounter Summary   Encounter Overview/Reason Palliative Care   Service Provided For Patient not available  (Patient intubated,  left VM with family inviting a return call.)   Referral/Consult From Rounding;Palliative Care   Support System Children   Last Encounter  06/17/24  (P-hd)   Complexity of Encounter High   Spiritual/Emotional needs   Type Spiritual Support   Palliative Care   Type Palliative Care, Initial/Spiritual Assessment   Assessment/Intervention/Outcome   Assessment Unable to assess   Intervention Prayer (assurance of)/[place];Read/Provided Scripture   Outcome Did not respond     Patient intubated,  left VM with family inviting a return call. Spiritual Care is ongoing and as needed.    Chaplain Marvin Mackay [facility]  [phone]    
   06/24/24 1515   Encounter Summary   Encounter Overview/Reason Palliative Care   Service Provided For Family;Patient not available  (Patient was unavailable,  left VM with family inviting return call.)   Referral/Consult From Palliative Care   Support System Children   Last Encounter  06/24/24  (PL-hd)   Complexity of Encounter High   Spiritual/Emotional needs   Type Spiritual Support   Palliative Care   Type Palliative Care, Follow-up   Assessment/Intervention/Outcome   Assessment Unable to assess   Intervention Read/Provided Scripture;Prayer (assurance of)/[place]   Outcome Did not respond     Patient was unavailable,  left VM with family inviting return call. Spiritual care is ongoing and as needed.    Chaplain Marvin Mackay  [facility]  [phone]    
  4 Eyes Skin Assessment     NAME:  Effie Portillo  YOB: 1945  MEDICAL RECORD NUMBER:  [id number]    The patient is being assessed for  Admission    I agree that at least one RN has performed a thorough Head to Toe Skin Assessment on the patient. ALL assessment sites listed below have been assessed.      Areas assessed by both nurses:    Head, Face, Ears, Shoulders, Back, Chest, Arms, Elbows, Hands, Sacrum. Buttock, Coccyx, Ischium, Legs. Feet and Heels, and Under Medical Devices Excoriated rin anal area small check marked shaped red none blanchable area right upper back, reddened area around peg tube site         Does the Patient have a Wound? Yes wound(s) were present on assessment. LDA wound assessment was Initiated and completed by ARTHUR       Branden Prevention initiated by RN: Yes  Wound Care Orders initiated by RN: Yes    Pressure Injury (Stage 3,4, Unstageable, DTI, NWPT, and Complex wounds) if present, place Wound referral order by RN under : Yes    New Ostomies, if present place, Ostomy referral order under : No     Nurse 1 eSignature: Electronically signed by Robert Anna RN on 6/15/24 at 7:33 PM EDT    **SHARE this note so that the co-signing nurse can place an eSignature**    Nurse 2 eSignature: Electronically signed by Miller Morton RN on 6/15/24 at 7:34 PM EDT   
  Palliative Care Department  [phone]  Palliative Care Progress Note  Provider TROY Coelho - ANNA     Effie Portillo  [id number]  Hospital Day: 5  Date of Initial Consult: 6/17/2024  Referring Provider: Nidia Flores APRN - CNP  Palliative Medicine was consulted for assistance with: Goals of care, end-stage disease    HPI:   Effie Portillo is a 79 y.o. 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  She has history of Candida auris colonization.  She was recently evaluated at [facility] for hypoxemia and was discharged.  Patient is nonverbal at baseline.  She was sent back to [facility] due to persistent hypoxemia and was found to be hypotensive. Respiratory is suctioning increased her trach to 20 L of oxygen.  EMS decreased her oxygen to 15 L en route.  Patient received fluid resuscitation in the emergency room and continued to be hypotensive.  GI and ID consulted.  Palliative medicine consulted for further assistance.    ASSESSMENT/PLAN:     Pertinent Hospital Diagnoses     Severe sepsis with septic shock  UTI  EVELIA  Acute blood loss anemia  GI bleeding  Chronic encephalopathy  Heart failure  Left-sided pleural effusion      Palliative Care Encounter / Counseling Regarding Goals of Care  Please see detailed goals of care discussion as below  At this time, Effie Portillo, Does Not have capacity for medical decision-making.  Capacity is time limited and situation/question specific  During encounter Parag was surrogate medical decision-maker  Outcome of goals of care meeting:   Continue 
Assessment and Plan  Patient is a 79 y.o. female with the following medical Problems:   Severe sepsis with septic shock (resolved)  Urinary tract infection.  Acute kidney injury  Hyperkalemia  Acute blood loss anemia  GI bleeding  Chronic encephalopathy  Aspiration pneumonia  Heart failure with moderately reduced ejection fraction (EF 30 to 35% January 2024)  Left-sided pleural effusion    Plan of care:  Fluid resuscitation with 30 mill per KG.  Change fluid to LR at 50 cc an hour  Midodrine 15 mg 3 times daily as a vasosparing agent  Will hold with chronic on Eliquis which was held.  SCDs were ordered  GI prophylaxis  Reconcile home medications  Levophed to keep MAP above 65  Continue with broad-spectrum antibiotic as per ID team recommendations.  Continue with Merrem and tobramycin  Goals of care discussion.  Dietitian consult for tube feeding  Resume Coreg at 12.5 twice a day.  Metoprolol 2.5 IV every 4 hours as needed for systolic above 150  Continue with mechanical ventilation.  Transfuse to keep hemoglobin above 7.  GI consult to rule out GI bleeding.    History of Present Illness:   History is limited due to patient's medical condition.  History is mostly obtained from the chart.  Patient is a 79-year-old woman who is a chronic resident of [facility].  She has history of Candida auris colonization.  She was recently evaluated at [facility] for hypoxemia and was discharged.  Patient is nonverbal at baseline.  She was sent back to [facility] due to persistent hypoxemia and was found to be hypotensive.  Patient received fluid resuscitation in the emergency room and continued to be hypotensive.  ID team addressed antimicrobial therapy and patient was admitted to the ICU.  Lactic acid was 1.5.    Daily progress:  June 16, 2024: Patient has been maintained on mechanical ventilation.  She has been weaned off Levophed however she remains lethargic.  She opens eyes but does 
Chart review, for consultation to follow.    Briefly Mrs. Portillo is a 79-year-old female with history of HTN, CAD status post MI, HFrEF 35%, PAF, emphysema with chronic respiratory failure ventilator dependent, status post tracheostomy, pulmonary hypertension, status post PEG tube placement, Candida aureus colonization, who was admitted on Amy 15, 2024 after he was referred to the ER via EMS from the nursing home due to concerns of aspiration.  Chest x-ray showed right lower lobe airspace disease, small left pleural effusion, CT of the chest on 217 shows small right and moderate left pleural effusions.  Patient was admitted to MICU with a diagnosis of severe sepsis with septic shock, aspiration pneumonia, UTI and severe anemia.  Her creatinine level on admission was 1.1 and since then has improved down to 0.8 mg/dL, her sodium level on admission was 145 mEq/L but in the last 48 hours sodium level has decreased down to 126 mEq/L reason for this consultation.  Since admission she is has received significant amount of volume resuscitation with positive fluid balance >11 L at the same time in the last 72 hours her urine output has improved up to 3.7 L in the last 24 hours.  She has received Lasix 20 mg IV daily since June 20, and she has been receiving Bactrim IV and D5W 500 cc every 8 hours.  Other pertinent medications are Entresto 24-26 mg twice daily    Hypotonic hyponatremia, probably hemodynamically (congestive heart failure) mediated along with large free water administration (IV Bactrim), needs further diuresis    HFrEF 35%, on Entresto, Lasix, carvedilol, needs further diuresis.  Hypophosphatemia, rule out vitamin D deficiency  -----------------------------------------------  Aspiration pneumonia, on Bactrim  PAF, on apixaban, carvedilol  Hyperlipidemia, on atorvastatin  Nutrition, TF at 60 cc/hour, free water 30 cc every 4 hours    Plan:       Change Lasix to 20 mg IV twice daily  Try to concentrate 
Chart reviewed. The potential plan is for patient to discharge back to the nursing facility tomorrow. Goals of care and code status established. DNR form placed in the soft chart. There are no further PM needs at this time.  PM will now sign off.  If new PM needs arise, please re-consult.  Thank you.    
Comprehensive Nutrition Assessment    Type and Reason for Visit:  Reassess    Nutrition Recommendations/Plan:   Continue NPO status   Continue current TF regimen        Malnutrition Assessment:  Malnutrition Status:  At risk for malnutrition (Comment) (06/17/24 0836)    Context:  Chronic Illness     Findings of the 6 clinical characteristics of malnutrition:  Energy Intake:  No significant decrease in energy intake  Weight Loss:  No significant weight loss     Body Fat Loss:  Unable to assess (isolation status)     Muscle Mass Loss:  Unable to assess (isolation status)    Fluid Accumulation:  No significant fluid accumulation     Strength:  Not Performed    Nutrition Assessment:    Pt admit w/ acute on chronic resp failure, bilateral PNA (VAP vs aspiration), sepsis w/ shock & hypotension (now resolved, off pressors), anemia d/t acute blood loss/GIB, UTI & EVELIA. PMHx of CVA, CAD s/p MI, HF, Afib, HTN, L AKA, s/p trach/PEG. Pt is NPO - TF clamped for bronch 6/20. Recommend continue current TF regimen, continue to monitor nutrition progression.    Nutrition Related Findings:    abd WDL, +1 edema, I/O's +9.48L since admit, Jevity @ 60ml/h - tolerating, GRV 0 Wound Type: None       Current Nutrition Intake & Therapies:    Average Meal Intake: NPO (on tube feedings)  Average Supplements Intake: NPO  ADULT TUBE FEEDING; PEG; Standard with Fiber; Continuous; 20; Yes; 10; Q 6 hours; 60; 100; Q 4 hours  Current Tube Feeding (TF) Orders:  Feeding Route: PEG  Formula: Standard with Fiber  Schedule: Continuous  Feeding Regimen: 60ml/h  Additives/Modulars: None  Water Flushes: 100ml q4h  Current TF & Flush Orders Provides: at goal  Goal TF & Flush Orders Provides: 2160kcal, 92g protein, 1694ml water per day    Anthropometric Measures:  Height: 157.5 cm (5' 2.01\")  Ideal Body Weight (IBW): 110 lbs (50 kg)    Admission Body Weight: 50.6 kg (111 lb 8 oz) (6/16 bed scale)  Current Body Weight: 54.9 kg (121 lb 0.5 oz) (6/17/24 bed 
Department of Internal Medicine  Nephrology Attending Consult Note    Events reviewed.      SUBJECTIVE: We are following Mrs. Portillo for hyponatremia.  Patient with tracheostomy.    PHYSICAL EXAM:      Vitals:    VITALS:  BP (!) 101/44   Pulse 75   Temp 97.5 °F (36.4 °C) (Oral)   Resp 25   Ht 1.575 m (5' 2.01\")   Wt 61.4 kg (135 lb 6 oz)   SpO2 95%   BMI 24.75 kg/m²   24HR INTAKE/OUTPUT:    Intake/Output Summary (Last 24 hours) at 6/25/2024 1132  Last data filed at 6/25/2024 0800  Gross per 24 hour   Intake 1244 ml   Output 5150 ml   Net -3906 ml         Constitutional: Patient is awake, alert  HEENT: Pupils are equal reactive  Respiratory: Tracheostomy in place  Cardiovascular/Edema: Heart sounds regular  Gastrointestinal: Abdomen soft, PEG in place  Neurologic: Overall nonfocal  Skin: No lesion  Other: + Edema    Scheduled Meds:   carvedilol  12.5 mg Oral BID WC    midodrine  15 mg Oral TID WC    sulfamethoxazole-trimethoprim  160 mg Oral Q12H    predniSONE  5 mg Oral Daily    clopidogrel  75 mg PEG Tube Daily    furosemide  20 mg IntraVENous BID    apixaban  2.5 mg Oral BID    sacubitril-valsartan  1 tablet Oral BID    miconazole nitrate   Topical BID    ipratropium 0.5 mg-albuterol 2.5 mg  1 Dose Inhalation Q4H RT    sodium chloride flush  5-40 mL IntraVENous 2 times per day    pantoprazole (PROTONIX) 40 mg in sodium chloride (PF) 0.9 % 10 mL injection  40 mg IntraVENous Q12H    tobramycin (PF)  300 mg Nebulization BID RT    miconazole   Topical BID    atorvastatin  10 mg PEG Tube Nightly    budesonide  500 mcg Nebulization Q12H     Continuous Infusions:   sodium chloride      sodium chloride       PRN Meds:.ondansetron, sodium chloride, sodium chloride flush, sodium chloride, metoprolol, acetaminophen, bisacodyl, polyethylene glycol      DATA:    CBC:   Lab Results   Component Value Date/Time    WBC 6.6 06/25/2024 04:43 AM    RBC 3.64 06/25/2024 04:43 AM    HGB 9.7 06/25/2024 04:43 AM    HCT 29.6 
Department of Internal Medicine  Nephrology Attending Consult Note    Events reviewed.      SUBJECTIVE: We are following Mrs. Portillo for hyponatremia.  Patient with tracheostomy.    PHYSICAL EXAM:      Vitals:    VITALS:  BP (!) 109/48   Pulse 74   Temp (!) 96.6 °F (35.9 °C)   Resp 25   Ht 1.575 m (5' 2.01\")   Wt 60.1 kg (132 lb 8 oz)   SpO2 96%   BMI 24.23 kg/m²   24HR INTAKE/OUTPUT:    Intake/Output Summary (Last 24 hours) at 6/26/2024 1237  Last data filed at 6/26/2024 0625  Gross per 24 hour   Intake 2319 ml   Output 3150 ml   Net -831 ml         Constitutional: Patient is awake, alert  HEENT: Pupils are equal reactive  Respiratory: Tracheostomy in place  Cardiovascular/Edema: Heart sounds regular  Gastrointestinal: Abdomen soft, PEG in place  Neurologic: Overall nonfocal  Skin: No lesion  Other: + Edema    Scheduled Meds:   sodium zirconium cyclosilicate  10 g Oral Daily    carvedilol  12.5 mg Oral BID WC    midodrine  15 mg Oral TID WC    [Held by provider] sulfamethoxazole-trimethoprim  160 mg Oral Q12H    predniSONE  5 mg Oral Daily    clopidogrel  75 mg PEG Tube Daily    [Held by provider] furosemide  20 mg IntraVENous BID    apixaban  2.5 mg Oral BID    [Held by provider] sacubitril-valsartan  1 tablet Oral BID    miconazole nitrate   Topical BID    ipratropium 0.5 mg-albuterol 2.5 mg  1 Dose Inhalation Q4H RT    sodium chloride flush  5-40 mL IntraVENous 2 times per day    pantoprazole (PROTONIX) 40 mg in sodium chloride (PF) 0.9 % 10 mL injection  40 mg IntraVENous Q12H    tobramycin (PF)  300 mg Nebulization BID RT    miconazole   Topical BID    atorvastatin  10 mg PEG Tube Nightly    budesonide  500 mcg Nebulization Q12H     Continuous Infusions:   sodium chloride      sodium chloride       PRN Meds:.ondansetron, sodium chloride, sodium chloride flush, sodium chloride, metoprolol, acetaminophen, bisacodyl, polyethylene glycol      DATA:    CBC:   Lab Results   Component Value Date/Time    WBC 
Multiple attempts failed to achieve a ABG  
POCT Gastroccult   PH 4.0   Occult negative  
PROGRESS NOTE  By Eren Kinsey, ALYSSA    [facility]  Dr. Rosemary Hartman M.D.,  Dr. Phil Benitez M.D.,   Dr. Pato Hernandez, D.O.,  Dr. Dayday Trejo M.D.,  Dr. Cristobal Salcedo D.O.,          Effie DUGAN Marwisammeagan  79 y.o.  female    SUBJECTIVE:  Patient resting comfortably in bed.  No acute distress.  Awake but nonresponsive.  Discussed with nursing.  Currently tolerating tube feed with no significant residual.  Vomited x 2 last night, emesis consisting of tube feed.  Passing stools.  Stools are brown and mushy.  No blood or melena.    OBJECTIVE:    /79   Pulse 63   Temp 97.5 °F (36.4 °C) (Axillary)   Resp 23   Ht 1.575 m (5' 2.01\")   Wt 64.5 kg (142 lb 3.2 oz)   SpO2 94%   BMI 26.00 kg/m²     General: Older adult  female.  NAD  HEENT: Anicteric sclera/moist oral mucosa  Neck: Trachea midline/tracheostomy  Chest: Symmetric excursion/mechanically ventilated via tracheostomy.  No wheezing  Cor: Regular/S1-S2  Abd.: Soft and obese.  Appears nontender.  PEG tube in place  Extr.:  LLE AKA.  Skin: Warm and dry/anicteric  Other: Goncalves catheter      DATA:    Monitor data reviewed -sinus rhythm noted.       Lab Results   Component Value Date/Time    WBC 8.5 06/21/2024 05:45 AM    RBC 2.91 06/21/2024 05:45 AM    HGB 9.0 06/22/2024 04:01 AM    HCT 26.8 06/22/2024 04:01 AM    MCV 82.1 06/21/2024 05:45 AM    MCH 26.1 06/21/2024 05:45 AM    MCHC 31.8 06/21/2024 05:45 AM    RDW 16.1 06/21/2024 05:45 AM     06/21/2024 05:45 AM    MPV 11.3 06/21/2024 05:45 AM     Lab Results   Component Value Date/Time     06/21/2024 05:45 AM    K 4.8 06/21/2024 05:45 AM    CL 98 06/21/2024 05:45 AM    CO2 27 06/21/2024 05:45 AM    BUN 27 06/21/2024 05:45 AM    CREATININE 0.7 06/21/2024 05:45 AM    CALCIUM 8.2 06/21/2024 05:45 AM    BILITOT <0.2 06/21/2024 05:45 AM    ALKPHOS 108 06/21/2024 05:45 AM    AST 12 06/21/2024 05:45 AM    ALT 11 06/21/2024 05:45 AM     No results found for: \"LIPASE\"  No results 
PROGRESS NOTE  By Neo Artis M.D.    [facility]  Dr. Rosemary Hartman M.D.,  Dr. Phil Benitez M.D.,   Dr. Pato Hernandez D.O.,  Dr. Dayday Trejo M.D.,  OTTO ArellanoO.,          Effie DUGAN Lindsey  79 y.o.  female    SUBJECTIVE:  Remains nonverbal    OBJECTIVE:    BP (!) 191/114   Pulse 100   Temp 98.3 °F (36.8 °C) (Bladder)   Resp 26   Ht 1.575 m (5' 2.01\")   Wt 54.9 kg (121 lb 0.5 oz)   SpO2 94%   BMI 22.13 kg/m²     General: NAD/older adult  female  HEENT: Anicteric sclera/moist oral mucosa  Neck: Neck is midline/tracheostomy  Chest: Mechanically ventilated via tracheostomy.  No wheezing  Cor: Regular/S1-S2  Abd.: Soft.  Obese.  Appears nontender.  PEG tube.  BS +/4  Extr.:  LLE AKA.  RLE mild edema.  Decreased muscle tone and bulk throughout  Skin: Warm and dry/anicteric  Other: Goncalves catheter      DATA:    Monitor data reviewed -sinus rhythm noted.       Lab Results   Component Value Date/Time    WBC 11.3 06/19/2024 03:46 AM    RBC 3.54 06/19/2024 03:46 AM    HGB 9.4 06/19/2024 03:46 AM    HCT 29.5 06/19/2024 03:46 AM    MCV 83.3 06/19/2024 03:46 AM    MCH 26.6 06/19/2024 03:46 AM    MCHC 31.9 06/19/2024 03:46 AM    RDW 15.7 06/19/2024 03:46 AM     06/19/2024 03:46 AM    MPV 11.0 06/19/2024 03:46 AM     Lab Results   Component Value Date/Time     06/19/2024 03:46 AM    K 4.1 06/19/2024 03:46 AM     06/19/2024 03:46 AM    CO2 27 06/19/2024 03:46 AM    BUN 32 06/19/2024 03:46 AM    CREATININE 0.8 06/19/2024 03:46 AM    CALCIUM 8.7 06/19/2024 03:46 AM    BILITOT 0.3 06/19/2024 03:46 AM    ALKPHOS 123 06/19/2024 03:46 AM    AST 12 06/19/2024 03:46 AM    ALT 10 06/19/2024 03:46 AM     No results found for: \"LIPASE\"  No results found for: \"AMYLASE\"      ASSESSMENT/PLAN:  Patient Active Problem List   Diagnosis    Chronic respiratory failure with hypoxia (HCC)    Primary hypertension    History of stroke with residual deficit    History of DVT (deep vein 
PROGRESS NOTE  By Neo Artis M.D.    [facility]  Dr. Rosemary Hartman M.D.,  Dr. Phil Benitez M.D.,   Dr. Pato Hernandez D.O.,  Dr. Dayday Trejo M.D.,  OTTO ArellanoO.,          Effie DUGAN Lindsey  79 y.o.  female    SUBJECTIVE:  Remains nonverbal secondary to tracheostomy.  Appears to complain of some abdominal discomfort    OBJECTIVE:    BP (!) 148/68   Pulse 74   Temp 98.8 °F (37.1 °C) (Axillary)   Resp 25   Ht 1.575 m (5' 2.01\")   Wt 67.2 kg (148 lb 1.6 oz)   SpO2 95%   BMI 27.08 kg/m²     General: NAD/older adult  female  HEENT: Anicteric sclera/moist oral mucosa/poor dentition  Neck: Trachea midline/tracheostomy  Chest: Symmetric excursion/ventilated via tracheostomy  Cor: Regular/S1-S2  Abd.: Soft and obese.  PEG tube in place  Extr.:  LLE AKA.  Skin: Warm and dry/anicteric  Other:Goncalves catheter      DATA:    Monitor data reviewed -sinus rhythm noted.       Lab Results   Component Value Date/Time    WBC 5.4 06/24/2024 06:15 AM    RBC 3.64 06/24/2024 06:15 AM    HGB 9.7 06/24/2024 06:15 AM    HCT 29.6 06/24/2024 06:15 AM    MCV 81.3 06/24/2024 06:15 AM    MCH 26.6 06/24/2024 06:15 AM    MCHC 32.8 06/24/2024 06:15 AM    RDW 16.9 06/24/2024 06:15 AM     06/24/2024 06:15 AM    MPV 11.2 06/24/2024 06:15 AM     Lab Results   Component Value Date/Time     06/24/2024 06:15 AM    K 5.0 06/24/2024 06:15 AM    CL 93 06/24/2024 06:15 AM    CO2 27 06/24/2024 06:15 AM    BUN 19 06/24/2024 06:15 AM    CREATININE 0.8 06/24/2024 06:15 AM    CALCIUM 8.1 06/24/2024 06:15 AM    BILITOT <0.2 06/24/2024 06:15 AM    ALKPHOS 122 06/24/2024 06:15 AM    AST 11 06/24/2024 06:15 AM    ALT 12 06/24/2024 06:15 AM     No results found for: \"LIPASE\"  No results found for: \"AMYLASE\"      ASSESSMENT/PLAN:  Patient Active Problem List   Diagnosis    Chronic respiratory failure with hypoxia (HCC)    Primary hypertension    History of stroke with residual deficit    History of DVT (deep vein 
PROGRESS NOTE  By Neo Artis M.D.    [facility]  Dr. Rosemary Hartman M.D.,  Dr. Phil Benitez M.D.,   Dr. Pato Hernandez D.O.,  Dr. Dayday Trejo M.D.,  OTTO ArellanoO.,          Effie DUGAN Marwisammeagan  79 y.o.  female    SUBJECTIVE:  Remains nonverbal secondary to tracheostomy.  No family at bedside    OBJECTIVE:    BP (!) 166/72   Pulse 93   Temp 97.5 °F (36.4 °C) (Oral)   Resp 26   Ht 1.575 m (5' 2.01\")   Wt 61.4 kg (135 lb 6 oz)   SpO2 92%   BMI 24.75 kg/m²     General: Older adult  female.  NAD  HEENT: Anicteric sclera/moist oral mucosa  Neck: Supple/trachea midline  Chest: Symmetric excursion/mechanically ventilated via tracheostomy  Cor: Regular  Abd.: Soft/appears nontender.  BS +.  PEG tube in place  Extr.:  LLE AKA  Skin: Warm and dry  Other: Goncalves catheter      DATA:    Monitor data reviewed -sinus rhythm noted.       Lab Results   Component Value Date/Time    WBC 6.6 06/25/2024 04:43 AM    RBC 3.64 06/25/2024 04:43 AM    HGB 9.7 06/25/2024 04:43 AM    HCT 29.6 06/25/2024 04:43 AM    MCV 81.3 06/25/2024 04:43 AM    MCH 26.6 06/25/2024 04:43 AM    MCHC 32.8 06/25/2024 04:43 AM    RDW 17.2 06/25/2024 04:43 AM     06/25/2024 04:43 AM    MPV 11.2 06/25/2024 04:43 AM     Lab Results   Component Value Date/Time     06/25/2024 04:43 AM    K 5.6 06/25/2024 04:43 AM    CL 95 06/25/2024 04:43 AM    CO2 28 06/25/2024 04:43 AM    BUN 23 06/25/2024 04:43 AM    CREATININE 0.8 06/25/2024 04:43 AM    CALCIUM 8.4 06/25/2024 04:43 AM    BILITOT <0.2 06/25/2024 04:43 AM    ALKPHOS 130 06/25/2024 04:43 AM    AST 10 06/25/2024 04:43 AM    ALT 11 06/25/2024 04:43 AM     No results found for: \"LIPASE\"  No results found for: \"AMYLASE\"      ASSESSMENT/PLAN:  Patient Active Problem List   Diagnosis    Chronic respiratory failure with hypoxia (HCC)    Primary hypertension    History of stroke with residual deficit    History of DVT (deep vein thrombosis)    Coronary artery disease 
PROGRESS NOTE  By Neo Artis M.D.    [facility]  Dr. Rosemary Hartman M.D.,  Dr. Phil Benitez M.D.,   Dr. Pato Hernandez D.O.,  Dr. Dayday Trejo M.D.,  OTTO ArellanoO.,          Effie Domeagan  79 y.o.  female    SUBJECTIVE:  Remains nonverbal secondary to tracheostomy.  No family at bedside    OBJECTIVE:    BP (!) 146/107   Pulse 88   Temp 99.1 °F (37.3 °C) (Bladder)   Resp (!) 36   Ht 1.575 m (5' 2.01\")   Wt 54.9 kg (121 lb)   SpO2 95%   BMI 22.13 kg/m²     General: Older adult  female.  NAD  HEENT: Anicteric sclera/no conjunctival erythema.  Moist oral mucosa  Neck: Supple/tracheostomy  Chest: Symmetric excursion/mechanically ventilated  Cor: Regular/S1-S2  Abd.: Soft.  Appears nontender.  PEG tube in place.  BS +/4  Extr.:  LLE AKA/RLE mild edema.  Decreased muscle tone and bulk throughout  Skin: Warm and dry/anicteric  Other: Goncalves catheter      DATA:    Monitor data reviewed -sinus rhythm noted.       Lab Results   Component Value Date/Time    WBC 5.7 06/18/2024 04:31 AM    RBC 3.40 06/18/2024 04:31 AM    HGB 9.0 06/18/2024 04:31 AM    HCT 27.5 06/18/2024 04:31 AM    MCV 80.9 06/18/2024 04:31 AM    MCH 26.5 06/18/2024 04:31 AM    MCHC 32.7 06/18/2024 04:31 AM    RDW 15.5 06/18/2024 04:31 AM     06/18/2024 04:31 AM    MPV 11.4 06/18/2024 04:31 AM     Lab Results   Component Value Date/Time     06/18/2024 04:31 AM    K 3.7 06/18/2024 04:31 AM     06/18/2024 04:31 AM    CO2 25 06/18/2024 04:31 AM    BUN 43 06/18/2024 04:31 AM    CREATININE 0.8 06/18/2024 04:31 AM    CALCIUM 8.7 06/18/2024 04:31 AM    BILITOT 0.5 06/18/2024 04:31 AM    ALKPHOS 109 06/18/2024 04:31 AM    AST 9 06/18/2024 04:31 AM    ALT 8 06/18/2024 04:31 AM     No results found for: \"LIPASE\"  No results found for: \"AMYLASE\"      ASSESSMENT/PLAN:  Patient Active Problem List   Diagnosis    Chronic respiratory failure with hypoxia (HCC)    Primary hypertension    History of stroke with 
PROGRESS NOTE  By Neo Artis M.D.    [facility]  Dr. Rosemary Hartman M.D.,  Dr. Phil Benitez M.D.,   Dr. Pato Hernandez D.O.,  Dr. Dayday Trejo M.D.,  OTTO ArellanoO.,          Effie Domeagan  79 y.o.  female    SUBJECTIVE:  Remains nonverbal..  No family at bedside    OBJECTIVE:    BP (!) 136/55   Pulse 78   Temp 98 °F (36.7 °C) (Axillary)   Resp 29   Ht 1.575 m (5' 2.01\")   Wt 60.1 kg (132 lb 8 oz)   SpO2 98%   BMI 24.23 kg/m²     General: NAD/older adult  female.  Nonverbal.  HEENT: Anicteric sclera/moist oral mucosa  Neck: Trachea midline/tracheostomy  Chest: Mechanically ventilated via tracheostomy.  No wheezing  Cor: Regular/S1-S2  Abd.: Soft and obese.  Appears nontender.  BS +.  PEG tube in place  Extr.:  LLE AKA  Skin: Warm and dry/anicteric  Other: Goncalves catheter      DATA:    Monitor data reviewed -sinus rhythm noted.       Lab Results   Component Value Date/Time    WBC 6.8 06/26/2024 05:29 AM    RBC 3.22 06/26/2024 05:29 AM    HGB 8.9 06/26/2024 05:29 AM    HCT 26.5 06/26/2024 05:29 AM    MCV 82.3 06/26/2024 05:29 AM    MCH 27.6 06/26/2024 05:29 AM    MCHC 33.6 06/26/2024 05:29 AM    RDW 17.2 06/26/2024 05:29 AM     06/26/2024 05:29 AM    MPV 11.3 06/26/2024 05:29 AM     Lab Results   Component Value Date/Time     06/26/2024 05:29 AM    K 5.7 06/26/2024 05:29 AM    CL 95 06/26/2024 05:29 AM    CO2 30 06/26/2024 05:29 AM    BUN 31 06/26/2024 05:29 AM    CREATININE 0.9 06/26/2024 05:29 AM    CALCIUM 8.7 06/26/2024 05:29 AM    BILITOT <0.2 06/25/2024 04:43 AM    ALKPHOS 130 06/25/2024 04:43 AM    AST 10 06/25/2024 04:43 AM    ALT 11 06/25/2024 04:43 AM     No results found for: \"LIPASE\"  No results found for: \"AMYLASE\"      ASSESSMENT/PLAN:  Patient Active Problem List   Diagnosis    Chronic respiratory failure with hypoxia (HCC)    Primary hypertension    History of stroke with residual deficit    History of DVT (deep vein thrombosis)    Coronary 
PROGRESS NOTE  By Neo Artis M.D.    [facility]  Dr. Rosemary Hartman M.D.,  Dr. Phil Benitez M.D.,   Dr. Pato Hernandez D.O.,  Dr. Dayday Trejo M.D.,  OTTO ArellanoO.,          Effie Portillo  79 y.o.  female    SUBJECTIVE:  Remains nonverbal secondary to tracheostomy.  No family present    OBJECTIVE:    BP (!) 124/57   Pulse 81   Temp 97.9 °F (36.6 °C) (Bladder)   Resp 26   Ht 1.575 m (5' 2.01\")   Wt 63.1 kg (139 lb 3.2 oz)   SpO2 96%   BMI 25.45 kg/m²     General: Older adult  female.  NAD  HEENT: Anicteric sclera/moist oral mucosa  Neck: Trachea midline/tracheostomy  Chest: Symmetric excursion/mechanically ventilated via tracheostomy.  No wheezing  Cor: Regular/S1-S2  Abd.: Soft and obese.  Appears nontender.  PEG tube in place  Extr.:  LLE AKA.  Skin: Warm and dry/anicteric  Other: Goncalves catheter      DATA:    Monitor data reviewed -sinus rhythm noted.       Lab Results   Component Value Date/Time    WBC 8.5 06/21/2024 05:45 AM    RBC 2.91 06/21/2024 05:45 AM    HGB 7.6 06/21/2024 05:45 AM    HCT 23.9 06/21/2024 05:45 AM    MCV 82.1 06/21/2024 05:45 AM    MCH 26.1 06/21/2024 05:45 AM    MCHC 31.8 06/21/2024 05:45 AM    RDW 16.1 06/21/2024 05:45 AM     06/21/2024 05:45 AM    MPV 11.3 06/21/2024 05:45 AM     Lab Results   Component Value Date/Time     06/21/2024 05:45 AM    K 4.8 06/21/2024 05:45 AM    CL 98 06/21/2024 05:45 AM    CO2 27 06/21/2024 05:45 AM    BUN 27 06/21/2024 05:45 AM    CREATININE 0.7 06/21/2024 05:45 AM    CALCIUM 8.2 06/21/2024 05:45 AM    BILITOT <0.2 06/21/2024 05:45 AM    ALKPHOS 108 06/21/2024 05:45 AM    AST 12 06/21/2024 05:45 AM    ALT 11 06/21/2024 05:45 AM     No results found for: \"LIPASE\"  No results found for: \"AMYLASE\"      ASSESSMENT/PLAN:  Patient Active Problem List   Diagnosis    Chronic respiratory failure with hypoxia (HCC)    Primary hypertension    History of stroke with residual deficit    History of DVT (deep 
PROGRESS NOTE  By Neo Artis M.D.    [facility]  Dr. Rosemary Hartman M.D.,  Dr. Phil Benitez M.D.,   Dr. Pato Hernandze D.O.,  Dr. Dayday Trejo M.D.,  OTTO ArellanoO.,          Effie L Lindsey  79 y.o.  female    SUBJECTIVE:  Remains nonverbal.  No family at bedside    OBJECTIVE:    /69   Pulse 75   Temp 97.9 °F (36.6 °C) (Bladder)   Resp (!) 37   Ht 1.575 m (5' 2.01\")   Wt 53.7 kg (118 lb 6.2 oz)   SpO2 97%   BMI 21.65 kg/m²     General: Older adult  female  HEENT: Anicteric sclera/moist oral mucosa  Neck: Trachea midline/tracheostomy  Chest: Metrical excursion/mechanically ventilated via tracheostomy  Cor: Regular/S1-S2  Abd.: Soft and obese.  PEG tube in place  Extr.:  LLE AKA/decreased muscle tone and bulk throughout  Skin: Warm and dry  Other: Goncalves catheter      DATA:    Monitor data reviewed -sinus rhythm noted.       Lab Results   Component Value Date/Time    WBC 21.6 06/20/2024 03:56 AM    RBC 3.76 06/20/2024 03:56 AM    HGB 10.0 06/20/2024 03:56 AM    HCT 32.0 06/20/2024 03:56 AM    MCV 85.1 06/20/2024 03:56 AM    MCH 26.6 06/20/2024 03:56 AM    MCHC 31.3 06/20/2024 03:56 AM    RDW 16.0 06/20/2024 03:56 AM     06/20/2024 03:56 AM    MPV 10.9 06/20/2024 03:56 AM     Lab Results   Component Value Date/Time     06/20/2024 03:56 AM    K 4.3 06/20/2024 03:56 AM     06/20/2024 03:56 AM    CO2 26 06/20/2024 03:56 AM    BUN 29 06/20/2024 03:56 AM    CREATININE 0.7 06/20/2024 03:56 AM    CALCIUM 8.5 06/20/2024 03:56 AM    BILITOT 0.2 06/20/2024 03:56 AM    ALKPHOS 134 06/20/2024 03:56 AM    AST 12 06/20/2024 03:56 AM    ALT 9 06/20/2024 03:56 AM     No results found for: \"LIPASE\"  No results found for: \"AMYLASE\"      ASSESSMENT/PLAN:  Patient Active Problem List   Diagnosis    Chronic respiratory failure with hypoxia (HCC)    Primary hypertension    History of stroke with residual deficit    History of DVT (deep vein thrombosis)    Coronary 
PULMONARY  CRITICAL CARE   SERVICE DAILY PROGRESS  NOTE     6/22/2024   Hospital  LOS: 7 days      Admit date- 6/15/2024       Initially admitted for -   Shortness of Breath (From [facility] EMS reports patient was vomiting last night seen at [place].  EMS reports staff at nursing home thinks she may have aspirated. )       Subjective:      Tube feedings were held secondary to projectile vomiting.   Awake but does not respond to communication   Follows simple  commands    Tolerating RA , oxygenation and ventilation  adequate       Review of Systems      Unable to obtain secondary to mental status.                     Allergies:  Allergies   Allergen Reactions    Penicillins      Per facility papers and verified by pt      Home Meds:  Prior to Admission medications    Medication Sig Start Date End Date Taking? Authorizing Provider   omeprazole (PRILOSEC) 2 MG/ML SUSP 2 mg/mL oral suspension 20 mLs by Per NG tube route Daily   Yes Amina Velez MD   ondansetron (ZOFRAN) 4 MG tablet Take 1 tablet by mouth every 8 hours as needed for Nausea or Vomiting   Yes Amina Velez MD   predniSONE (DELTASONE) 5 MG tablet 1 tablet by PEG Tube route daily   Yes Amina Velez MD   sodium polystyrene (KAYEXALATE) 15 GM/60ML suspension 60 mLs by Per G Tube route daily For hyperkalemia for 5 days. 6/15/24 6/20/24 Yes Amina Velez MD   tobramycin, PF, (CHERELLE) 300 MG/5ML nebulizer solution Take 5 mLs by nebulization in the morning and 5 mLs in the evening. 14 days. 6/10/24 6/24/24 Yes Amina Velez MD   meropenem (MERREM) 1 g injection Infuse 1,000 mg intravenously in the morning and 1,000 mg at noon and 1,000 mg in the evening. 14 days for positive sputum cultures. 6/12/24 6/26/24 Yes Amina Velez MD   empagliflozin (JARDIANCE) 10 MG tablet Take 1 tablet by mouth daily  Patient taking differently: 1 tablet by PEG Tube route daily 4/18/24   Jessica Hartley MD   carvedilol (COREG) 25 MG tablet 1 
PULMONARY  CRITICAL CARE   SERVICE DAILY PROGRESS  NOTE     6/23/2024   Hospital  LOS: 8 days      Admit date- 6/15/2024       Initially admitted for -   Shortness of Breath (From [facility]NR EMS reports patient was vomiting last night seen at [place].  EMS reports staff at nursing home thinks she may have aspirated. )       Subjective:      Emesis resolved , TF being advanced , continue to advance to goal   Awake but does not respond to communication   Follows simple  commands    On MV at baseline , vetinilator requirements have been minimal and stable.       Review of Systems      Unable to obtain secondary to  - Tracheostomy.                      Allergies:  Allergies   Allergen Reactions    Penicillins      Per facility papers and verified by pt      Home Meds:  Prior to Admission medications    Medication Sig Start Date End Date Taking? Authorizing Provider   omeprazole (PRILOSEC) 2 MG/ML SUSP 2 mg/mL oral suspension 20 mLs by Per NG tube route Daily   Yes Amina Velez MD   ondansetron (ZOFRAN) 4 MG tablet Take 1 tablet by mouth every 8 hours as needed for Nausea or Vomiting   Yes ProviderAmina MD   predniSONE (DELTASONE) 5 MG tablet 1 tablet by PEG Tube route daily   Yes ProviderAmina MD   sodium polystyrene (KAYEXALATE) 15 GM/60ML suspension 60 mLs by Per G Tube route daily For hyperkalemia for 5 days. 6/15/24 6/20/24 Yes Amina Velez MD   tobramycin, PF, (CHERELLE) 300 MG/5ML nebulizer solution Take 5 mLs by nebulization in the morning and 5 mLs in the evening. 14 days. 6/10/24 6/24/24 Yes Amina Velez MD   meropenem (MERREM) 1 g injection Infuse 1,000 mg intravenously in the morning and 1,000 mg at noon and 1,000 mg in the evening. 14 days for positive sputum cultures. 6/12/24 6/26/24 Yes Amina Velez MD   empagliflozin (JARDIANCE) 10 MG tablet Take 1 tablet by mouth daily  Patient taking differently: 1 tablet by PEG Tube route daily 4/18/24   Jessica Hartley MD 
Patient came down to Special Procedures for ultrasound guided left thoracentesis.    Procedure was explained, questions were answered.    1524 Starting procedure /89 85 24 100% on ventilator.    1527 Ending procedure /102 85 24 100% on ventilator.    25 cc of yellow/blood tinged colored pleural fluid drained from patient, petrolatum dressing folded 4x4 and tegaderm applied to left lower back.     Patients DSD dressing can be removed in 24 hours.    Patient tolerated procedure well.    Post procedure chest xray taken.    Respiratory came took specimen for PH.    Specimen sent to lab, floor nurse to print labels and send to lab.    Nurse to nurse given to Beverly GIBSON, nurse present for procedure and notified of above information.    Patient transported back to ICU per bed.      
Patient found to be incontinent, when attempting to provide patient care, the tube feed was paused and then  head of bed was placed down however when the head of bed was declined the patient began vomiting from the mouth- the patient was then placed in high fowlers and suctioned both orally and through the tracheostomy.     Kev BAY notified of findings- See MAR for details, additionally a STAT portable Xray was ordered alongside halting tube feedings.     Electronically signed by Jennifer Sosa RN on 6/21/2024 at 12:54 AM    
Pulmonary/Critical Care Progress Note    We are following patient for SN bacterial pneumonia, chronic respiratory failure with tracheostomy, COPD, question GI bleed, HFrEF with ejection fraction 30 to 35%    SUBJECTIVE:  The patient appears to be in a bit of heart failure which would not be unusual considering her ejection fraction is 30 to 35%.  In fact, she was on Entresto as an outpatient.  We will also add diuretics and we will start to decrease her IV steroids and in fact we will start using a Medrol Dosepak in order to wean her off of her current steroid dose.    The patient has thick tenacious secretions which are probably impairing oxygenation.  Therefore, we will plan bronchoscopy for later today.    Her Acinetobacter pneumonia is being treated with trimethoprim sulfa and meropenem.    MEDICATIONS:   methylPREDNISolone  40 mg IntraVENous Daily    meropenem  1,000 mg IntraVENous Q8H    midazolam  5 mg IntraVENous Once    sulfamethoxazole-trimethoprim (BACTRIM) 260.8 mg in dextrose 5 % 500 mL IVPB  5 mg/kg (Adjusted) IntraVENous Q8H    miconazole nitrate   Topical BID    ipratropium 0.5 mg-albuterol 2.5 mg  1 Dose Inhalation Q4H RT    enoxaparin  40 mg SubCUTAneous Daily    carvedilol  25 mg Oral BID WC    sodium chloride flush  5-40 mL IntraVENous 2 times per day    pantoprazole (PROTONIX) 40 mg in sodium chloride (PF) 0.9 % 10 mL injection  40 mg IntraVENous Q12H    tobramycin (PF)  300 mg Nebulization BID RT    miconazole   Topical BID    atorvastatin  10 mg PEG Tube Nightly    budesonide  500 mcg Nebulization Q12H    [Held by provider] clopidogrel  75 mg PEG Tube Daily    [Held by provider] apixaban  2.5 mg Oral BID      sodium chloride      lactated ringers IV soln 50 mL/hr at 06/20/24 1029     sodium chloride flush, sodium chloride, metoprolol, acetaminophen, bisacodyl, polyethylene glycol      REVIEW OF SYSTEMS:    The patient is tracheostomy and has dementia and is therefore unable to answer questions 
Pulmonary/Critical Care Progress Note    We are following patient for left lower lobe infiltrate presumably Acinetobacter pneumonia, possible empyema, acute superimposed on chronic respiratory failure with ventilator dependence, encephalopathy, history of possible GI bleed, HFrEF with ejection fraction 30 to 35%    SUBJECTIVE:  Patient had a low-grade fever this morning of approximately 100.3.  She is being treated per the infectious disease service for her presumed multidrug-resistant organism (Acinetobacter) with trimethoprim sulfamethoxazole, inhaled tobramycin and meropenem.  She is tolerating tube feedings without difficulty.    CT scan of the chest yesterday showed evidence of chronic pleural effusions and perhaps an empyema.  We will try to obtain samples of pleural fluid from these areas.    The patient will also need a PICC line for return to the nursing home and this will be placed by the PICC line team.    MEDICATIONS:   ipratropium 0.5 mg-albuterol 2.5 mg  1 Dose Inhalation Q4H RT    enoxaparin  40 mg SubCUTAneous Daily    carvedilol  25 mg Oral BID WC    sodium chloride flush  5-40 mL IntraVENous 2 times per day    sulfamethoxazole-trimethoprim (BACTRIM) 208 mg in dextrose 5 % 500 mL IVPB  4 mg/kg (Adjusted) IntraVENous Q12H    pantoprazole (PROTONIX) 40 mg in sodium chloride (PF) 0.9 % 10 mL injection  40 mg IntraVENous Q12H    meropenem  1,000 mg IntraVENous Q12H    tobramycin (PF)  300 mg Nebulization BID RT    miconazole   Topical BID    atorvastatin  10 mg PEG Tube Nightly    budesonide  500 mcg Nebulization Q12H    [Held by provider] clopidogrel  75 mg PEG Tube Daily    [Held by provider] apixaban  2.5 mg Oral BID      sodium chloride      lactated ringers IV soln 50 mL/hr at 06/18/24 0822    norepinephrine Stopped (06/16/24 0146)     sodium chloride flush, sodium chloride, metoprolol, acetaminophen, bisacodyl, polyethylene glycol      REVIEW OF SYSTEMS:    The patient is encephalopathic and has a 
Pulmonary/Critical Care Progress Note    We are following patient for left lower lobe infiltrate with possible empyema, acute respiratory failure, chronic ventilator dependence, encephalopathy, possible GI bleed, HFrEF with ejection fraction 30 to 35% noted in January of this year    SUBJECTIVE:  The patient will be starting tube feedings today and remains afebrile.  However, I was concerned about a possible left lower lobe pneumonia and we obtained a CT scan of the chest which shows loculated effusions in several locations in the left hemithorax.  She will have a thoracentesis which will be CT-guided tomorrow.  We will make sure to check a pH in the pleural fluid.    She continues on inhaled tobramycin and meropenem as well as aerosolized bronchodilators.    MEDICATIONS:   ipratropium 0.5 mg-albuterol 2.5 mg  1 Dose Inhalation Q4H RT    enoxaparin  40 mg SubCUTAneous Daily    carvedilol  25 mg Oral BID WC    sodium chloride flush  5-40 mL IntraVENous 2 times per day    pantoprazole (PROTONIX) 40 mg in sodium chloride (PF) 0.9 % 10 mL injection  40 mg IntraVENous Q12H    meropenem  1,000 mg IntraVENous Q12H    tobramycin (PF)  300 mg Nebulization BID RT    miconazole   Topical BID    atorvastatin  10 mg PEG Tube Nightly    budesonide  500 mcg Nebulization Q12H    [Held by provider] clopidogrel  75 mg PEG Tube Daily    miconazole   Topical BID    [Held by provider] apixaban  2.5 mg Oral BID      sodium chloride      sodium chloride      sodium chloride      lactated ringers IV soln 50 mL/hr at 06/17/24 0917    norepinephrine Stopped (06/16/24 0146)     sodium chloride, sodium chloride flush, sodium chloride, sodium chloride, metoprolol, acetaminophen, bisacodyl, polyethylene glycol      REVIEW OF SYSTEMS:    The patient has encephalopathy and cannot answer questions regarding review of systems  OBJECTIVE:  Vitals:    06/17/24 1200   BP: (!) 146/52   Pulse: 75   Resp: 20   Temp: 98.3 °F (36.8 °C)   SpO2:      FiO2 : 
SBAR form completed. Placed on chart. ICU RN remained in the room for the procedure and administered sedation and monitored vital signs. Patient tolerated procedure well and was returned to the care of ICU RN.   
   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  Anemia  -Acute on chronic  -Mildly microcytic  -Iron deficient  -Gastroccult negative  -Monitor hemoglobin every 12 hours-24 hours  -Keep PRBCs on hold  -Defer transfusion to admitting  -Continue twice daily PPI  -EGD 6/21/2024 by Dr. Smith with findings of unremarkable exam, PEG in place with no ulcer, no bleeding source identified  -Consider colonoscopy     2.  Comorbidites / goals of care  -Per admitting        Hemoglobin stable.  Continue to monitor.  Can consider colonoscopy versus conservative management.  Okay to attempt anticoagulation.  Consider use of Lovenox/Heparin initially and monitor for bleeding.  If no bleeding and hemoglobin remained stable, can then consider Eliquis.  If recurrent bleeding or decrease in hemoglobin, can consider colonoscopy.  GI will follow.    Neymar Kinsey, APRN - CNP  6/23/2024  10:00 AM    NOTE:  This report was transcribed using voice recognition software.  Every effort was made to ensure accuracy; however, inadvertent 
been weak and debilitated.  Goals of care were discussed with the patient's son.  Patient had no evidence of GI bleeding however, hemoglobin continues to trend down.  She was transfused 1 unit of packed red blood cells.  Wound cultures grew gram-positive cocci and gram-positive cocci bacilli.  Potassium improved and bicarbonate level corrected.  Patient's fluid changed to LR at 50 cc an hour.    June 24, 2024: Patient remains on mechanical ventilation.  Patient is actually more awake and interactive compared to last week.  She has no fever, chills, rigors.  She continues to be off pressors.  Sodium has been slightly trending down nephrology was consulted.      June 25, 2024: Patient remains on mechanical ventilation.  She is more awake however intermittently tachypneic.  Patient blood pressure tends to drop while sleeping and midodrine was started.  She has no fever, chills, rigors.  Potassium slightly elevated however sodium has improved.  Patient was tachypneic this morning however chest x-ray showed no acute infiltrate.  Tachypnea is likely related to anxiety.    June 26, 2024: Patient remains on mechanical ventilation.  Potassium remains elevated.  Sodium has improved.  Patient has been tolerating tube feeding and she was changed to Nepro.  She has no fever, chills, or rigors.  Patient continues to be intermittently tachypneic.    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  Anemia  -Acute on chronic  -Mildly microcytic  -Iron deficient  -Gastroccult negative/pending  -Monitor hemoglobin every 12 hours  -Keep PRBCs on hold  -Defer transfusion to admitting  -Continue twice daily PPI  --Consider patient for EGD -see below  -Consideration for colonoscopy will depend on results of EGD and trend of hemoglobin     2.  Comorbidites / goals of care  -Per admitting       H&H remains stable without clear evidence of overt bleeding negative Gastroccult  Okay to restart tube feeding from GI..  Continue to monitor H&H and consider endoscopy in case of precipitous drop in H&H or overt bleed.       Neo Artis MD  6/17/2024  10:30 AM    NOTE:  This report was transcribed using voice recognition software.  Every effort was made to ensure accuracy; however, inadvertent computerized transcription errors may be present.  
06/15/24 0939     Specimen: Skin Updated: 06/20/24 0336      Specimen Description .SKIN      Culture YARELIS AURIS POSITIVE FOR: No further workup       Culture, Blood 1 [[id number]] Collected: 06/19/24 0855     Specimen: Blood Updated: 06/20/24 1151      Specimen Description .BLOOD .ARM      Special Requests           Culture NO GROWTH 1 DAY     Culture, Blood 2 [[id number]] Collected: 06/19/24 0858     Specimen: Blood Updated: 06/20/24 1151      Specimen Description .BLOOD .ARM      Special Requests Site: Blood      Culture NO GROWTH 1 DAY       Blood Culture 1 [[id number]] Collected: 06/15/24 0939     Specimen: Blood Updated: 06/20/24 1656      Specimen Description .BLOOD      Special Requests           Culture NO GROWTH 5 DAYS     Culture, Blood 2 [[id number]] Collected: 06/15/24 0939     Specimen: Blood Updated: 06/20/24 1656      Specimen Description .BLOOD      Special Requests Site: Blood      Culture NO GROWTH 5 DAYS       Radiology:   XR CHEST PORTABLE   Final Result   1. Mild cardiomegaly with mild pulmonary edema and small bilateral pleural   effusions.   2. Lines and tubes in good position.         XR CHEST PORTABLE   Final Result   Interval increase in left pleural effusion.         XR CHEST 1 VIEW   Final Result   No left-sided pneumothorax.  No right-sided pneumothorax.      Mild to moderate left-sided pleural effusion.         IR FLUORO GUIDED CVA DEVICE PLMT/REPLACE/REMOVAL   Final Result   Successful placement of a dual lumen power PICC line.         IR ULTRASOUND GUIDANCE VASCULAR ACCESS   Final Result   Successful placement of a dual lumen power PICC line.         IR GUIDED THORACENTESIS PLEURAL   Final Result   Successful ultrasound guided thoracentesis.         XR CHEST PORTABLE   Final Result   Life support tubes are stable with enlargement of the heart and mild   increased markings at the lung bases with small left pleural effusion stable   and unchanged         CT CHEST WO CONTRAST   Final 
06/23/2024 05:31 AM    CL 94 06/23/2024 05:31 AM    CO2 24 06/23/2024 05:31 AM    BUN 19 06/23/2024 05:31 AM    CREATININE 0.8 06/23/2024 05:31 AM    LABGLOM 75 06/23/2024 05:31 AM    LABGLOM 80 04/17/2024 07:48 AM    GLUCOSE 73 06/23/2024 05:31 AM    CALCIUM 8.2 06/23/2024 05:31 AM    BILITOT <0.2 06/23/2024 05:31 AM    ALKPHOS 109 06/23/2024 05:31 AM    AST 11 06/23/2024 05:31 AM    ALT 11 06/23/2024 05:31 AM     Magnesium:    Lab Results   Component Value Date/Time    MG 1.9 06/23/2024 05:31 AM     Phosphorus:    Lab Results   Component Value Date/Time    PHOS 2.6 06/23/2024 05:31 AM     ABG:    Lab Results   Component Value Date/Time    PH 7.506 06/23/2024 04:05 AM    PCO2 30.3 06/23/2024 04:05 AM    PO2 75.8 06/23/2024 04:05 AM    HCO3 23.4 06/23/2024 04:05 AM    BE 0.8 06/23/2024 04:05 AM    O2SAT 95.2 06/23/2024 04:05 AM        Radiology:   XR CHEST PORTABLE   Final Result   No significant interval change.  Suspect bilateral pleural effusions and   bibasilar areas of consolidation/atelectasis.         XR CHEST PORTABLE   Final Result   Small bilateral pleural effusions with probable increase in a right pleural   effusion.         XR ABDOMEN (KUB) (SINGLE AP VIEW)   Final Result   Pattern of generalized ileus of the bowel.         XR CHEST PORTABLE   Final Result   Some improvement seen in the aeration of the lung bases with residual   markings remaining with bilateral pleural effusions left greater than right.         XR CHEST PORTABLE   Final Result   Patchy infiltrate at the right base suggesting possible aspiration   pneumonitis. Otherwise, unchanged mild perihilar and interstitial pulmonary   edema.         XR CHEST PORTABLE   Final Result   1. Mild cardiomegaly with mild pulmonary edema and small bilateral pleural   effusions.   2. Lines and tubes in good position.         XR CHEST PORTABLE   Final Result   Interval increase in left pleural effusion.         XR CHEST 1 VIEW   Final Result   No left-sided 
AM    BASOSABS 0.00 06/22/2024 04:45 AM     Hemoglobin/Hematocrit:    Lab Results   Component Value Date/Time    HGB 9.2 06/22/2024 03:42 PM    HCT 26.5 06/22/2024 03:42 PM     CMP:    Lab Results   Component Value Date/Time     06/22/2024 04:01 AM    K 4.6 06/22/2024 04:01 AM    CL 97 06/22/2024 04:01 AM    CO2 25 06/22/2024 04:01 AM    BUN 22 06/22/2024 04:01 AM    CREATININE 0.8 06/22/2024 04:01 AM    LABGLOM 80 06/22/2024 04:01 AM    LABGLOM 80 04/17/2024 07:48 AM    GLUCOSE 87 06/22/2024 04:01 AM    CALCIUM 8.0 06/22/2024 04:01 AM    BILITOT <0.2 06/22/2024 04:01 AM    ALKPHOS 119 06/22/2024 04:01 AM    AST 12 06/22/2024 04:01 AM    ALT 11 06/22/2024 04:01 AM     Magnesium:    Lab Results   Component Value Date/Time    MG 2.1 06/22/2024 04:01 AM     Phosphorus:    Lab Results   Component Value Date/Time    PHOS 2.8 06/22/2024 04:01 AM        Radiology:   XR CHEST PORTABLE   Final Result   Small bilateral pleural effusions with probable increase in a right pleural   effusion.         XR ABDOMEN (KUB) (SINGLE AP VIEW)   Final Result   Pattern of generalized ileus of the bowel.         XR CHEST PORTABLE   Final Result   Some improvement seen in the aeration of the lung bases with residual   markings remaining with bilateral pleural effusions left greater than right.         XR CHEST PORTABLE   Final Result   Patchy infiltrate at the right base suggesting possible aspiration   pneumonitis. Otherwise, unchanged mild perihilar and interstitial pulmonary   edema.         XR CHEST PORTABLE   Final Result   1. Mild cardiomegaly with mild pulmonary edema and small bilateral pleural   effusions.   2. Lines and tubes in good position.         XR CHEST PORTABLE   Final Result   Interval increase in left pleural effusion.         XR CHEST 1 VIEW   Final Result   No left-sided pneumothorax.  No right-sided pneumothorax.      Mild to moderate left-sided pleural effusion.         IR FLUORO GUIDED CVA DEVICE 
          These results are not intended for use in patients <18 years of age.        eGFR results are calculated without a race factor using the 2021 CKD-EPI equation.  Careful clinical correlation is recommended, particularly when comparing to results   calculated using previous equations.  The CKD-EPI equation is less accurate in patients with extremes of muscle mass, extra-renal   metabolism of creatine, excessive creatine ingestion, or following therapy that affects   renal tubular secretion.     04/16/2024 63 >60 mL/min/1.73m2 Final     Comment:           These results are not intended for use in patients <18 years of age.        eGFR results are calculated without a race factor using the 2021 CKD-EPI equation.  Careful clinical correlation is recommended, particularly when comparing to results   calculated using previous equations.  The CKD-EPI equation is less accurate in patients with extremes of muscle mass, extra-renal   metabolism of creatine, excessive creatine ingestion, or following therapy that affects   renal tubular secretion.     04/15/2024 69 >60 mL/min/1.73m2 Final     Comment:           These results are not intended for use in patients <18 years of age.        eGFR results are calculated without a race factor using the 2021 CKD-EPI equation.  Careful clinical correlation is recommended, particularly when comparing to results   calculated using previous equations.  The CKD-EPI equation is less accurate in patients with extremes of muscle mass, extra-renal   metabolism of creatine, excessive creatine ingestion, or following therapy that affects   renal tubular secretion.       Magnesium   Date Value Ref Range Status   06/19/2024 1.8 1.6 - 2.6 mg/dL Final   06/18/2024 2.0 1.6 - 2.6 mg/dL Final   06/17/2024 2.1 1.6 - 2.6 mg/dL Final     Phosphorus   Date Value Ref Range Status   06/19/2024 2.3 (L) 2.5 - 4.5 mg/dL Final   06/18/2024 2.8 2.5 - 4.5 mg/dL Final   06/17/2024 3.3 2.5 - 4.5 mg/dL Final 
Status: Completed Specimen: Tracheal Aspirate Updated: 06/18/24 0718     Specimen Description .TRACHEAL ASPIRATE     Direct Exam FEW Polymorphonuclear leukocytes      RARE EPITHELIAL CELLS      FEW GRAM NEGATIVE RODS     Culture ACINETOBACTER BAUMANNII MODERATE GROWTH ADDITIONAL SENSITIVITIES TO FOLLOW      NO NORMAL PREET    Susceptibility        Acinetobacter baumannii      BACTERIAL SUSCEPTIBILITY PANEL DIANE (Preliminary)      ampicillin-sulbactam >=32  Resistant      cefTAZidime >=64  Resistant      meropenem >=16  Resistant      piperacillin-tazobactam >=128  Resistant      trimethoprim-sulfamethoxazole <=20  Sensitive                           Culture, MRSA, Screening [[id number]] Collected: 06/15/24 1844    Order Status: Completed Specimen: Nares Updated: 06/17/24 1103     Specimen Description .NARES     Culture NEGATIVE FOR: METHICILLIN RESISTANT STAPHYLOCOCCUS AUREUS    Culture, Urine [[id number]] Collected: 06/15/24 1844    Order Status: Completed Specimen: Urine, catheter Updated: 06/17/24 1117     Specimen Description .URINE, CATHETERIZED     Special Requests Site: Urine     Culture NO GROWTH    Culture, Wound [[id number]]  (Abnormal) Collected: 06/15/24 1844    Order Status: Completed Specimen: Abdomen Updated: 06/18/24 0743     Specimen Description .ABDOMEN     Direct Exam Swab collections are low-yield and rarely indicated. Generally, the specimen volume when collected by swab is small, reducing the probability of isolating organisms: many organisms adhere to the fibers of the swab, which reduces the opportunity or recovering organisms. Interpret results with caution.         RARE Polymorphonuclear leukocytes      FEW EPITHELIAL CELLS      RARE GRAM POSITIVE COCCI      RARE GRAM POSITIVE COCCOBACILLI     Culture STAPHYLOCOCCUS AUREUS One colony Susceptibility to follow.      Mixed preet isolated. Further workup and sensitivity testing not routinely indicated and will not be perfomed. Mixed preet 
[[id number]] Collected: 06/15/24 1844    Order Status: Sent Specimen: Nares Updated: 06/15/24 1905    Culture, Urine [[id number]] Collected: 06/15/24 1844    Order Status: Sent Specimen: Urine, catheter Updated: 06/15/24 1907    Culture, Wound [[id number]] Collected: 06/15/24 1844    Order Status: Completed Specimen: Abdomen Updated: 06/16/24 0724     Specimen Description .ABDOMEN     Direct Exam Swab collections are low-yield and rarely indicated. Generally, the specimen volume when collected by swab is small, reducing the probability of isolating organisms: many organisms adhere to the fibers of the swab, which reduces the opportunity or recovering organisms. Interpret results with caution.         RARE Polymorphonuclear leukocytes      FEW EPITHELIAL CELLS      RARE GRAM POSITIVE COCCI      RARE GRAM POSITIVE COCCOBACILLI     Culture PENDING    Culture, Respiratory [[id number]]  (Abnormal) Collected: 06/15/24 1203    Order Status: Completed Specimen: Tracheal Aspirate Updated: 06/16/24 1226     Specimen Description .TRACHEAL ASPIRATE     Direct Exam MANY Polymorphonuclear leukocytes      NO EPITHELIAL CELLS      MODERATE MIXED BACTERIAL MORPHOTYPES SEEN ON GRAM STAIN.     Culture ACINETOBACTER BAUMANNII Identification by MALDI-TOF HEAVY GROWTH Susceptibility to follow.      NO NORMAL BENITO    Culture, C Auris Screen [[id number]] Collected: 06/15/24 1000    Order Status: Canceled Specimen: Other     Blood Culture 1 [[id number]] Collected: 06/15/24 0939    Order Status: Completed Specimen: Blood Updated: 06/16/24 1652     Specimen Description .BLOOD     Special Requests          Culture NO GROWTH 1 DAY    Culture, Blood 2 [[id number]] Collected: 06/15/24 0939    Order Status: Completed Specimen: Blood Updated: 06/16/24 1652     Specimen Description .BLOOD     Special Requests Site: Blood     Culture NO GROWTH 1 DAY    COVID-19, Rapid [[id number]] Collected: 06/15/24 0939    Order Status: Completed Specimen: 
Order Status: Completed Specimen: Urine Updated: 06/16/24 0751     Legionella Pneumophilia Ag, Urine NEGATIVE     Comment:       L. pneumophila serogroup 1 antigen not detected.  A negative result does not exclude infection with Leginella pnemophila serogroup 1 nor does   it rule out other microbial-caused respiratory infections of disease caused by other   serogroups of Legionella pneumophila.         STREP PNEUMONIAE ANTIGEN [[id number]] Collected: 06/15/24 1855    Order Status: Completed Specimen: Urine, clean catch Updated: 06/16/24 0751     Source .CLEAN CATCH URINE     Strep pneumo Ag NEGATIVE     Comment:       Presumptive Negative  suggests no current or recent pneumococcal infection. Infection due to Strep pneumoniae   cannot be ruled out since the antigen present in the sample may be below the detection limit   of the test.         Culture, Respiratory [[id number]]  (Abnormal)  (Susceptibility) Collected: 06/15/24 1846    Order Status: Completed Specimen: Tracheal Aspirate Updated: 06/19/24 0640     Specimen Description .TRACHEAL ASPIRATE     Direct Exam FEW Polymorphonuclear leukocytes      RARE EPITHELIAL CELLS      FEW GRAM NEGATIVE RODS     Culture ACINETOBACTER BAUMANNII MODERATE GROWTH      NO NORMAL BENITO    Susceptibility        Acinetobacter baumannii      BACTERIAL SUSCEPTIBILITY PANEL DIANE      ampicillin-sulbactam >=32  Resistant      cefotaxime >=64  Resistant      cefTAZidime >=64  Resistant      doxycycline >=16  Resistant      meropenem >=16  Resistant      minocycline 8  Intermediate      piperacillin-tazobactam >=128  Resistant      trimethoprim-sulfamethoxazole <=20  Sensitive                           Culture, MRSA, Screening [[id number]] Collected: 06/15/24 1844    Order Status: Completed Specimen: Nares Updated: 06/17/24 1103     Specimen Description .NARES     Culture NEGATIVE FOR: METHICILLIN RESISTANT STAPHYLOCOCCUS AUREUS    Culture, Urine [[id number]] Collected: 06/15/24 1844    
[[id number]] Collected: 06/17/24 1525    Order Status: Sent Specimen: Body Fluid Updated: 06/18/24 1841    Gram stain [[id number]] Collected: 06/17/24 1315    Order Status: Canceled Specimen: Body Fluid     LEGIONELLA ANTIGEN, URINE [[id number]] Collected: 06/15/24 1855    Order Status: Completed Specimen: Urine Updated: 06/16/24 0751     Legionella Pneumophilia Ag, Urine NEGATIVE     Comment:       L. pneumophila serogroup 1 antigen not detected.  A negative result does not exclude infection with Leginella pnemophila serogroup 1 nor does   it rule out other microbial-caused respiratory infections of disease caused by other   serogroups of Legionella pneumophila.         STREP PNEUMONIAE ANTIGEN [[id number]] Collected: 06/15/24 1855    Order Status: Completed Specimen: Urine, clean catch Updated: 06/16/24 0751     Source .CLEAN CATCH URINE     Strep pneumo Ag NEGATIVE     Comment:       Presumptive Negative  suggests no current or recent pneumococcal infection. Infection due to Strep pneumoniae   cannot be ruled out since the antigen present in the sample may be below the detection limit   of the test.         Culture, Respiratory [[id number]]  (Abnormal)  (Susceptibility) Collected: 06/15/24 1846    Order Status: Completed Specimen: Tracheal Aspirate Updated: 06/19/24 0640     Specimen Description .TRACHEAL ASPIRATE     Direct Exam FEW Polymorphonuclear leukocytes      RARE EPITHELIAL CELLS      FEW GRAM NEGATIVE RODS     Culture ACINETOBACTER BAUMANNII MODERATE GROWTH      NO NORMAL BENITO    Susceptibility        Acinetobacter baumannii      BACTERIAL SUSCEPTIBILITY PANEL DIANE      ampicillin-sulbactam >=32  Resistant      cefotaxime >=64  Resistant      cefTAZidime >=64  Resistant      doxycycline >=16  Resistant      meropenem >=16  Resistant      minocycline 8  Intermediate      piperacillin-tazobactam >=128  Resistant      trimethoprim-sulfamethoxazole <=20  Sensitive                           Culture, MRSA, 
tenderness, no hepatosplenomegaly, and no masses , +ve PEG  Extremities: +ve edema. Pulses are equally present.   Skin: intact, no rashes   Neurologic: Awake and intermittently follows command, no focal deficit     Investigations:  Labs, radiological imaging and cardiac work up were personally reviewed        ICU STAFF PHYSICIAN NOTE OF PERSONAL INVOLVEMENT IN CARE  As the attending physician, I certify that I personally reviewed the patient's history and personally examined the patient to confirm the physical findings described above, and that I reviewed the relevant imaging studies and available reports.  I also discussed the differential diagnosis and all of the proposed management plans with the patient and individuals accompanying the patient to this visit.  They had the opportunity to ask questions about the proposed management plans and to have those questions answered.    This patient has a high probability of sudden, clinically significant deterioration, which requires the highest level of physician preparedness to intervene urgently.  I managed/supervised life or organ supporting interventions that required frequent physician assessment.  I devoted my full attention to the direct care of this patient for the amount of time indicated below.  Time I spent with family or surrogate(s) is included only if the patient was incapable of providing the necessary information or participating in medical decision-making.  Time devoted to teaching and to any procedures I billed separately is not included.  Critical Care Time: 31 minutes      Electronically signed by Martin Metz MD on 6/25/2024 at 12:11 PM   
Healthcare Providers: https://www.fda.gov/media/692665/download  Fact sheet for Patients: https://www.fda.gov/media/669499/download          Methodology: Isothermal Nucleic Acid Amplification         Culture, C Auris Screen [[id number]]  (Abnormal) Collected: 06/15/24 0939    Order Status: Completed Specimen: Skin Updated: 06/22/24 0948     Specimen Description .SKIN     Culture YARELIS AURIS POSITIVE FOR: No further workup      No Candida auris present            IMAGING:     XR CHEST PORTABLE    Result Date: 6/19/2024  Interval increase in left pleural effusion.     XR CHEST 1 VIEW    Result Date: 6/18/2024  No left-sided pneumothorax.  No right-sided pneumothorax. Mild to moderate left-sided pleural effusion.     IR FLUORO GUIDED CVA DEVICE PLMT/REPLACE/REMOVAL    Result Date: 6/18/2024  Successful placement of a dual lumen power PICC line.     IR ULTRASOUND GUIDANCE VASCULAR ACCESS    Result Date: 6/18/2024  Successful placement of a dual lumen power PICC line.     IR GUIDED THORACENTESIS PLEURAL    Result Date: 6/18/2024  Successful ultrasound guided thoracentesis.     XR CHEST PORTABLE    Result Date: 6/18/2024  Life support tubes are stable with enlargement of the heart and mild increased markings at the lung bases with small left pleural effusion stable and unchanged      XR CHEST PORTABLE    Result Date: 6/15/2024  1. Right lower lobe airspace disease 2. Left perihilar and left lower lobe airspace disease. 3. Small left pleural effusion.       Electronically signed by Allie Villegas MD on 6/16/24 at 11:46 AM EDT    
MD Nelly on 6/16/24 at 11:46 AM EDT    
this note reflect this event.      CRITICAL CARE TIME:  37 minutes    Electronically signed by Ant Burgos MD on 6/21/2024 at 9:57 AM

## 2024-07-09 ENCOUNTER — APPOINTMENT (OUTPATIENT)
Dept: GENERAL RADIOLOGY | Age: 79
DRG: 207 | End: 2024-07-09
Payer: MEDICARE

## 2024-07-09 ENCOUNTER — HOSPITAL ENCOUNTER (INPATIENT)
Age: 79
LOS: 10 days | Discharge: NURSING, REHAB OR LONG-TERM CARE | DRG: 207 | End: 2024-07-19
Attending: EMERGENCY MEDICINE | Admitting: INTERNAL MEDICINE
Payer: MEDICARE

## 2024-07-09 DIAGNOSIS — G93.49 UREMIC ENCEPHALOPATHY: Primary | ICD-10-CM

## 2024-07-09 DIAGNOSIS — E86.0 DEHYDRATION: ICD-10-CM

## 2024-07-09 DIAGNOSIS — N19 UREMIC ENCEPHALOPATHY: Primary | ICD-10-CM

## 2024-07-09 DIAGNOSIS — E87.0 HYPERNATREMIA: ICD-10-CM

## 2024-07-09 DIAGNOSIS — E87.8 HYPERCHLOREMIA: ICD-10-CM

## 2024-07-09 LAB
ALBUMIN SERPL-MCNC: 2.8 G/DL (ref 3.5–5.2)
ALP SERPL-CCNC: 107 U/L (ref 35–104)
ALT SERPL-CCNC: 16 U/L (ref 0–32)
ANION GAP SERPL CALCULATED.3IONS-SCNC: 8 MMOL/L (ref 7–16)
ANION GAP SERPL CALCULATED.3IONS-SCNC: 8 MMOL/L (ref 7–16)
ANION GAP SERPL CALCULATED.3IONS-SCNC: 9 MMOL/L (ref 7–16)
AST SERPL-CCNC: 18 U/L (ref 0–31)
B PARAP IS1001 DNA NPH QL NAA+NON-PROBE: NOT DETECTED
B PERT DNA SPEC QL NAA+PROBE: NOT DETECTED
BACTERIA URNS QL MICRO: ABNORMAL
BASOPHILS # BLD: 0.02 K/UL (ref 0–0.2)
BASOPHILS NFR BLD: 0 % (ref 0–2)
BILIRUB SERPL-MCNC: 0.2 MG/DL (ref 0–1.2)
BILIRUB UR QL STRIP: NEGATIVE
BUN SERPL-MCNC: 119 MG/DL (ref 6–23)
BUN SERPL-MCNC: 123 MG/DL (ref 6–23)
BUN SERPL-MCNC: 132 MG/DL (ref 6–23)
C PNEUM DNA NPH QL NAA+NON-PROBE: NOT DETECTED
CALCIUM SERPL-MCNC: 10.2 MG/DL (ref 8.6–10.2)
CALCIUM SERPL-MCNC: 9.4 MG/DL (ref 8.6–10.2)
CALCIUM SERPL-MCNC: 9.7 MG/DL (ref 8.6–10.2)
CHLORIDE SERPL-SCNC: 114 MMOL/L (ref 98–107)
CHLORIDE SERPL-SCNC: 114 MMOL/L (ref 98–107)
CHLORIDE SERPL-SCNC: 115 MMOL/L (ref 98–107)
CLARITY UR: ABNORMAL
CO2 SERPL-SCNC: 35 MMOL/L (ref 22–29)
CO2 SERPL-SCNC: 39 MMOL/L (ref 22–29)
CO2 SERPL-SCNC: 41 MMOL/L (ref 22–29)
COLOR UR: YELLOW
CREAT SERPL-MCNC: 1.2 MG/DL (ref 0.5–1)
CREAT SERPL-MCNC: 1.2 MG/DL (ref 0.5–1)
CREAT SERPL-MCNC: 1.4 MG/DL (ref 0.5–1)
CRITICAL ACTION: NORMAL
CRITICAL NOTIFICATION DATE/TIME: NORMAL
CRITICAL NOTIFICATION: NORMAL
CRITICAL VALUE READ BACK: YES
EKG ATRIAL RATE: 103 BPM
EKG P AXIS: 76 DEGREES
EKG P-R INTERVAL: 128 MS
EKG Q-T INTERVAL: 302 MS
EKG QRS DURATION: 82 MS
EKG QTC CALCULATION (BAZETT): 395 MS
EKG R AXIS: 13 DEGREES
EKG T AXIS: 144 DEGREES
EKG VENTRICULAR RATE: 103 BPM
EOSINOPHIL # BLD: 0.03 K/UL (ref 0.05–0.5)
EOSINOPHILS RELATIVE PERCENT: 1 % (ref 0–6)
EPI CELLS #/AREA URNS HPF: ABNORMAL /HPF
ERYTHROCYTE [DISTWIDTH] IN BLOOD BY AUTOMATED COUNT: 19.1 % (ref 11.5–15)
FIO2: 100
FLUAV RNA NPH QL NAA+NON-PROBE: NOT DETECTED
FLUBV RNA NPH QL NAA+NON-PROBE: NOT DETECTED
GFR, ESTIMATED: 39 ML/MIN/1.73M2
GFR, ESTIMATED: 45 ML/MIN/1.73M2
GFR, ESTIMATED: 47 ML/MIN/1.73M2
GLUCOSE BLD-MCNC: 107 MG/DL (ref 74–99)
GLUCOSE SERPL-MCNC: 119 MG/DL (ref 74–99)
GLUCOSE SERPL-MCNC: 173 MG/DL (ref 74–99)
GLUCOSE SERPL-MCNC: 98 MG/DL (ref 74–99)
GLUCOSE UR STRIP-MCNC: 100 MG/DL
HADV DNA NPH QL NAA+NON-PROBE: NOT DETECTED
HCOV 229E RNA NPH QL NAA+NON-PROBE: NOT DETECTED
HCOV HKU1 RNA NPH QL NAA+NON-PROBE: NOT DETECTED
HCOV NL63 RNA NPH QL NAA+NON-PROBE: NOT DETECTED
HCOV OC43 RNA NPH QL NAA+NON-PROBE: NOT DETECTED
HCT VFR BLD AUTO: 31.6 % (ref 34–48)
HGB BLD-MCNC: 8.8 G/DL (ref 11.5–15.5)
HGB UR QL STRIP.AUTO: NEGATIVE
HMPV RNA NPH QL NAA+NON-PROBE: NOT DETECTED
HPIV1 RNA NPH QL NAA+NON-PROBE: NOT DETECTED
HPIV2 RNA NPH QL NAA+NON-PROBE: NOT DETECTED
HPIV3 RNA NPH QL NAA+NON-PROBE: NOT DETECTED
HPIV4 RNA NPH QL NAA+NON-PROBE: NOT DETECTED
KETONES UR STRIP-MCNC: NEGATIVE MG/DL
LACTATE BLDV-SCNC: 1.9 MMOL/L (ref 0.5–1.9)
LEUKOCYTE ESTERASE UR QL STRIP: ABNORMAL
LYMPHOCYTES NFR BLD: 0.47 K/UL (ref 1.5–4)
LYMPHOCYTES RELATIVE PERCENT: 7 % (ref 20–42)
M PNEUMO DNA NPH QL NAA+NON-PROBE: NOT DETECTED
MCH RBC QN AUTO: 25.7 PG (ref 26–35)
MCHC RBC AUTO-ENTMCNC: 27.8 G/DL (ref 32–34.5)
MCV RBC AUTO: 92.1 FL (ref 80–99.9)
MONOCYTES NFR BLD: 0.3 K/UL (ref 0.1–0.95)
MONOCYTES NFR BLD: 5 % (ref 2–12)
MUCOUS THREADS URNS QL MICRO: PRESENT
NEUTROPHILS NFR BLD: 86 % (ref 43–80)
NEUTS SEG NFR BLD: 5.51 K/UL (ref 1.8–7.3)
NITRITE UR QL STRIP: NEGATIVE
O2 DELIVERY DEVICE: ABNORMAL
OSMOLALITY UR: 594 MOSM/KG (ref 300–900)
PEEP: 8
PH UR STRIP: 8.5 [PH] (ref 5–9)
PLATELET CONFIRMATION: NORMAL
PLATELET, FLUORESCENCE: 154 K/UL (ref 130–450)
PMV BLD AUTO: 12.7 FL (ref 7–12)
POC HCO3: 45.7 MMOL/L (ref 22–26)
POC O2 SATURATION: 88.7 % (ref 92–98.5)
POC PCO2: 73.9 MM HG (ref 35–45)
POC PH: 7.4 (ref 7.35–7.45)
POC PO2: 59.6 MM HG (ref 60–80)
POSITIVE BASE EXCESS, ART: 18.1 MMOL/L (ref 0–3)
POTASSIUM SERPL-SCNC: 4.5 MMOL/L (ref 3.5–5)
POTASSIUM SERPL-SCNC: 4.5 MMOL/L (ref 3.5–5)
POTASSIUM SERPL-SCNC: 4.9 MMOL/L (ref 3.5–5)
POTASSIUM, UR: 36.3 MMOL/L
PROT SERPL-MCNC: 6.3 G/DL (ref 6.4–8.3)
PROT UR STRIP-MCNC: 30 MG/DL
RBC # BLD AUTO: 3.43 M/UL (ref 3.5–5.5)
RBC # BLD: ABNORMAL 10*6/UL
RBC #/AREA URNS HPF: ABNORMAL /HPF
RSV RNA NPH QL NAA+NON-PROBE: NOT DETECTED
RV+EV RNA NPH QL NAA+NON-PROBE: NOT DETECTED
SARS-COV-2 RNA NPH QL NAA+NON-PROBE: NOT DETECTED
SET RATE, POC: 18
SODIUM SERPL-SCNC: 158 MMOL/L (ref 132–146)
SODIUM SERPL-SCNC: 162 MMOL/L (ref 132–146)
SODIUM SERPL-SCNC: 163 MMOL/L (ref 132–146)
SODIUM UR-SCNC: 27 MMOL/L
SP GR UR STRIP: 1.01 (ref 1–1.03)
SPECIMEN DESCRIPTION: NORMAL
TIDAL VOLUME: 375
TROPONIN I SERPL HS-MCNC: 70 NG/L (ref 0–9)
TROPONIN I SERPL HS-MCNC: 84 NG/L (ref 0–9)
UROBILINOGEN UR STRIP-ACNC: 0.2 EU/DL (ref 0–1)
WBC #/AREA URNS HPF: ABNORMAL /HPF
WBC OTHER # BLD: 6.4 K/UL (ref 4.5–11.5)
YEAST URNS QL MICRO: PRESENT

## 2024-07-09 PROCEDURE — 96360 HYDRATION IV INFUSION INIT: CPT

## 2024-07-09 PROCEDURE — 6360000002 HC RX W HCPCS: Performed by: SPECIALIST

## 2024-07-09 PROCEDURE — 6370000000 HC RX 637 (ALT 250 FOR IP): Performed by: INTERNAL MEDICINE

## 2024-07-09 PROCEDURE — 87086 URINE CULTURE/COLONY COUNT: CPT

## 2024-07-09 PROCEDURE — 87040 BLOOD CULTURE FOR BACTERIA: CPT

## 2024-07-09 PROCEDURE — 83935 ASSAY OF URINE OSMOLALITY: CPT

## 2024-07-09 PROCEDURE — 71045 X-RAY EXAM CHEST 1 VIEW: CPT

## 2024-07-09 PROCEDURE — 94640 AIRWAY INHALATION TREATMENT: CPT

## 2024-07-09 PROCEDURE — 94002 VENT MGMT INPAT INIT DAY: CPT

## 2024-07-09 PROCEDURE — 36600 WITHDRAWAL OF ARTERIAL BLOOD: CPT

## 2024-07-09 PROCEDURE — 2580000003 HC RX 258

## 2024-07-09 PROCEDURE — 2580000003 HC RX 258: Performed by: INTERNAL MEDICINE

## 2024-07-09 PROCEDURE — 82272 OCCULT BLD FECES 1-3 TESTS: CPT

## 2024-07-09 PROCEDURE — 96361 HYDRATE IV INFUSION ADD-ON: CPT

## 2024-07-09 PROCEDURE — 84300 ASSAY OF URINE SODIUM: CPT

## 2024-07-09 PROCEDURE — 99223 1ST HOSP IP/OBS HIGH 75: CPT | Performed by: INTERNAL MEDICINE

## 2024-07-09 PROCEDURE — 36415 COLL VENOUS BLD VENIPUNCTURE: CPT

## 2024-07-09 PROCEDURE — 84145 PROCALCITONIN (PCT): CPT

## 2024-07-09 PROCEDURE — 84484 ASSAY OF TROPONIN QUANT: CPT

## 2024-07-09 PROCEDURE — 2580000003 HC RX 258: Performed by: SPECIALIST

## 2024-07-09 PROCEDURE — 99285 EMERGENCY DEPT VISIT HI MDM: CPT

## 2024-07-09 PROCEDURE — 87205 SMEAR GRAM STAIN: CPT

## 2024-07-09 PROCEDURE — 6370000000 HC RX 637 (ALT 250 FOR IP): Performed by: NURSE PRACTITIONER

## 2024-07-09 PROCEDURE — 2060000000 HC ICU INTERMEDIATE R&B

## 2024-07-09 PROCEDURE — 6360000002 HC RX W HCPCS: Performed by: NURSE PRACTITIONER

## 2024-07-09 PROCEDURE — XW033A6 INTRODUCTION OF CEFIDEROCOL ANTI-INFECTIVE INTO PERIPHERAL VEIN, PERCUTANEOUS APPROACH, NEW TECHNOLOGY GROUP 6: ICD-10-PCS | Performed by: INTERNAL MEDICINE

## 2024-07-09 PROCEDURE — 87184 SC STD DISK METHOD PER PLATE: CPT

## 2024-07-09 PROCEDURE — 2500000003 HC RX 250 WO HCPCS: Performed by: INTERNAL MEDICINE

## 2024-07-09 PROCEDURE — 87106 FUNGI IDENTIFICATION YEAST: CPT

## 2024-07-09 PROCEDURE — 87102 FUNGUS ISOLATION CULTURE: CPT

## 2024-07-09 PROCEDURE — 87077 CULTURE AEROBIC IDENTIFY: CPT

## 2024-07-09 PROCEDURE — 84133 ASSAY OF URINE POTASSIUM: CPT

## 2024-07-09 PROCEDURE — 99291 CRITICAL CARE FIRST HOUR: CPT | Performed by: INTERNAL MEDICINE

## 2024-07-09 PROCEDURE — 80053 COMPREHEN METABOLIC PANEL: CPT

## 2024-07-09 PROCEDURE — 87070 CULTURE OTHR SPECIMN AEROBIC: CPT

## 2024-07-09 PROCEDURE — 85025 COMPLETE CBC W/AUTO DIFF WBC: CPT

## 2024-07-09 PROCEDURE — 83605 ASSAY OF LACTIC ACID: CPT

## 2024-07-09 PROCEDURE — 81001 URINALYSIS AUTO W/SCOPE: CPT

## 2024-07-09 PROCEDURE — 93010 ELECTROCARDIOGRAM REPORT: CPT | Performed by: INTERNAL MEDICINE

## 2024-07-09 PROCEDURE — 82803 BLOOD GASES ANY COMBINATION: CPT

## 2024-07-09 PROCEDURE — 2580000003 HC RX 258: Performed by: NURSE PRACTITIONER

## 2024-07-09 PROCEDURE — 82962 GLUCOSE BLOOD TEST: CPT

## 2024-07-09 PROCEDURE — 93005 ELECTROCARDIOGRAM TRACING: CPT

## 2024-07-09 PROCEDURE — 0202U NFCT DS 22 TRGT SARS-COV-2: CPT

## 2024-07-09 PROCEDURE — 80048 BASIC METABOLIC PNL TOTAL CA: CPT

## 2024-07-09 RX ORDER — ACETAMINOPHEN 325 MG/1
650 TABLET ORAL EVERY 6 HOURS PRN
Status: DISCONTINUED | OUTPATIENT
Start: 2024-07-09 | End: 2024-07-19 | Stop reason: HOSPADM

## 2024-07-09 RX ORDER — PREDNISONE 5 MG/1
5 TABLET ORAL DAILY
Status: DISCONTINUED | OUTPATIENT
Start: 2024-07-09 | End: 2024-07-19 | Stop reason: HOSPADM

## 2024-07-09 RX ORDER — MIDODRINE HYDROCHLORIDE 5 MG/1
15 TABLET ORAL
Status: DISCONTINUED | OUTPATIENT
Start: 2024-07-09 | End: 2024-07-19 | Stop reason: HOSPADM

## 2024-07-09 RX ORDER — 0.9 % SODIUM CHLORIDE 0.9 %
1000 INTRAVENOUS SOLUTION INTRAVENOUS ONCE
Status: DISCONTINUED | OUTPATIENT
Start: 2024-07-09 | End: 2024-07-09

## 2024-07-09 RX ORDER — DEXTROSE MONOHYDRATE 50 MG/ML
INJECTION, SOLUTION INTRAVENOUS
Status: COMPLETED
Start: 2024-07-09 | End: 2024-07-09

## 2024-07-09 RX ORDER — ONDANSETRON 2 MG/ML
4 INJECTION INTRAMUSCULAR; INTRAVENOUS EVERY 6 HOURS PRN
Status: DISCONTINUED | OUTPATIENT
Start: 2024-07-09 | End: 2024-07-19 | Stop reason: HOSPADM

## 2024-07-09 RX ORDER — DEXTROSE MONOHYDRATE 50 MG/ML
INJECTION, SOLUTION INTRAVENOUS CONTINUOUS
Status: DISCONTINUED | OUTPATIENT
Start: 2024-07-09 | End: 2024-07-10

## 2024-07-09 RX ORDER — BISACODYL 10 MG
10 SUPPOSITORY, RECTAL RECTAL DAILY PRN
Status: DISCONTINUED | OUTPATIENT
Start: 2024-07-09 | End: 2024-07-19 | Stop reason: HOSPADM

## 2024-07-09 RX ORDER — FERROUS SULFATE 300 MG/5ML
300 LIQUID (ML) ORAL DAILY
Status: DISCONTINUED | OUTPATIENT
Start: 2024-07-09 | End: 2024-07-19 | Stop reason: HOSPADM

## 2024-07-09 RX ORDER — FUROSEMIDE 20 MG/1
20 TABLET ORAL DAILY
Status: ON HOLD | COMMUNITY
End: 2024-07-18

## 2024-07-09 RX ORDER — ARFORMOTEROL TARTRATE 15 UG/2ML
15 SOLUTION RESPIRATORY (INHALATION) 2 TIMES DAILY
Status: DISCONTINUED | OUTPATIENT
Start: 2024-07-09 | End: 2024-07-17

## 2024-07-09 RX ORDER — SODIUM CHLORIDE 0.9 % (FLUSH) 0.9 %
5-40 SYRINGE (ML) INJECTION EVERY 12 HOURS SCHEDULED
Status: DISCONTINUED | OUTPATIENT
Start: 2024-07-09 | End: 2024-07-19 | Stop reason: HOSPADM

## 2024-07-09 RX ORDER — SODIUM CHLORIDE 9 MG/ML
INJECTION, SOLUTION INTRAVENOUS PRN
Status: DISCONTINUED | OUTPATIENT
Start: 2024-07-09 | End: 2024-07-19 | Stop reason: HOSPADM

## 2024-07-09 RX ORDER — ONDANSETRON 4 MG/1
4 TABLET, ORALLY DISINTEGRATING ORAL EVERY 8 HOURS PRN
Status: DISCONTINUED | OUTPATIENT
Start: 2024-07-09 | End: 2024-07-19 | Stop reason: HOSPADM

## 2024-07-09 RX ORDER — CLOPIDOGREL BISULFATE 75 MG/1
75 TABLET ORAL DAILY
Status: DISCONTINUED | OUTPATIENT
Start: 2024-07-09 | End: 2024-07-19 | Stop reason: HOSPADM

## 2024-07-09 RX ORDER — ATORVASTATIN CALCIUM 10 MG/1
10 TABLET, FILM COATED ORAL NIGHTLY
Status: DISCONTINUED | OUTPATIENT
Start: 2024-07-09 | End: 2024-07-19 | Stop reason: HOSPADM

## 2024-07-09 RX ORDER — BUDESONIDE 0.5 MG/2ML
500 INHALANT ORAL EVERY 12 HOURS
Status: DISCONTINUED | OUTPATIENT
Start: 2024-07-09 | End: 2024-07-19 | Stop reason: HOSPADM

## 2024-07-09 RX ORDER — PANTOPRAZOLE SODIUM 40 MG/10ML
40 INJECTION, POWDER, LYOPHILIZED, FOR SOLUTION INTRAVENOUS DAILY
Status: DISCONTINUED | OUTPATIENT
Start: 2024-07-09 | End: 2024-07-10

## 2024-07-09 RX ORDER — SODIUM CHLORIDE 0.9 % (FLUSH) 0.9 %
5-40 SYRINGE (ML) INJECTION PRN
Status: DISCONTINUED | OUTPATIENT
Start: 2024-07-09 | End: 2024-07-19 | Stop reason: HOSPADM

## 2024-07-09 RX ORDER — CARVEDILOL 6.25 MG/1
12.5 TABLET ORAL 2 TIMES DAILY WITH MEALS
Status: DISCONTINUED | OUTPATIENT
Start: 2024-07-10 | End: 2024-07-19 | Stop reason: HOSPADM

## 2024-07-09 RX ORDER — ACETAMINOPHEN 650 MG/1
650 SUPPOSITORY RECTAL EVERY 6 HOURS PRN
Status: DISCONTINUED | OUTPATIENT
Start: 2024-07-09 | End: 2024-07-19 | Stop reason: HOSPADM

## 2024-07-09 RX ORDER — IPRATROPIUM BROMIDE AND ALBUTEROL SULFATE 2.5; .5 MG/3ML; MG/3ML
1 SOLUTION RESPIRATORY (INHALATION) EVERY 4 HOURS PRN
Status: DISCONTINUED | OUTPATIENT
Start: 2024-07-09 | End: 2024-07-19 | Stop reason: HOSPADM

## 2024-07-09 RX ORDER — 0.9 % SODIUM CHLORIDE 0.9 %
1000 INTRAVENOUS SOLUTION INTRAVENOUS ONCE
Status: COMPLETED | OUTPATIENT
Start: 2024-07-09 | End: 2024-07-09

## 2024-07-09 RX ORDER — POLYETHYLENE GLYCOL 3350 17 G/17G
17 POWDER, FOR SOLUTION ORAL DAILY PRN
Status: DISCONTINUED | OUTPATIENT
Start: 2024-07-09 | End: 2024-07-19 | Stop reason: HOSPADM

## 2024-07-09 RX ADMIN — EMPAGLIFLOZIN 10 MG: 10 TABLET, FILM COATED ORAL at 23:50

## 2024-07-09 RX ADMIN — DEXTROSE MONOHYDRATE: 50 INJECTION, SOLUTION INTRAVENOUS at 17:07

## 2024-07-09 RX ADMIN — MIDODRINE HYDROCHLORIDE 15 MG: 5 TABLET ORAL at 16:03

## 2024-07-09 RX ADMIN — Medication 300 MG: at 23:51

## 2024-07-09 RX ADMIN — ATORVASTATIN CALCIUM 10 MG: 10 TABLET, FILM COATED ORAL at 23:50

## 2024-07-09 RX ADMIN — MICAFUNGIN SODIUM 100 MG: 100 INJECTION, POWDER, LYOPHILIZED, FOR SOLUTION INTRAVENOUS at 23:48

## 2024-07-09 RX ADMIN — SODIUM CHLORIDE: 9 INJECTION, SOLUTION INTRAVENOUS at 22:48

## 2024-07-09 RX ADMIN — APIXABAN 2.5 MG: 5 TABLET, FILM COATED ORAL at 16:03

## 2024-07-09 RX ADMIN — ARFORMOTEROL TARTRATE 15 MCG: 15 SOLUTION RESPIRATORY (INHALATION) at 16:42

## 2024-07-09 RX ADMIN — PANTOPRAZOLE SODIUM 40 MG: 40 INJECTION, POWDER, FOR SOLUTION INTRAVENOUS at 16:16

## 2024-07-09 RX ADMIN — PETROLATUM: 420 OINTMENT TOPICAL at 23:02

## 2024-07-09 RX ADMIN — ANTI-FUNGAL POWDER MICONAZOLE NITRATE TALC FREE: 1.42 POWDER TOPICAL at 23:00

## 2024-07-09 RX ADMIN — SODIUM CHLORIDE 1000 ML: 9 INJECTION, SOLUTION INTRAVENOUS at 06:37

## 2024-07-09 RX ADMIN — CEFIDEROCOL SULFATE TOSYLATE 1500 MG: 1 INJECTION, POWDER, FOR SOLUTION INTRAVENOUS at 22:58

## 2024-07-09 RX ADMIN — IPRATROPIUM BROMIDE AND ALBUTEROL SULFATE 1 DOSE: 2.5; .5 SOLUTION RESPIRATORY (INHALATION) at 16:44

## 2024-07-09 RX ADMIN — PREDNISONE 5 MG: 5 TABLET ORAL at 16:03

## 2024-07-09 RX ADMIN — DEXTROSE MONOHYDRATE: 50 INJECTION, SOLUTION INTRAVENOUS at 20:54

## 2024-07-09 RX ADMIN — Medication 10 ML: at 23:04

## 2024-07-09 RX ADMIN — MIDODRINE HYDROCHLORIDE 15 MG: 5 TABLET ORAL at 23:50

## 2024-07-09 RX ADMIN — CLOPIDOGREL BISULFATE 75 MG: 75 TABLET ORAL at 16:03

## 2024-07-09 RX ADMIN — MICONAZOLE NITRATE: 2 OINTMENT TOPICAL at 22:59

## 2024-07-09 RX ADMIN — BUDESONIDE INHALATION 500 MCG: 0.5 SUSPENSION RESPIRATORY (INHALATION) at 16:42

## 2024-07-09 ASSESSMENT — PULMONARY FUNCTION TESTS
PIF_VALUE: 30
PIF_VALUE: 28
PIF_VALUE: 30
PIF_VALUE: 30
PIF_VALUE: 33
PIF_VALUE: 28
PIF_VALUE: 27
PIF_VALUE: 29

## 2024-07-09 ASSESSMENT — LIFESTYLE VARIABLES
HOW OFTEN DO YOU HAVE A DRINK CONTAINING ALCOHOL: PATIENT UNABLE TO ANSWER
HOW MANY STANDARD DRINKS CONTAINING ALCOHOL DO YOU HAVE ON A TYPICAL DAY: PATIENT UNABLE TO ANSWER

## 2024-07-09 ASSESSMENT — PAIN - FUNCTIONAL ASSESSMENT: PAIN_FUNCTIONAL_ASSESSMENT: WONG-BAKER FACES

## 2024-07-09 ASSESSMENT — PAIN SCALES - WONG BAKER: WONGBAKER_NUMERICALRESPONSE: NO HURT

## 2024-07-09 ASSESSMENT — PAIN SCALES - GENERAL: PAINLEVEL_OUTOF10: 0

## 2024-07-09 NOTE — CONSULTS
Assessment and Plan  Patient is a 79 y.o. female with the following medical Problems:     Chronic hypoxic respiratory failure on mechanical ventilation (S/P tracheostomy and PEG)  Urinary tract infection.  Acute kidney injury  Hypernatremia.  Acute blood loss anemia  GI bleeding  Chronic encephalopathy  Aspiration pneumonia  Left-sided pleural effusion       Plan of care:  Continue with mechanical ventilation  Reconcile home medications  Patient was recently on multiple antimicrobial therapy.  Monitor culture results.  ID team is consulted.  Procalcitonin level and lactic acid was ordered.  Dietitian consult for tube feeding.  Goals of care were discussed.  Patient is currently DNR CCA.  Dietitian consult for tube feeding  Nephrology is managing hyponatremia and EVELIA.  DVT prophylaxis.  Patient is currently on Eliquis 2.5 mg twice daily and Plavix  Midodrine to help with hypotension.  Carvedilol 12.5 twice daily  Will consider ICU transfer if no improvement.      History of Present Illness:   Patient is known to me from prior hospitalization in June 2024.  She is currently encephalopathic and unable to provide history.  Patient is a 79-year-old woman with above-mentioned medical problem who was admitted on July 9, 2024 with altered mental status.  Patient was found to be hyponatremic with positive UTI.  Goals of care were discussed previously and patient was made DNR CCA.  Patient was initially had encephalopathy when she presented in June 2024 but she was more awake and interactive upon discharge.  She is chronically on mechanical ventilation.  Patient was hypotensive and responded to IV fluid.    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    wound    Cerebral artery occlusion with cerebral infarction ([facility])     CHF (congestive heart failure) ([facility])     COPD (chronic obstructive pulmonary disease) ([facility])  5 mg, 5 mg, PEG Tube, Daily, Denisse Mckeon APRN - CNP    pantoprazole (PROTONIX) injection 40 mg, 40 mg, IntraVENous, Daily, Denisse Mckeon APRN - CNP    sodium chloride flush 0.9 % injection 5-40 mL, 5-40 mL, IntraVENous, 2 times per day, Denisse Mckeon APRN - CNP    sodium chloride flush 0.9 % injection 5-40 mL, 5-40 mL, IntraVENous, PRN, Denisse Mckeon APRN - CNP    0.9 % sodium chloride infusion, , IntraVENous, PRN, Denisse Mckeon APRN - CNP    ondansetron (ZOFRAN-ODT) disintegrating tablet 4 mg, 4 mg, Oral, Q8H PRN **OR** ondansetron (ZOFRAN) injection 4 mg, 4 mg, IntraVENous, Q6H PRN, Denisse Mckeon APRN - CNP    polyethylene glycol (GLYCOLAX) packet 17 g, 17 g, Oral, Daily PRN, Denisse Mckeon APRN - CNP    acetaminophen (TYLENOL) tablet 650 mg, 650 mg, Oral, Q6H PRN **OR** acetaminophen (TYLENOL) suppository 650 mg, 650 mg, Rectal, Q6H PRN, Denisse Mckeon APRN - CNP    dextrose 5 % solution, , IntraVENous, Continuous, Marcie Rodrigues MD    Current Outpatient Medications:     furosemide (LASIX) 20 MG tablet, 1 tablet by PEG Tube route daily, Disp: , Rfl:     budesonide (PULMICORT) 0.5 MG/2ML nebulizer suspension, Take 2 mLs by nebulization in the morning and 2 mLs in the evening., Disp: 60 each, Rfl: 3    empagliflozin (JARDIANCE) 10 MG tablet, 1 tablet by PEG Tube route daily, Disp: , Rfl:     carvedilol (COREG) 12.5 MG tablet, Take 1 tablet by mouth 2 times daily (with meals), Disp: 60 tablet, Rfl: 3    midodrine (PROAMATINE) 5 MG tablet, Take 3 tablets by mouth 3 times daily (with meals), Disp: 90 tablet, Rfl: 3    Respiratory Therapy Supplies (FULL KIT NEBULIZER SET) MISC, Use as directed with nebulized medication., Disp: 1 each, Rfl: 0    tobramycin, PF, (CHERELLE) 300 MG/5ML nebulizer solution, Take 5 mLs by nebulization in the morning and 5 mLs in the evening. Do all this for 14 days. 14 days., Disp: 140 mL, Rfl: 0    miconazole (MICOTIN) 2 % powder, Apply topically 2  physician preparedness to intervene urgently.  I managed/supervised life or organ supporting interventions that required frequent physician assessment.  I devoted my full attention to the direct care of this patient for the amount of time indicated below.  Time I spent with family or surrogate(s) is included only if the patient was incapable of providing the necessary information or participating in medical decision-making.  Time devoted to teaching and to any procedures I billed separately is not included.  Critical Care Time: 31 minutes    .      Electronically signed by Martin Metz MD on 7/9/2024 at 1:51 PM

## 2024-07-09 NOTE — CONSULTS
[address] [place] [address] [address] [place] [address]  Phone [phone]   [phone]      Admit Date: 2024  6:01 AM  Pt Name: Effie Portillo  MRN: [id number]  : 1945  Reason for Consult:    Chief Complaint   Patient presents with    Loss of Consciousness     Pt comes to the ED from WNR unresponsive except to painful stimuli. Pt is vent dependent and is sating between 85-88% on 15-20 liters which is not baseline.      Requesting Physician:  Kwame Enamorado MD  PCP: Sheba Sandoval MD  History Obtained From:  patient, chart   ID consulted for Hypernatremia [E87.0]  on hospital day 0  CHIEF COMPLAINT       Chief Complaint   Patient presents with    Loss of Consciousness     Pt comes to the ED from WNR unresponsive except to painful stimuli. Pt is vent dependent and is sating between 85-88% on 15-20 liters which is not baseline.      HISTORYOF PRESENT ILLNESS   Effie Portillo is a 79 y.o. female who  has a past medical history of Atrial fibrillation ([facility]), Bipolar disorder ([facility]), Candida auris colonization, Cerebral artery occlusion with cerebral infarction ([facility]), CHF (congestive heart failure) ([facility]), COPD (chronic obstructive pulmonary disease) ([facility]), COVID-19, Depression, ESBL (extended spectrum beta-lactamase) producing bacteria infection, GI (gastrointestinal bleed), History of blood transfusion, Hx of blood clots, Hypertension, Infection due to extended spectrum beta-lactamase producing bacteria, Ischemic cardiomyopathy, MI (myocardial infarction) ([facility]), Multiple drug resistant organism (MDRO) culture positive, Pneumonia, Ventilator dependent ([facility]), and VRE (vancomycin resistant enterococcus) culture positive.   Presented to ED TRIAGE VITALS  BP: 128/61, Temp: 98.2 °F (36.8 °C), Pulse: 85, Respirations: 23, SpO2: 98 %  HPI:  ID was consulted on 24 for infection management  KNONW TO ID    Pt presented to ER on 2024 with diagnosis of  Hypernatremia [E87.0]  SEEN IN  06/26/24 0625   G Port Status Infusing 06/26/24 0625   Surrounding Skin Reddened 06/26/24 0800   Dressing Status Clean, dry & intact 06/26/24 0800   Dressing Type Split gauze 06/26/24 0800   G-Tube Care Completed Yes 06/26/24 0800   Tube Feeding Renal Formula 06/26/24 0800   Tube feeding/verify rate (mL/hr) 60 mL/hr 06/26/24 0800   Tube Feeding Intake (mL) 304 ml 06/26/24 0625   Free Water/Flush (mL) 30 mL 06/26/24 0625   Action Taken Placement verified (comment) 06/25/24 1600   Residual Volume (ml) 5 ml 06/26/24 0035            DIAGNOSTIC RESULTS   RADIOLOGY:   XR CHEST PORTABLE    Result Date: 7/9/2024  Mild, patchy bilateral perihilar and interstitial infiltrates suggesting mild pulmonary edema, mildly increased from prior examination.     LABS  Recent Labs     07/09/24 0615   WBC 6.4   HGB 8.8*   HCT 31.6*   MCV 92.1     Recent Labs     07/09/24 0615   *   K 4.5   *   CO2 41*   *   CREATININE 1.4*   LABGLOM 39*   GLUCOSE 173*   CALCIUM 10.2   BILITOT 0.2   ALKPHOS 107*   AST 18   ALT 16     No results for input(s): \"PROCAL\" in the last 72 hours.  Lab Results   Component Value Date    CRP 48.0 (H) 09/12/2023     No results found for: \"SEDRATE\"  No results found for: \"NETRDFG2M6\"  Lab Results   Component Value Date/Time    BPARAPPCR Not Detected 07/09/2024 06:24 AM    COVID19 Not Detected 07/09/2024 06:24 AM    FLUBPCR Not Detected 07/09/2024 06:24 AM    NAXNRVF7XCJ Not Detected 07/09/2024 06:24 AM        Lab Results   Component Value Date/Time    COVID19 Not Detected 07/09/2024 06:24 AM     COVID-19/WHIT-COV2 LABS  Recent Labs     07/09/24 0615   AST 18   ALT 16          MICROBIOLOGY:  Results       Procedure Component Value Units Date/Time    Culture, Urine [[id number]]     Order Status: Sent Specimen: Urine, clean catch     Culture, Respiratory [[id number]]     Order Status: Sent Specimen: Sputum Aspirated     Culture, Urine [[id number]] Collected: 07/09/24 0630    Order Status: Sent  Specimen: Urine, clean catch Updated: 07/09/24 0651    Culture, Blood 2 [[id number]] Collected: 07/09/24 0624    Order Status: Completed Specimen: Blood Updated: 07/09/24 0923     Specimen Description .BLOOD     Special Requests Site: Blood     Culture NO GROWTH <24 HRS    Blood Culture 1 [[id number]] Collected: 07/09/24 0624    Order Status: Completed Specimen: Blood Updated: 07/09/24 0926     Specimen Description .BLOOD     Special Requests          Culture NO GROWTH <24 HRS    Respiratory Panel, Molecular, with COVID-19 (Restricted: peds pts or suitable admitted adults) [[id number]] Collected: 07/09/24 0624    Order Status: Completed Specimen: Nasopharyngeal Swab Updated: 07/09/24 1021     Specimen Description .NASOPHARYNGEAL SWAB     Adenovirus PCR Not Detected     Coronavirus 229E PCR Not Detected     Coronavirus HKU1 PCR Not Detected     Coronavirus NL63 PCR Not Detected     Coronavirus OC43 PCR Not Detected     SARS-CoV-2, PCR Not Detected     Human Metapneumovirus PCR Not Detected     Rhino/Enterovirus PCR Not Detected     Influenza A by PCR Not Detected     Influenza B by PCR Not Detected     Parainfluenza 1 PCR Not Detected     Parainfluenza 2 PCR Not Detected     Parainfluenza 3 PCR Not Detected     Parainfluenza 4 PCR Not Detected     Resp Syncytial Virus PCR Not Detected     Bordetella parapertussis by PCR Not Detected     B Pertussis by PCR Not Detected     Chlamydia pneumoniae By PCR Not Detected     Mycoplasma pneumo by PCR Not Detected     Comment: Performed by multiplexed nucleic acid assay.                    No results found for: \"RESPSMEAR\"      Component Value Date/Time    MPNEUMO Not Detected 07/09/2024 0624        FINAL IMPRESSION    Patient is a 79 y.o. female who presented with   Chief Complaint   Patient presents with    Loss of Consciousness     Pt comes to the ED from WNR unresponsive except to painful stimuli. Pt is vent dependent and is sating between 85-88% on 15-20 liters which is

## 2024-07-09 NOTE — ED PROVIDER NOTES
[facility] EMERGENCY DEPARTMENT  EMERGENCY DEPARTMENT ENCOUNTER        Pt Name: Effie Portillo  MRN: [id number]  Birthdate 1945  Date of evaluation: 7/9/2024  Provider: Mahesh Cantu DO  PCP: Sheba Sandoval MD  Note Started: 6:17 AM EDT 7/9/24    CHIEF COMPLAINT       Chief Complaint   Patient presents with    Loss of Consciousness     Pt comes to the ED from WNR unresponsive except to painful stimuli. Pt is vent dependent and is sating between 85-88% on 15-20 liters which is not baseline.        HISTORY OF PRESENT ILLNESS: 1 or more Elements   Effie Portillo is a 79 y.o. female who presents to the emergency department with chief complaint of altered mental status.  Per EMS, patient coming from nursing facility and decreased responsiveness compared to baseline.  Patient is responsive to painful stimuli.  At baseline patient is ventilator dependent but has recently had increased oxygen requirements.  Patient unable to provide significant medical history due to current mentation.  Tachycardic and mildly hypotensive on arrival in emergency department.  No HPI able to be obtained from patient.    Nursing Notes were all reviewed and agreed with or any disagreements were addressed in the HPI.    REVIEW OF SYSTEMS :    Positives and Pertinent negatives as per HPI.    PAST MEDICAL HISTORY/Chronic Conditions Affecting Care    has a past medical history of Atrial fibrillation ([facility]), Bipolar disorder ([facility]), Candida auris colonization (01/16/2024), Cerebral artery occlusion with cerebral infarction ([facility]), CHF (congestive heart failure) ([facility]), COPD (chronic obstructive pulmonary disease) ([facility]), COVID-19, Depression, ESBL (extended spectrum beta-lactamase) producing bacteria infection (01/15/2024), GI (gastrointestinal bleed), History of blood transfusion, blood clots, Hypertension, Infection due to extended spectrum beta-lactamase producing bacteria (09/12/2023), Ischemic  NEPHROLOGY  IP CONSULT TO DIETITIAN  IP CONSULT TO INFECTIOUS DISEASES  IP CONSULT TO PHARMACY    FINAL IMPRESSION      1. Uremic encephalopathy    2. Hypernatremia    3. Hyperchloremia    4. Dehydration          DISPOSITION/PLAN   DISPOSITION Admitted 07/09/2024 08:56:15 AM    PATIENT REFERRED TO:  No follow-up provider specified.    DISCHARGE MEDICATIONS:  New Prescriptions    No medications on file            (Please note that portions of this note were completed with a voice recognition program.  Efforts were made to edit the dictations but occasionally words are mis-transcribed.)    Mahesh Cantu DO (electronically signed)

## 2024-07-09 NOTE — CONSULTS
Department of Internal Medicine  Nephrology Attending Consult Note      Reason for Consult: Hypernatremia  Requesting Physician:  Kwame Enamorado MD     CHIEF COMPLAINT: Altered mental status    History Obtained From:  electronic medical record    HISTORY OF PRESENT ILLNESS:  Briefly Mrs. Portillo is a 79-year-old female with history of HTN, CAD status post MI, HFrEF 35%, PAF, emphysema with chronic respiratory failure ventilator dependent, status post tracheostomy, pulmonary hypertension, status post PEG tube placement, Candida aureus colonization, who was admitted on July 9, 2024 after she was referred from [facility] due to altered mental status, after evaluation she was found to have a sodium level of 163 mEq/L, reason for this consultation.  Prior to admission current medication including Lasix 20 mg daily, empagliflozin 10 mg daily, midodrine 5 mg 3 times daily.      Past Medical History:        Diagnosis Date    Atrial fibrillation ([facility])     Bipolar disorder ([facility])     Candida auris colonization 01/16/2024    wound    Cerebral artery occlusion with cerebral infarction ([facility])     CHF (congestive heart failure) ([facility])     COPD (chronic obstructive pulmonary disease) ([facility])     COVID-19     Depression     ESBL (extended spectrum beta-lactamase) producing bacteria infection 01/15/2024    wound    GI (gastrointestinal bleed)     History of blood transfusion     Hx of blood clots     venous thrombosis    Hypertension     Infection due to extended spectrum beta-lactamase producing bacteria 09/12/2023    urine and blood    Ischemic cardiomyopathy     MI (myocardial infarction) ([facility])     Multiple drug resistant organism (MDRO) culture positive 01/17/2024    sputum    Pneumonia     Ventilator dependent ([facility])     VRE (vancomycin resistant enterococcus) culture positive 04/13/2024    urine     Past Surgical History:        Procedure Laterality Date    BRONCHOSCOPY N/A 6/20/2024    BRONCHOSCOPY performed

## 2024-07-09 NOTE — H&P
[facility] Hospitalist [facility]   History and Physical      CHIEF COMPLAINT:  AMS    History of Present Illness:  79 y.o. female with a history of Bipolar Disorder, Depression, CVA, COPD, Atrial Fibrillation, CHF, HTN, Ventilator Dependence presents with AMS. Pt presets from [facility] with complaints of AMS. Pt is reportedly less responsive then normal. Pt is ventilator dependent. Code status change with last admission. Currently DNRCCA. AC 18 Vt 375 +8 100%. Pt received 1L NSS bolus in ED course. Decision to admit pt for further evaluation and treatment.       6/15-6/26 Presented with concerns of aspiration. Difficulty maintaining oxygen saturation at [facility]. In the ER the patient received 1000 mg calcium gluconate IV x 1, Maxipime 2000 mg IV x 1, Flagyl 5 mg IV x 1, Protonix 40 mg IV x 1, 2 L of normal saline boluses. Admitted to the ICU for severe sepsis with septic shock, weaned off pressors and started on midodrine, carvedilol reduced to 12.5 twice daily. Hyponatremia/Hyperkalemia. Acute on chronic anemia. Nephrology and GI Consulted. Occult stool negative. Consider colonoscopy outpatient. Code status change DNR CCA.     4/13-4/17 Presented with increased SOB. CT Chest revealing alveolar consolidation in the right and left lower lobes and emphysema. Cardiology and ID consulted. ECHO revealed LVEF 30-35%. Grade I Diastolic Dysfunction. 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 Received IV Furosemide transitioned to oral.Diagnosis CHF exacerbation. Discharged to [place] re[facility]      1/13-1/16 Presented to ED in Respiratory Distress. Noted respiratory rate in the 50s. Pt unresponsive upon arrival. Found with elevated Na+ level. Required fluid management. Received Free water through PEG.Recommended continuing. Discharged to [facility].     Informant(s) for H&P:EMR    REVIEW OF SYSTEMS:  RAY AMS      PMH:  Past Medical History:   Diagnosis Date    Atrial  163*   K 4.5   *   CO2 41*   *   CREATININE 1.4*   GLUCOSE 173*   CALCIUM 10.2       Recent Labs     07/09/24  0615   WBC 6.4   RBC 3.43*   HGB 8.8*   HCT 31.6*   MCV 92.1   MCH 25.7*   MCHC 27.8*   RDW 19.1*   MPV 12.7*       No results for input(s): \"POCGLU\" in the last 72 hours.        Radiology: XR CHEST PORTABLE    Result Date: 7/9/2024  EXAMINATION: ONE XRAY VIEW OF THE CHEST 7/9/2024 6:36 am COMPARISON: June 25, 2024. HISTORY: ORDERING SYSTEM PROVIDED HISTORY: sepsis TECHNOLOGIST PROVIDED HISTORY: Reason for exam:->sepsis FINDINGS: Mild, patchy bilateral perihilar and interstitial infiltrates suggesting mild pulmonary edema, mildly increased from prior examination.  No focal disease. No effusion or pneumothorax.  Endotracheal tube remains well position. Osseous and body wall soft tissues unremarkable.  Interim removal of right arm PICC.     Mild, patchy bilateral perihilar and interstitial infiltrates suggesting mild pulmonary edema, mildly increased from prior examination.       EKG:     ASSESSMENT:        Active Problems:    * No active hospital problems. *  Resolved Problems:    * No resolved hospital problems. *      PLAN:    Hypernatremia- Na+ 163 Received 1L NSS bolus in ED course. Urine studies. D%. Free Water flushes. BMP Q6.   EVELIA- BUN/Crt 132/1.4 REceived 1L NSS bolus in ED course. Avoid Nephrotoxic agents. Nephrology input appreciated   Chronic Respiratory Failure with Hypoxia- Vent dependent. AC 18 Vt 375 100% +8. Has seen Dr. Metz in the past.   Atrial Fibrillation- Eliquis/Coreg Telemetry monitoring. Has seen [facility].   Anemia- Hgb 8.8 No overt signs of bleeding noted.   Bipolar Disorder/Depression  Hx CVA  HTN- On Midodrine TID. Follow.   Hx GI Bleed          Code Status: DNR CCA  DVT prophylaxis: Eliquis    NOTE: This report was transcribed using voice recognition software. Every effort was made to ensure accuracy; however, inadvertent computerized transcription

## 2024-07-10 LAB
AMPHET UR QL SCN: NEGATIVE
ANION GAP SERPL CALCULATED.3IONS-SCNC: 10 MMOL/L (ref 7–16)
ANION GAP SERPL CALCULATED.3IONS-SCNC: 11 MMOL/L (ref 7–16)
ANION GAP SERPL CALCULATED.3IONS-SCNC: 8 MMOL/L (ref 7–16)
BARBITURATES UR QL SCN: NEGATIVE
BASOPHILS # BLD: 0.01 K/UL (ref 0–0.2)
BASOPHILS NFR BLD: 0 % (ref 0–2)
BENZODIAZ UR QL: NEGATIVE
BUN SERPL-MCNC: 108 MG/DL (ref 6–23)
BUN SERPL-MCNC: 89 MG/DL (ref 6–23)
BUN SERPL-MCNC: 94 MG/DL (ref 6–23)
BUPRENORPHINE UR QL: NEGATIVE
CALCIUM SERPL-MCNC: 8.6 MG/DL (ref 8.6–10.2)
CALCIUM SERPL-MCNC: 8.6 MG/DL (ref 8.6–10.2)
CALCIUM SERPL-MCNC: 8.9 MG/DL (ref 8.6–10.2)
CANNABINOIDS UR QL SCN: NEGATIVE
CHLORIDE SERPL-SCNC: 102 MMOL/L (ref 98–107)
CHLORIDE SERPL-SCNC: 103 MMOL/L (ref 98–107)
CHLORIDE SERPL-SCNC: 108 MMOL/L (ref 98–107)
CO2 SERPL-SCNC: 30 MMOL/L (ref 22–29)
CO2 SERPL-SCNC: 31 MMOL/L (ref 22–29)
CO2 SERPL-SCNC: 36 MMOL/L (ref 22–29)
COCAINE UR QL SCN: NEGATIVE
CREAT SERPL-MCNC: 0.9 MG/DL (ref 0.5–1)
CREAT SERPL-MCNC: 1 MG/DL (ref 0.5–1)
CREAT SERPL-MCNC: 1.1 MG/DL (ref 0.5–1)
DATE, STOOL #1: ABNORMAL
EOSINOPHIL # BLD: 0.06 K/UL (ref 0.05–0.5)
EOSINOPHILS RELATIVE PERCENT: 1 % (ref 0–6)
ERYTHROCYTE [DISTWIDTH] IN BLOOD BY AUTOMATED COUNT: 18.5 % (ref 11.5–15)
FENTANYL UR QL: NEGATIVE
GFR, ESTIMATED: 51 ML/MIN/1.73M2
GFR, ESTIMATED: 61 ML/MIN/1.73M2
GFR, ESTIMATED: 63 ML/MIN/1.73M2
GLUCOSE SERPL-MCNC: 112 MG/DL (ref 74–99)
GLUCOSE SERPL-MCNC: 118 MG/DL (ref 74–99)
GLUCOSE SERPL-MCNC: 122 MG/DL (ref 74–99)
HCT VFR BLD AUTO: 24 % (ref 34–48)
HEMOCCULT SP1 STL QL: POSITIVE
HEMOCCULT STL QL: ABNORMAL
HGB BLD-MCNC: 7 G/DL (ref 11.5–15.5)
IMM GRANULOCYTES # BLD AUTO: 0.09 K/UL (ref 0–0.58)
IMM GRANULOCYTES NFR BLD: 2 % (ref 0–5)
LYMPHOCYTES NFR BLD: 0.68 K/UL (ref 1.5–4)
LYMPHOCYTES RELATIVE PERCENT: 11 % (ref 20–42)
MCH RBC QN AUTO: 26.9 PG (ref 26–35)
MCHC RBC AUTO-ENTMCNC: 29.2 G/DL (ref 32–34.5)
MCV RBC AUTO: 92.3 FL (ref 80–99.9)
METHADONE UR QL: NEGATIVE
MONOCYTES NFR BLD: 0.26 K/UL (ref 0.1–0.95)
MONOCYTES NFR BLD: 4 % (ref 2–12)
NEUTROPHILS NFR BLD: 82 % (ref 43–80)
NEUTS SEG NFR BLD: 4.87 K/UL (ref 1.8–7.3)
OPIATES UR QL SCN: NEGATIVE
OXYCODONE UR QL SCN: NEGATIVE
PCP UR QL SCN: NEGATIVE
PLATELET, FLUORESCENCE: 144 K/UL (ref 130–450)
PMV BLD AUTO: 13.2 FL (ref 7–12)
POTASSIUM SERPL-SCNC: 3.8 MMOL/L (ref 3.5–5)
POTASSIUM SERPL-SCNC: 3.9 MMOL/L (ref 3.5–5)
POTASSIUM SERPL-SCNC: 4.1 MMOL/L (ref 3.5–5)
PROCALCITONIN SERPL-MCNC: 0.86 NG/ML (ref 0–0.08)
RBC # BLD AUTO: 2.6 M/UL (ref 3.5–5.5)
RBC # BLD: ABNORMAL 10*6/UL
SODIUM SERPL-SCNC: 143 MMOL/L (ref 132–146)
SODIUM SERPL-SCNC: 144 MMOL/L (ref 132–146)
SODIUM SERPL-SCNC: 152 MMOL/L (ref 132–146)
TEST INFORMATION: NORMAL
TIME, STOOL #1: 2212
WBC OTHER # BLD: 6 K/UL (ref 4.5–11.5)

## 2024-07-10 PROCEDURE — 85025 COMPLETE CBC W/AUTO DIFF WBC: CPT

## 2024-07-10 PROCEDURE — 2580000003 HC RX 258: Performed by: INTERNAL MEDICINE

## 2024-07-10 PROCEDURE — 94640 AIRWAY INHALATION TREATMENT: CPT

## 2024-07-10 PROCEDURE — 80048 BASIC METABOLIC PNL TOTAL CA: CPT

## 2024-07-10 PROCEDURE — 6370000000 HC RX 637 (ALT 250 FOR IP): Performed by: NURSE PRACTITIONER

## 2024-07-10 PROCEDURE — 99291 CRITICAL CARE FIRST HOUR: CPT | Performed by: INTERNAL MEDICINE

## 2024-07-10 PROCEDURE — 6360000002 HC RX W HCPCS: Performed by: NURSE PRACTITIONER

## 2024-07-10 PROCEDURE — 99233 SBSQ HOSP IP/OBS HIGH 50: CPT | Performed by: INTERNAL MEDICINE

## 2024-07-10 PROCEDURE — 2580000003 HC RX 258: Performed by: NURSE PRACTITIONER

## 2024-07-10 PROCEDURE — 94003 VENT MGMT INPAT SUBQ DAY: CPT

## 2024-07-10 PROCEDURE — 6360000002 HC RX W HCPCS: Performed by: INTERNAL MEDICINE

## 2024-07-10 PROCEDURE — APPSS30 APP SPLIT SHARED TIME 16-30 MINUTES: Performed by: NURSE PRACTITIONER

## 2024-07-10 PROCEDURE — 80307 DRUG TEST PRSMV CHEM ANLYZR: CPT

## 2024-07-10 PROCEDURE — 6360000002 HC RX W HCPCS: Performed by: SPECIALIST

## 2024-07-10 PROCEDURE — 2580000003 HC RX 258: Performed by: SPECIALIST

## 2024-07-10 PROCEDURE — 2060000000 HC ICU INTERMEDIATE R&B

## 2024-07-10 PROCEDURE — 36415 COLL VENOUS BLD VENIPUNCTURE: CPT

## 2024-07-10 RX ORDER — PANTOPRAZOLE SODIUM 40 MG/10ML
40 INJECTION, POWDER, LYOPHILIZED, FOR SOLUTION INTRAVENOUS 2 TIMES DAILY
Status: DISCONTINUED | OUTPATIENT
Start: 2024-07-10 | End: 2024-07-19 | Stop reason: HOSPADM

## 2024-07-10 RX ADMIN — PETROLATUM: 420 OINTMENT TOPICAL at 21:50

## 2024-07-10 RX ADMIN — ANTI-FUNGAL POWDER MICONAZOLE NITRATE TALC FREE: 1.42 POWDER TOPICAL at 09:20

## 2024-07-10 RX ADMIN — MICONAZOLE NITRATE: 2 OINTMENT TOPICAL at 21:50

## 2024-07-10 RX ADMIN — MIDODRINE HYDROCHLORIDE 15 MG: 5 TABLET ORAL at 16:59

## 2024-07-10 RX ADMIN — CARVEDILOL 12.5 MG: 6.25 TABLET, FILM COATED ORAL at 17:00

## 2024-07-10 RX ADMIN — DEXTROSE MONOHYDRATE: 50 INJECTION, SOLUTION INTRAVENOUS at 11:05

## 2024-07-10 RX ADMIN — IPRATROPIUM BROMIDE AND ALBUTEROL SULFATE 1 DOSE: 2.5; .5 SOLUTION RESPIRATORY (INHALATION) at 10:21

## 2024-07-10 RX ADMIN — CEFIDEROCOL SULFATE TOSYLATE 1500 MG: 1 INJECTION, POWDER, FOR SOLUTION INTRAVENOUS at 11:07

## 2024-07-10 RX ADMIN — CEFIDEROCOL SULFATE TOSYLATE 1500 MG: 1 INJECTION, POWDER, FOR SOLUTION INTRAVENOUS at 03:55

## 2024-07-10 RX ADMIN — BUDESONIDE INHALATION 500 MCG: 0.5 SUSPENSION RESPIRATORY (INHALATION) at 06:12

## 2024-07-10 RX ADMIN — PANTOPRAZOLE SODIUM 40 MG: 40 INJECTION, POWDER, FOR SOLUTION INTRAVENOUS at 21:51

## 2024-07-10 RX ADMIN — MIDODRINE HYDROCHLORIDE 15 MG: 5 TABLET ORAL at 13:09

## 2024-07-10 RX ADMIN — ARFORMOTEROL TARTRATE 15 MCG: 15 SOLUTION RESPIRATORY (INHALATION) at 06:11

## 2024-07-10 RX ADMIN — ANTI-FUNGAL POWDER MICONAZOLE NITRATE TALC FREE: 1.42 POWDER TOPICAL at 21:50

## 2024-07-10 RX ADMIN — Medication 300 MG: at 09:19

## 2024-07-10 RX ADMIN — Medication 10 ML: at 09:23

## 2024-07-10 RX ADMIN — PANTOPRAZOLE SODIUM 40 MG: 40 INJECTION, POWDER, FOR SOLUTION INTRAVENOUS at 09:19

## 2024-07-10 RX ADMIN — IPRATROPIUM BROMIDE AND ALBUTEROL SULFATE 1 DOSE: 2.5; .5 SOLUTION RESPIRATORY (INHALATION) at 18:33

## 2024-07-10 RX ADMIN — MICONAZOLE NITRATE: 2 OINTMENT TOPICAL at 09:20

## 2024-07-10 RX ADMIN — MIDODRINE HYDROCHLORIDE 15 MG: 5 TABLET ORAL at 09:19

## 2024-07-10 RX ADMIN — Medication 10 ML: at 21:51

## 2024-07-10 RX ADMIN — ARFORMOTEROL TARTRATE 15 MCG: 15 SOLUTION RESPIRATORY (INHALATION) at 18:33

## 2024-07-10 RX ADMIN — SODIUM CHLORIDE: 9 INJECTION, SOLUTION INTRAVENOUS at 21:56

## 2024-07-10 RX ADMIN — MICAFUNGIN SODIUM 100 MG: 100 INJECTION, POWDER, LYOPHILIZED, FOR SOLUTION INTRAVENOUS at 16:58

## 2024-07-10 RX ADMIN — BUDESONIDE INHALATION 500 MCG: 0.5 SUSPENSION RESPIRATORY (INHALATION) at 18:33

## 2024-07-10 RX ADMIN — ATORVASTATIN CALCIUM 10 MG: 10 TABLET, FILM COATED ORAL at 21:50

## 2024-07-10 RX ADMIN — IPRATROPIUM BROMIDE AND ALBUTEROL SULFATE 1 DOSE: 2.5; .5 SOLUTION RESPIRATORY (INHALATION) at 06:11

## 2024-07-10 RX ADMIN — CEFIDEROCOL SULFATE TOSYLATE 1500 MG: 1 INJECTION, POWDER, FOR SOLUTION INTRAVENOUS at 21:52

## 2024-07-10 RX ADMIN — EMPAGLIFLOZIN 10 MG: 10 TABLET, FILM COATED ORAL at 09:19

## 2024-07-10 RX ADMIN — DEXTROSE MONOHYDRATE: 50 INJECTION, SOLUTION INTRAVENOUS at 01:34

## 2024-07-10 RX ADMIN — PREDNISONE 5 MG: 5 TABLET ORAL at 09:23

## 2024-07-10 RX ADMIN — CARVEDILOL 12.5 MG: 6.25 TABLET, FILM COATED ORAL at 09:19

## 2024-07-10 ASSESSMENT — PULMONARY FUNCTION TESTS
PIF_VALUE: 30
PIF_VALUE: 29
PIF_VALUE: 28
PIF_VALUE: 28
PIF_VALUE: 24

## 2024-07-10 NOTE — PROGRESS NOTES
Assessment and Plan  Patient is a 79 y.o. female with the following medical Problems:     Chronic hypoxic respiratory failure on mechanical ventilation (S/P tracheostomy and PEG)  Urinary tract infection.  Acute kidney injury  Hypernatremia.  Acute blood loss anemia  GI bleeding  Chronic encephalopathy  Aspiration pneumonia  Left-sided pleural effusion       Plan of care:  Continue with mechanical ventilation  Reconcile home medications  Patient was recently on multiple antimicrobial therapy.  Monitor culture results.  ID team is consulted.   Dietitian consult for tube feeding.  Goals of care were discussed.  Patient is currently DNR CCA.  Dietitian consult for tube feeding  Nephrology is managing hyponatremia and EVELIA.  DVT prophylaxis.  Patient is currently on Eliquis 2.5 mg twice daily and Plavix but they are on hold due to suspected GI bleeding.  GI is consulted.  Midodrine to help with hypotension.  Carvedilol 12.5 twice daily  Urine toxicology  Palliative care dietitian consult.        History of Present Illness:   Patient is known to me from prior hospitalization in June 2024.  She is currently encephalopathic and unable to provide history.  Patient is a 79-year-old woman with above-mentioned medical problem who was admitted on July 9, 2024 with altered mental status.  Patient was found to be hyponatremic with positive UTI.  Goals of care were discussed previously and patient was made DNR CCA.  Patient was initially had encephalopathy when she presented in June 2024 but she was more awake and interactive upon discharge.  She is chronically on mechanical ventilation.  Patient was hypotensive and responded to IV fluid.    Daily progress:  July 10, 2024: Patient continues to be encephalopathic and poorly responsive.  She remains on mechanical ventilation.  She has no fever, chills, rigors.  She was started on Fetroja and micafungin by the infectious disease team.  Kidney function is slowly improving.  No evidence  APRN - CNP    dextrose 5 % solution, , IntraVENous, Continuous, Marcie Rodrigues MD, Last Rate: 100 mL/hr at 07/10/24 1105, New Bag at 07/10/24 1105    micafungin (MYCAMINE) 100 mg in sodium chloride 0.9 % 100 mL IVPB (mini-bag), 100 mg, IntraVENous, Daily, Allie Villegas MD, Stopped at 07/10/24 0048    bisacodyl (DULCOLAX) suppository 10 mg, 10 mg, Rectal, Daily PRN, Kwame Enamorado MD    miconazole nitrate 2 % ointment, , Topical, BID, Kwame Enamorado MD, Given at 07/10/24 0920    miconazole (MICOTIN) 2 % powder, , Topical, BID, Kwame Enamorado MD, Given at 07/10/24 0920    cefiderocol sulfate tosylate (FETROJA) 1,500 mg in sodium chloride 0.9 % 100 mL IVPB, 1,500 mg, IntraVENous, Q8H, Allie Villegas MD, Stopped at 07/10/24 1412    white petrolatum ointment, , Topical, BID PRN, Kwame Enamorado MD, Given at 07/09/24 2302      Review of Systems:   Unable to obtain due to medical condition    Physical Exam:   Vital Signs:  BP (!) 109/44   Pulse 68   Temp 98.5 °F (36.9 °C) (Axillary)   Resp 22   Ht 1.575 m (5' 2.01\")   Wt 57.7 kg (127 lb 3.3 oz)   SpO2 95%   BMI 23.26 kg/m²     Input/Output:  In: 3027.3 [I.V.:1550.6; NG/GT:1180]  Out: 650     Oxygen requirements: MV      General appearance: , not in pain or distress, in no respiratory distress    HEENT: +ve tracheostomy  Neck: Supple, no jugular venous distension, lymphadenopathy, thyromegaly or carotid bruits  Chest: Equal normal breath sounds, no wheezing, no crackles and no tenderness over ribs   Cardiovascular: Normal S1 , S2, regular rate and rhythm, no murmur, rub or gallop  Abdomen: Normal sounds present, soft, lax with no tenderness, no hepatosplenomegaly, and no masses  Extremities: +ve edema. +ve left AKA  Skin: intact, no rashes   Neurologic: Unresponsive, lethargic, No focal deficit     Investigations:  Labs, radiological imaging and cardiac work up were personally reviewed and independently interpreted.    ICU STAFF PHYSICIAN

## 2024-07-10 NOTE — PROGRESS NOTES
[facility] Hospitalist   Progress Note    Admitting Date and Time: 7/9/2024  6:01 AM  Admit Dx: Dehydration [E86.0]  Hyperchloremia [E87.8]  Hypernatremia [E87.0]  Uremic encephalopathy [G93.49, N19]    Subjective:    Pt lying in bed in no acute distress. Grimaces to painful stimuli. Remains on Ventilation support. AC 18 Vt 375 +8 70%. D5/Free water flushes continue.       Per RN: No new concerns noted    ROS: denies fever, chills, cp, sob, n/v, HA unless stated above.     [Held by provider] apixaban  2.5 mg Per G Tube BID    arformoterol tartrate  15 mcg Nebulization BID    atorvastatin  10 mg PEG Tube Nightly    budesonide  500 mcg Nebulization Q12H    carvedilol  12.5 mg Oral BID WC    [Held by provider] clopidogrel  75 mg PEG Tube Daily    empagliflozin  10 mg PEG Tube Daily    ferrous Sulfate  300 mg Per G Tube Daily    midodrine  15 mg Oral TID WC    predniSONE  5 mg PEG Tube Daily    pantoprazole  40 mg IntraVENous Daily    sodium chloride flush  5-40 mL IntraVENous 2 times per day    micafungin (MYCAMINE) 100 mg in sodium chloride 0.9 % 100 mL IVPB (mini-bag)  100 mg IntraVENous Daily    miconazole nitrate   Topical BID    miconazole   Topical BID    cefiderocol sulfate tosylate (FETROJA) 1,500 mg in sodium chloride 0.9 % 100 mL IVPB  1,500 mg IntraVENous Q8H     ipratropium 0.5 mg-albuterol 2.5 mg, 1 Dose, Q4H PRN  sodium chloride flush, 5-40 mL, PRN  sodium chloride, , PRN  ondansetron, 4 mg, Q8H PRN   Or  ondansetron, 4 mg, Q6H PRN  polyethylene glycol, 17 g, Daily PRN  acetaminophen, 650 mg, Q6H PRN   Or  acetaminophen, 650 mg, Q6H PRN  bisacodyl, 10 mg, Daily PRN  white petrolatum, , BID PRN         Objective:    BP (!) 119/45   Pulse 73   Temp 98.2 °F (36.8 °C) (Axillary)   Resp 24   Ht 1.575 m (5' 2\")   Wt 57.7 kg (127 lb 3.3 oz)   SpO2 95%   BMI 23.27 kg/m²   General Appearance: Somnolent and in no acute distress  Skin: warm and dry. Ecchymosis.  Head: normocephalic and

## 2024-07-10 NOTE — ACP (ADVANCE CARE PLANNING)
Advance Care Planning   Healthcare Decision Maker:    Primary Decision Maker (Active): PO TORRES [facility] - [phone]

## 2024-07-10 NOTE — PROGRESS NOTES
This RN spoke with Dr. Artis via telephone about new consult. If Tfs are resumed today, they are to be stopped at midnight in case any procedures are necessary.

## 2024-07-10 NOTE — PROGRESS NOTES
Transported patient using ParaPAC portable ventilator from ED to 6th floor. No problems encountered. Cleaned and redressed ED vent. Total time 40 minutes.

## 2024-07-10 NOTE — PROGRESS NOTES
Department of Internal Medicine  Nephrology Attending Consult Note    Events reviewed    SUBJECTIVE: We are following Mrs. Portillo for hyponatremia.  Patient is nonverbal    PHYSICAL EXAM:      Vitals:    VITALS:  BP (!) 118/55   Pulse 71   Temp 98.2 °F (36.8 °C) (Axillary)   Resp 24   Ht 1.575 m (5' 2\")   Wt 57.7 kg (127 lb 3.3 oz)   SpO2 95%   BMI 23.27 kg/m²   24HR INTAKE/OUTPUT:    Intake/Output Summary (Last 24 hours) at 7/10/2024 1014  Last data filed at 7/10/2024 0751  Gross per 24 hour   Intake 2447.27 ml   Output 300 ml   Net 2147.27 ml         Constitutional: Patient is lethargic  HEENT: Pupils are equal reactive  Respiratory: Tracheostomy in place  Cardiovascular/Edema: Heart sounds regular  Gastrointestinal: Abdomen soft, PEG in place  Neurologic: Overall nonfocal  Skin: No lesion  Other: No Edema    Scheduled Meds:   [Held by provider] apixaban  2.5 mg Per G Tube BID    arformoterol tartrate  15 mcg Nebulization BID    atorvastatin  10 mg PEG Tube Nightly    budesonide  500 mcg Nebulization Q12H    carvedilol  12.5 mg Oral BID WC    [Held by provider] clopidogrel  75 mg PEG Tube Daily    empagliflozin  10 mg PEG Tube Daily    ferrous Sulfate  300 mg Per G Tube Daily    midodrine  15 mg Oral TID WC    predniSONE  5 mg PEG Tube Daily    pantoprazole  40 mg IntraVENous Daily    sodium chloride flush  5-40 mL IntraVENous 2 times per day    micafungin (MYCAMINE) 100 mg in sodium chloride 0.9 % 100 mL IVPB (mini-bag)  100 mg IntraVENous Daily    miconazole nitrate   Topical BID    miconazole   Topical BID    cefiderocol sulfate tosylate (FETROJA) 1,500 mg in sodium chloride 0.9 % 100 mL IVPB  1,500 mg IntraVENous Q8H     Continuous Infusions:   sodium chloride Stopped (07/10/24 0355)    dextrose 100 mL/hr at 07/10/24 0733     PRN Meds:.ipratropium 0.5 mg-albuterol 2.5 mg, sodium chloride flush, sodium chloride, ondansetron **OR** ondansetron, polyethylene glycol, acetaminophen **OR** acetaminophen,

## 2024-07-10 NOTE — PROGRESS NOTES
Date:  07/10/24  Hospital Day:  Hospital Day: 2  PT Name:  Effie Portillo  MRN:   [id number]  Primary Care Physician: Sheba Sandoval MD  Requesting physician:   Kwame Enamorado MD  Reason for follow up: antibiotics/infection  Chief Complaint:   Chief Complaint   Patient presents with    Loss of Consciousness     Pt comes to the ED from WNR unresponsive except to painful stimuli. Pt is vent dependent and is sating between 85-88% on 15-20 liters which is not baseline.        Assessment  Effie Portillo is a 79 y.o. year old female who presented on 7/9/2024 and is being treated for Hypernatremia   Chief Complaint   Patient presents with    Loss of Consciousness     Pt comes to the ED from WNR unresponsive except to painful stimuli. Pt is vent dependent and is sating between 85-88% on 15-20 liters which is not baseline.       Id following for   Plan    Pt came in with hypernatremia a  Chronic respiratory failure   R/O SEPSIS/HCAP/FUNGEMIA  DTI SACRAL AND  SKIN TEAR  H/O MDR A BAUMANNII  C AURIS EXPOSURE  Strict isolation      micafungin (MYCAMINE) 100 mg in sodium chloride 0.9 % 100 mL IVPB (mini-bag), Daily  bisacodyl (DULCOLAX) suppository 10 mg, Daily PRN  miconazole nitrate 2 % ointment, BID  miconazole (MICOTIN) 2 % powder, BID  cefiderocol sulfate tosylate (FETROJA) 1,500 mg in sodium chloride 0.9 % 100 mL IVPB, Q8H     CHECK CX resp cx gpc pairs  Urien cx gpo/proreus     Suggest consulting wound care/ostomy nurse  Continue wound care  Encourage nutritional support  Continue to off load wound/pressure relief bed    Watch for cdiff colitis/clabsi-line infection  Monitor labs  Continue current therapy.  Please see orders for further management and care.    Subjective/HPI:  Effie was seen and examined at bedside today for sepsis    Overnight:  Pt is not awkae   There are no adverse drug reactions.  All tests were reviewed.   No  family present during my examination.    ROS: unable due to pt's status

## 2024-07-10 NOTE — PROGRESS NOTES
4 Eyes Skin Assessment     NAME:  Effie Portillo  YOB: 1945  MEDICAL RECORD NUMBER:  [id number]    The patient is being assessed for  Admission    I agree that at least one RN has performed a thorough Head to Toe Skin Assessment on the patient. ALL assessment sites listed below have been assessed.      Areas assessed by both nurses:    Head, Face, Ears, Shoulders, Back, Chest, Arms, Elbows, Hands, Sacrum. Buttock, Coccyx, Ischium, and Legs. Feet and Heels        Does the Patient have a Wound? Yes wound(s) were present on assessment. LDA wound assessment was Initiated and completed by RN       Branden Prevention initiated by RN: Yes  Wound Care Orders initiated by RN: Yes    Pressure Injury (Stage 3,4, Unstageable, DTI, NWPT, and Complex wounds) if present, place Wound referral order by RN under : Yes    New Ostomies, if present place, Ostomy referral order under : No     Nurse 1 eSignature: Electronically signed by Graeme Campbell RN on 7/10/24 at 1:56 AM EDT    **SHARE this note so that the co-signing nurse can place an eSignature**    Nurse 2 eSignature: Electronically signed by Nenita Escamilla RN on 7/10/24 at 1:57 AM EDT

## 2024-07-10 NOTE — CONSULTS
Comprehensive Nutrition Assessment    Type and Reason for Visit:  Reassess    Nutrition Recommendations/Plan:   Continue NPO  Consult for Tube Feeding needed once consult initiated please see below  Begin Willie BID via PEG  Tube Feeding Recommendations:    Recommend continuous, Jevity 1.5 (Standard w/ fiber) @ 60ml/h with 100ml flush q4h. Initiate TF at 20ml/h and advance 10ml q6h or as tolerated until goal is reached.  Regimen to provide 1440ml TV, 2160kcal, 92g protein, 1694ml water per day - increase flush to 175ml/h if constipation or appears dry.  Regimen to exceed 100% of estimated protein and energy needs at goal rate.      Osmolite 1.5 not carried in this facility - will substitute Jevity 1.5 while inpatient. Monitor GI function with additional fiber. Can switch back to Osmolite at SNF if pt tolerates it better.     Malnutrition Assessment:  Malnutrition Status:  At risk for malnutrition (Comment) (07/10/24 1133)    Context:  Chronic Illness     Findings of the 6 clinical characteristics of malnutrition:  Energy Intake:  No significant decrease in energy intake  Weight Loss:  No significant weight loss     Body Fat Loss:  No significant body fat loss     Muscle Mass Loss:  No significant muscle mass loss    Fluid Accumulation:  Mild Generalized   Strength:  Not Performed    Nutrition Assessment:    Pt admit from nursing facility for AMS (unresponsive), EVELIA, acute on chronic resp failure, Anemia, Hypernatremia, left pleural effusion, aspiration PNA, A-fib, chronic encephalopathy, anemia d/t acute blood loss/GIB, UTI & EVELIA. PMHx of CVA, CAD s/p MI, HF, Afib, HTN, L AKA, s/p trach/PEG. Pt is NPO. RD consulted multiple deep tissue wounds. Pt with PEG in place. WIll provide tube feeding recommendations, f/up per policy.    Nutrition Related Findings:    Obtunded, Trach, PEG, Ventilator dependent, Minute Volume 6.8 L, MAP 76, /45, Left foot amputation, I/O +2147, abd distended, bowel sounds active x4,  Generalized edema +2, mottled, pale ecchymosis skin, Na+ 152, Chloride 108, CO2 36, , Creatinine 1.1, GFR 51, Procalcitonin 0.86, BNP 20,734, Troponin 70, ALK PHOS 107, Hgb 7.0, Hct 24.0 Wound Type: Deep Tissue Injury (Right Heel, Right anterior feet, Right Toe, Coccyx DTI, Left Elbow)       Current Nutrition Intake & Therapies:    Average Meal Intake: NPO  Average Supplements Intake: None Ordered  Diet NPO Exceptions are: Sips of Water with Meds, Other (Specify); Specify Other Exceptions: Clear water flushes with meds.    Anthropometric Measures:  Height: 157.5 cm (5' 2.01\")  Ideal Body Weight (IBW): 110 lbs (50 kg)    Admission Body Weight: 54.9 kg (121 lb 0.5 oz)  Current Body Weight: 57.7 kg (127 lb 3.3 oz), 115.6 % IBW. Weight Source: Stated (07/09/24)  Current BMI (kg/m2): 23.3  Usual Body Weight: 54.9 kg (121 lb 0.5 oz) (06/17/24 [facility])  % Weight Change (Calculated): 5.1  Weight Adjustment For: Amputation  Total Adjusted Percentage (Calculated): 10.1  Adjusted Ideal Body Weight (lbs) (Calculated): 98.9 lbs  Adjusted Ideal Body Weight (kg) (Calculated): 44.95 kg  Adjusted % Ideal Body Weight (Calculated): 128.6  Adjusted BMI (kg/m2) (Calculated): 25.7  BMI Categories: Normal Weight (BMI 22.0 to 24.9) age over 65    Estimated Daily Nutrient Needs:  Energy Requirements Based On: Kcal/kg     Energy (kcal/day): 1100 -1600 kcals/day (CBW [place] 2003b , CBW 25-30 kcals/kg Normal Weight BMI)  Weight Used for Protein Requirements: Current  Protein (g/day): 75 -90 g/day (CBW 1.3-1.5 g/kg Normal Weigt BMI guidelines)  Method Used for Fluid Requirements: 1 ml/kcal  Fluid (ml/day): 3111-8150 mls/day or per care team    Nutrition Diagnosis:   Inadequate oral intake, In context of chronic illness related to catabolic illness, impaired respiratory function as evidenced by NPO or clear liquid status due to medical condition, intubation, nutrition support - enteral nutrition  Increased nutrient needs related to  catabolic illness, impaired nutrient utilization as evidenced by wounds, NPO or clear liquid status due to medical condition, intubation, nutrition support - enteral nutrition    Nutrition Interventions:   Food and/or Nutrient Delivery: Continue NPO, Start Tube Feeding  Nutrition Education/Counseling: No recommendation at this time  Coordination of Nutrition Care: Continue to monitor while inpatient     Goals:   Goals: by next RD assessment, Initiate nutrition support, Tolerate nutrition support at goal rate       Nutrition Monitoring and Evaluation:   Behavioral-Environmental Outcomes: None Identified  Food/Nutrient Intake Outcomes: Enteral Nutrition Intake/Tolerance  Physical Signs/Symptoms Outcomes: Biochemical Data, GI Status, Fluid Status or Edema, Hemodynamic Status, Nutrition Focused Physical Findings, Skin, Weight    Discharge Planning:    Too soon to determine     Katie Nettles RD, LD  Contact: [phone]

## 2024-07-10 NOTE — CARE COORDINATION
Case Management Assessment  Initial Evaluation    Date/Time of Evaluation: 7/10/2024 11:23 AM  Assessment Completed by: Shena Solis RN    If patient is discharged prior to next notation, then this note serves as note for discharge by case management.    Patient Name: Effie Torres                   YOB: 1945  Diagnosis: Dehydration [E86.0]  Hyperchloremia [E87.8]  Hypernatremia [E87.0]  Uremic encephalopathy [G93.49, N19]                   Date / Time: 7/9/2024  6:01 AM    Patient Admission Status: Inpatient   Readmission Risk (Low < 19, Mod (19-27), High > 27): Readmission Risk Score: 27.8    Current PCP: Sheba Sandoval MD  PCP verified by CM? Yes    Chart Reviewed: Yes      History Provided by: Medical Record, Other (see comment) (liaison at the NH)  Patient Orientation: Unresponsive    Patient Cognition: Other (see comment) (Unable to assess)    Hospitalization in the last 30 days (Readmission):  Yes    If yes, Readmission Assessment in  Navigator will be completed.    Advance Directives:      Code Status: DNR-CCA   Patient's Primary Decision Maker is: Legal Next of Kin    Primary Decision Maker (Active): PO TORRES - Child - [phone]    Discharge Planning:    Patient lives with: Other (Comment) Type of Home: Skilled Nursing Facility  Primary Care Giver: Other (Comment) (NH staff)  Patient Support Systems include: Children, /, Other (Comment) (NH staff)   Current Financial resources:    Current community resources:    Current services prior to admission: Other (Comment)            Current DME:              Type of Home Care services:  None    ADLS  Prior functional level: Assistance with the following:, Bathing, Dressing, Toileting, Cooking, Feeding, Housework, Shopping, Mobility  Current functional level: Housework, Assistance with the following:, Bathing, Dressing, Toileting, Feeding, Cooking, Shopping, Mobility    PT AM-PAC:   /24  OT AM-PAC:    /24    Family can provide assistance at DC: No (pt resides in a NH)  Would you like Case Management to discuss the discharge plan with any other family members/significant others, and if so, who? Yes (pt's son)  Plans to Return to Present Housing: Yes  Other Identified Issues/Barriers to RETURNING to current housing: none  Potential Assistance needed at discharge: N/A            Potential DME:    Patient expects to discharge to: Skilled nursing facility  Plan for transportation at discharge:      Financial    Payor: MEDICARE / Plan: MEDICARE PART A AND B / Product Type: *No Product type* /     Does insurance require precert for SNF: No    Potential assistance Purchasing Medications:    Meds-to-Beds request: No      [facility] - [place], [place] - [address] - P [phone] - F [phone]  [address]  [facility] [address]  Phone: [phone] Fax: [phone]    [facility] [place] - [place], [place] - [address] [facility] - P [phone] - F [phone]  [address] [place], [place] [id number]  Phone: [phone] Fax: [phone]      Notes:    Factors facilitating achievement of predicted outcomes: Family support and Caregiver support    Barriers to discharge: none    Additional Case Management Notes: 7/10/2024 1120 CM Note:  Pt is from [facility].  Per Melissa pt can return, she's a BED HOLD, NO PRECERT NEEDED.  Will NEED A SIGNED LEOBARDO.  If IV antibioitcs at d/c she will need a midline or PICC line.  CM will follow. Electronically signed by Shena Solis RN on 7/10/2024 at 11:29 AM                                                 Shena Solis RN  Case Management Department

## 2024-07-11 LAB
ANION GAP SERPL CALCULATED.3IONS-SCNC: 10 MMOL/L (ref 7–16)
ANION GAP SERPL CALCULATED.3IONS-SCNC: 11 MMOL/L (ref 7–16)
ANION GAP SERPL CALCULATED.3IONS-SCNC: 9 MMOL/L (ref 7–16)
ANION GAP SERPL CALCULATED.3IONS-SCNC: 9 MMOL/L (ref 7–16)
BASOPHILS # BLD: 0 K/UL (ref 0–0.2)
BASOPHILS NFR BLD: 0 % (ref 0–2)
BUN SERPL-MCNC: 67 MG/DL (ref 6–23)
BUN SERPL-MCNC: 69 MG/DL (ref 6–23)
BUN SERPL-MCNC: 77 MG/DL (ref 6–23)
BUN SERPL-MCNC: 83 MG/DL (ref 6–23)
CALCIUM SERPL-MCNC: 8.3 MG/DL (ref 8.6–10.2)
CALCIUM SERPL-MCNC: 8.4 MG/DL (ref 8.6–10.2)
CALCIUM SERPL-MCNC: 8.4 MG/DL (ref 8.6–10.2)
CALCIUM SERPL-MCNC: 8.6 MG/DL (ref 8.6–10.2)
CHLORIDE SERPL-SCNC: 100 MMOL/L (ref 98–107)
CHLORIDE SERPL-SCNC: 103 MMOL/L (ref 98–107)
CHLORIDE SERPL-SCNC: 104 MMOL/L (ref 98–107)
CHLORIDE SERPL-SCNC: 99 MMOL/L (ref 98–107)
CO2 SERPL-SCNC: 29 MMOL/L (ref 22–29)
CO2 SERPL-SCNC: 30 MMOL/L (ref 22–29)
CO2 SERPL-SCNC: 32 MMOL/L (ref 22–29)
CO2 SERPL-SCNC: 33 MMOL/L (ref 22–29)
CREAT SERPL-MCNC: 0.8 MG/DL (ref 0.5–1)
CREAT SERPL-MCNC: 0.9 MG/DL (ref 0.5–1)
DATE, STOOL #1: NORMAL
EOSINOPHIL # BLD: 0 K/UL (ref 0.05–0.5)
EOSINOPHILS RELATIVE PERCENT: 0 % (ref 0–6)
ERYTHROCYTE [DISTWIDTH] IN BLOOD BY AUTOMATED COUNT: 17.7 % (ref 11.5–15)
GFR, ESTIMATED: 68 ML/MIN/1.73M2
GFR, ESTIMATED: 68 ML/MIN/1.73M2
GFR, ESTIMATED: 70 ML/MIN/1.73M2
GFR, ESTIMATED: 75 ML/MIN/1.73M2
GLUCOSE BLD-MCNC: 89 MG/DL (ref 74–99)
GLUCOSE SERPL-MCNC: 107 MG/DL (ref 74–99)
GLUCOSE SERPL-MCNC: 76 MG/DL (ref 74–99)
GLUCOSE SERPL-MCNC: 80 MG/DL (ref 74–99)
GLUCOSE SERPL-MCNC: 91 MG/DL (ref 74–99)
HCT VFR BLD AUTO: 23.1 % (ref 34–48)
HCT VFR BLD AUTO: 24.1 % (ref 34–48)
HCT VFR BLD AUTO: 25.4 % (ref 34–48)
HEMOCCULT SP1 STL QL: NEGATIVE
HGB BLD-MCNC: 7.1 G/DL (ref 11.5–15.5)
HGB BLD-MCNC: 7.5 G/DL (ref 11.5–15.5)
HGB BLD-MCNC: 7.8 G/DL (ref 11.5–15.5)
INR PPP: 1.3
LYMPHOCYTES NFR BLD: 0.5 K/UL (ref 1.5–4)
LYMPHOCYTES RELATIVE PERCENT: 9 % (ref 20–42)
MAGNESIUM SERPL-MCNC: 2.3 MG/DL (ref 1.6–2.6)
MCH RBC QN AUTO: 27.2 PG (ref 26–35)
MCHC RBC AUTO-ENTMCNC: 30.7 G/DL (ref 32–34.5)
MCV RBC AUTO: 88.5 FL (ref 80–99.9)
METAMYELOCYTES ABSOLUTE COUNT: 0.1 K/UL (ref 0–0.12)
METAMYELOCYTES: 2 % (ref 0–1)
MONOCYTES NFR BLD: 0 % (ref 2–12)
MONOCYTES NFR BLD: 0 K/UL (ref 0.1–0.95)
NEUTROPHILS NFR BLD: 90 % (ref 43–80)
NEUTS SEG NFR BLD: 5.11 K/UL (ref 1.8–7.3)
PLATELET, FLUORESCENCE: 149 K/UL (ref 130–450)
PMV BLD AUTO: 13.3 FL (ref 7–12)
POTASSIUM SERPL-SCNC: 3.3 MMOL/L (ref 3.5–5)
POTASSIUM SERPL-SCNC: 3.6 MMOL/L (ref 3.5–5)
POTASSIUM SERPL-SCNC: 3.7 MMOL/L (ref 3.5–5)
POTASSIUM SERPL-SCNC: 3.9 MMOL/L (ref 3.5–5)
PROTHROMBIN TIME: 13.9 SEC (ref 9.3–12.4)
RBC # BLD AUTO: 2.61 M/UL (ref 3.5–5.5)
RBC # BLD: ABNORMAL 10*6/UL
SODIUM SERPL-SCNC: 141 MMOL/L (ref 132–146)
SODIUM SERPL-SCNC: 142 MMOL/L (ref 132–146)
SODIUM SERPL-SCNC: 143 MMOL/L (ref 132–146)
SODIUM SERPL-SCNC: 143 MMOL/L (ref 132–146)
TIME, STOOL #1: 1700
WBC OTHER # BLD: 5.7 K/UL (ref 4.5–11.5)

## 2024-07-11 PROCEDURE — 6360000002 HC RX W HCPCS: Performed by: INTERNAL MEDICINE

## 2024-07-11 PROCEDURE — 2580000003 HC RX 258: Performed by: NURSE PRACTITIONER

## 2024-07-11 PROCEDURE — 2580000003 HC RX 258: Performed by: HOSPITALIST

## 2024-07-11 PROCEDURE — 85610 PROTHROMBIN TIME: CPT

## 2024-07-11 PROCEDURE — 94003 VENT MGMT INPAT SUBQ DAY: CPT

## 2024-07-11 PROCEDURE — 2580000003 HC RX 258: Performed by: SPECIALIST

## 2024-07-11 PROCEDURE — 6360000002 HC RX W HCPCS: Performed by: HOSPITALIST

## 2024-07-11 PROCEDURE — 6370000000 HC RX 637 (ALT 250 FOR IP): Performed by: NURSE PRACTITIONER

## 2024-07-11 PROCEDURE — 6360000002 HC RX W HCPCS: Performed by: SPECIALIST

## 2024-07-11 PROCEDURE — 82962 GLUCOSE BLOOD TEST: CPT

## 2024-07-11 PROCEDURE — 6360000002 HC RX W HCPCS: Performed by: NURSE PRACTITIONER

## 2024-07-11 PROCEDURE — 85025 COMPLETE CBC W/AUTO DIFF WBC: CPT

## 2024-07-11 PROCEDURE — 85018 HEMOGLOBIN: CPT

## 2024-07-11 PROCEDURE — 82272 OCCULT BLD FECES 1-3 TESTS: CPT

## 2024-07-11 PROCEDURE — 6370000000 HC RX 637 (ALT 250 FOR IP): Performed by: HOSPITALIST

## 2024-07-11 PROCEDURE — 2060000000 HC ICU INTERMEDIATE R&B

## 2024-07-11 PROCEDURE — 83735 ASSAY OF MAGNESIUM: CPT

## 2024-07-11 PROCEDURE — 99232 SBSQ HOSP IP/OBS MODERATE 35: CPT | Performed by: INTERNAL MEDICINE

## 2024-07-11 PROCEDURE — 85014 HEMATOCRIT: CPT

## 2024-07-11 PROCEDURE — 94640 AIRWAY INHALATION TREATMENT: CPT

## 2024-07-11 PROCEDURE — 99291 CRITICAL CARE FIRST HOUR: CPT | Performed by: INTERNAL MEDICINE

## 2024-07-11 PROCEDURE — 99222 1ST HOSP IP/OBS MODERATE 55: CPT | Performed by: NURSE PRACTITIONER

## 2024-07-11 PROCEDURE — 2580000003 HC RX 258: Performed by: INTERNAL MEDICINE

## 2024-07-11 PROCEDURE — 36415 COLL VENOUS BLD VENIPUNCTURE: CPT

## 2024-07-11 PROCEDURE — 80048 BASIC METABOLIC PNL TOTAL CA: CPT

## 2024-07-11 RX ORDER — POTASSIUM CHLORIDE 7.45 MG/ML
10 INJECTION INTRAVENOUS
Status: COMPLETED | OUTPATIENT
Start: 2024-07-11 | End: 2024-07-11

## 2024-07-11 RX ADMIN — SODIUM CHLORIDE 25 MG: 9 INJECTION, SOLUTION INTRAVENOUS at 13:36

## 2024-07-11 RX ADMIN — CEFIDEROCOL SULFATE TOSYLATE 1500 MG: 1 INJECTION, POWDER, FOR SOLUTION INTRAVENOUS at 16:36

## 2024-07-11 RX ADMIN — ACETAMINOPHEN 650 MG: 325 TABLET ORAL at 23:37

## 2024-07-11 RX ADMIN — MICONAZOLE NITRATE: 2 OINTMENT TOPICAL at 23:43

## 2024-07-11 RX ADMIN — CARVEDILOL 12.5 MG: 6.25 TABLET, FILM COATED ORAL at 10:51

## 2024-07-11 RX ADMIN — POTASSIUM CHLORIDE 10 MEQ: 7.46 INJECTION, SOLUTION INTRAVENOUS at 14:20

## 2024-07-11 RX ADMIN — ATORVASTATIN CALCIUM 10 MG: 10 TABLET, FILM COATED ORAL at 23:37

## 2024-07-11 RX ADMIN — Medication 300 MG: at 10:51

## 2024-07-11 RX ADMIN — BUDESONIDE INHALATION 500 MCG: 0.5 SUSPENSION RESPIRATORY (INHALATION) at 18:16

## 2024-07-11 RX ADMIN — BUDESONIDE INHALATION 500 MCG: 0.5 SUSPENSION RESPIRATORY (INHALATION) at 06:14

## 2024-07-11 RX ADMIN — ANTI-FUNGAL POWDER MICONAZOLE NITRATE TALC FREE: 1.42 POWDER TOPICAL at 10:51

## 2024-07-11 RX ADMIN — ARFORMOTEROL TARTRATE 15 MCG: 15 SOLUTION RESPIRATORY (INHALATION) at 18:16

## 2024-07-11 RX ADMIN — CEFIDEROCOL SULFATE TOSYLATE 1500 MG: 1 INJECTION, POWDER, FOR SOLUTION INTRAVENOUS at 23:29

## 2024-07-11 RX ADMIN — PANTOPRAZOLE SODIUM 40 MG: 40 INJECTION, POWDER, FOR SOLUTION INTRAVENOUS at 23:38

## 2024-07-11 RX ADMIN — EMPAGLIFLOZIN 10 MG: 10 TABLET, FILM COATED ORAL at 10:51

## 2024-07-11 RX ADMIN — PREDNISONE 5 MG: 5 TABLET ORAL at 10:51

## 2024-07-11 RX ADMIN — SODIUM CHLORIDE 100 MG: 9 INJECTION, SOLUTION INTRAVENOUS at 15:30

## 2024-07-11 RX ADMIN — ARFORMOTEROL TARTRATE 15 MCG: 15 SOLUTION RESPIRATORY (INHALATION) at 06:14

## 2024-07-11 RX ADMIN — CARVEDILOL 12.5 MG: 6.25 TABLET, FILM COATED ORAL at 18:18

## 2024-07-11 RX ADMIN — CEFIDEROCOL SULFATE TOSYLATE 1500 MG: 1 INJECTION, POWDER, FOR SOLUTION INTRAVENOUS at 06:37

## 2024-07-11 RX ADMIN — POLYETHYLENE GLYCOL-3350 AND ELECTROLYTES 4000 ML: 236; 6.74; 5.86; 2.97; 22.74 POWDER, FOR SOLUTION ORAL at 19:41

## 2024-07-11 RX ADMIN — MICAFUNGIN SODIUM 100 MG: 100 INJECTION, POWDER, LYOPHILIZED, FOR SOLUTION INTRAVENOUS at 17:54

## 2024-07-11 RX ADMIN — IPRATROPIUM BROMIDE AND ALBUTEROL SULFATE 1 DOSE: 2.5; .5 SOLUTION RESPIRATORY (INHALATION) at 21:45

## 2024-07-11 RX ADMIN — POTASSIUM CHLORIDE 10 MEQ: 7.46 INJECTION, SOLUTION INTRAVENOUS at 16:41

## 2024-07-11 RX ADMIN — Medication 10 ML: at 23:36

## 2024-07-11 RX ADMIN — ANTI-FUNGAL POWDER MICONAZOLE NITRATE TALC FREE: 1.42 POWDER TOPICAL at 23:39

## 2024-07-11 RX ADMIN — MICONAZOLE NITRATE: 2 OINTMENT TOPICAL at 10:52

## 2024-07-11 RX ADMIN — PANTOPRAZOLE SODIUM 40 MG: 40 INJECTION, POWDER, FOR SOLUTION INTRAVENOUS at 10:40

## 2024-07-11 ASSESSMENT — PULMONARY FUNCTION TESTS
PIF_VALUE: 30
PIF_VALUE: 30
PIF_VALUE: 28
PIF_VALUE: 31
PIF_VALUE: 31

## 2024-07-11 ASSESSMENT — PAIN SCALES - WONG BAKER: WONGBAKER_NUMERICALRESPONSE: NO HURT

## 2024-07-11 NOTE — CONSULTS
CONSULT  Neo Artis M.D.  [facility]  Dr. Rosemary aHrtman M.D.,  Dr. Phil Benitez M.D.,  Dr. Pato Hernandez D.O.,  Dr. Cristobal Salcedo D.O. ,  Dr. Dayday Trejo M.D.,          Effie Portillo  79 y.o.  female      Re: \"Anemia/rectal bleeding\"  Requesting physician: Dr. Metz  Date:9:16 AM 7/11/2024          HPI: 79-year-old female patient seen in the hospital for above described issue.  She has history of CVA, COPD, atrial fibrillation, oral anticoagulation, CHF, hypertension, chronic respiratory failure and she is ventilator dependent with tracheostomy and PEG tube.  Patient was recently seen in the hospital by our service seen June of this year and undergo upper endoscopy on the 21st with unremarkable findings.  At that time her H&H remained relatively stable however with some mild decrease but no overt bleed.  Patient was restarted on her anticoagulation with plan for outpatient follow-up.  It must be noted, that this patient has not been followed previously as outpatient by our service.  On this admission patient has been admitted since the ninth when she presented with altered mental status.  Patient was found to be anemic on presentation with hemoglobin of 8.8 which is consistent with her baseline of 8.9 on discharge but decreased to 7.0 yesterday.  Her hemoglobin has remained stable at 7.1 this morning.  Patient has not been transfused on this hospital admission.  Patient previously was found to be anemic in June of this year.  According to the notes from critical care physician patient apparently had questionable GI bleeding overnight.    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    wound    Cerebral artery occlusion with cerebral infarction (HCC)     CHF (congestive heart failure) (HCC)     COPD (chronic obstructive pulmonary  \"LIPASE\"  No results found for: \"AMYLASE\"      ASSESSMENT/PLAN:  Patient Active Problem List   Diagnosis    Chronic respiratory failure with hypoxia (HCC)    Primary hypertension    History of stroke with residual deficit    History of DVT (deep vein thrombosis)    Coronary artery disease involving native coronary artery of native heart without angina pectoris    Ischemic cardiomyopathy    Subacute osteomyelitis of left foot (HCC)    Hypernatremia    Severe protein-energy malnutrition (HCC)    Hx of AKA (above knee amputation), left (HCC)    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  Anemia  -Acute on chronic  -Mildly microcytic  -Iron deficient  -Gastroccult negative on prior admission  -Monitor hemoglobin every 8 hours  -Keep PRBCs on hold  -Defer transfusion to admitting  -Continue twice daily PPI  -EGD 6/21/2024 by Dr. Smith with findings of unremarkable exam, PEG in place with no ulcer, no bleeding source identified  -Consider colonoscopy -see below     2.  Comorbidites / goals of care  -Vent dependent via tracheostomy with PEG tube  -Poor quality of life  -Palliative medicine input appreciated  -Per admitting/pertinent consultants    Questionable GI bleed with decreased H&H.  As above EGD recently revealed no bleeding source.  Will consider colonoscopy tomorrow after appropriate holding of oral anticoagulation pending no change in CODE STATUS/goals of care.  Please, see orders for plan of care.  Thank you

## 2024-07-11 NOTE — CONSULTS
Palliative Care Department  [phone]  Palliative Care Initial Consult  Provider Rachel Smallwood, APRN - CNP     Effie Portillo  [id number]  Hospital Day: 3  Date of Initial Consult: 7/10/2024  Referring Provider: Martin Metz MD  Palliative Medicine was consulted for assistance with: goals of care    HPI:   Effie Portillo is a 79 y.o. with a medical history of Bipolar Disorder, Depression, CVA, COPD, Atrial Fibrillation, CHF, HTN, Ventilator Dependence who was admitted on 7/9/2024 from nursing facility with a CHIEF COMPLAINT of altered mental status. Pt is reportedly less responsive then normal. Pt is ventilator dependent.  The patient has had multiple hospitalizations recently.  Nephrology, GI, and ID consulted.  Palliative medicine consulted for further assistance.    ASSESSMENT/PLAN:     Pertinent Hospital Diagnoses     Chronic hypoxic respiratory failure on mechanical ventilation s/p tracheostomy and PEG  UTI  EVELIA  Hyponatremia  Acute blood loss anemia  GI bleed  Chronic encephalopathy  Aspiration pneumonia      Palliative Care Encounter / Counseling Regarding Goals of Care  Please see detailed goals of care discussion as below  At this time, Effie Portillo, Does Not have capacity for medical decision-making.  Capacity is time limited and situation/question specific  During encounter Parag was surrogate medical decision-maker  Outcome of goals of care meeting:   Continue current medical management  Discussed comfort measures & hospice philosophy  Code status DNR-CCA  Advanced Directives: no POA or living will in [facility]  Surrogate/Legal NOK:  Parag Portillo (child) [phone]    Spiritual assessment: no spiritual distress identified  Bereavement and grief: to be determined  Referrals to: none today  SUBJECTIVE:     Current medical issues leading to Palliative Medicine involvement include   Active Hospital Problems    Diagnosis Date Noted    Hypernatremia [E87.0] 01/13/2024        Details of Conversation:    Chart reviewed.  Patient seen at the bedside, lethargic.  Patient unable to partake in meaningful conversation and unable to make decisions for herself.  No family present at the bedside.  Update provided by bedside RN.  Call placed to patient's son, Parag.  Parag is familiar with palliative medicine services as the patient was recently seen by our services last hospitalization.  We discussed patient's current condition and multiple comorbidities.  Discussed goals of care and reviewed the patient's CODE STATUS.  We discussed due to patient's poor prognosis, advanced age, and multiple comorbidities the option of comfort measures and hospice philosophy.  Parag states that he is not comfortable with transitioning to the patient to hospice care and is not receptive to hospice at this time.  He states that the patient waxes and wanes in mentation but she always seems to return to her baseline.  We discussed that because of these chronic conditions and illnesses, she will most likely have continuous infections and illnesses and over time she will continue to worsen.  He understands but wishes to continue current medical management and treatment at this time.  All questions and concerns addressed.  Palliative medicine will continue to follow.    OBJECTIVE:   Prognosis: Poor    Physical Exam:  BP (!) 96/43   Pulse 60   Temp 97.8 °F (36.6 °C)   Resp 20   Ht 1.575 m (5' 2.01\")   Wt 57.7 kg (127 lb 3.3 oz)   SpO2 94%   BMI 23.26 kg/m²   Constitutional:  Elderly, lethargic  Eyes: no scleral icterus, normal lids, no discharge  ENMT:  Normocephalic, atraumatic, mucosa moist, EOMI  Neck:  trachea midline, no JVD  Lungs:  vent dependent via trach, rhonchi  Heart::  RRR  Abd:  Soft, non tender, distended, bowel sounds present, + gastrostomy  Ext:  contractures, L AKA, +edema, weak pulses  Skin:  Warm and dry, impaired skin integrity  Psych: non-anxious affect  Neuro:  lethargic,  RAY    Objective data reviewed: labs, images, records, medication use, vitals, and chart    Discussed patient and the plan of care with the other IDT members: Floor Nurse and Family    Time/Communication  Greater than 50% of time spent, total 55 minutes in counseling and coordination of care at the bedside regarding goals of care, diagnosis and prognosis, and see above.    Thank you for allowing Palliative Medicine to participate in the care of Effie RITCHIE Portillo.

## 2024-07-11 NOTE — PROGRESS NOTES
07/11/24 1402   Encounter Summary   Encounter Overview/Reason Palliative Care   Service Provided For Family  (Unable to visit with patient.  reached out to child by phone.)   Referral/Consult From Palliative Care   Support System Children   Last Encounter  07/11/24  (hd)   Complexity of Encounter Moderate   Palliative Care   Type Palliative Care, Initial/Spiritual Assessment   Assessment/Intervention/Outcome   Assessment Calm   Intervention Active listening;Discussed illness injury and it’s impact;Explored/Affirmed feelings, thoughts, concerns;Explored Coping Skills/Resources;Sustaining Presence/Ministry of presence   Outcome Acceptance;Engaged in conversation;Expressed feelings, needs, and concerns;Expressed Gratitude      was unable to visit with patient.  reached out to child by phone.  offered a listening presence and affirmation.     Spiritual care is ongoing and as necessary.  Chaplain Marvin Mackay [facility]   #[phone])

## 2024-07-11 NOTE — PROGRESS NOTES
Date:  07/11/24  Hospital Day:  Hospital Day: 3  PT Name:  Effie Portillo  MRN:   [id number]  Primary Care Physician: Sheba Sandoval MD  Requesting physician:   Kwame Enamorado MD  Reason for follow up: antibiotics/infection  Chief Complaint:   Chief Complaint   Patient presents with    Loss of Consciousness     Pt comes to the ED from WNR unresponsive except to painful stimuli. Pt is vent dependent and is sating between 85-88% on 15-20 liters which is not baseline.        Assessment  Effie Portillo is a 79 y.o. year old female who presented on 7/9/2024 and is being treated for Hypernatremia   Chief Complaint   Patient presents with    Loss of Consciousness     Pt comes to the ED from WNR unresponsive except to painful stimuli. Pt is vent dependent and is sating between 85-88% on 15-20 liters which is not baseline.       Id following for   Plan    Pt came in with hypernatremia a  Chronic respiratory failure   R/O SEPSIS/HCAP/FUNGEMIA  Proteus bacteruria   DTI SACRAL AND  SKIN TEAR  H/O MDR A BAUMANNII  C AURIS EXPOSURE  Strict isolation      No change in atbx   micafungin (MYCAMINE) 100 mg in sodium chloride 0.9 % 100 mL IVPB (mini-bag), Daily  bisacodyl (DULCOLAX) suppository 10 mg, Daily PRN  miconazole nitrate 2 % ointment, BID  miconazole (MICOTIN) 2 % powder, BID  cefiderocol sulfate tosylate (FETROJA) 1,500 mg in sodium chloride 0.9 % 100 mL IVPB, Q8H       Suggest consulting wound care/ostomy nurse  Continue wound care  Encourage nutritional support  Continue to off load wound/pressure relief bed    Watch for cdiff colitis/clabsi-line infection  Monitor labs  Continue current therapy.  Please see orders for further management and care.    Subjective/HPI:  Effie was seen and examined at bedside today for sepsis    Overnight:  7/11 pt is not responsive trach vent   7/10 Pt is not awkae   There are no adverse drug reactions.  All tests were reviewed.   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      MIXED GRAM POSITIVE ORGANISMS <10,000 CFU/ML    Susceptibility        Proteus mirabilis      BACTERIAL SUSCEPTIBILITY PANEL DIANE (Preliminary)      ceFAZolin >=64  Resistant  [1]       cefepime 16  Resistant      cefotaxime >=64  Resistant      cefOXitin 32  Resistant      cefTAZidime <=1  Sensitive      ciprofloxacin >=4  Resistant      meropenem <=0.25  Sensitive      nitrofurantoin 128  Resistant      piperacillin-tazobactam <=4  Sensitive      trimethoprim-sulfamethoxazole >=320  Resistant                   [1]  Cefazolin sensitivity results can be used to predict the effectiveness of oral   cephalosporins (eg. Cephalexin) in uncomplicated Urinary Tract Infections due to E. coli, K.   pneumoniae, and P. mirabilis                     Culture, Blood 2 [[id number]] Collected: 07/09/24 0624    Order Status: Completed Specimen: Blood Updated: 07/11/24 0923     Specimen Description .BLOOD     Special Requests Site: Blood     Culture NO GROWTH 2 DAYS    Blood Culture 1 [[id number]] Collected: 07/09/24 0624    Order Status: Completed Specimen: Blood Updated: 07/11/24 0926     Specimen Description .BLOOD     Special Requests          Culture NO GROWTH 2 DAYS    Respiratory Panel, Molecular, with COVID-19 (Restricted: peds pts or suitable admitted adults) [[id number]] Collected: 07/09/24 0624    Order Status: Completed Specimen: Nasopharyngeal Swab Updated: 07/09/24 1021     Specimen Description .NASOPHARYNGEAL SWAB     Adenovirus PCR Not Detected     Coronavirus 229E PCR Not Detected     Coronavirus HKU1 PCR Not Detected     Coronavirus NL63 PCR Not Detected     Coronavirus OC43 PCR Not Detected     SARS-CoV-2, PCR Not Detected     Human Metapneumovirus PCR Not Detected     Rhino/Enterovirus PCR Not Detected     Influenza A by PCR Not Detected     Influenza B by PCR Not Detected     Parainfluenza 1 PCR Not Detected     Parainfluenza 2 PCR Not Detected     Parainfluenza 3 PCR Not

## 2024-07-11 NOTE — PROGRESS NOTES
Department of Internal Medicine  Nephrology Attending Consult Note    Events reviewed    SUBJECTIVE: We are following Mrs. Portillo for hyponatremia.  Patient is nonverbal    PHYSICAL EXAM:      Vitals:    VITALS:  BP (!) 157/54   Pulse 61   Temp 97.8 °F (36.6 °C)   Resp 27   Ht 1.575 m (5' 2.01\")   Wt 57.7 kg (127 lb 3.3 oz)   SpO2 96%   BMI 23.26 kg/m²   24HR INTAKE/OUTPUT:    Intake/Output Summary (Last 24 hours) at 7/11/2024 1302  Last data filed at 7/11/2024 0539  Gross per 24 hour   Intake 3233.98 ml   Output 1075 ml   Net 2158.98 ml         Constitutional: Patient is lethargic  HEENT: Pupils are equal reactive  Respiratory: Tracheostomy in place  Cardiovascular/Edema: Heart sounds regular  Gastrointestinal: Abdomen soft, PEG in place  Neurologic: Overall nonfocal  Skin: No lesion  Other: No Edema    Scheduled Meds:   ferric gluconate (FERRLECIT) 25 mg in sodium chloride 0.9 % 50 mL (test dose) IVPB  25 mg IntraVENous Once    Followed by    ferric gluconate (FERRLECIT) 100 mg in sodium chloride 0.9 % 100 mL IVPB  100 mg IntraVENous Once    Followed by    [START ON 7/12/2024] ferric gluconate (FERRLECIT) 125 mg in sodium chloride 0.9 % 100 mL IVPB  125 mg IntraVENous Once    pantoprazole  40 mg IntraVENous BID    cefiderocol sulfate tosylate (FETROJA) 1,500 mg in sodium chloride 0.9 % 100 mL IVPB  1,500 mg IntraVENous Q8H    [Held by provider] apixaban  2.5 mg Per G Tube BID    arformoterol tartrate  15 mcg Nebulization BID    atorvastatin  10 mg PEG Tube Nightly    budesonide  500 mcg Nebulization Q12H    carvedilol  12.5 mg Oral BID WC    [Held by provider] clopidogrel  75 mg PEG Tube Daily    empagliflozin  10 mg PEG Tube Daily    ferrous Sulfate  300 mg Per G Tube Daily    midodrine  15 mg Oral TID WC    predniSONE  5 mg PEG Tube Daily    sodium chloride flush  5-40 mL IntraVENous 2 times per day    micafungin (MYCAMINE) 100 mg in sodium chloride 0.9 % 100 mL IVPB (mini-bag)  100 mg IntraVENous Daily  after she was referred from [facility] due to altered mental status, after evaluation she was found to have a sodium level of 163 mEq/L, reason for this consultation.  Prior to admission current medication including Lasix 20 mg daily, empagliflozin 10 mg daily, midodrine 5 mg 3 times daily.    IMPRESSION/RECOMMENDATIONS:         Severe hypernatremia, with severe dehydration due to lack of free water administration along with the use of diuretics and SGLT2 inhibitors.  Resolved, sodium levels improved, level down to 141, to discontinue D5W    EVELIA stage I, volume responsive EVELIA, due to severe dehydration, renal function slowly improving, creatinine down to 0.9 mg/dL.    HFrEF 35%, on empagliflozin, Lasix  Normocytic anemia, with iron deficiency ferritin 360, iron saturation 11%, folate 16.5, B12 1154    -----------------------------------------------  Ventilator dependent chronic respiratory failure status post tracheostomy  PAF, on apixaban, carvedilol  Hyperlipidemia, on atorvastatin  Nutrition, tube feeding 60 cc/hour, free water 100 cc every 4 hours     Plan:        Continue to hold diuretics  Discontinue D5W  Continue Free water flushes at 80 cc/hour  Continue to monitor sodium level  Continue to monitor renal function  Replace potassium        Marcie Rodrigues MD

## 2024-07-11 NOTE — PROGRESS NOTES
[facility] Hospitalist   Progress Note    Admitting Date and Time: 7/9/2024  6:01 AM  Admit Dx: Dehydration [E86.0]  Hyperchloremia [E87.8]  Hypernatremia [E87.0]  Uremic encephalopathy [G93.49, N19]    Subjective:    Pt lying in bed in no acute distress. Remains on Ventilator support tolerating well. GI consulted for possible GI bleed. Hgb 7.1 this morning. Eliquis/Plavix on hold.     Per RN: No new concerns noted         pantoprazole  40 mg IntraVENous BID    cefiderocol sulfate tosylate (FETROJA) 1,500 mg in sodium chloride 0.9 % 100 mL IVPB  1,500 mg IntraVENous Q8H    [Held by provider] apixaban  2.5 mg Per G Tube BID    arformoterol tartrate  15 mcg Nebulization BID    atorvastatin  10 mg PEG Tube Nightly    budesonide  500 mcg Nebulization Q12H    carvedilol  12.5 mg Oral BID WC    [Held by provider] clopidogrel  75 mg PEG Tube Daily    empagliflozin  10 mg PEG Tube Daily    ferrous Sulfate  300 mg Per G Tube Daily    midodrine  15 mg Oral TID WC    predniSONE  5 mg PEG Tube Daily    sodium chloride flush  5-40 mL IntraVENous 2 times per day    micafungin (MYCAMINE) 100 mg in sodium chloride 0.9 % 100 mL IVPB (mini-bag)  100 mg IntraVENous Daily    miconazole nitrate   Topical BID    miconazole   Topical BID     ipratropium 0.5 mg-albuterol 2.5 mg, 1 Dose, Q4H PRN  sodium chloride flush, 5-40 mL, PRN  sodium chloride, , PRN  ondansetron, 4 mg, Q8H PRN   Or  ondansetron, 4 mg, Q6H PRN  polyethylene glycol, 17 g, Daily PRN  acetaminophen, 650 mg, Q6H PRN   Or  acetaminophen, 650 mg, Q6H PRN  bisacodyl, 10 mg, Daily PRN  white petrolatum, , BID PRN         Objective:    BP (!) 96/43   Pulse 60   Temp 97.8 °F (36.6 °C)   Resp 20   Ht 1.575 m (5' 2.01\")   Wt 57.7 kg (127 lb 3.3 oz)   SpO2 94%   BMI 23.26 kg/m²   General Appearance: Somnolent and in no acute distress  Skin: warm and dry. Ecchymosis.  Head: normocephalic and atraumatic  Eyes: pupils equal, round, and reactive to light,  extraocular eye movements intact, conjunctivae normal  Neck: neck supple and non tender without mass   Pulmonary/Chest: Diminished to auscultation bilaterally- no wheezes, rales or rhonchi, normal air movement, no respiratory distress  Cardiovascular: normal rate, normal S1 and S2 and no RGM  Abdomen: soft, non-tender, non-distended, normal bowel sounds, no masses or organomegaly  Extremities: no cyanosis, no clubbing and +edema  Neurologic: no cranial nerve deficit and speech normal      Recent Labs     07/10/24  1711 07/11/24  0010 07/11/24  0641    142 141   K 3.8 3.7 3.6    100 99   CO2 31* 33* 32*   BUN 89* 83* 77*   CREATININE 0.9 0.9 0.9   GLUCOSE 112* 91 76   CALCIUM 8.6 8.3* 8.4*         Recent Labs     07/09/24  0615   ALKPHOS 107*   BILITOT 0.2   AST 18   ALT 16         Recent Labs     07/09/24  0615 07/10/24  0520 07/11/24  0641   WBC 6.4 6.0 5.7   RBC 3.43* 2.60* 2.61*   HGB 8.8* 7.0* 7.1*   HCT 31.6* 24.0* 23.1*   MCV 92.1 92.3 88.5   MCH 25.7* 26.9 27.2   MCHC 27.8* 29.2* 30.7*   RDW 19.1* 18.5* 17.7*   MPV 12.7* 13.2* 13.3*              Radiology:   XR CHEST PORTABLE   Final Result   Mild, patchy bilateral perihilar and interstitial infiltrates suggesting mild   pulmonary edema, mildly increased from prior examination.             Assessment:  Principal Problem:    Hypernatremia  Resolved Problems:    * No resolved hospital problems. *      Plan:  Hypernatremia- Resolved Na+ 163>158>152>141 Received 1L NSS bolus in ED course. . D5%. Free Water flushes. BMP Q6.   EVELIA- Resolved  BUN/Crt 77//0.9 Received 1L NSS bolus in ED course. Avoid Nephrotoxic agents. Nephrology input appreciated   Chronic Respiratory Failure with Hypoxia- Vent dependent. AC 18 Vt 375 70% +8. Has seen Dr. Metz in the past.   Chronic Encephalopathy  Atrial Fibrillation- Holding Eliquis Continue Coreg Telemetry monitoring. Has seen [facility].   Anemia- Plavix/Eliquis on hold. No over signs of bleeding note.d  Hgb 8.8>7.0>7.1 Check Occult stool. PPI  BID Continue to follow transfuse as needed. H/H Q8. Colonoscopy in am. GI input appreciated.   HFrEF- 5/24 ECHO LVEF 30-35%. Global hypokinesis. Grade I diastolic dysfunction Mild Tricuspid Regurgitation.   Bipolar Disorder/Depression  Hx CVA  HTN- Coreg. On Midodrine TID. Follow.   Hx GI Bleed- Drop in Hgb. Hgb 9.7 end of June. Hgb 8.8 upon admission. Hgb 7.1 today. Occult stool pending. 6/21 EGD No bleeding source identified. Possible Colonoscopy 7/11.      NOTE: This report was transcribed using voice recognition software. Every effort was made to ensure accuracy; however, inadvertent computerized transcription errors may be present.     Electronically signed by TROY Camacho CNP on 7/11/2024 at 8:27 AM

## 2024-07-11 NOTE — PROGRESS NOTES
Assessment and Plan  Patient is a 79 y.o. female with the following medical Problems:     Chronic hypoxic respiratory failure on mechanical ventilation (S/P tracheostomy and PEG)  Urinary tract infection.  Acute kidney injury (improved)  Hypernatremia.(Improved)  Acute blood loss anemia  GI bleeding  Chronic encephalopathy  Aspiration pneumonia  Left-sided pleural effusion  Metabolic encephalopathy.       Plan of care:  Continue with mechanical ventilation.  Patient is currently on assist-control mechanical ventilation with a tidal volume of 375, rate 18, PEEP of 8, FiO2 70%.  Reconcile home medications  Patient was recently on multiple antimicrobial therapy.  Monitor culture results.  ID team is consulted.  Patient is currently on Fetroja and micafungin.  Patient's urine culture grew Proteus mirabilis which is multidrug-resistant and sputum is growing moderate gram-positive cocci and gram-negative rods.  Dietitian consult for tube feeding.  Goals of care were discussed.  Patient is currently DNR CCA.  Dietitian consult for tube feeding  Nephrology is managing hyponatremia and EVELIA.  DVT prophylaxis.  Patient is currently on Eliquis 2.5 mg twice daily and Plavix but they are on hold due to suspected GI bleeding.  GI is consulted.  Midodrine to help with hypotension.  Carvedilol 12.5 twice daily  Urine toxicology is negative.  Palliative care dietitian consult.        History of Present Illness:   Patient is known to me from prior hospitalization in June 2024.  She is currently encephalopathic and unable to provide history.  Patient is a 79-year-old woman with above-mentioned medical problem who was admitted on July 9, 2024 with altered mental status.  Patient was found to be hyponatremic with positive UTI.  Goals of care were discussed previously and patient was made DNR CCA.  Patient was initially had encephalopathy when she presented in June 2024 but she was more awake and interactive upon discharge.  She is  help you need?: Not on file   Intimate Partner Violence: Not on file   Housing Stability: Patient Unable To Answer (7/10/2024)    Housing Stability Vital Sign     Unable to Pay for Housing in the Last Year: Patient unable to answer     Number of Places Lived in the Last Year: 1     Unstable Housing in the Last Year: Patient unable to answer       Current Medications:     Current Facility-Administered Medications:     ferric gluconate (FERRLECIT) 25 mg in sodium chloride 0.9 % 50 mL (test dose) IVPB, 25 mg, IntraVENous, Once **FOLLOWED BY** ferric gluconate (FERRLECIT) 100 mg in sodium chloride 0.9 % 100 mL IVPB, 100 mg, IntraVENous, Once **FOLLOWED BY** [START ON 7/12/2024] ferric gluconate (FERRLECIT) 125 mg in sodium chloride 0.9 % 100 mL IVPB, 125 mg, IntraVENous, Once, Neo Artis MD    potassium chloride 10 mEq/100 mL IVPB (Peripheral Line), 10 mEq, IntraVENous, Q1H, Marcie Rodrigues MD    pantoprazole (PROTONIX) injection 40 mg, 40 mg, IntraVENous, BID, Denisse Mckeon APRN - CNP, 40 mg at 07/11/24 1040    cefiderocol sulfate tosylate (FETROJA) 1,500 mg in sodium chloride 0.9 % 100 mL IVPB, 1,500 mg, IntraVENous, Q8H, Kwame Enamorado MD, Stopped at 07/11/24 0937    [Held by provider] apixaban (ELIQUIS) tablet 2.5 mg, 2.5 mg, Per G Tube, BID, Denisse Mckeon APRN - CNP, 2.5 mg at 07/09/24 1603    arformoterol tartrate (BROVANA) nebulizer solution 15 mcg, 15 mcg, Nebulization, BID, Denisse Mckeon APRN - CNP, 15 mcg at 07/11/24 0614    atorvastatin (LIPITOR) tablet 10 mg, 10 mg, PEG Tube, Nightly, Denisse Mckeon APRN - CNP, 10 mg at 07/10/24 2150    budesonide (PULMICORT) nebulizer suspension 500 mcg, 500 mcg, Nebulization, Q12H, Denisse Mckeon APRN - CNP, 500 mcg at 07/11/24 0614    carvedilol (COREG) tablet 12.5 mg, 12.5 mg, Oral, BID WC, Denisse Mckeon, APRN - CNP, 12.5 mg at 07/11/24 1051    [Held by provider] clopidogrel (PLAVIX) tablet 75 mg, 75 mg, PEG Tube, Daily,  Kwame Enamorado MD, Given at 07/11/24 1051    white petrolatum ointment, , Topical, BID PRN, Kwame Enamorado MD, Given at 07/10/24 2150      Review of Systems:   Unable to obtain due to medical condition    Physical Exam:   Vital Signs:  BP (!) 157/54   Pulse 61   Temp 97.8 °F (36.6 °C)   Resp 27   Ht 1.575 m (5' 2.01\")   Wt 57.7 kg (127 lb 3.3 oz)   SpO2 96%   BMI 23.26 kg/m²     Input/Output:  In: 3294 [I.V.:1658.8; NG/GT:1461]  Out: 1075     Oxygen requirements: MV      General appearance: , not in pain or distress, in no respiratory distress    HEENT: +ve tracheostomy  Neck: Supple, no jugular venous distension, lymphadenopathy, thyromegaly or carotid bruits  Chest: Equal normal breath sounds, no wheezing, no crackles and no tenderness over ribs   Cardiovascular: Normal S1 , S2, regular rate and rhythm, no murmur, rub or gallop  Abdomen: Normal sounds present, soft, lax with no tenderness, no hepatosplenomegaly, and no masses  Extremities: +ve edema. +ve left AKA  Skin: intact, no rashes   Neurologic: Unresponsive, lethargic, No focal deficit     Investigations:  Labs, radiological imaging and cardiac work up were personally reviewed and independently interpreted.    ICU STAFF PHYSICIAN NOTE OF PERSONAL INVOLVEMENT IN CARE  As the attending physician, I certify that I personally reviewed the patient's history and personally examined the patient to confirm the physical findings described above, and that I reviewed the relevant imaging studies and available reports.  I also discussed the differential diagnosis and all of the proposed management plans with the patient and individuals accompanying the patient to this visit.  They had the opportunity to ask questions about the proposed management plans and to have those questions answered.    This patient has a high probability of sudden, clinically significant deterioration, which requires the highest level of physician preparedness to intervene  urgently.  I managed/supervised life or organ supporting interventions that required frequent physician assessment.  I devoted my full attention to the direct care of this patient for the amount of time indicated below.  Time I spent with family or surrogate(s) is included only if the patient was incapable of providing the necessary information or participating in medical decision-making.  Time devoted to teaching and to any procedures I billed separately is not included.  Critical Care Time: 32 minutes    .      Electronically signed by Martin Metz MD on 7/11/2024 at 1:20 PM

## 2024-07-11 NOTE — CARE COORDINATION
7/11/2024 1040 CM Note: Pt is from [facility]. Per Melissa pt can return, she's a BED HOLD, NO PRECERT NEEDED. Will NEED A SIGNED LEOBARDO. If IV antibioitcs at d/c she will need a midline or PICC line. CM will follow. Electronically signed by Shena Solis RN on 7/11/2024 at 10:46 AM

## 2024-07-12 ENCOUNTER — ANESTHESIA (OUTPATIENT)
Dept: ENDOSCOPY | Age: 79
DRG: 207 | End: 2024-07-12
Payer: MEDICARE

## 2024-07-12 ENCOUNTER — ANESTHESIA EVENT (OUTPATIENT)
Dept: ENDOSCOPY | Age: 79
DRG: 207 | End: 2024-07-12
Payer: MEDICARE

## 2024-07-12 LAB
ANION GAP SERPL CALCULATED.3IONS-SCNC: 11 MMOL/L (ref 7–16)
ANION GAP SERPL CALCULATED.3IONS-SCNC: 12 MMOL/L (ref 7–16)
ANION GAP SERPL CALCULATED.3IONS-SCNC: 13 MMOL/L (ref 7–16)
ANION GAP SERPL CALCULATED.3IONS-SCNC: 14 MMOL/L (ref 7–16)
BASOPHILS # BLD: 0 K/UL (ref 0–0.2)
BASOPHILS NFR BLD: 0 % (ref 0–2)
BUN SERPL-MCNC: 47 MG/DL (ref 6–23)
BUN SERPL-MCNC: 55 MG/DL (ref 6–23)
BUN SERPL-MCNC: 56 MG/DL (ref 6–23)
BUN SERPL-MCNC: 59 MG/DL (ref 6–23)
CALCIUM SERPL-MCNC: 8.5 MG/DL (ref 8.6–10.2)
CALCIUM SERPL-MCNC: 8.5 MG/DL (ref 8.6–10.2)
CALCIUM SERPL-MCNC: 8.6 MG/DL (ref 8.6–10.2)
CALCIUM SERPL-MCNC: 8.6 MG/DL (ref 8.6–10.2)
CHLORIDE SERPL-SCNC: 104 MMOL/L (ref 98–107)
CHLORIDE SERPL-SCNC: 105 MMOL/L (ref 98–107)
CHLORIDE SERPL-SCNC: 105 MMOL/L (ref 98–107)
CHLORIDE SERPL-SCNC: 106 MMOL/L (ref 98–107)
CO2 SERPL-SCNC: 26 MMOL/L (ref 22–29)
CO2 SERPL-SCNC: 26 MMOL/L (ref 22–29)
CO2 SERPL-SCNC: 27 MMOL/L (ref 22–29)
CO2 SERPL-SCNC: 28 MMOL/L (ref 22–29)
CREAT SERPL-MCNC: 0.7 MG/DL (ref 0.5–1)
EOSINOPHIL # BLD: 0 K/UL (ref 0.05–0.5)
EOSINOPHILS RELATIVE PERCENT: 0 % (ref 0–6)
ERYTHROCYTE [DISTWIDTH] IN BLOOD BY AUTOMATED COUNT: 17.6 % (ref 11.5–15)
GFR, ESTIMATED: 82 ML/MIN/1.73M2
GFR, ESTIMATED: 86 ML/MIN/1.73M2
GFR, ESTIMATED: 88 ML/MIN/1.73M2
GFR, ESTIMATED: 88 ML/MIN/1.73M2
GLUCOSE BLD-MCNC: 107 MG/DL (ref 74–99)
GLUCOSE BLD-MCNC: 66 MG/DL (ref 74–99)
GLUCOSE BLD-MCNC: 80 MG/DL (ref 74–99)
GLUCOSE BLD-MCNC: 82 MG/DL (ref 74–99)
GLUCOSE SERPL-MCNC: 57 MG/DL (ref 74–99)
GLUCOSE SERPL-MCNC: 60 MG/DL (ref 74–99)
GLUCOSE SERPL-MCNC: 67 MG/DL (ref 74–99)
GLUCOSE SERPL-MCNC: 72 MG/DL (ref 74–99)
HCT VFR BLD AUTO: 24.9 % (ref 34–48)
HCT VFR BLD AUTO: 25.2 % (ref 34–48)
HGB BLD-MCNC: 7.5 G/DL (ref 11.5–15.5)
HGB BLD-MCNC: 7.6 G/DL (ref 11.5–15.5)
LYMPHOCYTES NFR BLD: 0.35 K/UL (ref 1.5–4)
LYMPHOCYTES RELATIVE PERCENT: 7 % (ref 20–42)
MAGNESIUM SERPL-MCNC: 2.3 MG/DL (ref 1.6–2.6)
MAGNESIUM SERPL-MCNC: 2.3 MG/DL (ref 1.6–2.6)
MCH RBC QN AUTO: 26.8 PG (ref 26–35)
MCHC RBC AUTO-ENTMCNC: 30.2 G/DL (ref 32–34.5)
MCV RBC AUTO: 88.7 FL (ref 80–99.9)
MICROORGANISM SPEC CULT: ABNORMAL
MICROORGANISM SPEC CULT: ABNORMAL
MONOCYTES NFR BLD: 0.35 K/UL (ref 0.1–0.95)
MONOCYTES NFR BLD: 7 % (ref 2–12)
NEUTROPHILS NFR BLD: 86 % (ref 43–80)
NEUTS SEG NFR BLD: 4.3 K/UL (ref 1.8–7.3)
NUCLEATED RED BLOOD CELLS: 2 PER 100 WBC
PLATELET CONFIRMATION: NORMAL
PLATELET, FLUORESCENCE: 168 K/UL (ref 130–450)
PMV BLD AUTO: 13.4 FL (ref 7–12)
POTASSIUM SERPL-SCNC: 3.3 MMOL/L (ref 3.5–5)
POTASSIUM SERPL-SCNC: 3.4 MMOL/L (ref 3.5–5)
POTASSIUM SERPL-SCNC: 3.4 MMOL/L (ref 3.5–5)
POTASSIUM SERPL-SCNC: 3.5 MMOL/L (ref 3.5–5)
RBC # BLD AUTO: 2.84 M/UL (ref 3.5–5.5)
RBC # BLD: ABNORMAL 10*6/UL
RBC # BLD: ABNORMAL 10*6/UL
SERVICE CMNT-IMP: ABNORMAL
SODIUM SERPL-SCNC: 143 MMOL/L (ref 132–146)
SODIUM SERPL-SCNC: 144 MMOL/L (ref 132–146)
SODIUM SERPL-SCNC: 145 MMOL/L (ref 132–146)
SODIUM SERPL-SCNC: 145 MMOL/L (ref 132–146)
SPECIMEN DESCRIPTION: ABNORMAL
WBC OTHER # BLD: 5 K/UL (ref 4.5–11.5)

## 2024-07-12 PROCEDURE — 2580000003 HC RX 258: Performed by: SPECIALIST

## 2024-07-12 PROCEDURE — C1751 CATH, INF, PER/CENT/MIDLINE: HCPCS

## 2024-07-12 PROCEDURE — 85025 COMPLETE CBC W/AUTO DIFF WBC: CPT

## 2024-07-12 PROCEDURE — 2580000003 HC RX 258: Performed by: INTERNAL MEDICINE

## 2024-07-12 PROCEDURE — 85014 HEMATOCRIT: CPT

## 2024-07-12 PROCEDURE — 5A1955Z RESPIRATORY VENTILATION, GREATER THAN 96 CONSECUTIVE HOURS: ICD-10-PCS | Performed by: INTERNAL MEDICINE

## 2024-07-12 PROCEDURE — 76937 US GUIDE VASCULAR ACCESS: CPT

## 2024-07-12 PROCEDURE — 82962 GLUCOSE BLOOD TEST: CPT

## 2024-07-12 PROCEDURE — 6360000002 HC RX W HCPCS: Performed by: INTERNAL MEDICINE

## 2024-07-12 PROCEDURE — 94003 VENT MGMT INPAT SUBQ DAY: CPT

## 2024-07-12 PROCEDURE — 2060000000 HC ICU INTERMEDIATE R&B

## 2024-07-12 PROCEDURE — 99291 CRITICAL CARE FIRST HOUR: CPT | Performed by: INTERNAL MEDICINE

## 2024-07-12 PROCEDURE — 2580000003 HC RX 258: Performed by: HOSPITALIST

## 2024-07-12 PROCEDURE — 6370000000 HC RX 637 (ALT 250 FOR IP): Performed by: NURSE PRACTITIONER

## 2024-07-12 PROCEDURE — 02HV33Z INSERTION OF INFUSION DEVICE INTO SUPERIOR VENA CAVA, PERCUTANEOUS APPROACH: ICD-10-PCS | Performed by: INTERNAL MEDICINE

## 2024-07-12 PROCEDURE — 36410 VNPNXR 3YR/> PHY/QHP DX/THER: CPT

## 2024-07-12 PROCEDURE — 6360000002 HC RX W HCPCS: Performed by: NURSE PRACTITIONER

## 2024-07-12 PROCEDURE — 83735 ASSAY OF MAGNESIUM: CPT

## 2024-07-12 PROCEDURE — 6360000002 HC RX W HCPCS: Performed by: HOSPITALIST

## 2024-07-12 PROCEDURE — 36415 COLL VENOUS BLD VENIPUNCTURE: CPT

## 2024-07-12 PROCEDURE — 80048 BASIC METABOLIC PNL TOTAL CA: CPT

## 2024-07-12 PROCEDURE — 99233 SBSQ HOSP IP/OBS HIGH 50: CPT | Performed by: INTERNAL MEDICINE

## 2024-07-12 PROCEDURE — 31502 CHANGE OF WINDPIPE AIRWAY: CPT

## 2024-07-12 PROCEDURE — 30233N1 TRANSFUSION OF NONAUTOLOGOUS RED BLOOD CELLS INTO PERIPHERAL VEIN, PERCUTANEOUS APPROACH: ICD-10-PCS | Performed by: INTERNAL MEDICINE

## 2024-07-12 PROCEDURE — 94640 AIRWAY INHALATION TREATMENT: CPT

## 2024-07-12 PROCEDURE — 6360000002 HC RX W HCPCS: Performed by: SPECIALIST

## 2024-07-12 PROCEDURE — 85018 HEMOGLOBIN: CPT

## 2024-07-12 RX ORDER — SODIUM CHLORIDE 0.9 % (FLUSH) 0.9 %
5-40 SYRINGE (ML) INJECTION PRN
Status: DISCONTINUED | OUTPATIENT
Start: 2024-07-12 | End: 2024-07-19 | Stop reason: HOSPADM

## 2024-07-12 RX ORDER — DEXTROSE MONOHYDRATE 100 MG/ML
INJECTION, SOLUTION INTRAVENOUS CONTINUOUS PRN
Status: DISCONTINUED | OUTPATIENT
Start: 2024-07-12 | End: 2024-07-13 | Stop reason: SDUPTHER

## 2024-07-12 RX ORDER — DEXTROSE MONOHYDRATE 50 MG/ML
INJECTION, SOLUTION INTRAVENOUS CONTINUOUS
Status: DISCONTINUED | OUTPATIENT
Start: 2024-07-12 | End: 2024-07-13

## 2024-07-12 RX ORDER — GLUCAGON 1 MG/ML
1 KIT INJECTION PRN
Status: DISCONTINUED | OUTPATIENT
Start: 2024-07-12 | End: 2024-07-13

## 2024-07-12 RX ORDER — SODIUM CHLORIDE 0.9 % (FLUSH) 0.9 %
5-40 SYRINGE (ML) INJECTION EVERY 12 HOURS SCHEDULED
Status: DISCONTINUED | OUTPATIENT
Start: 2024-07-12 | End: 2024-07-19 | Stop reason: HOSPADM

## 2024-07-12 RX ORDER — POTASSIUM CHLORIDE 7.45 MG/ML
10 INJECTION INTRAVENOUS
Status: COMPLETED | OUTPATIENT
Start: 2024-07-12 | End: 2024-07-12

## 2024-07-12 RX ORDER — SODIUM CHLORIDE 9 MG/ML
INJECTION, SOLUTION INTRAVENOUS PRN
Status: DISCONTINUED | OUTPATIENT
Start: 2024-07-12 | End: 2024-07-19 | Stop reason: HOSPADM

## 2024-07-12 RX ADMIN — MICONAZOLE NITRATE: 2 OINTMENT TOPICAL at 10:40

## 2024-07-12 RX ADMIN — CARVEDILOL 12.5 MG: 6.25 TABLET, FILM COATED ORAL at 18:22

## 2024-07-12 RX ADMIN — CARVEDILOL 12.5 MG: 6.25 TABLET, FILM COATED ORAL at 10:38

## 2024-07-12 RX ADMIN — ANTI-FUNGAL POWDER MICONAZOLE NITRATE TALC FREE: 1.42 POWDER TOPICAL at 10:40

## 2024-07-12 RX ADMIN — POTASSIUM BICARBONATE 50 MEQ: 978 TABLET, EFFERVESCENT ORAL at 18:22

## 2024-07-12 RX ADMIN — BUDESONIDE INHALATION 500 MCG: 0.5 SUSPENSION RESPIRATORY (INHALATION) at 05:31

## 2024-07-12 RX ADMIN — ARFORMOTEROL TARTRATE 15 MCG: 15 SOLUTION RESPIRATORY (INHALATION) at 17:10

## 2024-07-12 RX ADMIN — SODIUM CHLORIDE 125 MG: 9 INJECTION, SOLUTION INTRAVENOUS at 13:10

## 2024-07-12 RX ADMIN — IPRATROPIUM BROMIDE AND ALBUTEROL SULFATE 1 DOSE: 2.5; .5 SOLUTION RESPIRATORY (INHALATION) at 00:00

## 2024-07-12 RX ADMIN — CEFIDEROCOL SULFATE TOSYLATE 1500 MG: 1 INJECTION, POWDER, FOR SOLUTION INTRAVENOUS at 06:29

## 2024-07-12 RX ADMIN — EMPAGLIFLOZIN 10 MG: 10 TABLET, FILM COATED ORAL at 10:36

## 2024-07-12 RX ADMIN — POTASSIUM CHLORIDE 10 MEQ: 7.46 INJECTION, SOLUTION INTRAVENOUS at 13:07

## 2024-07-12 RX ADMIN — GLUCAGON 1 MG: 1 INJECTION, POWDER, LYOPHILIZED, FOR SOLUTION INTRAMUSCULAR; INTRAVENOUS at 09:12

## 2024-07-12 RX ADMIN — ARFORMOTEROL TARTRATE 15 MCG: 15 SOLUTION RESPIRATORY (INHALATION) at 05:31

## 2024-07-12 RX ADMIN — POTASSIUM CHLORIDE 10 MEQ: 7.46 INJECTION, SOLUTION INTRAVENOUS at 10:46

## 2024-07-12 RX ADMIN — BUDESONIDE INHALATION 500 MCG: 0.5 SUSPENSION RESPIRATORY (INHALATION) at 17:10

## 2024-07-12 RX ADMIN — CEFIDEROCOL SULFATE TOSYLATE 1500 MG: 1 INJECTION, POWDER, FOR SOLUTION INTRAVENOUS at 15:08

## 2024-07-12 RX ADMIN — Medication 300 MG: at 10:52

## 2024-07-12 RX ADMIN — IPRATROPIUM BROMIDE AND ALBUTEROL SULFATE 1 DOSE: 2.5; .5 SOLUTION RESPIRATORY (INHALATION) at 05:31

## 2024-07-12 RX ADMIN — MICAFUNGIN SODIUM 100 MG: 100 INJECTION, POWDER, LYOPHILIZED, FOR SOLUTION INTRAVENOUS at 18:50

## 2024-07-12 RX ADMIN — PANTOPRAZOLE SODIUM 40 MG: 40 INJECTION, POWDER, FOR SOLUTION INTRAVENOUS at 10:36

## 2024-07-12 RX ADMIN — PREDNISONE 5 MG: 5 TABLET ORAL at 10:36

## 2024-07-12 RX ADMIN — DEXTROSE MONOHYDRATE: 50 INJECTION, SOLUTION INTRAVENOUS at 06:05

## 2024-07-12 ASSESSMENT — PULMONARY FUNCTION TESTS
PIF_VALUE: 30
PIF_VALUE: 30
PIF_VALUE: 29
PIF_VALUE: 28
PIF_VALUE: 29
PIF_VALUE: 34

## 2024-07-12 ASSESSMENT — PAIN SCALES - WONG BAKER
WONGBAKER_NUMERICALRESPONSE: NO HURT
WONGBAKER_NUMERICALRESPONSE: HURTS A LITTLE BIT
WONGBAKER_NUMERICALRESPONSE: NO HURT

## 2024-07-12 NOTE — PLAN OF CARE
Problem: Discharge Planning  Goal: Discharge to home or other facility with appropriate resources  Outcome: Progressing  Flowsheets (Taken 7/11/2024 1930)  Discharge to home or other facility with appropriate resources: Identify barriers to discharge with patient and caregiver     Problem: Pain  Goal: Verbalizes/displays adequate comfort level or baseline comfort level  Outcome: Progressing     Problem: Pain  Goal: Verbalizes/displays adequate comfort level or baseline comfort level  Outcome: Progressing     Problem: Safety - Adult  Goal: Free from fall injury  Outcome: Progressing     Problem: Respiratory - Adult  Goal: Achieves optimal ventilation and oxygenation  Outcome: Progressing  Flowsheets (Taken 7/11/2024 1930)  Achieves optimal ventilation and oxygenation: Assess for changes in respiratory status     Problem: Cardiovascular - Adult  Goal: Maintains optimal cardiac output and hemodynamic stability  Recent Flowsheet Documentation  Taken 7/11/2024 1930 by Dee Saxena RN  Maintains optimal cardiac output and hemodynamic stability: Monitor blood pressure and heart rate  Goal: Absence of cardiac dysrhythmias or at baseline  Recent Flowsheet Documentation  Taken 7/11/2024 1930 by Dee Saxena RN  Absence of cardiac dysrhythmias or at baseline: Monitor cardiac rate and rhythm     Problem: Skin/Tissue Integrity - Adult  Goal: Skin integrity remains intact  Recent Flowsheet Documentation  Taken 7/11/2024 1930 by Dee Saxena RN  Skin Integrity Remains Intact: Monitor for areas of redness and/or skin breakdown  Goal: Incisions, wounds, or drain sites healing without S/S of infection  Recent Flowsheet Documentation  Taken 7/11/2024 1930 by Dee Saxena RN  Incisions, Wounds, or Drain Sites Healing Without Sign and Symptoms of Infection: Initiate isolation precautions as appropriate  Goal: Oral mucous membranes remain intact  Recent Flowsheet Documentation  Taken 7/11/2024 1930 by  Dee Saxena RN  Oral Mucous Membranes Remain Intact: Assess oral mucosa and hygiene practices     Problem: Musculoskeletal - Adult  Goal: Return mobility to safest level of function  Recent Flowsheet Documentation  Taken 7/11/2024 1930 by Dee Saxena RN  Return Mobility to Safest Level of Function: Assess patient stability and activity tolerance for standing, transferring and ambulating with or without assistive devices  Goal: Maintain proper alignment of affected body part  Recent Flowsheet Documentation  Taken 7/11/2024 1930 by Dee Saxena RN  Maintain proper alignment of affected body part: Support and protect limb and body alignment per provider's orders  Goal: Return ADL status to a safe level of function  Recent Flowsheet Documentation  Taken 7/11/2024 1930 by Dee Saxena RN  Return ADL Status to a Safe Level of Function: Assist and instruct patient to increase activity and self care as tolerated     Problem: Chronic Conditions and Co-morbidities  Goal: Patient's chronic conditions and co-morbidity symptoms are monitored and maintained or improved  Recent Flowsheet Documentation  Taken 7/11/2024 1930 by Dee Saxena RN  Care Plan - Patient's Chronic Conditions and Co-Morbidity Symptoms are Monitored and Maintained or Improved: Monitor and assess patient's chronic conditions and comorbid symptoms for stability, deterioration, or improvement

## 2024-07-12 NOTE — OP NOTE
Operative Note      Patient: Effie Portillo  YOB: 1945  MRN: [id number]    Date of Procedure: 7/12/2024    Pre-Op Diagnosis Codes:     * Anemia, unspecified type [D64.9]    Post-Op Diagnosis: {MH OR SAME:341239351}       Procedure(s):  COLONOSCOPY WITH BIOPSY BLEEDING CONTROL AND POLYPECTOMY    Surgeon(s):  Jelani Smith MD    Assistant:   * No surgical staff found *    Anesthesia: Monitor Anesthesia Care    Estimated Blood Loss (mL): {NUMBERS; EBL:81543}    Complications: {Symptoms; Intra-op complications:93385}    Specimens:   * No specimens in log *    Implants:  * No implants in log *      Drains:   Gastrostomy/Enterostomy/Jejunostomy Tube Percutaneous Endoscopic Gastrostomy (PEG) LUQ 1 (Active)       Gastrostomy/Enterostomy/Jejunostomy Tube LUQ (Active)   Drainage Appearance Tan 07/12/24 0900   Site Description Reddened 07/12/24 0900   J Port Status Infusing 07/12/24 0709   G Port Status Infusing 07/12/24 0900   Surrounding Skin Reddened 07/12/24 0900   Dressing Status Clean, dry & intact 07/12/24 0900   Dressing Type Split gauze 07/12/24 0900   G-Tube Care Completed Yes 07/12/24 0709   Tube Feeding Other Tube Feeding (specify) 07/12/24 0900   Tube feeding/verify rate (mL/hr) 0 mL/hr 07/11/24 1930   Tube Feeding Intake (mL) 304 ml 06/26/24 0625   Free Water/Flush (mL) 240 mL 07/12/24 0709   Action Taken Placement verified (comment) 07/12/24 0900   Residual Volume (ml) 0 ml 07/12/24 0709       Urinary Catheter 07/09/24 (Active)   $ Urethral catheter insertion $ Not inserted for procedure 07/09/24 1940   Catheter Indications Urinary retention (acute or chronic), continuous bladder irrigation or bladder outlet obstruction 07/12/24 0900   Site Assessment No urethral drainage 07/12/24 0900   Urine Color Other (Comment) 07/12/24 0315   Urine Appearance Sediment 07/12/24 0900   Collection Container Standard 07/12/24 0900   Securement Method Securing device (Describe) 07/12/24 0900   Catheter Care   Soap and water 07/12/24 0800   Catheter Best Practices  Drainage tube clipped to bed;Catheter secured to thigh;Tamper seal intact;Bag below bladder;Bag not on floor;Drainage bag less than half full;Lack of dependent loop in tubing 07/12/24 0900   Status Draining 07/12/24 0900   Output (mL) 600 mL 07/12/24 0315       [REMOVED] Urinary Catheter 06/15/24 (Removed)   $ Urethral catheter insertion $ Not inserted for procedure 06/15/24 2000   Catheter Indications Prolonged immobilization (e.g. unstable thoracic or lumbar spine, multiple traumatic injuries such as pelvic fractures) 06/26/24 0800   Site Assessment [place]rey 06/26/24 0800   Urine Color Yellow 06/26/24 0800   Urine Appearance Clear 06/26/24 0800   Urine Odor Other (Comment) 06/26/24 0800   Collection Container Standard 06/26/24 0800   Securement Method Securing device (Describe) 06/26/24 0800   Catheter Care  Soap and water 06/26/24 0800   Catheter Best Practices  Drainage tube clipped to bed;Catheter secured to thigh;Tamper seal intact;Bag below bladder;Bag not on floor;Lack of dependent loop in tubing;Drainage bag less than half full 06/26/24 0800   Status Draining 06/26/24 0800   Output (mL) 400 mL 06/26/24 1444       Findings:  Infection Present At Time Of Surgery (PATOS) (choose all levels that have infection present):  {PATOS LEVELS:280825108}  Other Findings: ***    Detailed Description of Procedure:   ***    Electronically signed by Jelani Smith MD on 7/12/2024 at 3:35 PM

## 2024-07-12 NOTE — PROGRESS NOTES
Date:  07/12/24  Hospital Day:  Hospital Day: 4  PT Name:  Effie Portillo  MRN:   [id number]  Primary Care Physician: Sheba Sandoval MD  Requesting physician:   Kwame Enamorado MD  Reason for follow up: antibiotics/infection  Chief Complaint:   Chief Complaint   Patient presents with    Loss of Consciousness     Pt comes to the ED from WNR unresponsive except to painful stimuli. Pt is vent dependent and is sating between 85-88% on 15-20 liters which is not baseline.        Assessment  Effie Portillo is a 79 y.o. year old female who presented on 7/9/2024 and is being treated for Hypernatremia   Chief Complaint   Patient presents with    Loss of Consciousness     Pt comes to the ED from WNR unresponsive except to painful stimuli. Pt is vent dependent and is sating between 85-88% on 15-20 liters which is not baseline.       Id following for   Plan    Pt came in with hypernatremia a  Chronic respiratory failure   R/O SEPSIS/HCAP/FUNGEMIA  Hcap CRAB  Proteus bacteruria   DTI SACRAL AND  SKIN TEAR  H/O MDR A BAUMANNII  C AURIS EXPOSURE  Strict isolation   For scope tpday      No change inPOC  micafungin (MYCAMINE) 100 mg in sodium chloride 0.9 % 100 mL IVPB (mini-bag), Daily  cefiderocol sulfate tosylate (FETROJA) 1,500 mg in sodium chloride 0.9 % 100 mL IVPB, Q8H        Continue wound care  Encourage nutritional support  Continue to off load wound/pressure relief bed    Watch for cdiff colitis/clabsi-line infection  Monitor labs  Continue current therapy.  Please see orders for further management and care.    Subjective/HPI:  Effie was seen and examined at bedside today for sepsis    Overnight:  7/12 trach vent open eyes for c scope   7/11 pt is not responsive trach vent   7/10 Pt is not awkae   There are no adverse drug reactions.  All tests were reviewed.   No  family present during my examination.    ROS: unable due to pt's status trach/vent        Objective  Most Recent Recorded Vitals  Vitals:     Description .CLEAN CATCH URINE     Special Requests Site: Urine     Culture PROTEUS MIRABILIS Identification by MALDI-TOF >100,000 CFU/ML Susceptibility to follow.    Culture, C Auris Screen [[id number]] Collected: 07/09/24 1639    Order Status: No result Specimen: Other Updated: 07/09/24 1857    Culture, Urine [[id number]]  (Abnormal)  (Susceptibility) Collected: 07/09/24 0630    Order Status: Completed Specimen: Urine, clean catch Updated: 07/12/24 1105     Specimen Description .CLEAN CATCH URINE     Special Requests Site: Urine     Culture PROTEUS MIRABILIS >100,000 CFU/ML Identification by MALDI-TOF Organism is Multi Drug Resistant      MIXED GRAM POSITIVE ORGANISMS <10,000 CFU/ML    Susceptibility        Proteus mirabilis      BACTERIAL SUSCEPTIBILITY PANEL DIANE      ampicillin >=32  Resistant      aztreonam 2  Sensitive      ceFAZolin >=64  Resistant  [1]       cefepime 16  Resistant      cefotaxime >=64  Resistant      cefOXitin 32  Resistant      cefpodoxime proxetil >=8  Resistant      cefTAZidime <=1  Sensitive      Ceftolozane/Tazobactam (Zerbaxa) 1  Sensitive      Cefuroxime axetil >=64  Resistant      Cefuroxime-Sodium >=64  Resistant      ciprofloxacin >=4  Resistant      doxycycline 4  Sensitive      ertapenem <=0.12  Sensitive      meropenem <=0.25  Sensitive      minocycline 16  Resistant      moxifloxacin >=8  Resistant      nitrofurantoin 128  Resistant      piperacillin-tazobactam <=4  Sensitive      tigecycline >=8  Resistant      trimethoprim-sulfamethoxazole >=320  Resistant                   [1]  Cefazolin sensitivity results can be used to predict the effectiveness of oral   cephalosporins (eg. Cephalexin) in uncomplicated Urinary Tract Infections due to E. coli, K.   pneumoniae, and P. mirabilis                     Culture, Blood 2 [[id number]] Collected: 07/09/24 0624    Order Status: Completed Specimen: Blood Updated: 07/12/24 0923     Specimen Description .BLOOD     Special Requests Site:  Blood     Culture NO GROWTH 3 DAYS    Blood Culture 1 [[id number]] Collected: 07/09/24 0624    Order Status: Completed Specimen: Blood Updated: 07/12/24 0926     Specimen Description .BLOOD     Special Requests          Culture NO GROWTH 3 DAYS    Respiratory Panel, Molecular, with COVID-19 (Restricted: peds pts or suitable admitted adults) [[id number]] Collected: 07/09/24 0624    Order Status: Completed Specimen: Nasopharyngeal Swab Updated: 07/09/24 1021     Specimen Description .NASOPHARYNGEAL SWAB     Adenovirus PCR Not Detected     Coronavirus 229E PCR Not Detected     Coronavirus HKU1 PCR Not Detected     Coronavirus NL63 PCR Not Detected     Coronavirus OC43 PCR Not Detected     SARS-CoV-2, PCR Not Detected     Human Metapneumovirus PCR Not Detected     Rhino/Enterovirus PCR Not Detected     Influenza A by PCR Not Detected     Influenza B by PCR Not Detected     Parainfluenza 1 PCR Not Detected     Parainfluenza 2 PCR Not Detected     Parainfluenza 3 PCR Not Detected     Parainfluenza 4 PCR Not Detected     Resp Syncytial Virus PCR Not Detected     Bordetella parapertussis by PCR Not Detected     B Pertussis by PCR Not Detected     Chlamydia pneumoniae By PCR Not Detected     Mycoplasma pneumo by PCR Not Detected     Comment: Performed by multiplexed nucleic acid assay.             Recent Labs     07/09/24  1639 07/11/24  1015   PROCAL 0.86*  --    INR  --  1.3   PROTIME  --  13.9*       No results found for: \"CHOL\", \"TRIG\", \"HDL\"  Lab Results   Component Value Date/Time    VITD25 22.5 (L) 06/26/2024 05:29 AM     Recent Labs     07/10/24  0520 07/11/24  0641 07/11/24  1340 07/11/24  2315 07/12/24  0531 07/12/24  0605   WBC 6.0 5.7  --   --  5.0  --    HGB 7.0* 7.1*   < > 7.5* 7.6* 7.5*   HCT 24.0* 23.1*   < > 24.1* 25.2* 24.9*   MCV 92.3 88.5  --   --  88.7  --    MCH 26.9 27.2  --   --  26.8  --    MCHC 29.2* 30.7*  --   --  30.2*  --    RDW 18.5* 17.7*  --   --  17.6*  --    NRBC  --   --   --   --  2  --

## 2024-07-12 NOTE — PROGRESS NOTES
[facility] Hospitalist   Progress Note    Admitting Date and Time: 7/9/2024  6:01 AM  Admit Dx: Dehydration [E86.0]  Hyperchloremia [E87.8]  Hypernatremia [E87.0]  Uremic encephalopathy [G93.49, N19]    Subjective:    Pt lying in bed in no acute distress. Remains on Ventilator support tolerating well. ML placed this morning. Pt opens eyes to name. More responsive today. She remains on Micafungin tolerating well. For Colonoscopy today. Following Hgb.     Per RN: For Colonoscopy today.          sodium chloride flush  5-40 mL IntraVENous 2 times per day    ferric gluconate (FERRLECIT) 125 mg in sodium chloride 0.9 % 100 mL IVPB  125 mg IntraVENous Once    pantoprazole  40 mg IntraVENous BID    cefiderocol sulfate tosylate (FETROJA) 1,500 mg in sodium chloride 0.9 % 100 mL IVPB  1,500 mg IntraVENous Q8H    [Held by provider] apixaban  2.5 mg Per G Tube BID    arformoterol tartrate  15 mcg Nebulization BID    atorvastatin  10 mg PEG Tube Nightly    budesonide  500 mcg Nebulization Q12H    carvedilol  12.5 mg Oral BID WC    [Held by provider] clopidogrel  75 mg PEG Tube Daily    empagliflozin  10 mg PEG Tube Daily    ferrous Sulfate  300 mg Per G Tube Daily    midodrine  15 mg Oral TID WC    predniSONE  5 mg PEG Tube Daily    sodium chloride flush  5-40 mL IntraVENous 2 times per day    micafungin (MYCAMINE) 100 mg in sodium chloride 0.9 % 100 mL IVPB (mini-bag)  100 mg IntraVENous Daily    miconazole nitrate   Topical BID    miconazole   Topical BID     sodium chloride flush, 5-40 mL, PRN  sodium chloride, , PRN  glucose, 4 tablet, PRN  dextrose bolus, 125 mL, PRN   Or  dextrose bolus, 250 mL, PRN  glucagon (rDNA), 1 mg, PRN  dextrose, , Continuous PRN  ipratropium 0.5 mg-albuterol 2.5 mg, 1 Dose, Q4H PRN  sodium chloride flush, 5-40 mL, PRN  sodium chloride, , PRN  ondansetron, 4 mg, Q8H PRN   Or  ondansetron, 4 mg, Q6H PRN  polyethylene glycol, 17 g, Daily PRN  acetaminophen, 650 mg, Q6H PRN    Or  acetaminophen, 650 mg, Q6H PRN  bisacodyl, 10 mg, Daily PRN  white petrolatum, , BID PRN         Objective:    BP (!) 150/68   Pulse 78   Temp 98 °F (36.7 °C)   Resp 27   Ht 1.575 m (5' 2.01\")   Wt 57.7 kg (127 lb 3.3 oz)   SpO2 95%   BMI 23.26 kg/m²   General Appearance: Somnolent and in no acute distress. Ill appearing.   Skin: warm and dry. Ecchymosis.  Head: normocephalic and atraumatic  Eyes: pupils equal, round, and reactive to light, extraocular eye movements intact, conjunctivae normal  Neck: neck supple and non tender without mass   Pulmonary/Chest: Diminished to auscultation bilaterally- no wheezes, rales or rhonchi, normal air movement, no respiratory distress  Cardiovascular: normal rate, normal S1 and S2 and no RGM  Abdomen: soft, non-tender, non-distended, normal bowel sounds, no masses or organomegaly  Extremities: no cyanosis, no clubbing and +edema. ML LUE  Neurologic: no cranial nerve deficit and speech normal      Recent Labs     07/11/24  2315 07/12/24  0530 07/12/24  0605    144 145   K 3.5 3.4* 3.3*    105 105   CO2 28 26 26   BUN 59* 55* 56*   CREATININE 0.7 0.7 0.7   GLUCOSE 67* 57* 60*   CALCIUM 8.6 8.6 8.5*         No results for input(s): \"ALKPHOS\", \"LABALBU\", \"BILITOT\", \"AST\", \"ALT\" in the last 72 hours.    Invalid input(s): \"PROT\"      Recent Labs     07/10/24  0520 07/11/24  0641 07/11/24  1340 07/11/24  2315 07/12/24  0531 07/12/24  0605   WBC 6.0 5.7  --   --  5.0  --    RBC 2.60* 2.61*  --   --  2.84*  --    HGB 7.0* 7.1*   < > 7.5* 7.6* 7.5*   HCT 24.0* 23.1*   < > 24.1* 25.2* 24.9*   MCV 92.3 88.5  --   --  88.7  --    MCH 26.9 27.2  --   --  26.8  --    MCHC 29.2* 30.7*  --   --  30.2*  --    RDW 18.5* 17.7*  --   --  17.6*  --    MPV 13.2* 13.3*  --   --  13.4*  --     < > = values in this interval not displayed.              Radiology:   XR CHEST PORTABLE   Final Result   Mild, patchy bilateral perihilar and interstitial infiltrates suggesting mild    pulmonary edema, mildly increased from prior examination.             Assessment:  Principal Problem:    Hypernatremia  Resolved Problems:    * No resolved hospital problems. *      Plan:  Hypernatremia- Resolved Na+ 163>158>152>141 Received 1L NSS bolus in ED course.Free Water flushes. BMP Q6.   EVELIA- Resolved  BUN/Crt 56/0.7 Received 1L NSS bolus in ED course. Avoid Nephrotoxic agents. Nephrology input appreciated   Chronic Respiratory Failure with Hypoxia- Vent dependent. AC 18 Vt 375 70% +8. Has seen Dr. Metz in the past.   Chronic Encephalopathy  Hyperkalemia- K+ 3.3 Replete follow.   Atrial Fibrillation- Holding Eliquis Continue Coreg Telemetry monitoring. Has seen [facility].   Anemia- Plavix/Eliquis on hold. No over signs of bleeding note.d Hgb 8.8>7.0>7.1 Check Occult stool. PPI  BID Continue to follow transfuse as needed. H/H Q8. Colonoscopy today. Occult  GI input appreciated.   HFrEF- 5/24 ECHO LVEF 30-35%. Global hypokinesis. Grade I diastolic dysfunction Mild Tricuspid Regurgitation.   Bipolar Disorder/Depression  Hx CVA  HTN- Coreg. On Midodrine TID. Follow.   Hx GI Bleed- Drop in Hgb. Hgb 9.7 end of June. Hgb 8.8 upon admission. Hgb 7.1 Hgb 7.5 Occult stool+ 6/21 EGD No bleeding source identified. Colonoscopy today.     NOTE: This report was transcribed using voice recognition software. Every effort was made to ensure accuracy; however, inadvertent computerized transcription errors may be present.     Electronically signed by TROY Camacho - CNP on 7/12/2024 at 2:05 PM

## 2024-07-12 NOTE — DISCHARGE INSTR - COC
Continuity of Care Form    Patient Name: Effie Torres   :  1945  MRN:  [id number]    Admit date:  2024  Discharge date:  24    Code Status Order: DNR-CCA   Advance Directives:     Admitting Physician:  Kwame Enamorado MD  PCP: Sheba Sandoval MD    Discharging Nurse: June  Discharging Hospital Unit/Room#: 0629/0629-01  Discharging Unit Phone Number: [phone]    Emergency Contact:   Extended Emergency Contact Information  Primary Emergency Contact: PO TORRES  Home Phone: [phone]  Relation: Child   needed? No    Past Surgical History:  Past Surgical History:   Procedure Laterality Date    BRONCHOSCOPY N/A 2024    BRONCHOSCOPY performed by Ant Burgos MD at [facility] ENDOSCOPY    LOWER EXTREMITY AMPUTATION Left     aka of left leg    PEG W/TRACHEOSTOMY PLACEMENT      UPPER GASTROINTESTINAL ENDOSCOPY N/A 2024    ESOPHAGOGASTRODUODENOSCOPY performed by Jelani Smith MD at [facility] ENDOSCOPY       Immunization History:     There is no immunization history on file for this patient.    Active Problems:  Patient Active Problem List   Diagnosis Code    Chronic respiratory failure with hypoxia ([facility]) J96.11    Primary hypertension I10    History of stroke with residual deficit I69.30    History of DVT (deep vein thrombosis) Z86.718    Coronary artery disease involving native coronary artery of native heart without angina pectoris I25.10    Ischemic cardiomyopathy I25.5    Subacute osteomyelitis of left foot ([facility]) M86.272    Hypernatremia E87.0    Severe protein-energy malnutrition ([facility]) E43    Hx of AKA (above knee amputation), left ([facility]) Z89.612    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    Acute on chronic respiratory failure with hypoxia ([facility]) J96.21    COPD exacerbation ([facility]) J44.1    PAF (paroxysmal atrial fibrillation) ([facility]) I48.0    Sinus tachycardia R00.0    Ventilator dependent ([facility]) Z99.11    Sepsis ([facility]) A41.9    Acute renal failure (ARF)  Volume (cm^3) 0 cm^3 07/09/24 2045   Ana-wound Assessment Blanchable erythema 07/11/24 1930   Number of days: 2       Wound 07/09/24 Elbow Left;Posterior (Active)   Wound Etiology Skin Tear 07/12/24 0900   Dressing Status Clean;Dry;Intact 07/12/24 0900   Dressing/Treatment Silicone pad 07/12/24 0900   Wound Length (cm) 0.25 cm 07/09/24 2045   Wound Width (cm) 0.25 cm 07/09/24 2045   Wound Depth (cm) 0 cm 07/09/24 2045   Wound Surface Area (cm^2) 0.0625 cm^2 07/09/24 2045   Wound Volume (cm^3) 0 cm^3 07/09/24 2045   Ana-wound Assessment Blanchable erythema 07/11/24 1930   Number of days: 2       Wound 07/09/24 Coccyx DTI coocyx left right buttocks (Active)   Wound Etiology Deep tissue/Injury 07/11/24 1930   Dressing Status Other (Comment) 07/11/24 1930   Wound Cleansed Cleansed with saline 07/11/24 1930   Dressing/Treatment Pharmaceutical agent (see MAR) 07/11/24 1930   Wound Length (cm) 5 cm 07/09/24 2045   Wound Width (cm) 6 cm 07/09/24 2045   Wound Depth (cm) 0 cm 07/09/24 2045   Wound Surface Area (cm^2) 30 cm^2 07/09/24 2045   Wound Volume (cm^3) 0 cm^3 07/09/24 2045   Wound Assessment Purple/maroon;Pink/red 07/12/24 0900   Ana-wound Assessment Non-blanchable erythema 07/12/24 1038   Number of days: 2        Elimination:  Continence:   Bowel: No  Bladder: No  Urinary Catheter: Indication for Use of Catheter: Stage III or IV perineal and sacral wound OR full thickness perineal/lower extremity burns in continent patients   Colostomy/Ileostomy/Ileal Conduit: No       Date of Last BM: 07/17/24    Intake/Output Summary (Last 24 hours) at 7/12/2024 1524  Last data filed at 7/12/2024 1426  Gross per 24 hour   Intake 3091 ml   Output 900 ml   Net 2191 ml     I/O last 3 completed shifts:  In: 3777.5 [I.V.:446.5; NG/GT:3331]  Out: 1050 [Urine:1050]    Safety Concerns:     Aspiration Risk    Impairments/Disabilities:      Speech, Contractures - BUE, and Amputation - LLE     Nutrition Therapy:  Current Nutrition Therapy:

## 2024-07-12 NOTE — PROGRESS NOTES
Patient's rectal temperature noted to be 96.3 today, Dr Puri notified, warming blanket ordered and applied to patient.

## 2024-07-12 NOTE — PROGRESS NOTES
Assessment and Plan  Patient is a 79 y.o. 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       Plan of care:  Continue with mechanical ventilation.  Patient is currently on assist-control mechanical ventilation with a tidal volume of 375, rate 18, PEEP of 8, FiO2 70%.  Wean FiO2 as able for sats 88 to 94%  Trach care and suctioning as needed  Reconcile home medications  Patient was recently on multiple antimicrobial therapy.  Monitor culture results.  ID team is following.  Patient is currently on Fetroja and micafungin.  Patient's urine culture grew Proteus mirabilis which is multidrug-resistant and sputum is growing Acinetobacter which is likely colonization  Nephrology is managing hyponatremia and EVELIA.  DVT prophylaxis.  Patient is currently on Eliquis 2.5 mg twice daily and Plavix but they are on hold due to suspected GI bleeding.  GI is consulted.  Midodrine to help with hypotension.  Carvedilol 12.5 twice daily  Urine toxicology is negative.  Palliative care dietitian consult.  Goals of care were discussed.  Patient is currently DNR CCA.        History of Present Illness:   Patient is known to me from prior hospitalization in June 2024.  She is currently encephalopathic and unable to provide history.  Patient is a 79-year-old woman with above-mentioned medical problem who was admitted on July 9, 2024 with altered mental status.  Patient was found to be hyponatremic with positive UTI.  Goals of care were discussed previously and patient was made DNR CCA.  Patient

## 2024-07-12 NOTE — PROGRESS NOTES
Assessment and Plan  Patient is a 79 y.o. female with the following medical Problems:     Chronic hypoxic respiratory failure on mechanical ventilation (S/P tracheostomy and PEG)  Urinary tract infection.  Acute kidney injury (improved)  Hypernatremia.(Improved)  Acute blood loss anemia  GI bleeding  Chronic encephalopathy  Aspiration pneumonia  Left-sided pleural effusion  Metabolic encephalopathy.       Plan of care:  Continue with mechanical ventilation.  Patient is currently on assist-control mechanical ventilation with a tidal volume of 375, rate 18, PEEP of 8, FiO2 70%.  Wean FiO2 as able for sats 88 to 94%  Trach care and suctioning as needed  Reconcile home medications  Patient was recently on multiple antimicrobial therapy.  Monitor culture results.  ID team is following.  Patient is currently on Fetroja and micafungin.  Patient's urine culture grew Proteus mirabilis which is multidrug-resistant and sputum is growing Acinetobacter  Nephrology is managing hyponatremia and EVELIA.  DVT prophylaxis.  Patient is currently on Eliquis 2.5 mg twice daily and Plavix but they are on hold due to suspected GI bleeding.  GI is consulted.  Midodrine to help with hypotension.  Carvedilol 12.5 twice daily  Urine toxicology is negative.  Palliative care dietitian consult.  Goals of care were discussed.  Patient is currently DNR CCA.        History of Present Illness:   Patient is known to me from prior hospitalization in June 2024.  She is currently encephalopathic and unable to provide history.  Patient is a 79-year-old woman with above-mentioned medical problem who was admitted on July 9, 2024 with altered mental status.  Patient was found to be hyponatremic with positive UTI.  Goals of care were discussed previously and patient was made DNR CCA.  Patient was initially had encephalopathy when she presented in June 2024 but she was more awake and interactive upon discharge.  She is chronically on mechanical ventilation.   Time devoted to teaching and to any procedures I billed separately is not included.  Critical Care Time: 32 minutes    .      Electronically signed by TROY Prajapati NP on 7/12/2024 at 9:15 AM

## 2024-07-12 NOTE — PROGRESS NOTES
PROGRESS NOTE  By Neo Artis M.D.    [facility]  Dr. Rosemary Hartman M.D.,  Dr. Phil Benitez M.D.,   Dr. Pato Hernandez D.O.,  Dr. Dayday Trejo M.D.,  OTTO ArellanoO.,          Effie DUGAN Lindsey  79 y.o.  female    SUBJECTIVE:  Remains nonverbal    OBJECTIVE:    BP (!) 161/72   Pulse 66   Temp 98.8 °F (37.1 °C) (Oral)   Resp 25   Ht 1.575 m (5' 2.01\")   Wt 57.7 kg (127 lb 3.3 oz)   SpO2 93%   BMI 23.26 kg/m²     General: Older adult  female.  NAD  HEENT: Anicteric sclera/moist oral mucosa  Neck: Trachea midline/no JVD.  Tracheostomy  Chest: Symmetric excursion/mechanically ventilated via tracheostomy  Cor: Regular/S1-S2  Abd.: Soft and obese.  PEG tube in place  Extr.:  Left lower extremity amputation.  Diffuse edema.  Decreased muscle tone and bulk throughout  Skin: Warm and dry/anicteric      DATA:    Monitor data reviewed -sinus rhythm noted.       Lab Results   Component Value Date/Time    WBC 5.0 07/12/2024 05:31 AM    RBC 2.84 07/12/2024 05:31 AM    HGB 7.5 07/12/2024 06:05 AM    HCT 24.9 07/12/2024 06:05 AM    MCV 88.7 07/12/2024 05:31 AM    MCH 26.8 07/12/2024 05:31 AM    MCHC 30.2 07/12/2024 05:31 AM    RDW 17.6 07/12/2024 05:31 AM     06/26/2024 05:29 AM    MPV 13.4 07/12/2024 05:31 AM     Lab Results   Component Value Date/Time     07/12/2024 06:05 AM    K 3.3 07/12/2024 06:05 AM     07/12/2024 06:05 AM    CO2 26 07/12/2024 06:05 AM    BUN 56 07/12/2024 06:05 AM    CREATININE 0.7 07/12/2024 06:05 AM    CALCIUM 8.5 07/12/2024 06:05 AM    BILITOT 0.2 07/09/2024 06:15 AM    ALKPHOS 107 07/09/2024 06:15 AM    AST 18 07/09/2024 06:15 AM    ALT 16 07/09/2024 06:15 AM     No results found for: \"LIPASE\"  No results found for: \"AMYLASE\"      ASSESSMENT/PLAN:  Patient Active Problem List   Diagnosis    Chronic respiratory failure with hypoxia (HCC)    Primary hypertension    History of stroke with residual deficit    History of DVT (deep vein thrombosis)

## 2024-07-12 NOTE — PROGRESS NOTES
Department of Internal Medicine  Nephrology Attending Consult Note    Events reviewed    SUBJECTIVE: We are following Mrs. Portillo for hyponatremia.  Patient is nonverbal    PHYSICAL EXAM:      Vitals:    VITALS:  BP (!) 150/68   Pulse 78   Temp 98 °F (36.7 °C)   Resp 27   Ht 1.575 m (5' 2.01\")   Wt 57.7 kg (127 lb 3.3 oz)   SpO2 95%   BMI 23.26 kg/m²   24HR INTAKE/OUTPUT:    Intake/Output Summary (Last 24 hours) at 7/12/2024 1244  Last data filed at 7/12/2024 0850  Gross per 24 hour   Intake 3091 ml   Output 600 ml   Net 2491 ml         Constitutional: Patient is lethargic  HEENT: Pupils are equal reactive  Respiratory: Tracheostomy in place  Cardiovascular/Edema: Heart sounds regular  Gastrointestinal: Abdomen soft, PEG in place  Neurologic: Overall nonfocal  Skin: No lesion  Other: No Edema    Scheduled Meds:   sodium chloride flush  5-40 mL IntraVENous 2 times per day    ferric gluconate (FERRLECIT) 125 mg in sodium chloride 0.9 % 100 mL IVPB  125 mg IntraVENous Once    pantoprazole  40 mg IntraVENous BID    cefiderocol sulfate tosylate (FETROJA) 1,500 mg in sodium chloride 0.9 % 100 mL IVPB  1,500 mg IntraVENous Q8H    [Held by provider] apixaban  2.5 mg Per G Tube BID    arformoterol tartrate  15 mcg Nebulization BID    atorvastatin  10 mg PEG Tube Nightly    budesonide  500 mcg Nebulization Q12H    carvedilol  12.5 mg Oral BID WC    [Held by provider] clopidogrel  75 mg PEG Tube Daily    empagliflozin  10 mg PEG Tube Daily    ferrous Sulfate  300 mg Per G Tube Daily    midodrine  15 mg Oral TID WC    predniSONE  5 mg PEG Tube Daily    sodium chloride flush  5-40 mL IntraVENous 2 times per day    micafungin (MYCAMINE) 100 mg in sodium chloride 0.9 % 100 mL IVPB (mini-bag)  100 mg IntraVENous Daily    miconazole nitrate   Topical BID    miconazole   Topical BID     Continuous Infusions:   dextrose 75 mL/hr at 07/12/24 0605    sodium chloride      dextrose      sodium chloride 10 mL/hr at 07/11/24 3602  referred from [facility] due to altered mental status, after evaluation she was found to have a sodium level of 163 mEq/L, reason for this consultation.  Prior to admission current medication including Lasix 20 mg daily, empagliflozin 10 mg daily, midodrine 5 mg 3 times daily.    IMPRESSION/RECOMMENDATIONS:         Severe hypernatremia, with severe dehydration due to lack of free water administration along with the use of diuretics and SGLT2 inhibitors.  Sodium level has increased last 24 hours, patient was n.p.o. overnight.    EVELIA stage I, volume responsive EVELIA, due to severe dehydration, renal function slowly improving, creatinine down to 0.9 mg/dL.    HFrEF 35%, on empagliflozin, Lasix  Hypokalemia, 2/2 diuretics, to replace  Normocytic anemia, with iron deficiency ferritin 360, iron saturation 11%, folate 16.5, B12 1154, for colonoscopy today    -----------------------------------------------  Ventilator dependent chronic respiratory failure status post tracheostomy  PAF, on apixaban, carvedilol  Hyperlipidemia, on atorvastatin  Nutrition, tube feeding 60 cc/hour, free water 100 cc every 4 hours, will hold for colonoscopy today.     Plan:       D5W at 75 cc/hour while NPO  Replace potassium  Continue to monitor sodium level  Continue to monitor renal function          Marcie Rodrigues MD

## 2024-07-12 NOTE — PROCEDURES
PROCEDURE NOTE  Date: 7/12/2024   Name: Effie Portillo  YOB: 1945    Procedures        DL power midline Placement 7/12/2024        Product number: [facility] [address]11 [id number]   Lot Number: [id number]   Consult: limited access   Ultrasound: yes   Left Basilic vein:                Upper Arm Circumference: (CM) 24    Size:(FR)/GUAGE 5.5    Exposed Length: (CM) 0    Internal Length: (CM) 15   Cut: (CM) 0   Vein Measurement: 0.5              Lidocaine Given: yes    Tolerated well  Brisk blood return  Flushed well and capped  External pressure dressing applied d/t bleeding  RN notified      Duran Carver RN  7/12/2024  10:42 AM

## 2024-07-12 NOTE — CARE COORDINATION
7/12/2024 1000 CM Note: Pt is from [facility]. Per Melissa pt can return, she's a BED HOLD, NO PRECERT NEEDED. Will NEED A SIGNED LEOBARDO. If IV antibioitcs at d/c she will need a midline or PICC line. CM will follow. Electronically signed by Shena Solis RN on 7/12/2024 at 10:07 AM

## 2024-07-12 NOTE — PROGRESS NOTES
Chart reviewed.  Patient seen at the bedside, sleeping, responds to voice.  No family present at the bedside.  Spoke with the patient's son, Parag, yesterday.  He wishes to continue current medical management and treatment. Not receptive to hospice.  Palliative medicine will continue to monitor patient's clinical progression.  No acute/immediate needs at this time.

## 2024-07-12 NOTE — PLAN OF CARE
Problem: Discharge Planning  Goal: Discharge to home or other facility with appropriate resources  7/12/2024 1418 by Amanda Ozuna RN  Outcome: Progressing  7/12/2024 1411 by Amanda Ozuna RN  Outcome: Progressing  7/12/2024 0520 by Dee Saxena RN  Outcome: Progressing  Flowsheets (Taken 7/11/2024 1930)  Discharge to home or other facility with appropriate resources: Identify barriers to discharge with patient and caregiver     Problem: Pain  Goal: Verbalizes/displays adequate comfort level or baseline comfort level  7/12/2024 1418 by Amanda Ozuna RN  Outcome: Progressing  7/12/2024 1411 by Amanda Ozuna RN  Outcome: Progressing  7/12/2024 0520 by Dee Saxena RN  Outcome: Progressing     Problem: Safety - Adult  Goal: Free from fall injury  7/12/2024 1411 by Amanda Ozuna RN  Outcome: Progressing  7/12/2024 0520 by Dee Saxena RN  Outcome: Progressing     Problem: Respiratory - Adult  Goal: Achieves optimal ventilation and oxygenation  7/12/2024 1418 by Amanda Ozuna RN  Outcome: Progressing  7/12/2024 1411 by Amanda Ozuna RN  Outcome: Progressing  7/12/2024 0520 by Dee Saxena RN  Outcome: Progressing  Flowsheets (Taken 7/11/2024 1930)  Achieves optimal ventilation and oxygenation: Assess for changes in respiratory status     Problem: Cardiovascular - Adult  Goal: Maintains optimal cardiac output and hemodynamic stability  7/12/2024 1418 by Amanda Ozuna RN  Outcome: Progressing  7/12/2024 1411 by Amanda Ozuna RN  Outcome: Progressing     Problem: Cardiovascular - Adult  Goal: Absence of cardiac dysrhythmias or at baseline  7/12/2024 1418 by Amanda Ozuna RN  Outcome: Progressing  7/12/2024 1411 by Amanda Ozuna RN  Outcome: Progressing     Problem: Skin/Tissue Integrity - Adult  Goal: Skin integrity remains intact  7/12/2024 1418 by Amanda Ozuna RN  Outcome: Progressing  7/12/2024 1411 by Amanda Ozuna RN  Outcome: Progressing

## 2024-07-13 ENCOUNTER — APPOINTMENT (OUTPATIENT)
Dept: CT IMAGING | Age: 79
DRG: 207 | End: 2024-07-13
Payer: MEDICARE

## 2024-07-13 LAB
ABO + RH BLD: NORMAL
ANION GAP SERPL CALCULATED.3IONS-SCNC: 11 MMOL/L (ref 7–16)
ARM BAND NUMBER: NORMAL
BLOOD BANK SAMPLE EXPIRATION: NORMAL
BLOOD GROUP ANTIBODIES SERPL: NEGATIVE
BUN SERPL-MCNC: 38 MG/DL (ref 6–23)
CALCIUM SERPL-MCNC: 8.1 MG/DL (ref 8.6–10.2)
CHLORIDE SERPL-SCNC: 97 MMOL/L (ref 98–107)
CO2 SERPL-SCNC: 26 MMOL/L (ref 22–29)
CREAT SERPL-MCNC: 0.7 MG/DL (ref 0.5–1)
GFR, ESTIMATED: 89 ML/MIN/1.73M2
GLUCOSE BLD-MCNC: 174 MG/DL (ref 74–99)
GLUCOSE SERPL-MCNC: 385 MG/DL (ref 74–99)
HBA1C MFR BLD: 5.5 % (ref 4–5.6)
HCT VFR BLD AUTO: 22.3 % (ref 34–48)
HCT VFR BLD AUTO: 24.2 % (ref 34–48)
HCT VFR BLD AUTO: 25 % (ref 34–48)
HCT VFR BLD AUTO: 25.2 % (ref 34–48)
HGB BLD-MCNC: 6.8 G/DL (ref 11.5–15.5)
HGB BLD-MCNC: 7.4 G/DL (ref 11.5–15.5)
HGB BLD-MCNC: 7.8 G/DL (ref 11.5–15.5)
HGB BLD-MCNC: 7.8 G/DL (ref 11.5–15.5)
MAGNESIUM SERPL-MCNC: 2.2 MG/DL (ref 1.6–2.6)
MICROORGANISM SPEC CULT: ABNORMAL
POTASSIUM SERPL-SCNC: 2.7 MMOL/L (ref 3.5–5)
POTASSIUM SERPL-SCNC: 4.6 MMOL/L (ref 3.5–5)
SERVICE CMNT-IMP: ABNORMAL
SODIUM SERPL-SCNC: 134 MMOL/L (ref 132–146)
SPECIMEN DESCRIPTION: ABNORMAL

## 2024-07-13 PROCEDURE — 99232 SBSQ HOSP IP/OBS MODERATE 35: CPT | Performed by: INTERNAL MEDICINE

## 2024-07-13 PROCEDURE — 83735 ASSAY OF MAGNESIUM: CPT

## 2024-07-13 PROCEDURE — 86901 BLOOD TYPING SEROLOGIC RH(D): CPT

## 2024-07-13 PROCEDURE — 6370000000 HC RX 637 (ALT 250 FOR IP): Performed by: NURSE PRACTITIONER

## 2024-07-13 PROCEDURE — 94640 AIRWAY INHALATION TREATMENT: CPT

## 2024-07-13 PROCEDURE — 99291 CRITICAL CARE FIRST HOUR: CPT | Performed by: INTERNAL MEDICINE

## 2024-07-13 PROCEDURE — 2580000003 HC RX 258: Performed by: SPECIALIST

## 2024-07-13 PROCEDURE — 80048 BASIC METABOLIC PNL TOTAL CA: CPT

## 2024-07-13 PROCEDURE — 94003 VENT MGMT INPAT SUBQ DAY: CPT

## 2024-07-13 PROCEDURE — 83036 HEMOGLOBIN GLYCOSYLATED A1C: CPT

## 2024-07-13 PROCEDURE — 2580000003 HC RX 258: Performed by: NURSE PRACTITIONER

## 2024-07-13 PROCEDURE — 6360000002 HC RX W HCPCS: Performed by: INTERNAL MEDICINE

## 2024-07-13 PROCEDURE — 84132 ASSAY OF SERUM POTASSIUM: CPT

## 2024-07-13 PROCEDURE — 86850 RBC ANTIBODY SCREEN: CPT

## 2024-07-13 PROCEDURE — 2580000003 HC RX 258: Performed by: INTERNAL MEDICINE

## 2024-07-13 PROCEDURE — 6360000002 HC RX W HCPCS: Performed by: NURSE PRACTITIONER

## 2024-07-13 PROCEDURE — 86900 BLOOD TYPING SEROLOGIC ABO: CPT

## 2024-07-13 PROCEDURE — 2060000000 HC ICU INTERMEDIATE R&B

## 2024-07-13 PROCEDURE — 85014 HEMATOCRIT: CPT

## 2024-07-13 PROCEDURE — 82962 GLUCOSE BLOOD TEST: CPT

## 2024-07-13 PROCEDURE — 85018 HEMOGLOBIN: CPT

## 2024-07-13 PROCEDURE — 74176 CT ABD & PELVIS W/O CONTRAST: CPT

## 2024-07-13 PROCEDURE — 6360000002 HC RX W HCPCS: Performed by: SPECIALIST

## 2024-07-13 RX ORDER — POTASSIUM CHLORIDE 7.45 MG/ML
10 INJECTION INTRAVENOUS
Status: COMPLETED | OUTPATIENT
Start: 2024-07-13 | End: 2024-07-13

## 2024-07-13 RX ORDER — DEXTROSE, SODIUM CHLORIDE, SODIUM LACTATE, POTASSIUM CHLORIDE, AND CALCIUM CHLORIDE 5; .6; .31; .03; .02 G/100ML; G/100ML; G/100ML; G/100ML; G/100ML
INJECTION, SOLUTION INTRAVENOUS CONTINUOUS
Status: DISCONTINUED | OUTPATIENT
Start: 2024-07-13 | End: 2024-07-13

## 2024-07-13 RX ORDER — GLUCAGON 1 MG/ML
1 KIT INJECTION PRN
Status: DISCONTINUED | OUTPATIENT
Start: 2024-07-13 | End: 2024-07-19 | Stop reason: HOSPADM

## 2024-07-13 RX ORDER — DEXTROSE MONOHYDRATE 100 MG/ML
INJECTION, SOLUTION INTRAVENOUS CONTINUOUS PRN
Status: DISCONTINUED | OUTPATIENT
Start: 2024-07-13 | End: 2024-07-19 | Stop reason: HOSPADM

## 2024-07-13 RX ADMIN — ARFORMOTEROL TARTRATE 15 MCG: 15 SOLUTION RESPIRATORY (INHALATION) at 17:11

## 2024-07-13 RX ADMIN — CEFIDEROCOL SULFATE TOSYLATE 1500 MG: 1 INJECTION, POWDER, FOR SOLUTION INTRAVENOUS at 01:32

## 2024-07-13 RX ADMIN — PANTOPRAZOLE SODIUM 40 MG: 40 INJECTION, POWDER, FOR SOLUTION INTRAVENOUS at 09:00

## 2024-07-13 RX ADMIN — PANTOPRAZOLE SODIUM 40 MG: 40 INJECTION, POWDER, FOR SOLUTION INTRAVENOUS at 01:09

## 2024-07-13 RX ADMIN — POTASSIUM CHLORIDE 10 MEQ: 7.46 INJECTION, SOLUTION INTRAVENOUS at 08:59

## 2024-07-13 RX ADMIN — POTASSIUM CHLORIDE 10 MEQ: 7.46 INJECTION, SOLUTION INTRAVENOUS at 10:56

## 2024-07-13 RX ADMIN — MICONAZOLE NITRATE: 2 OINTMENT TOPICAL at 09:05

## 2024-07-13 RX ADMIN — PREDNISONE 5 MG: 5 TABLET ORAL at 09:04

## 2024-07-13 RX ADMIN — CEFIDEROCOL SULFATE TOSYLATE 1500 MG: 1 INJECTION, POWDER, FOR SOLUTION INTRAVENOUS at 17:44

## 2024-07-13 RX ADMIN — CARVEDILOL 12.5 MG: 6.25 TABLET, FILM COATED ORAL at 09:04

## 2024-07-13 RX ADMIN — POTASSIUM CHLORIDE 10 MEQ: 7.46 INJECTION, SOLUTION INTRAVENOUS at 13:00

## 2024-07-13 RX ADMIN — MICAFUNGIN SODIUM 100 MG: 100 INJECTION, POWDER, LYOPHILIZED, FOR SOLUTION INTRAVENOUS at 17:41

## 2024-07-13 RX ADMIN — ARFORMOTEROL TARTRATE 15 MCG: 15 SOLUTION RESPIRATORY (INHALATION) at 06:16

## 2024-07-13 RX ADMIN — SODIUM CHLORIDE, PRESERVATIVE FREE 10 ML: 5 INJECTION INTRAVENOUS at 01:09

## 2024-07-13 RX ADMIN — POTASSIUM CHLORIDE 10 MEQ: 7.46 INJECTION, SOLUTION INTRAVENOUS at 12:08

## 2024-07-13 RX ADMIN — BUDESONIDE INHALATION 500 MCG: 0.5 SUSPENSION RESPIRATORY (INHALATION) at 17:11

## 2024-07-13 RX ADMIN — Medication 10 ML: at 09:05

## 2024-07-13 RX ADMIN — ATORVASTATIN CALCIUM 10 MG: 10 TABLET, FILM COATED ORAL at 01:08

## 2024-07-13 RX ADMIN — POTASSIUM CHLORIDE 10 MEQ: 7.46 INJECTION, SOLUTION INTRAVENOUS at 15:10

## 2024-07-13 RX ADMIN — ACETAMINOPHEN 650 MG: 325 TABLET ORAL at 01:08

## 2024-07-13 RX ADMIN — BUDESONIDE INHALATION 500 MCG: 0.5 SUSPENSION RESPIRATORY (INHALATION) at 06:16

## 2024-07-13 RX ADMIN — DEXTROSE MONOHYDRATE: 50 INJECTION, SOLUTION INTRAVENOUS at 01:27

## 2024-07-13 RX ADMIN — ANTI-FUNGAL POWDER MICONAZOLE NITRATE TALC FREE: 1.42 POWDER TOPICAL at 09:05

## 2024-07-13 RX ADMIN — DEXTROSE MONOHYDRATE: 50 INJECTION, SOLUTION INTRAVENOUS at 15:11

## 2024-07-13 RX ADMIN — Medication 10 ML: at 09:00

## 2024-07-13 RX ADMIN — CEFIDEROCOL SULFATE TOSYLATE 1500 MG: 1 INJECTION, POWDER, FOR SOLUTION INTRAVENOUS at 09:17

## 2024-07-13 RX ADMIN — Medication 10 ML: at 01:49

## 2024-07-13 RX ADMIN — Medication 300 MG: at 09:51

## 2024-07-13 RX ADMIN — ANTI-FUNGAL POWDER MICONAZOLE NITRATE TALC FREE: 1.42 POWDER TOPICAL at 01:11

## 2024-07-13 RX ADMIN — MICONAZOLE NITRATE: 2 OINTMENT TOPICAL at 01:10

## 2024-07-13 RX ADMIN — POTASSIUM CHLORIDE 10 MEQ: 7.46 INJECTION, SOLUTION INTRAVENOUS at 09:50

## 2024-07-13 RX ADMIN — CARVEDILOL 12.5 MG: 6.25 TABLET, FILM COATED ORAL at 17:37

## 2024-07-13 ASSESSMENT — PULMONARY FUNCTION TESTS
PIF_VALUE: 28
PIF_VALUE: 25
PIF_VALUE: 36
PIF_VALUE: 35
PIF_VALUE: 34
PIF_VALUE: 36

## 2024-07-13 ASSESSMENT — PAIN DESCRIPTION - LOCATION: LOCATION: BUTTOCKS

## 2024-07-13 ASSESSMENT — PAIN SCALES - WONG BAKER
WONGBAKER_NUMERICALRESPONSE: HURTS A LITTLE BIT
WONGBAKER_NUMERICALRESPONSE: HURTS A LITTLE BIT

## 2024-07-13 NOTE — PROGRESS NOTES
PROGRESS NOTE  By Eren Kinsey, ALYSSA    [facility]  Dr. Rosemary Hartman M.D.,  Dr. Phil Benitez M.D.,   Dr. Pato Hernandez D.O.,  Dr. Dayday Trejo M.D.,  LOUISA Arellano.O.,          Effie DUGAN Lindsey  79 y.o.  female    SUBJECTIVE:  Patient resting in bed.  Remains nonverbal.  No acute distress.  Discussed with nursing.  Multiple watery/yellow bowel movements overnight.  No blood or melena.    OBJECTIVE:    BP (!) 161/88   Pulse 65   Temp 96.9 °F (36.1 °C) (Infrared)   Resp (!) 33   Ht 1.575 m (5' 2.01\")   Wt 57.7 kg (127 lb 3.3 oz)   SpO2 96%   BMI 23.26 kg/m²     General: Older adult  female.  NAD  HEENT: Anicteric sclera/moist oral mucosa  Neck: Trachea midline/no JVD.  Tracheostomy  Chest: Symmetric excursion/mechanically ventilated via tracheostomy  Cor: Regular/S1-S2  Abd.: Soft and obese.  PEG tube in place  Extr.:  Left lower extremity amputation.  Diffuse edema.  Decreased muscle tone and bulk throughout  Skin: Warm and dry/anicteric      DATA:    Monitor data reviewed -sinus rhythm noted.       Lab Results   Component Value Date/Time    WBC 5.0 07/12/2024 05:31 AM    RBC 2.84 07/12/2024 05:31 AM    HGB 7.8 07/13/2024 02:22 AM    HCT 25.0 07/13/2024 02:22 AM    MCV 88.7 07/12/2024 05:31 AM    MCH 26.8 07/12/2024 05:31 AM    MCHC 30.2 07/12/2024 05:31 AM    RDW 17.6 07/12/2024 05:31 AM     06/26/2024 05:29 AM    MPV 13.4 07/12/2024 05:31 AM     Lab Results   Component Value Date/Time     07/12/2024 03:45 PM    K 3.4 07/12/2024 03:45 PM     07/12/2024 03:45 PM    CO2 27 07/12/2024 03:45 PM    BUN 47 07/12/2024 03:45 PM    CREATININE 0.7 07/12/2024 03:45 PM    CALCIUM 8.5 07/12/2024 03:45 PM    BILITOT 0.2 07/09/2024 06:15 AM    ALKPHOS 107 07/09/2024 06:15 AM    AST 18 07/09/2024 06:15 AM    ALT 16 07/09/2024 06:15 AM     No results found for: \"LIPASE\"  No results found for: \"AMYLASE\"      ASSESSMENT/PLAN:  Patient Active Problem List   Diagnosis     Chronic respiratory failure with hypoxia (HCC)    Primary hypertension    History of stroke with residual deficit    History of DVT (deep vein thrombosis)    Coronary artery disease involving native coronary artery of native heart without angina pectoris    Ischemic cardiomyopathy    Subacute osteomyelitis of left foot (HCC)    Hypernatremia    Severe protein-energy malnutrition (HCC)    Hx of AKA (above knee amputation), left (HCC)    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  Anemia  -Acute on chronic  -Mildly microcytic  -Iron deficient  -Gastroccult negative on prior admission  -Monitor hemoglobin every 8 hours  -Keep PRBCs on hold  -Defer transfusion to admitting  -Continue twice daily PPI  -EGD 6/21/2024 by Dr. Smith with findings of unremarkable exam, PEG in place with no ulcer, no bleeding source identified  -Plan for colonoscopy today    2.  Comorbidites / goals of care  -Vent dependent via tracheostomy with PEG tube  -Poor quality of life  -Palliative medicine input appreciated  -Per admitting/pertinent consultants       Colonoscopy canceled.  Unable to obtain consent from family.  Can consider proceeding with colonoscopy today if consent is obtained.  Advised nursing to contact our service when family is contacted.  Can consider procedure Monday if consent does not obtained today.  Goals of care per admitting.    Neymar Kinsey, APRN - CNP  7/13/2024  5:09 AM    NOTE:

## 2024-07-13 NOTE — PROGRESS NOTES
[facility] Hospitalist   Progress Note    Admitting Date and Time: 7/9/2024  6:01 AM  Admit Dx: Dehydration [E86.0]  Hyperchloremia [E87.8]  Hypernatremia [E87.0]  Uremic encephalopathy [G93.49, N19]    Subjective:    Pt lying in bed in no acute distress. Remains on Ventilator support tolerating well. Pt opens eyes to name. Colonoscopy cancelled yesterday 2/2 lack of consent. Unable to reach son. Will continue to reach out for consent. Possible Colonoscopy today.      Per RN: Still unable to obtain consent for colonoscopy. Will continue to reach out. K+ 2.7. Hgb 6.8          potassium chloride  10 mEq IntraVENous Q1H    sodium chloride flush  5-40 mL IntraVENous 2 times per day    pantoprazole  40 mg IntraVENous BID    cefiderocol sulfate tosylate (FETROJA) 1,500 mg in sodium chloride 0.9 % 100 mL IVPB  1,500 mg IntraVENous Q8H    [Held by provider] apixaban  2.5 mg Per G Tube BID    arformoterol tartrate  15 mcg Nebulization BID    atorvastatin  10 mg PEG Tube Nightly    budesonide  500 mcg Nebulization Q12H    carvedilol  12.5 mg Oral BID WC    [Held by provider] clopidogrel  75 mg PEG Tube Daily    empagliflozin  10 mg PEG Tube Daily    ferrous Sulfate  300 mg Per G Tube Daily    midodrine  15 mg Oral TID WC    predniSONE  5 mg PEG Tube Daily    sodium chloride flush  5-40 mL IntraVENous 2 times per day    micafungin (MYCAMINE) 100 mg in sodium chloride 0.9 % 100 mL IVPB (mini-bag)  100 mg IntraVENous Daily    miconazole nitrate   Topical BID    miconazole   Topical BID     sodium chloride flush, 5-40 mL, PRN  sodium chloride, , PRN  glucose, 4 tablet, PRN  dextrose bolus, 125 mL, PRN   Or  dextrose bolus, 250 mL, PRN  glucagon (rDNA), 1 mg, PRN  dextrose, , Continuous PRN  ipratropium 0.5 mg-albuterol 2.5 mg, 1 Dose, Q4H PRN  sodium chloride flush, 5-40 mL, PRN  sodium chloride, , PRN  ondansetron, 4 mg, Q8H PRN   Or  ondansetron, 4 mg, Q6H PRN  polyethylene glycol, 17 g, Daily

## 2024-07-13 NOTE — PLAN OF CARE
Problem: Discharge Planning  Goal: Discharge to home or other facility with appropriate resources  Outcome: Progressing  Flowsheets (Taken 7/12/2024 1917)  Discharge to home or other facility with appropriate resources: Identify barriers to discharge with patient and caregiver     Problem: Pain  Goal: Verbalizes/displays adequate comfort level or baseline comfort level  Outcome: Progressing     Problem: Safety - Adult  Goal: Free from fall injury  Outcome: Progressing     Problem: Respiratory - Adult  Goal: Achieves optimal ventilation and oxygenation  Outcome: Progressing  Flowsheets (Taken 7/12/2024 1917)  Achieves optimal ventilation and oxygenation: Assess for changes in respiratory status     Problem: Cardiovascular - Adult  Goal: Maintains optimal cardiac output and hemodynamic stability  Outcome: Progressing  Flowsheets (Taken 7/12/2024 1917)  Maintains optimal cardiac output and hemodynamic stability: Monitor blood pressure and heart rate  Goal: Absence of cardiac dysrhythmias or at baseline  Recent Flowsheet Documentation  Taken 7/12/2024 1917 by Dee Saxena RN  Absence of cardiac dysrhythmias or at baseline: Monitor cardiac rate and rhythm

## 2024-07-13 NOTE — PROGRESS NOTES
Date:  07/13/24  Hospital Day:  Hospital Day: 5  PT Name:  Effie Portillo  MRN:   [id number]  Primary Care Physician: Sheba Sandoval MD  Requesting physician:   Kwame Enamorado MD  Reason for follow up: antibiotics/infection  Chief Complaint:   Chief Complaint   Patient presents with    Loss of Consciousness     Pt comes to the ED from WNR unresponsive except to painful stimuli. Pt is vent dependent and is sating between 85-88% on 15-20 liters which is not baseline.        Assessment  Effie Portillo is a 79 y.o. year old female who presented on 7/9/2024 and is being treated for Hypernatremia   Chief Complaint   Patient presents with    Loss of Consciousness     Pt comes to the ED from WNR unresponsive except to painful stimuli. Pt is vent dependent and is sating between 85-88% on 15-20 liters which is not baseline.       Id following for   Plan    Pt came in with hypernatremia a  Chronic respiratory failure   R/O SEPSIS/HCAP/FUNGEMIA  Hcap CRAB  Proteus bacteruria   DTI SACRAL AND  SKIN TEAR  H/O MDR A BAUMANNII  C AURIS EXPOSURE  Strict isolation   For scope tpday         Will stop micafungin (MYCAMINE)    Cont cefiderocol sulfate tosylate         Continue wound care  Encourage nutritional support  Continue to off load wound/pressure relief bed    Watch for cdiff colitis/clabsi-line infection  Monitor labs  Continue current therapy.  Please see orders for further management and care.    Subjective/HPI:  Effie was seen and examined at bedside today for sepsis    Overnight:  7/13 trach vent nad   7/12 trach vent open eyes for c scope   7/11 pt is not responsive trach vent   7/10 Pt is not awkae   There are no adverse drug reactions.  All tests were reviewed.   No  family present during my examination.    ROS: unable due to pt's status trach/vent        Objective  Most Recent Recorded Vitals  Vitals:    07/13/24 0937   BP: (!) 186/97   Pulse: 65   Resp: 25   Temp: 97.5 °F (36.4 °C)   SpO2: 95%      moxifloxacin >=8  Resistant      nitrofurantoin 128  Resistant      piperacillin-tazobactam <=4  Sensitive      tigecycline >=8  Resistant      trimethoprim-sulfamethoxazole >=320  Resistant                   [1]  Cefazolin sensitivity results can be used to predict the effectiveness of oral   cephalosporins (eg. Cephalexin) in uncomplicated Urinary Tract Infections due to E. coli, K.   pneumoniae, and P. mirabilis                     Culture, Blood 2 [[id number]] Collected: 07/09/24 0624    Order Status: Completed Specimen: Blood Updated: 07/13/24 0923     Specimen Description .BLOOD     Special Requests Site: Blood     Culture NO GROWTH 4 DAYS    Blood Culture 1 [[id number]] Collected: 07/09/24 0624    Order Status: Completed Specimen: Blood Updated: 07/13/24 0926     Specimen Description .BLOOD     Special Requests          Culture NO GROWTH 4 DAYS    Respiratory Panel, Molecular, with COVID-19 (Restricted: peds pts or suitable admitted adults) [[id number]] Collected: 07/09/24 0624    Order Status: Completed Specimen: Nasopharyngeal Swab Updated: 07/09/24 1021     Specimen Description .NASOPHARYNGEAL SWAB     Adenovirus PCR Not Detected     Coronavirus 229E PCR Not Detected     Coronavirus HKU1 PCR Not Detected     Coronavirus NL63 PCR Not Detected     Coronavirus OC43 PCR Not Detected     SARS-CoV-2, PCR Not Detected     Human Metapneumovirus PCR Not Detected     Rhino/Enterovirus PCR Not Detected     Influenza A by PCR Not Detected     Influenza B by PCR Not Detected     Parainfluenza 1 PCR Not Detected     Parainfluenza 2 PCR Not Detected     Parainfluenza 3 PCR Not Detected     Parainfluenza 4 PCR Not Detected     Resp Syncytial Virus PCR Not Detected     Bordetella parapertussis by PCR Not Detected     B Pertussis by PCR Not Detected     Chlamydia pneumoniae By PCR Not Detected     Mycoplasma pneumo by PCR Not Detected     Comment: Performed by multiplexed nucleic acid assay.             Recent Labs              Imaging and labs were reviewed per medical records and any ID pertinent labs were addressed with the patient.     The patient was educated about the diagnosis, prognosis, indications, risks and benefits of treatment.      Pt had the opportunity to ask questions.  All questions were answered.    Thank you for involving me in the care of Effie Portillo. Please do not hesitate to call for any questions or concerns.    Electronically signed by Allie Villegas MD on 7/13/2024 at 11:10 AM    Phone [phone]  Fax [phone]

## 2024-07-13 NOTE — PROGRESS NOTES
Assessment and Plan  Patient is a 79 y.o. 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       Plan of care:  Continue with mechanical ventilation.  Patient is currently on assist-control mechanical ventilation with a tidal volume of 375, rate 18, PEEP of 8, FiO2 70%.  Wean FiO2 as able for sats 88 to 94%  Trach care and suctioning as needed  Reconcile home medications  Patient was recently on multiple antimicrobial therapy.  Monitor culture results.  ID team is following.  Patient is currently on Fetroja and micafungin.  Patient's urine culture grew Proteus mirabilis which is multidrug-resistant and sputum is growing Acinetobacter which is likely colonization  Nephrology is managing hyponatremia and EVELIA.  DVT prophylaxis.  Patient is currently on Eliquis 2.5 mg twice daily and Plavix but they are on hold due to suspected GI bleeding.  GI is consulted.  Midodrine to help with hypotension.  Carvedilol 12.5 twice daily  Urine toxicology is negative.  Palliative care dietitian consult.  Goals of care were discussed.  Patient is currently DNR CCA.  CT abdomen and pelvis to rule out retroperitoneal bleeding.        History of Present Illness:   Patient is known to me from prior hospitalization in June 2024.  She is currently encephalopathic and unable to provide history.  Patient is a 79-year-old woman with above-mentioned medical problem who was admitted on July 9, 2024 with altered mental status.  Patient was found to be hyponatremic with positive UTI.  Goals of care  2024:  Patient had an uneventful night.  She remains on 70% FiO2.  She has no fever, chills, rigors.  Patient continues to be on Fetroja and micafungin.  Urine culture is growing Proteus mirabilis.  Patient is scheduled for colonoscopy .  Eliquis remains on hold.  Hemoglobin continues to trend down.  Hemoglobin dropped to 6.8 and will be repeated.  If it remains low patient will be transfused.  Patient may be started on subcu heparin for DVT prophylaxis if there is no evidence of bleeding.  CT abdomen and pelvis was ordered today.      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/2024    wound    Cerebral artery occlusion with cerebral infarction ([facility])     CHF (congestive heart failure) ([facility])     COPD (chronic obstructive pulmonary disease) ([facility])     COVID-19     Depression     ESBL (extended spectrum beta-lactamase) producing bacteria infection 01/15/2024    wound    GI (gastrointestinal bleed)     History of blood transfusion     Hx of blood clots     venous thrombosis    Hypertension     Infection due to extended spectrum beta-lactamase producing bacteria 09/12/2023    urine and blood    Ischemic cardiomyopathy     MI (myocardial infarction) ([facility])     Multiple drug resistant organism (MDRO) culture positive 01/17/2024    sputum    Pneumonia     Ventilator dependent ([facility])     VRE (vancomycin resistant enterococcus) culture positive 04/13/2024    urine      Past Surgical History:   Procedure Laterality Date    BRONCHOSCOPY N/A 6/20/2024    BRONCHOSCOPY performed by Ant Burgos MD at [facility] ENDOSCOPY    LOWER EXTREMITY AMPUTATION Left     aka of left leg    PEG W/TRACHEOSTOMY PLACEMENT      UPPER GASTROINTESTINAL ENDOSCOPY N/A 6/21/2024    ESOPHAGOGASTRODUODENOSCOPY performed by Jelani Smith MD at [facility] ENDOSCOPY       Family History:   History reviewed. No pertinent family history.    Allergies:        clinically significant deterioration, which requires the highest level of physician preparedness to intervene urgently.  I managed/supervised life or organ supporting interventions that required frequent physician assessment.  I devoted my full attention to the direct care of this patient for the amount of time indicated below.  Time I spent with family or surrogate(s) is included only if the patient was incapable of providing the necessary information or participating in medical decision-making.  Time devoted to teaching and to any procedures I billed separately is not included.  Critical Care Time: 32 minutes    .      Electronically signed by Martin Metz MD on 7/13/2024 at 12:36 PM

## 2024-07-13 NOTE — PROGRESS NOTES
Pharmacy Consultation Note  (Antibiotic Dosing and Monitoring)    Initial consult date: 7/9/24  Consulting physician/provider: Dr. Villegas  Drug: Cefiderocol    Patient's renal function has been stable since initial consult and remains at baseline. Pharmacy will sign off renally dose adjusting cefiderocol, please re-consult if needed.      Kate Banks, PharmD, BCPS 7/13/2024 12:39 PM   [phone]     [facility]: [phone]

## 2024-07-13 NOTE — PROGRESS NOTES
Department of Internal Medicine  Nephrology Attending Consult Note    Events reviewed.  Colonoscopy was canceled.    SUBJECTIVE: We are following Mrs. Portillo for hyponatremia.  Patient is nonverbal    PHYSICAL EXAM:      Vitals:    VITALS:  BP (!) 186/97   Pulse 65   Temp 97.5 °F (36.4 °C) (Oral)   Resp 25   Ht 1.575 m (5' 2.01\")   Wt 57.7 kg (127 lb 3.3 oz)   SpO2 95%   BMI 23.26 kg/m²   24HR INTAKE/OUTPUT:    Intake/Output Summary (Last 24 hours) at 7/13/2024 1520  Last data filed at 7/13/2024 0925  Gross per 24 hour   Intake 60 ml   Output 0 ml   Net 60 ml         Constitutional: Patient is lethargic  HEENT: Pupils are equal reactive  Respiratory: Tracheostomy in place  Cardiovascular/Edema: Heart sounds regular  Gastrointestinal: Abdomen soft, PEG in place  Neurologic: Overall nonfocal  Skin: No lesion  Other: No Edema    Scheduled Meds:   sodium chloride flush  5-40 mL IntraVENous 2 times per day    pantoprazole  40 mg IntraVENous BID    cefiderocol sulfate tosylate (FETROJA) 1,500 mg in sodium chloride 0.9 % 100 mL IVPB  1,500 mg IntraVENous Q8H    [Held by provider] apixaban  2.5 mg Per G Tube BID    arformoterol tartrate  15 mcg Nebulization BID    atorvastatin  10 mg PEG Tube Nightly    budesonide  500 mcg Nebulization Q12H    carvedilol  12.5 mg Oral BID WC    [Held by provider] clopidogrel  75 mg PEG Tube Daily    empagliflozin  10 mg PEG Tube Daily    ferrous Sulfate  300 mg Per G Tube Daily    midodrine  15 mg Oral TID WC    predniSONE  5 mg PEG Tube Daily    sodium chloride flush  5-40 mL IntraVENous 2 times per day    micafungin (MYCAMINE) 100 mg in sodium chloride 0.9 % 100 mL IVPB (mini-bag)  100 mg IntraVENous Daily    miconazole nitrate   Topical BID    miconazole   Topical BID     Continuous Infusions:   dextrose      dextrose 75 mL/hr at 07/13/24 1511    sodium chloride      sodium chloride 10 mL/hr at 07/11/24 0455     PRN Meds:.glucose, dextrose bolus **OR** dextrose bolus, glucagon

## 2024-07-14 ENCOUNTER — APPOINTMENT (OUTPATIENT)
Dept: GENERAL RADIOLOGY | Age: 79
DRG: 207 | End: 2024-07-14
Payer: MEDICARE

## 2024-07-14 LAB
ANION GAP SERPL CALCULATED.3IONS-SCNC: 8 MMOL/L (ref 7–16)
BUN SERPL-MCNC: 30 MG/DL (ref 6–23)
CALCIUM SERPL-MCNC: 8.8 MG/DL (ref 8.6–10.2)
CHLORIDE SERPL-SCNC: 104 MMOL/L (ref 98–107)
CO2 SERPL-SCNC: 25 MMOL/L (ref 22–29)
CREAT SERPL-MCNC: 0.6 MG/DL (ref 0.5–1)
GFR, ESTIMATED: >90 ML/MIN/1.73M2
GLUCOSE BLD-MCNC: 120 MG/DL (ref 74–99)
GLUCOSE BLD-MCNC: 125 MG/DL (ref 74–99)
GLUCOSE BLD-MCNC: 135 MG/DL (ref 74–99)
GLUCOSE BLD-MCNC: 97 MG/DL (ref 74–99)
GLUCOSE SERPL-MCNC: 107 MG/DL (ref 74–99)
HCT VFR BLD AUTO: 24.9 % (ref 34–48)
HGB BLD-MCNC: 7.8 G/DL (ref 11.5–15.5)
MICROORGANISM SPEC CULT: ABNORMAL
MICROORGANISM SPEC CULT: ABNORMAL
MICROORGANISM SPEC CULT: NORMAL
MICROORGANISM SPEC CULT: NORMAL
POTASSIUM SERPL-SCNC: 3.5 MMOL/L (ref 3.5–5)
SERVICE CMNT-IMP: NORMAL
SERVICE CMNT-IMP: NORMAL
SODIUM SERPL-SCNC: 137 MMOL/L (ref 132–146)
SPECIMEN DESCRIPTION: ABNORMAL
SPECIMEN DESCRIPTION: NORMAL
SPECIMEN DESCRIPTION: NORMAL

## 2024-07-14 PROCEDURE — 6360000002 HC RX W HCPCS: Performed by: INTERNAL MEDICINE

## 2024-07-14 PROCEDURE — 99291 CRITICAL CARE FIRST HOUR: CPT | Performed by: INTERNAL MEDICINE

## 2024-07-14 PROCEDURE — 6360000002 HC RX W HCPCS: Performed by: NURSE PRACTITIONER

## 2024-07-14 PROCEDURE — 94640 AIRWAY INHALATION TREATMENT: CPT

## 2024-07-14 PROCEDURE — 2580000003 HC RX 258: Performed by: NURSE PRACTITIONER

## 2024-07-14 PROCEDURE — 80048 BASIC METABOLIC PNL TOTAL CA: CPT

## 2024-07-14 PROCEDURE — 71045 X-RAY EXAM CHEST 1 VIEW: CPT

## 2024-07-14 PROCEDURE — 6370000000 HC RX 637 (ALT 250 FOR IP): Performed by: NURSE PRACTITIONER

## 2024-07-14 PROCEDURE — 2580000003 HC RX 258: Performed by: INTERNAL MEDICINE

## 2024-07-14 PROCEDURE — 85014 HEMATOCRIT: CPT

## 2024-07-14 PROCEDURE — 94003 VENT MGMT INPAT SUBQ DAY: CPT

## 2024-07-14 PROCEDURE — APPSS30 APP SPLIT SHARED TIME 16-30 MINUTES: Performed by: NURSE PRACTITIONER

## 2024-07-14 PROCEDURE — 99233 SBSQ HOSP IP/OBS HIGH 50: CPT | Performed by: INTERNAL MEDICINE

## 2024-07-14 PROCEDURE — 82962 GLUCOSE BLOOD TEST: CPT

## 2024-07-14 PROCEDURE — 85018 HEMOGLOBIN: CPT

## 2024-07-14 PROCEDURE — 6370000000 HC RX 637 (ALT 250 FOR IP): Performed by: INTERNAL MEDICINE

## 2024-07-14 PROCEDURE — 2060000000 HC ICU INTERMEDIATE R&B

## 2024-07-14 RX ORDER — HYDRALAZINE HYDROCHLORIDE 20 MG/ML
10 INJECTION INTRAMUSCULAR; INTRAVENOUS EVERY 6 HOURS PRN
Status: DISCONTINUED | OUTPATIENT
Start: 2024-07-14 | End: 2024-07-16

## 2024-07-14 RX ORDER — POLYETHYLENE GLYCOL 3350, SODIUM CHLORIDE, SODIUM BICARBONATE, POTASSIUM CHLORIDE 420; 11.2; 5.72; 1.48 G/4L; G/4L; G/4L; G/4L
4000 POWDER, FOR SOLUTION ORAL ONCE
Status: DISCONTINUED | OUTPATIENT
Start: 2024-07-14 | End: 2024-07-14 | Stop reason: CLARIF

## 2024-07-14 RX ORDER — FUROSEMIDE 10 MG/ML
20 INJECTION INTRAMUSCULAR; INTRAVENOUS 2 TIMES DAILY
Status: DISCONTINUED | OUTPATIENT
Start: 2024-07-14 | End: 2024-07-19 | Stop reason: HOSPADM

## 2024-07-14 RX ADMIN — ACETAMINOPHEN 650 MG: 325 TABLET ORAL at 00:07

## 2024-07-14 RX ADMIN — POLYETHYLENE GLYCOL-3350 AND ELECTROLYTES 4000 ML: 236; 6.74; 5.86; 2.97; 22.74 POWDER, FOR SOLUTION ORAL at 17:14

## 2024-07-14 RX ADMIN — HYDRALAZINE HYDROCHLORIDE 10 MG: 20 INJECTION, SOLUTION INTRAMUSCULAR; INTRAVENOUS at 08:53

## 2024-07-14 RX ADMIN — MICONAZOLE NITRATE: 2 OINTMENT TOPICAL at 08:09

## 2024-07-14 RX ADMIN — PREDNISONE 5 MG: 5 TABLET ORAL at 08:10

## 2024-07-14 RX ADMIN — IPRATROPIUM BROMIDE AND ALBUTEROL SULFATE 1 DOSE: 2.5; .5 SOLUTION RESPIRATORY (INHALATION) at 01:26

## 2024-07-14 RX ADMIN — ANTI-FUNGAL POWDER MICONAZOLE NITRATE TALC FREE: 1.42 POWDER TOPICAL at 08:09

## 2024-07-14 RX ADMIN — MICONAZOLE NITRATE: 2 OINTMENT TOPICAL at 21:58

## 2024-07-14 RX ADMIN — PANTOPRAZOLE SODIUM 40 MG: 40 INJECTION, POWDER, FOR SOLUTION INTRAVENOUS at 08:10

## 2024-07-14 RX ADMIN — ANTI-FUNGAL POWDER MICONAZOLE NITRATE TALC FREE: 1.42 POWDER TOPICAL at 00:04

## 2024-07-14 RX ADMIN — CARVEDILOL 12.5 MG: 6.25 TABLET, FILM COATED ORAL at 08:10

## 2024-07-14 RX ADMIN — MICONAZOLE NITRATE: 2 OINTMENT TOPICAL at 00:04

## 2024-07-14 RX ADMIN — IPRATROPIUM BROMIDE AND ALBUTEROL SULFATE 1 DOSE: 2.5; .5 SOLUTION RESPIRATORY (INHALATION) at 22:30

## 2024-07-14 RX ADMIN — CEFIDEROCOL SULFATE TOSYLATE 1500 MG: 1 INJECTION, POWDER, FOR SOLUTION INTRAVENOUS at 08:39

## 2024-07-14 RX ADMIN — HYDRALAZINE HYDROCHLORIDE 10 MG: 20 INJECTION, SOLUTION INTRAMUSCULAR; INTRAVENOUS at 15:40

## 2024-07-14 RX ADMIN — ATORVASTATIN CALCIUM 10 MG: 10 TABLET, FILM COATED ORAL at 21:57

## 2024-07-14 RX ADMIN — Medication 10 ML: at 21:58

## 2024-07-14 RX ADMIN — Medication 300 MG: at 08:09

## 2024-07-14 RX ADMIN — IPRATROPIUM BROMIDE AND ALBUTEROL SULFATE 1 DOSE: 2.5; .5 SOLUTION RESPIRATORY (INHALATION) at 10:00

## 2024-07-14 RX ADMIN — Medication 10 ML: at 00:03

## 2024-07-14 RX ADMIN — POTASSIUM BICARBONATE 20 MEQ: 782 TABLET, EFFERVESCENT ORAL at 12:23

## 2024-07-14 RX ADMIN — ANTI-FUNGAL POWDER MICONAZOLE NITRATE TALC FREE: 1.42 POWDER TOPICAL at 21:59

## 2024-07-14 RX ADMIN — BUDESONIDE INHALATION 500 MCG: 0.5 SUSPENSION RESPIRATORY (INHALATION) at 06:53

## 2024-07-14 RX ADMIN — CEFIDEROCOL SULFATE TOSYLATE 1500 MG: 1 INJECTION, POWDER, FOR SOLUTION INTRAVENOUS at 01:01

## 2024-07-14 RX ADMIN — Medication 10 ML: at 08:10

## 2024-07-14 RX ADMIN — FUROSEMIDE 20 MG: 10 INJECTION, SOLUTION INTRAMUSCULAR; INTRAVENOUS at 17:15

## 2024-07-14 RX ADMIN — ARFORMOTEROL TARTRATE 15 MCG: 15 SOLUTION RESPIRATORY (INHALATION) at 06:53

## 2024-07-14 RX ADMIN — ATORVASTATIN CALCIUM 10 MG: 10 TABLET, FILM COATED ORAL at 00:01

## 2024-07-14 RX ADMIN — EMPAGLIFLOZIN 10 MG: 10 TABLET, FILM COATED ORAL at 08:11

## 2024-07-14 RX ADMIN — BUDESONIDE INHALATION 500 MCG: 0.5 SUSPENSION RESPIRATORY (INHALATION) at 19:06

## 2024-07-14 RX ADMIN — POTASSIUM BICARBONATE 20 MEQ: 782 TABLET, EFFERVESCENT ORAL at 21:57

## 2024-07-14 RX ADMIN — IPRATROPIUM BROMIDE AND ALBUTEROL SULFATE 1 DOSE: 2.5; .5 SOLUTION RESPIRATORY (INHALATION) at 19:06

## 2024-07-14 RX ADMIN — ARFORMOTEROL TARTRATE 15 MCG: 15 SOLUTION RESPIRATORY (INHALATION) at 19:06

## 2024-07-14 RX ADMIN — SODIUM CHLORIDE, PRESERVATIVE FREE 20 ML: 5 INJECTION INTRAVENOUS at 00:03

## 2024-07-14 RX ADMIN — PANTOPRAZOLE SODIUM 40 MG: 40 INJECTION, POWDER, FOR SOLUTION INTRAVENOUS at 21:58

## 2024-07-14 RX ADMIN — IPRATROPIUM BROMIDE AND ALBUTEROL SULFATE 1 DOSE: 2.5; .5 SOLUTION RESPIRATORY (INHALATION) at 06:53

## 2024-07-14 RX ADMIN — CEFIDEROCOL SULFATE TOSYLATE 1500 MG: 1 INJECTION, POWDER, FOR SOLUTION INTRAVENOUS at 17:12

## 2024-07-14 RX ADMIN — CARVEDILOL 12.5 MG: 6.25 TABLET, FILM COATED ORAL at 17:15

## 2024-07-14 RX ADMIN — FUROSEMIDE 20 MG: 10 INJECTION, SOLUTION INTRAMUSCULAR; INTRAVENOUS at 12:23

## 2024-07-14 RX ADMIN — PANTOPRAZOLE SODIUM 40 MG: 40 INJECTION, POWDER, FOR SOLUTION INTRAVENOUS at 00:01

## 2024-07-14 RX ADMIN — IPRATROPIUM BROMIDE AND ALBUTEROL SULFATE 1 DOSE: 2.5; .5 SOLUTION RESPIRATORY (INHALATION) at 14:18

## 2024-07-14 ASSESSMENT — PULMONARY FUNCTION TESTS
PIF_VALUE: 37
PIF_VALUE: 37
PIF_VALUE: 28
PIF_VALUE: 28
PIF_VALUE: 30
PIF_VALUE: 35

## 2024-07-14 ASSESSMENT — PAIN DESCRIPTION - LOCATION: LOCATION: ABDOMEN

## 2024-07-14 ASSESSMENT — PAIN SCALES - GENERAL
PAINLEVEL_OUTOF10: 0

## 2024-07-14 ASSESSMENT — PAIN SCALES - WONG BAKER: WONGBAKER_NUMERICALRESPONSE: NO HURT

## 2024-07-14 NOTE — PROGRESS NOTES
PROGRESS NOTE  By Eren Kinsey, ONIP    [facility]  Dr. Rosemary Hartman M.D.,  Dr. Phil Benitez M.D.,   Dr. Pato Hernandez D.O.,  Dr. Dayday Trejo M.D.,  OTTO ArellanoO.,          Effie DUGAN Lindsey  79 y.o.  female    SUBJECTIVE:  Patient resting in bed.  Remains on mechanical ventilation.  Stools remain watery.  No blood or melena.    OBJECTIVE:    BP (!) 169/89   Pulse 71   Temp 97.5 °F (36.4 °C) (Infrared)   Resp (!) 85   Ht 1.575 m (5' 2.01\")   Wt 57.7 kg (127 lb 3.3 oz)   SpO2 96%   BMI 23.26 kg/m²     General: Older adult  female.  NAD  HEENT: Anicteric sclera/moist oral mucosa  Neck: Trachea midline/no JVD.  Tracheostomy  Chest: Symmetric excursion/mechanically ventilated via tracheostomy  Cor: Regular/S1-S2  Abd.: Soft and obese.  PEG tube in place  Extr.:  Left lower extremity amputation.  Diffuse edema.  Decreased muscle tone and bulk throughout  Skin: Warm and dry/anicteric      DATA:    Monitor data reviewed -sinus rhythm noted.       Lab Results   Component Value Date/Time    WBC 5.0 07/12/2024 05:31 AM    RBC 2.84 07/12/2024 05:31 AM    HGB 7.8 07/14/2024 05:31 AM    HCT 24.9 07/14/2024 05:31 AM    MCV 88.7 07/12/2024 05:31 AM    MCH 26.8 07/12/2024 05:31 AM    MCHC 30.2 07/12/2024 05:31 AM    RDW 17.6 07/12/2024 05:31 AM     06/26/2024 05:29 AM    MPV 13.4 07/12/2024 05:31 AM     Lab Results   Component Value Date/Time     07/14/2024 05:31 AM    K 3.5 07/14/2024 05:31 AM     07/14/2024 05:31 AM    CO2 25 07/14/2024 05:31 AM    BUN 30 07/14/2024 05:31 AM    CREATININE 0.6 07/14/2024 05:31 AM    CALCIUM 8.8 07/14/2024 05:31 AM    BILITOT 0.2 07/09/2024 06:15 AM    ALKPHOS 107 07/09/2024 06:15 AM    AST 18 07/09/2024 06:15 AM    ALT 16 07/09/2024 06:15 AM     No results found for: \"LIPASE\"  No results found for: \"AMYLASE\"      ASSESSMENT/PLAN:  Patient Active Problem List   Diagnosis    Chronic respiratory failure with hypoxia (HCC)    Primary

## 2024-07-14 NOTE — PROGRESS NOTES
Date:  07/14/24  Hospital Day:  Hospital Day: 6  PT Name:  Effie Portillo  MRN:   [id number]  Primary Care Physician: Sheba Sandoval MD  Requesting physician:   Kwame Enamorado MD  Reason for follow up: antibiotics/infection  Chief Complaint:   Chief Complaint   Patient presents with    Loss of Consciousness     Pt comes to the ED from WNR unresponsive except to painful stimuli. Pt is vent dependent and is sating between 85-88% on 15-20 liters which is not baseline.        Assessment  Effie Portillo is a 79 y.o. year old female who presented on 7/9/2024 and is being treated for Hypernatremia   Chief Complaint   Patient presents with    Loss of Consciousness     Pt comes to the ED from WNR unresponsive except to painful stimuli. Pt is vent dependent and is sating between 85-88% on 15-20 liters which is not baseline.       Id following for   Plan    Pt came in with hypernatremia   Chronic respiratory failure   R/O SEPSIS/HCAP/FUNGEMIA  Hcap CRAB/gpc  Asked mircro for more work up   Proteus bacteruria   DTI SACRAL AND  SKIN TEAR  H/O MDR A BAUMANNII  C AURIS EXPOSURE  Strict isolation   For scope          Off micafungin (MYCAMINE)    Cont cefiderocol sulfate tosylate         Continue wound care  Encourage nutritional support  Continue to off load wound/pressure relief bed    Watch for cdiff colitis/clabsi-line infection  Monitor labs  Continue current therapy.  Please see orders for further management and care.    Subjective/HPI:  Effie was seen and examined at bedside today for sepsis    Overnight:  7/14 trach vent flushed no rash tachypnea spoke with charge nurse   7/13 trach vent nad   7/12 trach vent open eyes for c scope   7/11 pt is not responsive trach vent   7/10 Pt is not awkae   There are no adverse drug reactions.  All tests were reviewed.   No  family present during my examination.    ROS: unable due to pt's status trach/vent        Objective  Most Recent Recorded Vitals  Vitals:     Detected     Parainfluenza 2 PCR Not Detected     Parainfluenza 3 PCR Not Detected     Parainfluenza 4 PCR Not Detected     Resp Syncytial Virus PCR Not Detected     Bordetella parapertussis by PCR Not Detected     B Pertussis by PCR Not Detected     Chlamydia pneumoniae By PCR Not Detected     Mycoplasma pneumo by PCR Not Detected     Comment: Performed by multiplexed nucleic acid assay.             No results for input(s): \"SEDRATE\", \"CRP\", \"PROCAL\", \"FERRITIN\", \"LDH\", \"TROPONINI\", \"DDIMER\", \"FIBRINOGEN\", \"INR\", \"PROTIME\", \"AST\", \"ALT\" in the last 72 hours.    No results found for: \"CHOL\", \"TRIG\", \"HDL\"  Lab Results   Component Value Date/Time    VITD25 22.5 (L) 06/26/2024 05:29 AM     Recent Labs     07/12/24  0531 07/12/24  0605 07/13/24  1300 07/13/24  2237 07/14/24  0531   WBC 5.0  --   --   --   --    HGB 7.6*   < > 7.4* 7.8* 7.8*   HCT 25.2*   < > 24.2* 25.2* 24.9*   MCV 88.7  --   --   --   --    MCH 26.8  --   --   --   --    MCHC 30.2*  --   --   --   --    RDW 17.6*  --   --   --   --    NRBC 2  --   --   --   --    LYMPHOPCT 7*  --   --   --   --    MONOPCT 7  --   --   --   --    EOSPCT 0  --   --   --   --    BASOPCT 0  --   --   --   --    MONOSABS 0.35  --   --   --   --    LYMPHSABS 0.35*  --   --   --   --    EOSABS 0.00*  --   --   --   --    BASOSABS 0.00  --   --   --   --     < > = values in this interval not displayed.       Recent Labs     07/12/24  1545 07/13/24  0617 07/13/24  1500 07/14/24  0531    134  --  137   K 3.4* 2.7*   < > 3.5    97*  --  104   CO2 27 26  --  25   BUN 47* 38*  --  30*   CREATININE 0.7 0.7  --  0.6   LABGLOM 88 89  --  >90   GLUCOSE 72* 385*  --  107*   CALCIUM 8.5* 8.1*  --  8.8    < > = values in this interval not displayed.       U/A:    Lab Results   Component Value Date/Time    COLORU Yellow 07/09/2024 06:24 AM    PROTEINU 30 07/09/2024 06:24 AM    PHUR 8.5 07/09/2024 06:24 AM    PHUR 5.5 09/10/2023 06:09 PM    WBCUA 6 TO 9 07/09/2024 06:24 AM     RBCUA 0 TO 2 07/09/2024 06:24 AM    MUCUS PRESENT 07/09/2024 06:24 AM    YEAST PRESENT 07/09/2024 06:24 AM    BACTERIA 1+ 07/09/2024 06:24 AM    LEUKOCYTESUR MODERATE 07/09/2024 06:24 AM    UROBILINOGEN 0.2 07/09/2024 06:24 AM    BILIRUBINUR NEGATIVE 07/09/2024 06:24 AM    GLUCOSEU 100 07/09/2024 06:24 AM         XR CHEST PORTABLE    Result Date: 7/9/2024  Mild, patchy bilateral perihilar and interstitial infiltrates suggesting mild pulmonary edema, mildly increased from prior examination.         Imaging and labs were reviewed per medical records and any ID pertinent labs were addressed with the patient.     The patient was educated about the diagnosis, prognosis, indications, risks and benefits of treatment.      Pt had the opportunity to ask questions.  All questions were answered.    Thank you for involving me in the care of Effie Portillo. Please do not hesitate to call for any questions or concerns.    Electronically signed by Allie Villegas MD on 7/14/2024 at 10:23 AM    Phone [phone]  Fax [phone]

## 2024-07-14 NOTE — PLAN OF CARE
Problem: Discharge Planning  Goal: Discharge to home or other facility with appropriate resources  7/14/2024 1903 by Keeley Enamorado, RN  Outcome: Progressing  7/14/2024 0622 by Dee Saxena RN  Outcome: Progressing  Flowsheets (Taken 7/13/2024 1945)  Discharge to home or other facility with appropriate resources: Identify barriers to discharge with patient and caregiver     Problem: Pain  Goal: Verbalizes/displays adequate comfort level or baseline comfort level  7/14/2024 1903 by Keeley Enamorado, RN  Outcome: Progressing  7/14/2024 0622 by Dee Saxena, RN  Outcome: Progressing     Problem: Safety - Adult  Goal: Free from fall injury  7/14/2024 1903 by Keeley Enamorado, RN  Outcome: Progressing  7/14/2024 0622 by Dee Saxena, RN  Outcome: Progressing

## 2024-07-14 NOTE — PROGRESS NOTES
[facility] Hospitalist   Progress Note    Admitting Date and Time: 7/9/2024  6:01 AM  Admit Dx: Dehydration [E86.0]  Hyperchloremia [E87.8]  Hypernatremia [E87.0]  Uremic encephalopathy [G93.49, N19]    Subjective:    Pt more alert this morning. She is able to follow commands. Tachypnea. Loose brown stool . Cleansed with RN. For prep today. And Colonoscopy in am. Noted Respiratory distress this morning . Tachypnea. CXR ordered.      Per RN: Consent obtained Colonoscopy Monday. Prep to be initiated. BP elevated.        polyethylene glycol-electrolytes  4,000 mL Per G Tube Once    sodium chloride flush  5-40 mL IntraVENous 2 times per day    pantoprazole  40 mg IntraVENous BID    cefiderocol sulfate tosylate (FETROJA) 1,500 mg in sodium chloride 0.9 % 100 mL IVPB  1,500 mg IntraVENous Q8H    [Held by provider] apixaban  2.5 mg Per G Tube BID    arformoterol tartrate  15 mcg Nebulization BID    atorvastatin  10 mg PEG Tube Nightly    budesonide  500 mcg Nebulization Q12H    carvedilol  12.5 mg Oral BID WC    [Held by provider] clopidogrel  75 mg PEG Tube Daily    empagliflozin  10 mg PEG Tube Daily    ferrous Sulfate  300 mg Per G Tube Daily    midodrine  15 mg Oral TID WC    predniSONE  5 mg PEG Tube Daily    sodium chloride flush  5-40 mL IntraVENous 2 times per day    miconazole nitrate   Topical BID    miconazole   Topical BID     glucose, 4 tablet, PRN  dextrose bolus, 125 mL, PRN   Or  dextrose bolus, 250 mL, PRN  glucagon (rDNA), 1 mg, PRN  dextrose, , Continuous PRN  sodium chloride flush, 5-40 mL, PRN  sodium chloride, , PRN  ipratropium 0.5 mg-albuterol 2.5 mg, 1 Dose, Q4H PRN  sodium chloride flush, 5-40 mL, PRN  sodium chloride, , PRN  ondansetron, 4 mg, Q8H PRN   Or  ondansetron, 4 mg, Q6H PRN  polyethylene glycol, 17 g, Daily PRN  acetaminophen, 650 mg, Q6H PRN   Or  acetaminophen, 650 mg, Q6H PRN  bisacodyl, 10 mg, Daily PRN  white petrolatum, , BID PRN         Objective:    BP (!)

## 2024-07-14 NOTE — PROGRESS NOTES
Assessment and Plan  Patient is a 79 y.o. 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  Mild acute pulmonary edema.  Metabolic encephalopathy.       Plan of care:  Continue with mechanical ventilation.  Patient is currently on assist-control mechanical ventilation with a tidal volume of 375, rate 18, PEEP of 8, FiO2 70%.  Wean FiO2 as able for sats 88 to 94%  Trach care and suctioning as needed  Reconcile home medications  Patient was recently on multiple antimicrobial therapy.  Monitor culture results.  ID team is following.  Patient is currently on Fetroja but off micafungin.  Patient's urine culture grew Proteus mirabilis which is multidrug-resistant and sputum is growing Acinetobacter which is likely colonization  Nephrology is managing hyponatremia and EVELIA.  DVT prophylaxis.  Patient is currently on Eliquis 2.5 mg twice daily and Plavix but they are on hold due to suspected GI bleeding.  GI is consulted.  Midodrine to help with hypotension.  Carvedilol 12.5 twice daily  Urine toxicology is negative.  Palliative care dietitian consult.  Goals of care were discussed.  Patient is currently DNR CCA.  CT abdomen and pelvis ruled out retroperitoneal bleeding.  Images were personally reviewed and independently interpreted.  Patient does have bilateral pleural effusions.  Lasix 20 mg IV twice a day with repleted potassium.        History of Present Illness:   Patient is known to me from prior hospitalization in June 2024.  She is currently encephalopathic and unable to provide  radiological imaging and cardiac work up were personally reviewed and independently interpreted.    STAFF PHYSICIAN NOTE OF PERSONAL INVOLVEMENT IN CARE  As the attending physician, I certify that I personally reviewed the patient's history and personally examined the patient to confirm the physical findings described above, and that I reviewed the relevant imaging studies and available reports.  I also discussed the differential diagnosis and all of the proposed management plans with the patient and individuals accompanying the patient to this visit.  They had the opportunity to ask questions about the proposed management plans and to have those questions answered.    This patient has a high probability of sudden, clinically significant deterioration, which requires the highest level of physician preparedness to intervene urgently.  I managed/supervised life or organ supporting interventions that required frequent physician assessment.  I devoted my full attention to the direct care of this patient for the amount of time indicated below.  Time I spent with family or surrogate(s) is included only if the patient was incapable of providing the necessary information or participating in medical decision-making.  Time devoted to teaching and to any procedures I billed separately is not included.  Critical Care Time: 31 minutes    .      Electronically signed by Martin Metz MD on 7/14/2024 at 11:59 AM

## 2024-07-14 NOTE — PLAN OF CARE
Problem: Discharge Planning  Goal: Discharge to home or other facility with appropriate resources  Outcome: Progressing  Flowsheets (Taken 7/13/2024 1945)  Discharge to home or other facility with appropriate resources: Identify barriers to discharge with patient and caregiver     Problem: Pain  Goal: Verbalizes/displays adequate comfort level or baseline comfort level  Outcome: Progressing     Problem: Safety - Adult  Goal: Free from fall injury  Outcome: Progressing     Problem: Respiratory - Adult  Goal: Achieves optimal ventilation and oxygenation  Recent Flowsheet Documentation  Taken 7/13/2024 1945 by Dee Saxena RN  Achieves optimal ventilation and oxygenation: Assess for changes in respiratory status     Problem: Cardiovascular - Adult  Goal: Maintains optimal cardiac output and hemodynamic stability  Recent Flowsheet Documentation  Taken 7/13/2024 1945 by Dee Saxena RN  Maintains optimal cardiac output and hemodynamic stability: Monitor blood pressure and heart rate  Goal: Absence of cardiac dysrhythmias or at baseline  Recent Flowsheet Documentation  Taken 7/13/2024 1945 by Dee Saxena RN  Absence of cardiac dysrhythmias or at baseline: Monitor cardiac rate and rhythm

## 2024-07-15 PROBLEM — I50.20 HFREF (HEART FAILURE WITH REDUCED EJECTION FRACTION) (HCC): Status: ACTIVE | Noted: 2024-07-15

## 2024-07-15 PROBLEM — G93.49 UREMIC ENCEPHALOPATHY: Status: ACTIVE | Noted: 2024-06-21

## 2024-07-15 PROBLEM — N19 UREMIC ENCEPHALOPATHY: Status: ACTIVE | Noted: 2024-06-21

## 2024-07-15 LAB
ANION GAP SERPL CALCULATED.3IONS-SCNC: 9 MMOL/L (ref 7–16)
BASOPHILS # BLD: 0 K/UL (ref 0–0.2)
BASOPHILS NFR BLD: 0 % (ref 0–2)
BUN SERPL-MCNC: 27 MG/DL (ref 6–23)
CALCIUM SERPL-MCNC: 8.7 MG/DL (ref 8.6–10.2)
CHLORIDE SERPL-SCNC: 108 MMOL/L (ref 98–107)
CO2 SERPL-SCNC: 26 MMOL/L (ref 22–29)
CREAT SERPL-MCNC: 0.6 MG/DL (ref 0.5–1)
EOSINOPHIL # BLD: 0 K/UL (ref 0.05–0.5)
EOSINOPHILS RELATIVE PERCENT: 0 % (ref 0–6)
ERYTHROCYTE [DISTWIDTH] IN BLOOD BY AUTOMATED COUNT: 20.3 % (ref 11.5–15)
GFR, ESTIMATED: 90 ML/MIN/1.73M2
GLUCOSE BLD-MCNC: 102 MG/DL (ref 74–99)
GLUCOSE BLD-MCNC: 141 MG/DL (ref 74–99)
GLUCOSE BLD-MCNC: 92 MG/DL (ref 74–99)
GLUCOSE BLD-MCNC: 97 MG/DL (ref 74–99)
GLUCOSE SERPL-MCNC: 89 MG/DL (ref 74–99)
HCT VFR BLD AUTO: 25.1 % (ref 34–48)
HCT VFR BLD AUTO: 27.7 % (ref 34–48)
HGB BLD-MCNC: 7.8 G/DL (ref 11.5–15.5)
HGB BLD-MCNC: 8.6 G/DL (ref 11.5–15.5)
LYMPHOCYTES NFR BLD: 1.42 K/UL (ref 1.5–4)
LYMPHOCYTES RELATIVE PERCENT: 6 % (ref 20–42)
MCH RBC QN AUTO: 26.9 PG (ref 26–35)
MCHC RBC AUTO-ENTMCNC: 31 G/DL (ref 32–34.5)
MCV RBC AUTO: 86.6 FL (ref 80–99.9)
METAMYELOCYTES ABSOLUTE COUNT: 0.24 K/UL (ref 0–0.12)
METAMYELOCYTES: 1 % (ref 0–1)
MICROORGANISM SPEC CULT: ABNORMAL
MICROORGANISM SPEC CULT: ABNORMAL
MICROORGANISM/AGENT SPEC: ABNORMAL
MONOCYTES NFR BLD: 0.94 K/UL (ref 0.1–0.95)
MONOCYTES NFR BLD: 4 % (ref 2–12)
MYELOCYTES ABSOLUTE COUNT: 0.71 K/UL
MYELOCYTES: 3 %
NEUTROPHILS NFR BLD: 86 % (ref 43–80)
NEUTS SEG NFR BLD: 20.3 K/UL (ref 1.8–7.3)
PLATELET # BLD AUTO: 606 K/UL (ref 130–450)
PMV BLD AUTO: 11.7 FL (ref 7–12)
POTASSIUM SERPL-SCNC: 3.7 MMOL/L (ref 3.5–5)
RBC # BLD AUTO: 3.2 M/UL (ref 3.5–5.5)
RBC # BLD: ABNORMAL 10*6/UL
SODIUM SERPL-SCNC: 143 MMOL/L (ref 132–146)
SPECIMEN DESCRIPTION: ABNORMAL
WBC OTHER # BLD: 23.6 K/UL (ref 4.5–11.5)

## 2024-07-15 PROCEDURE — APPSS30 APP SPLIT SHARED TIME 16-30 MINUTES: Performed by: NURSE PRACTITIONER

## 2024-07-15 PROCEDURE — 2580000003 HC RX 258: Performed by: INTERNAL MEDICINE

## 2024-07-15 PROCEDURE — 6360000002 HC RX W HCPCS: Performed by: INTERNAL MEDICINE

## 2024-07-15 PROCEDURE — 6370000000 HC RX 637 (ALT 250 FOR IP): Performed by: INTERNAL MEDICINE

## 2024-07-15 PROCEDURE — 6360000002 HC RX W HCPCS: Performed by: NURSE PRACTITIONER

## 2024-07-15 PROCEDURE — 36415 COLL VENOUS BLD VENIPUNCTURE: CPT

## 2024-07-15 PROCEDURE — 99233 SBSQ HOSP IP/OBS HIGH 50: CPT | Performed by: INTERNAL MEDICINE

## 2024-07-15 PROCEDURE — 2580000003 HC RX 258: Performed by: NURSE PRACTITIONER

## 2024-07-15 PROCEDURE — 85018 HEMOGLOBIN: CPT

## 2024-07-15 PROCEDURE — 6370000000 HC RX 637 (ALT 250 FOR IP): Performed by: NURSE PRACTITIONER

## 2024-07-15 PROCEDURE — 85025 COMPLETE CBC W/AUTO DIFF WBC: CPT

## 2024-07-15 PROCEDURE — 94640 AIRWAY INHALATION TREATMENT: CPT

## 2024-07-15 PROCEDURE — 94003 VENT MGMT INPAT SUBQ DAY: CPT

## 2024-07-15 PROCEDURE — 80048 BASIC METABOLIC PNL TOTAL CA: CPT

## 2024-07-15 PROCEDURE — 2060000000 HC ICU INTERMEDIATE R&B

## 2024-07-15 PROCEDURE — 82962 GLUCOSE BLOOD TEST: CPT

## 2024-07-15 PROCEDURE — 85014 HEMATOCRIT: CPT

## 2024-07-15 PROCEDURE — 36592 COLLECT BLOOD FROM PICC: CPT

## 2024-07-15 PROCEDURE — 31502 CHANGE OF WINDPIPE AIRWAY: CPT

## 2024-07-15 RX ADMIN — BUDESONIDE INHALATION 500 MCG: 0.5 SUSPENSION RESPIRATORY (INHALATION) at 18:29

## 2024-07-15 RX ADMIN — FUROSEMIDE 20 MG: 10 INJECTION, SOLUTION INTRAMUSCULAR; INTRAVENOUS at 08:12

## 2024-07-15 RX ADMIN — CEFIDEROCOL SULFATE TOSYLATE 1500 MG: 1 INJECTION, POWDER, FOR SOLUTION INTRAVENOUS at 19:50

## 2024-07-15 RX ADMIN — Medication 300 MG: at 10:25

## 2024-07-15 RX ADMIN — IPRATROPIUM BROMIDE AND ALBUTEROL SULFATE 1 DOSE: 2.5; .5 SOLUTION RESPIRATORY (INHALATION) at 12:51

## 2024-07-15 RX ADMIN — Medication 10 ML: at 21:09

## 2024-07-15 RX ADMIN — PETROLATUM: 420 OINTMENT TOPICAL at 08:13

## 2024-07-15 RX ADMIN — ANTI-FUNGAL POWDER MICONAZOLE NITRATE TALC FREE: 1.42 POWDER TOPICAL at 21:17

## 2024-07-15 RX ADMIN — IPRATROPIUM BROMIDE AND ALBUTEROL SULFATE 1 DOSE: 2.5; .5 SOLUTION RESPIRATORY (INHALATION) at 09:10

## 2024-07-15 RX ADMIN — PANTOPRAZOLE SODIUM 40 MG: 40 INJECTION, POWDER, FOR SOLUTION INTRAVENOUS at 08:12

## 2024-07-15 RX ADMIN — ATORVASTATIN CALCIUM 10 MG: 10 TABLET, FILM COATED ORAL at 21:09

## 2024-07-15 RX ADMIN — MICONAZOLE NITRATE: 2 OINTMENT TOPICAL at 08:14

## 2024-07-15 RX ADMIN — HYDRALAZINE HYDROCHLORIDE 10 MG: 20 INJECTION, SOLUTION INTRAMUSCULAR; INTRAVENOUS at 07:27

## 2024-07-15 RX ADMIN — PANTOPRAZOLE SODIUM 40 MG: 40 INJECTION, POWDER, FOR SOLUTION INTRAVENOUS at 21:09

## 2024-07-15 RX ADMIN — EMPAGLIFLOZIN 10 MG: 10 TABLET, FILM COATED ORAL at 08:12

## 2024-07-15 RX ADMIN — CEFIDEROCOL SULFATE TOSYLATE 1500 MG: 1 INJECTION, POWDER, FOR SOLUTION INTRAVENOUS at 01:15

## 2024-07-15 RX ADMIN — BUDESONIDE INHALATION 500 MCG: 0.5 SUSPENSION RESPIRATORY (INHALATION) at 05:25

## 2024-07-15 RX ADMIN — CEFIDEROCOL SULFATE TOSYLATE 1500 MG: 1 INJECTION, POWDER, FOR SOLUTION INTRAVENOUS at 11:39

## 2024-07-15 RX ADMIN — CARVEDILOL 12.5 MG: 6.25 TABLET, FILM COATED ORAL at 08:14

## 2024-07-15 RX ADMIN — POTASSIUM BICARBONATE 20 MEQ: 782 TABLET, EFFERVESCENT ORAL at 21:09

## 2024-07-15 RX ADMIN — ANTI-FUNGAL POWDER MICONAZOLE NITRATE TALC FREE: 1.42 POWDER TOPICAL at 08:13

## 2024-07-15 RX ADMIN — POTASSIUM BICARBONATE 20 MEQ: 782 TABLET, EFFERVESCENT ORAL at 08:12

## 2024-07-15 RX ADMIN — CARVEDILOL 12.5 MG: 6.25 TABLET, FILM COATED ORAL at 18:06

## 2024-07-15 RX ADMIN — FUROSEMIDE 20 MG: 10 INJECTION, SOLUTION INTRAMUSCULAR; INTRAVENOUS at 18:06

## 2024-07-15 RX ADMIN — ARFORMOTEROL TARTRATE 15 MCG: 15 SOLUTION RESPIRATORY (INHALATION) at 18:29

## 2024-07-15 RX ADMIN — IPRATROPIUM BROMIDE AND ALBUTEROL SULFATE 1 DOSE: 2.5; .5 SOLUTION RESPIRATORY (INHALATION) at 05:25

## 2024-07-15 RX ADMIN — PREDNISONE 5 MG: 5 TABLET ORAL at 08:12

## 2024-07-15 RX ADMIN — Medication 10 ML: at 08:15

## 2024-07-15 RX ADMIN — ARFORMOTEROL TARTRATE 15 MCG: 15 SOLUTION RESPIRATORY (INHALATION) at 05:25

## 2024-07-15 RX ADMIN — MICONAZOLE NITRATE: 2 OINTMENT TOPICAL at 21:17

## 2024-07-15 RX ADMIN — Medication 10 ML: at 08:13

## 2024-07-15 ASSESSMENT — PULMONARY FUNCTION TESTS
PIF_VALUE: 38
PIF_VALUE: 38
PIF_VALUE: 34
PIF_VALUE: 38
PIF_VALUE: 37

## 2024-07-15 ASSESSMENT — PAIN SCALES - WONG BAKER: WONGBAKER_NUMERICALRESPONSE: NO HURT

## 2024-07-15 NOTE — PROGRESS NOTES
Date:  07/15/24  Hospital Day:  Hospital Day: 7  PT Name:  Effie Portillo  MRN:   [id number]  Primary Care Physician: Sheba Sandoval MD  Requesting physician:   García Horton DO  Reason for follow up: antibiotics/infection  Chief Complaint:   Chief Complaint   Patient presents with    Loss of Consciousness     Pt comes to the ED from WNR unresponsive except to painful stimuli. Pt is vent dependent and is sating between 85-88% on 15-20 liters which is not baseline.        Assessment  Effie Portillo is a 79 y.o. year old female who presented on 7/9/2024 and is being treated for Hypernatremia   Chief Complaint   Patient presents with    Loss of Consciousness     Pt comes to the ED from WNR unresponsive except to painful stimuli. Pt is vent dependent and is sating between 85-88% on 15-20 liters which is not baseline.       Id following for   Plan    Pt came in with hypernatremia   Chronic respiratory failure   R/O SEPSIS/HCAP/FUNGEMIA  Hcap CRAB/gpc  Asked mircro for more work up   Proteus bacteruria   DTI SACRAL AND  SKIN TEAR  H/O MDR A BAUMANNII  C AURIS EXPOSURE +   Strict isolation   For scope          Off micafungin (MYCAMINE)    Cont cefiderocol sulfate tosylate         Continue wound care  Encourage nutritional support  Continue to off load wound/pressure relief bed    Watch for cdiff colitis/clabsi-line infection  Monitor labs  Continue current therapy.  Please see orders for further management and care.    Subjective/HPI:  Efife was seen and examined at bedside today for sepsis    Overnight:  7/15 afebrile trach vent  not arousable  wbc23.6 cr0.6 spoke with nurse RT to see  7/14 trach vent flushed no rash tachypnea spoke with charge nurse   7/13 trach vent nad   7/12 trach vent open eyes for c scope   7/11 pt is not responsive trach vent   7/10 Pt is not awkae   There are no adverse drug reactions.  All tests were reviewed.   No  family present during my examination.    ROS: unable due to  (Susceptibility) Collected: 07/09/24 0630    Order Status: Completed Specimen: Urine, clean catch Updated: 07/12/24 1105     Specimen Description .CLEAN CATCH URINE     Special Requests Site: Urine     Culture PROTEUS MIRABILIS >100,000 CFU/ML Identification by MALDI-TOF Organism is Multi Drug Resistant      MIXED GRAM POSITIVE ORGANISMS <10,000 CFU/ML    Susceptibility        Proteus mirabilis      BACTERIAL SUSCEPTIBILITY PANEL DIANE      ampicillin >=32  Resistant      aztreonam 2  Sensitive      ceFAZolin >=64  Resistant  [1]       cefepime 16  Resistant      cefotaxime >=64  Resistant      cefOXitin 32  Resistant      cefpodoxime proxetil >=8  Resistant      cefTAZidime <=1  Sensitive      Ceftolozane/Tazobactam (Zerbaxa) 1  Sensitive      Cefuroxime axetil >=64  Resistant      Cefuroxime-Sodium >=64  Resistant      ciprofloxacin >=4  Resistant      doxycycline 4  Sensitive      ertapenem <=0.12  Sensitive      meropenem <=0.25  Sensitive      minocycline 16  Resistant      moxifloxacin >=8  Resistant      nitrofurantoin 128  Resistant      piperacillin-tazobactam <=4  Sensitive      tigecycline >=8  Resistant      trimethoprim-sulfamethoxazole >=320  Resistant                   [1]  Cefazolin sensitivity results can be used to predict the effectiveness of oral   cephalosporins (eg. Cephalexin) in uncomplicated Urinary Tract Infections due to E. coli, K.   pneumoniae, and P. mirabilis                     Culture, Blood 2 [[id number]] Collected: 07/09/24 0624    Order Status: Completed Specimen: Blood Updated: 07/14/24 0928     Specimen Description .BLOOD     Special Requests Site: Blood     Culture NO GROWTH 5 DAYS    Blood Culture 1 [[id number]] Collected: 07/09/24 0624    Order Status: Completed Specimen: Blood Updated: 07/14/24 0928     Specimen Description .BLOOD     Special Requests          Culture NO GROWTH 5 DAYS    Respiratory Panel, Molecular, with COVID-19 (Restricted: peds pts or suitable admitted    CREATININE 0.7  --  0.6 0.6   LABGLOM 89  --  >90 90   GLUCOSE 385*  --  107* 89   CALCIUM 8.1*  --  8.8 8.7    < > = values in this interval not displayed.       U/A:    Lab Results   Component Value Date/Time    COLORU Yellow 07/09/2024 06:24 AM    PROTEINU 30 07/09/2024 06:24 AM    PHUR 8.5 07/09/2024 06:24 AM    PHUR 5.5 09/10/2023 06:09 PM    WBCUA 6 TO 9 07/09/2024 06:24 AM    RBCUA 0 TO 2 07/09/2024 06:24 AM    MUCUS PRESENT 07/09/2024 06:24 AM    YEAST PRESENT 07/09/2024 06:24 AM    BACTERIA 1+ 07/09/2024 06:24 AM    LEUKOCYTESUR MODERATE 07/09/2024 06:24 AM    UROBILINOGEN 0.2 07/09/2024 06:24 AM    BILIRUBINUR NEGATIVE 07/09/2024 06:24 AM    GLUCOSEU 100 07/09/2024 06:24 AM         XR CHEST PORTABLE    Result Date: 7/9/2024  Mild, patchy bilateral perihilar and interstitial infiltrates suggesting mild pulmonary edema, mildly increased from prior examination.         Imaging and labs were reviewed per medical records and any ID pertinent labs were addressed with the patient.     The patient was educated about the diagnosis, prognosis, indications, risks and benefits of treatment.      Pt had the opportunity to ask questions.  All questions were answered.    Thank you for involving me in the care of Effie Portillo. Please do not hesitate to call for any questions or concerns.    Electronically signed by Allie Villegas MD on 7/15/2024 at 11:08 AM    Phone [phone]  Fax [phone]

## 2024-07-15 NOTE — FLOWSHEET NOTE
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    wound    Cerebral artery occlusion with cerebral infarction ([facility])     CHF (congestive heart failure) ([facility])     COPD (chronic obstructive pulmonary disease) ([facility])     COVID-19     Depression     ESBL (extended spectrum beta-lactamase) producing bacteria infection 01/15/2024    wound    GI (gastrointestinal bleed)     History of blood transfusion     Hx of blood clots     venous thrombosis    Hypertension     Infection due to extended spectrum beta-lactamase producing bacteria 09/12/2023    urine and blood    Ischemic cardiomyopathy     MI (myocardial infarction) ([facility])     Multiple drug resistant organism (MDRO) culture positive 01/17/2024    sputum    Pneumonia     Ventilator dependent ([facility])     VRE (vancomycin resistant enterococcus) culture positive 04/13/2024    urine       Wound history:      Findings:     07/11/24 1557   Wound 07/09/24 Heel Right   Date First Assessed/Time First Assessed: 07/09/24 2045   Primary Wound Type: Pressure Injury  Location: Heel  Wound Location Orientation: Right   Wound Image    Wound Etiology Deep tissue/Injury   Dressing/Treatment Open to air   Wound Length (cm) 3 cm   Wound Width (cm) 3 cm   Wound Surface Area (cm^2) 9 cm^2   Change in Wound Size % (l*w) 0   Drainage Amount None (dry)   Odor None       **Informed Consent**    The patient has given verbal consent to have photos taken of heel and inserted into their chart as part of their permanent medical record for purposes of documentation, treatment management and/or medical review.   All Images taken on 7/15/24 of patient name: Effie Portillo were transmitted and stored on secured Epic  Site located within Media Folder Tab by a registered [employer] Mobile Application

## 2024-07-15 NOTE — PROGRESS NOTES
Dr. Artis requested that patient be placed on clear supplements for possible scope and to hold tube feed until ordered.

## 2024-07-15 NOTE — PROGRESS NOTES
Department of Internal Medicine  Nephrology Attending Consult Note    Events reviewed.      SUBJECTIVE: We are following Mrs. Portillo for hyponatremia.  Patient is nonverbal    PHYSICAL EXAM:      Vitals:    VITALS:  BP (!) 147/47   Pulse (!) 109   Temp 98.1 °F (36.7 °C) (Oral)   Resp (!) 46   Ht 1.575 m (5' 2.01\")   Wt 57.7 kg (127 lb 3.3 oz)   SpO2 93%   BMI 23.26 kg/m²   24HR INTAKE/OUTPUT:    Intake/Output Summary (Last 24 hours) at 7/15/2024 0971  Last data filed at 7/15/2024 0746  Gross per 24 hour   Intake 40 ml   Output 1725 ml   Net -1685 ml         Constitutional: Patient is lethargic  HEENT: Pupils are equal reactive  Respiratory: Tracheostomy in place  Cardiovascular/Edema: Heart sounds regular  Gastrointestinal: Abdomen soft, PEG in place  Neurologic: Overall nonfocal  Skin: No lesion  Other: No Edema    Scheduled Meds:   furosemide  20 mg IntraVENous BID    potassium bicarb-citric acid  20 mEq Per G Tube BID    sodium chloride flush  5-40 mL IntraVENous 2 times per day    pantoprazole  40 mg IntraVENous BID    cefiderocol sulfate tosylate (FETROJA) 1,500 mg in sodium chloride 0.9 % 100 mL IVPB  1,500 mg IntraVENous Q8H    [Held by provider] apixaban  2.5 mg Per G Tube BID    arformoterol tartrate  15 mcg Nebulization BID    atorvastatin  10 mg PEG Tube Nightly    budesonide  500 mcg Nebulization Q12H    carvedilol  12.5 mg Oral BID WC    [Held by provider] clopidogrel  75 mg PEG Tube Daily    empagliflozin  10 mg PEG Tube Daily    ferrous Sulfate  300 mg Per G Tube Daily    [Held by provider] midodrine  15 mg Oral TID WC    predniSONE  5 mg PEG Tube Daily    sodium chloride flush  5-40 mL IntraVENous 2 times per day    miconazole nitrate   Topical BID    miconazole   Topical BID     Continuous Infusions:   dextrose      sodium chloride      sodium chloride 10 mL/hr at 07/11/24 0455     PRN Meds:.hydrALAZINE, glucose, dextrose bolus **OR** dextrose bolus, glucagon (rDNA), dextrose, sodium

## 2024-07-15 NOTE — PROGRESS NOTES
[facility]  Department of Internal Medicine  Division of Pulmonary, Critical Care and Sleep Medicine  Progress Note      Sagar Pretty, APRN-CNP  Azalea Monge, APRN-CNP  ICU Intensivist Attestation:    I saw, examined, and discussed the patient with .Azalea SIMMONS-ACN and agree with her assessment and plan.      I know this patient from the early part of her admission.  She remains essentially unchanged other than being colonized with various resistant antibiotics and fungal growth.    Her mental function remains negligible and she does not open her eyes or demonstrate any cognitive abilities.    The last chest x-ray from 714 shows no infiltrates that have developed since the prior study of July 9.  Therefore, she is being diuresed with the assumption that this may be pulmonary edema.  However, could also be related to infection, especially given the marked increase in her white blood cell count from 5000 on 712 2 24,000 today.    The patient's overall condition continues to deteriorate; we will treat appropriately and asked palliative care to revisit the situation.  Electronically signed by Ant Burgos MD on 7/15/2024 at 3:53 PM      Subjective:  Patient seen and examined.  She remains on mechanical ventilation 18/375/65/8.  Moderate amount of secretions noted.  RAY ROS due to patient's mentation.  She is not responding to me today.  No family is at bedside. Discussed with RN at bedside, plans for colonoscopy today.         OBJECTIVE:     PHYSICAL EXAM:   VITALS:   Vitals:    07/15/24 0727 07/15/24 0748 07/15/24 0814 07/15/24 1045   BP: (!) 200/80 (!) 200/80 (!) 147/47 136/72   Pulse:  80 (!) 109 72   Resp:  (!) 46 (!) 34 (!) 34   Temp:   97.4 °F (36.3 °C) 98.6 °F (37 °C)   TempSrc:   Axillary Axillary   SpO2:  93% 96%    Weight:       Height:      (improved)  Hypernatremia.(Improved)  Acute blood loss anemia  GI bleeding  Chronic encephalopathy  Aspiration pneumonia  Small bilateral pleural effusions.  Mild acute pulmonary edema.  Metabolic encephalopathy.        Plan:     Continue with mechanical ventilation.  Patient is currently on assist-control mechanical ventilation with a tidal volume of 375, rate 18, PEEP of 8, FiO2 65%.  Wean FiO2 as able for sats 88 to 94%  Trach care and suctioning as needed  Continue to diurese with lasix   Reconcile home medications  Patient was recently on multiple antimicrobial therapy.  Monitor culture results.  ID team is following.  Patient is currently on Fetroja but off micafungin.  Patient's urine culture grew Proteus mirabilis which is multidrug-resistant and sputum is growing Acinetobacter which is likely colonization  Nephrology is managing hyponatremia and EVELIA.  DVT prophylaxis.  Patient is currently on Eliquis 2.5 mg twice daily and Plavix but they are on hold due to suspected GI bleeding.  GI is consulted.  Palliative care following   DNR-CCA           Azalea Monge, APRN - NP       ICU/PULM Staff Physician note of personal involvement in Care  As the attending physician, I certify that I personally reviewed the patient’s history and personnally examined the patient to confirm the physical findings described above,  And that I reviewed the relevant imaging studies and available reports.  I also discussed the differential diagnosis and all of the proposed management plans with the patient and individuals accompanying the patient to this visit.  They had the opportunity to ask questions about the proposed management plans and to have those questions answered.     This patient has a high probability of sudden, clinically significant deterioration, which requires the highest level of physician preparedness to intervene urgently.  I managed/supervised life or organ supporting interventions that required frequent

## 2024-07-15 NOTE — PROGRESS NOTES
Comprehensive Nutrition Assessment    Type and Reason for Visit:  Reassess    Nutrition Recommendations/Plan:   Continue NPO  Begin Willie BID via PEG  Modify Tube Feeding    New Tube Feeding Recommendations:    Jevity 1.5 (Standard w/ fiber) @ 50mls/hour x 24 hours with 150mls flush q4 hours.  Regimen to provide 1200ml TV, 1800 kcals, 76g protein, 1800 mls water per day. Regimen to meet 100% of estimated protein, fluid and energy needs per day.         Malnutrition Assessment:  Malnutrition Status:  At risk for malnutrition (Comment) (07/10/24 1133)    Context:  Chronic Illness     Findings of the 6 clinical characteristics of malnutrition:  Energy Intake:  No significant decrease in energy intake  Weight Loss:  No significant weight loss     Body Fat Loss:  No significant body fat loss     Muscle Mass Loss:  No significant muscle mass loss    Fluid Accumulation:  Mild Generalized   Strength:  Not Performed    Nutrition Assessment:    Pt admit from nursing facility for AMS (unresponsive), EVELIA, acute on chronic resp failure, Anemia, Hypernatremia, left pleural effusion, aspiration PNA, A-fib, chronic encephalopathy, anemia d/t acute blood loss/GIB, UTI & EVELIA. PMHx of CVA, CAD s/p MI, HF, Afib, HTN, L AKA, s/p trach/PEG. Pt is NPO. RD consulted multiple deep tissue wounds. Pt with PEG in place. WIll provide tube feeding recommendations, f/up per policy. 07/15/24: F/UP: Pt continues w/ anemia, chronic encephalopathy and remains on mechanical ventilation   Moderate amount of secretions noted.  Plan for colonoscopy today to determine soure of anemia. Palliative care following. Modify current tube feeding. Pt w/ 15.45% weight gain in six months, intentional at goal.. Will re-adjust tube feeding to maintain current body weight. Will continue inpatient monitoring, f/up per policy.    Nutrition Related Findings:    Obtunded, Trach, PEG, ventilator dependent, MAP 76, 11.9 Liters MV, I/O +4585 since admit, abd distended,  bowel sounds x4 hyper active, + 2 edema, Chloride 108, CUN 27, WBC 23.6, RBC 3.20, Hgb 8.6, Hct 27.7 Wound Type: Deep Tissue Injury (Right Heel, Right anterior feet, Right Toe, Coccyx DTI, Left Elbow)       Current Nutrition Intake & Therapies:    Average Meal Intake: NPO  Average Supplements Intake: NPO  Diet NPO Exceptions are: Sips of Water with Meds, Other (Specify); Specify Other Exceptions: Clear water flushes with meds.  Current Tube Feeding (TF) Orders:  Feeding Route: PEG  Formula: Standard with Fiber  Schedule: Continuous  Feeding Regimen: Jevity 1.5 (Standard w/ Fiber) @ 50mls/hour x 24 hours  Additives/Modulars:    Water Flushes: 30mls q4 hours  Current TF & Flush Orders Provides:    Goal TF & Flush Orders Provides: 1200mls TV, 1800 kcals, 76 grams of protein, 1,092 mls H20    Anthropometric Measures:  Height: 157.5 cm (5' 2.01\")  Ideal Body Weight (IBW): 110 lbs (50 kg)    Admission Body Weight: 54.9 kg (121 lb 0.5 oz)  Current Body Weight: 57.7 kg (127 lb 3.3 oz), 115.6 % IBW. Weight Source: Stated (07/09/24)  Current BMI (kg/m2): 23.3  Usual Body Weight: 54.9 kg (121 lb 0.5 oz) (06/17/24 [facility])  % Weight Change (Calculated): 5.1  Weight Adjustment For: Amputation  Total Adjusted Percentage (Calculated): 10.1  Adjusted Ideal Body Weight (lbs) (Calculated): 98.9 lbs  Adjusted Ideal Body Weight (kg) (Calculated): 44.95 kg  Adjusted % Ideal Body Weight (Calculated): 128.6  Adjusted BMI (kg/m2) (Calculated): 25.7  BMI Categories: Normal Weight (BMI 22.0 to 24.9) age over 65    Estimated Daily Nutrient Needs:  Energy Requirements Based On: Kcal/kg     Energy (kcal/day): 4322-7162 kcals/day (CBW [facility] 2003b , CBW 25-30 kcals/kg Normal Weight BMI)  Weight Used for Protein Requirements: Current  Protein (g/day): 75 -90 g/day (CBW 1.3-1.5 g/kg Normal Weigt BMI guidelines)  Method Used for Fluid Requirements: 1 ml/kcal  Fluid (ml/day): 6056-6417 mls/day or per care team    Nutrition Diagnosis:

## 2024-07-15 NOTE — PROGRESS NOTES
PROGRESS NOTE  By Neo Artis M.D.    [facility]  Dr. Rosemary Hartman M.D.,  Dr. Phil Benitez M.D.,   Dr. Pato Hernandez D.O.,  Dr. Dayday Trejo M.D.,  OTTO ArellanoO.,          Effie DUGAN Lindsey  79 y.o.  female    SUBJECTIVE:  Remains nonverbal/unresponsive.  No family at bedside    OBJECTIVE:    /72   Pulse 72   Temp 98.6 °F (37 °C) (Axillary)   Resp (!) 34   Ht 1.575 m (5' 2.01\")   Wt 57.7 kg (127 lb 3.3 oz)   SpO2 96%   BMI 23.26 kg/m²     General: Older adult  female  HEENT: Moist oral mucosa/no discharge from nose or ears.  No conjunctival erythema  Neck: Trachea midline/tracheostomy  Chest: Mechanically ventilated via tracheostomy.  Cor: Regular/S1-S2  Abd.: Soft and obese.  PEG tube in place.  BS +/4  Extr.:  LLE BKA  Skin: Warm and dry/anicteric      DATA:    Monitor data reviewed -sinus rhythm noted.       Lab Results   Component Value Date/Time    WBC 23.6 07/15/2024 08:39 AM    RBC 3.20 07/15/2024 08:39 AM    HGB 8.6 07/15/2024 08:39 AM    HCT 27.7 07/15/2024 08:39 AM    MCV 86.6 07/15/2024 08:39 AM    MCH 26.9 07/15/2024 08:39 AM    MCHC 31.0 07/15/2024 08:39 AM    RDW 20.3 07/15/2024 08:39 AM     07/15/2024 08:39 AM    MPV 11.7 07/15/2024 08:39 AM     Lab Results   Component Value Date/Time     07/15/2024 05:00 AM    K 3.7 07/15/2024 05:00 AM     07/15/2024 05:00 AM    CO2 26 07/15/2024 05:00 AM    BUN 27 07/15/2024 05:00 AM    CREATININE 0.6 07/15/2024 05:00 AM    CALCIUM 8.7 07/15/2024 05:00 AM    BILITOT 0.2 07/09/2024 06:15 AM    ALKPHOS 107 07/09/2024 06:15 AM    AST 18 07/09/2024 06:15 AM    ALT 16 07/09/2024 06:15 AM     No results found for: \"LIPASE\"  No results found for: \"AMYLASE\"      ASSESSMENT/PLAN:  Patient Active Problem List   Diagnosis    Chronic respiratory failure with hypoxia (HCC)    Primary hypertension    History of stroke with residual deficit    History of DVT (deep vein thrombosis)    Coronary artery disease

## 2024-07-15 NOTE — ACP (ADVANCE CARE PLANNING)
7-15-Cm note: pt to return to [facility] when medically stable, NO PRECERT, pt is a bed hold. Cm following , Electronically signed by Chioma Ragsdale RN on 7/15/2024 at 3:31 PM

## 2024-07-15 NOTE — PROGRESS NOTES
[facility] Hospitalist   Progress Note    Admitting Date and Time: 7/9/2024  6:01 AM  Admit Dx: Dehydration [E86.0]  Hyperchloremia [E87.8]  Hypernatremia [E87.0]  Uremic encephalopathy [G93.49, N19]    Subjective:    7/14: Pt more alert this morning. She is able to follow commands. Tachypnea. Loose brown stool . Cleansed with RN. For prep today. And Colonoscopy in am. Noted Respiratory distress this morning. Tachypnea. CXR ordered.    Per RN: Consent obtained Colonoscopy Monday. Prep to be initiated. BP elevated.     7/15: Pt resting comfortably in bed, in no apparent distress. Going for colonoscopy later today.        furosemide  20 mg IntraVENous BID    potassium bicarb-citric acid  20 mEq Per G Tube BID    sodium chloride flush  5-40 mL IntraVENous 2 times per day    pantoprazole  40 mg IntraVENous BID    cefiderocol sulfate tosylate (FETROJA) 1,500 mg in sodium chloride 0.9 % 100 mL IVPB  1,500 mg IntraVENous Q8H    [Held by provider] apixaban  2.5 mg Per G Tube BID    arformoterol tartrate  15 mcg Nebulization BID    atorvastatin  10 mg PEG Tube Nightly    budesonide  500 mcg Nebulization Q12H    carvedilol  12.5 mg Oral BID WC    [Held by provider] clopidogrel  75 mg PEG Tube Daily    empagliflozin  10 mg PEG Tube Daily    ferrous Sulfate  300 mg Per G Tube Daily    [Held by provider] midodrine  15 mg Oral TID WC    predniSONE  5 mg PEG Tube Daily    sodium chloride flush  5-40 mL IntraVENous 2 times per day    miconazole nitrate   Topical BID    miconazole   Topical BID     hydrALAZINE, 10 mg, Q6H PRN  glucose, 4 tablet, PRN  dextrose bolus, 125 mL, PRN   Or  dextrose bolus, 250 mL, PRN  glucagon (rDNA), 1 mg, PRN  dextrose, , Continuous PRN  sodium chloride flush, 5-40 mL, PRN  sodium chloride, , PRN  ipratropium 0.5 mg-albuterol 2.5 mg, 1 Dose, Q4H PRN  sodium chloride flush, 5-40 mL, PRN  sodium chloride, , PRN  ondansetron, 4 mg, Q8H PRN   Or  ondansetron, 4 mg, Q6H PRN  polyethylene  glycol, 17 g, Daily PRN  acetaminophen, 650 mg, Q6H PRN   Or  acetaminophen, 650 mg, Q6H PRN  bisacodyl, 10 mg, Daily PRN  white petrolatum, , BID PRN       Objective:    BP (!) 147/47   Pulse (!) 109   Temp 98.1 °F (36.7 °C) (Oral)   Resp (!) 46   Ht 1.575 m (5' 2.01\")   Wt 57.7 kg (127 lb 3.3 oz)   SpO2 93%   BMI 23.26 kg/m²   General Appearance: Opens eyes to name and in no acute distress. Ill appearing.   Skin: warm and dry. Ecchymosis.  Head: normocephalic and atraumatic  Eyes: pupils equal, round, and reactive to light, extraocular eye movements intact, conjunctivae normal  Neck: neck supple and non tender without mass   Pulmonary/Chest: Diminished to auscultation bilaterally- tachypnea no wheezes, rales or rhonchi, normal air movement, no respiratory distress  Cardiovascular: Tachycardic rate, normal S1 and S2 and no RGM  Abdomen: soft, non-tender, non-distended, normal bowel sounds. PEG  Extremities: no cyanosis, no clubbing and +edema. ML LUE  Neurologic: no cranial nerve deficit and speech normal      Recent Labs     07/13/24  0617 07/13/24  1500 07/14/24  0531 07/15/24  0500     --  137 143   K 2.7* 4.6 3.5 3.7   CL 97*  --  104 108*   CO2 26  --  25 26   BUN 38*  --  30* 27*   CREATININE 0.7  --  0.6 0.6   GLUCOSE 385*  --  107* 89   CALCIUM 8.1*  --  8.8 8.7         No results for input(s): \"ALKPHOS\", \"LABALBU\", \"BILITOT\", \"AST\", \"ALT\" in the last 72 hours.    Invalid input(s): \"PROT\"      Recent Labs     07/13/24  2237 07/14/24  0531 07/15/24  0839   WBC  --   --  23.6*   RBC  --   --  3.20*   HGB 7.8* 7.8* 8.6*   HCT 25.2* 24.9* 27.7*   MCV  --   --  86.6   MCH  --   --  26.9   MCHC  --   --  31.0*   RDW  --   --  20.3*   PLT  --   --  606*   MPV  --   --  11.7     Radiology:   XR CHEST PORTABLE   Final Result      1.  Stable interstitial prominence throughout both lungs related to pulmonary   vascular congestion or peribronchial inflammation.      2.  Slight increased opacities in the  Continue Coreg. Telemetry monitoring. Has seen [facility].     Sacral decubitus ulcer: Continue wound care. Branden skin protocol.     Chronic Encephalopathy    Bipolar Disorder/Depression    Hx CVA    Hypotension (resolved) in a patient with HTN: Continue home BB as above. On Midodrine TID, has not received over the last couple of days 2/2 elevated BP will place on hold.  Hydralazine PRN.  Follow.       Code Status: DNR-CCA  DVT Prophylaxis: SCD's. Apixaban on hold    Disposition: From [facility]. For colonoscopy today.     NOTE: This report was transcribed using voice recognition software. Every effort was made to ensure accuracy; however, inadvertent computerized transcription errors may be present.     Electronically signed by TROY Zamora NP on 7/15/2024 at 9:42 AM

## 2024-07-16 ENCOUNTER — APPOINTMENT (OUTPATIENT)
Dept: GENERAL RADIOLOGY | Age: 79
DRG: 207 | End: 2024-07-16
Payer: MEDICARE

## 2024-07-16 PROBLEM — Z51.5 PALLIATIVE CARE ENCOUNTER: Status: ACTIVE | Noted: 2024-07-16

## 2024-07-16 LAB
ANION GAP SERPL CALCULATED.3IONS-SCNC: 12 MMOL/L (ref 7–16)
BASOPHILS # BLD: 0 K/UL (ref 0–0.2)
BASOPHILS NFR BLD: 0 % (ref 0–2)
BUN SERPL-MCNC: 27 MG/DL (ref 6–23)
CALCIUM SERPL-MCNC: 8.6 MG/DL (ref 8.6–10.2)
CHLORIDE SERPL-SCNC: 110 MMOL/L (ref 98–107)
CO2 SERPL-SCNC: 26 MMOL/L (ref 22–29)
CREAT SERPL-MCNC: 0.7 MG/DL (ref 0.5–1)
EOSINOPHIL # BLD: 0.13 K/UL (ref 0.05–0.5)
EOSINOPHILS RELATIVE PERCENT: 1 % (ref 0–6)
ERYTHROCYTE [DISTWIDTH] IN BLOOD BY AUTOMATED COUNT: 21.1 % (ref 11.5–15)
GFR, ESTIMATED: 88 ML/MIN/1.73M2
GLUCOSE BLD-MCNC: 115 MG/DL (ref 74–99)
GLUCOSE BLD-MCNC: 121 MG/DL (ref 74–99)
GLUCOSE BLD-MCNC: 137 MG/DL (ref 74–99)
GLUCOSE BLD-MCNC: 148 MG/DL (ref 74–99)
GLUCOSE SERPL-MCNC: 117 MG/DL (ref 74–99)
HCT VFR BLD AUTO: 23.3 % (ref 34–48)
HCT VFR BLD AUTO: 23.7 % (ref 34–48)
HCT VFR BLD AUTO: 25.2 % (ref 34–48)
HGB BLD-MCNC: 7.3 G/DL (ref 11.5–15.5)
HGB BLD-MCNC: 7.5 G/DL (ref 11.5–15.5)
HGB BLD-MCNC: 7.9 G/DL (ref 11.5–15.5)
LYMPHOCYTES NFR BLD: 0 K/UL (ref 1.5–4)
LYMPHOCYTES RELATIVE PERCENT: 0 % (ref 20–42)
MCH RBC QN AUTO: 27.9 PG (ref 26–35)
MCHC RBC AUTO-ENTMCNC: 31.3 G/DL (ref 32–34.5)
MCV RBC AUTO: 89 FL (ref 80–99.9)
METAMYELOCYTES ABSOLUTE COUNT: 0.13 K/UL (ref 0–0.12)
METAMYELOCYTES: 1 % (ref 0–1)
MONOCYTES NFR BLD: 0.13 K/UL (ref 0.1–0.95)
MONOCYTES NFR BLD: 1 % (ref 2–12)
MYELOCYTES ABSOLUTE COUNT: 0.13 K/UL
MYELOCYTES: 1 %
NEUTROPHILS NFR BLD: 96 % (ref 43–80)
NEUTS SEG NFR BLD: 13.69 K/UL (ref 1.8–7.3)
NUCLEATED RED BLOOD CELLS: 1 PER 100 WBC
PLATELET # BLD AUTO: 382 K/UL (ref 130–450)
PMV BLD AUTO: 11.5 FL (ref 7–12)
POTASSIUM SERPL-SCNC: 4 MMOL/L (ref 3.5–5)
RBC # BLD AUTO: 2.83 M/UL (ref 3.5–5.5)
RBC # BLD: ABNORMAL 10*6/UL
SODIUM SERPL-SCNC: 148 MMOL/L (ref 132–146)
WBC OTHER # BLD: 14.2 K/UL (ref 4.5–11.5)

## 2024-07-16 PROCEDURE — 94003 VENT MGMT INPAT SUBQ DAY: CPT

## 2024-07-16 PROCEDURE — 6360000002 HC RX W HCPCS: Performed by: INTERNAL MEDICINE

## 2024-07-16 PROCEDURE — 85018 HEMOGLOBIN: CPT

## 2024-07-16 PROCEDURE — 85025 COMPLETE CBC W/AUTO DIFF WBC: CPT

## 2024-07-16 PROCEDURE — 6360000002 HC RX W HCPCS: Performed by: NURSE PRACTITIONER

## 2024-07-16 PROCEDURE — 94640 AIRWAY INHALATION TREATMENT: CPT

## 2024-07-16 PROCEDURE — 6370000000 HC RX 637 (ALT 250 FOR IP): Performed by: NURSE PRACTITIONER

## 2024-07-16 PROCEDURE — 82962 GLUCOSE BLOOD TEST: CPT

## 2024-07-16 PROCEDURE — 99231 SBSQ HOSP IP/OBS SF/LOW 25: CPT | Performed by: NURSE PRACTITIONER

## 2024-07-16 PROCEDURE — 31502 CHANGE OF WINDPIPE AIRWAY: CPT

## 2024-07-16 PROCEDURE — 2580000003 HC RX 258

## 2024-07-16 PROCEDURE — 2580000003 HC RX 258: Performed by: INTERNAL MEDICINE

## 2024-07-16 PROCEDURE — 2580000003 HC RX 258: Performed by: NURSE PRACTITIONER

## 2024-07-16 PROCEDURE — APPSS30 APP SPLIT SHARED TIME 16-30 MINUTES: Performed by: NURSE PRACTITIONER

## 2024-07-16 PROCEDURE — 80048 BASIC METABOLIC PNL TOTAL CA: CPT

## 2024-07-16 PROCEDURE — 36592 COLLECT BLOOD FROM PICC: CPT

## 2024-07-16 PROCEDURE — 85014 HEMATOCRIT: CPT

## 2024-07-16 PROCEDURE — 71045 X-RAY EXAM CHEST 1 VIEW: CPT

## 2024-07-16 PROCEDURE — 2060000000 HC ICU INTERMEDIATE R&B

## 2024-07-16 PROCEDURE — 99233 SBSQ HOSP IP/OBS HIGH 50: CPT | Performed by: INTERNAL MEDICINE

## 2024-07-16 PROCEDURE — 6370000000 HC RX 637 (ALT 250 FOR IP): Performed by: INTERNAL MEDICINE

## 2024-07-16 PROCEDURE — 99291 CRITICAL CARE FIRST HOUR: CPT | Performed by: INTERNAL MEDICINE

## 2024-07-16 RX ORDER — DEXTROSE MONOHYDRATE 50 MG/ML
INJECTION, SOLUTION INTRAVENOUS CONTINUOUS
Status: DISCONTINUED | OUTPATIENT
Start: 2024-07-16 | End: 2024-07-17

## 2024-07-16 RX ORDER — LABETALOL HYDROCHLORIDE 5 MG/ML
10 INJECTION, SOLUTION INTRAVENOUS EVERY 4 HOURS PRN
Status: DISCONTINUED | OUTPATIENT
Start: 2024-07-16 | End: 2024-07-19 | Stop reason: HOSPADM

## 2024-07-16 RX ADMIN — ANTI-FUNGAL POWDER MICONAZOLE NITRATE TALC FREE: 1.42 POWDER TOPICAL at 20:20

## 2024-07-16 RX ADMIN — PREDNISONE 5 MG: 5 TABLET ORAL at 11:08

## 2024-07-16 RX ADMIN — FUROSEMIDE 20 MG: 10 INJECTION, SOLUTION INTRAMUSCULAR; INTRAVENOUS at 17:21

## 2024-07-16 RX ADMIN — Medication 10 ML: at 10:56

## 2024-07-16 RX ADMIN — CEFIDEROCOL SULFATE TOSYLATE 1500 MG: 1 INJECTION, POWDER, FOR SOLUTION INTRAVENOUS at 20:17

## 2024-07-16 RX ADMIN — POTASSIUM BICARBONATE 20 MEQ: 782 TABLET, EFFERVESCENT ORAL at 11:02

## 2024-07-16 RX ADMIN — BUDESONIDE INHALATION 500 MCG: 0.5 SUSPENSION RESPIRATORY (INHALATION) at 17:57

## 2024-07-16 RX ADMIN — CARVEDILOL 12.5 MG: 6.25 TABLET, FILM COATED ORAL at 11:08

## 2024-07-16 RX ADMIN — CEFIDEROCOL SULFATE TOSYLATE 1500 MG: 1 INJECTION, POWDER, FOR SOLUTION INTRAVENOUS at 11:22

## 2024-07-16 RX ADMIN — PETROLATUM: 420 OINTMENT TOPICAL at 10:54

## 2024-07-16 RX ADMIN — MICONAZOLE NITRATE: 2 OINTMENT TOPICAL at 20:19

## 2024-07-16 RX ADMIN — CEFIDEROCOL SULFATE TOSYLATE 1500 MG: 1 INJECTION, POWDER, FOR SOLUTION INTRAVENOUS at 04:14

## 2024-07-16 RX ADMIN — ATORVASTATIN CALCIUM 10 MG: 10 TABLET, FILM COATED ORAL at 20:17

## 2024-07-16 RX ADMIN — ARFORMOTEROL TARTRATE 15 MCG: 15 SOLUTION RESPIRATORY (INHALATION) at 17:57

## 2024-07-16 RX ADMIN — PANTOPRAZOLE SODIUM 40 MG: 40 INJECTION, POWDER, FOR SOLUTION INTRAVENOUS at 11:18

## 2024-07-16 RX ADMIN — DEXTROSE MONOHYDRATE: 50 INJECTION, SOLUTION INTRAVENOUS at 10:48

## 2024-07-16 RX ADMIN — MICONAZOLE NITRATE: 2 OINTMENT TOPICAL at 10:54

## 2024-07-16 RX ADMIN — PANTOPRAZOLE SODIUM 40 MG: 40 INJECTION, POWDER, FOR SOLUTION INTRAVENOUS at 20:17

## 2024-07-16 RX ADMIN — Medication 10 ML: at 20:41

## 2024-07-16 RX ADMIN — IPRATROPIUM BROMIDE AND ALBUTEROL SULFATE 1 DOSE: 2.5; .5 SOLUTION RESPIRATORY (INHALATION) at 17:57

## 2024-07-16 RX ADMIN — LABETALOL HYDROCHLORIDE 10 MG: 5 INJECTION INTRAVENOUS at 14:33

## 2024-07-16 RX ADMIN — ARFORMOTEROL TARTRATE 15 MCG: 15 SOLUTION RESPIRATORY (INHALATION) at 06:51

## 2024-07-16 RX ADMIN — PETROLATUM: 420 OINTMENT TOPICAL at 20:19

## 2024-07-16 RX ADMIN — BUDESONIDE INHALATION 500 MCG: 0.5 SUSPENSION RESPIRATORY (INHALATION) at 06:51

## 2024-07-16 RX ADMIN — ANTI-FUNGAL POWDER MICONAZOLE NITRATE TALC FREE: 1.42 POWDER TOPICAL at 10:55

## 2024-07-16 RX ADMIN — Medication 300 MG: at 11:01

## 2024-07-16 RX ADMIN — Medication 10 ML: at 10:55

## 2024-07-16 RX ADMIN — CARVEDILOL 12.5 MG: 6.25 TABLET, FILM COATED ORAL at 17:18

## 2024-07-16 RX ADMIN — EMPAGLIFLOZIN 10 MG: 10 TABLET, FILM COATED ORAL at 11:08

## 2024-07-16 RX ADMIN — POTASSIUM BICARBONATE 20 MEQ: 782 TABLET, EFFERVESCENT ORAL at 20:17

## 2024-07-16 ASSESSMENT — PULMONARY FUNCTION TESTS
PIF_VALUE: 35
PIF_VALUE: 45
PIF_VALUE: 35
PIF_VALUE: 37
PIF_VALUE: 32

## 2024-07-16 ASSESSMENT — PAIN SCALES - WONG BAKER: WONGBAKER_NUMERICALRESPONSE: NO HURT

## 2024-07-16 NOTE — PLAN OF CARE
Problem: Discharge Planning  Goal: Discharge to home or other facility with appropriate resources  Outcome: Progressing     Problem: Pain  Goal: Verbalizes/displays adequate comfort level or baseline comfort level  Outcome: Progressing     Problem: Safety - Adult  Goal: Free from fall injury  Outcome: Progressing     Problem: Respiratory - Adult  Goal: Achieves optimal ventilation and oxygenation  Outcome: Progressing     Problem: Cardiovascular - Adult  Goal: Maintains optimal cardiac output and hemodynamic stability  Outcome: Progressing     Problem: Cardiovascular - Adult  Goal: Absence of cardiac dysrhythmias or at baseline  Outcome: Progressing     Problem: Skin/Tissue Integrity - Adult  Goal: Skin integrity remains intact  Outcome: Progressing     Problem: Skin/Tissue Integrity - Adult  Goal: Incisions, wounds, or drain sites healing without S/S of infection  Outcome: Progressing     Problem: Skin/Tissue Integrity - Adult  Goal: Oral mucous membranes remain intact  Outcome: Progressing     Problem: Musculoskeletal - Adult  Goal: Return mobility to safest level of function  Outcome: Progressing     Problem: Musculoskeletal - Adult  Goal: Maintain proper alignment of affected body part  Outcome: Progressing     Problem: Musculoskeletal - Adult  Goal: Return ADL status to a safe level of function  Outcome: Progressing     Problem: Chronic Conditions and Co-morbidities  Goal: Patient's chronic conditions and co-morbidity symptoms are monitored and maintained or improved  Outcome: Progressing     Problem: Nutrition Deficit:  Goal: Optimize nutritional status  7/16/2024 0418 by Charlie Pan RN  Outcome: Progressing     Problem: Skin/Tissue Integrity  Goal: Absence of new skin breakdown  Description: 1.  Monitor for areas of redness and/or skin breakdown  2.  Assess vascular access sites hourly  3.  Every 4-6 hours minimum:  Change oxygen saturation probe site  4.  Every 4-6 hours:  If on nasal continuous

## 2024-07-16 NOTE — PROGRESS NOTES
[facility] Hospitalist   Progress Note    Admitting Date and Time: 7/9/2024  6:01 AM  Admit Dx: Dehydration [E86.0]  Hyperchloremia [E87.8]  Hypernatremia [E87.0]  Uremic encephalopathy [G93.49, N19]    Subjective:    7/14: Pt more alert this morning. She is able to follow commands. Tachypnea. Loose brown stool . Cleansed with RN. For prep today. And Colonoscopy in am. Noted Respiratory distress this morning. Tachypnea. CXR ordered.    Per RN: Consent obtained Colonoscopy Monday. Prep to be initiated. BP elevated.     7/15: Pt resting comfortably in bed, in no apparent distress. Going for colonoscopy later today.     7/16: Received call from RN last evening-GI holding off on scope to have further discussion with family to ensure that they choose to proceed with scope. Pt resting comfortably in bed, in no apparent distress. Awaiting GI input regarding status of colonoscopy.       [Held by provider] furosemide  20 mg IntraVENous BID    potassium bicarb-citric acid  20 mEq Per G Tube BID    sodium chloride flush  5-40 mL IntraVENous 2 times per day    pantoprazole  40 mg IntraVENous BID    cefiderocol sulfate tosylate (FETROJA) 1,500 mg in sodium chloride 0.9 % 100 mL IVPB  1,500 mg IntraVENous Q8H    [Held by provider] apixaban  2.5 mg Per G Tube BID    arformoterol tartrate  15 mcg Nebulization BID    atorvastatin  10 mg PEG Tube Nightly    budesonide  500 mcg Nebulization Q12H    carvedilol  12.5 mg Oral BID WC    [Held by provider] clopidogrel  75 mg PEG Tube Daily    empagliflozin  10 mg PEG Tube Daily    ferrous Sulfate  300 mg Per G Tube Daily    [Held by provider] midodrine  15 mg Oral TID WC    predniSONE  5 mg PEG Tube Daily    sodium chloride flush  5-40 mL IntraVENous 2 times per day    miconazole nitrate   Topical BID    miconazole   Topical BID     hydrALAZINE, 10 mg, Q6H PRN  glucose, 4 tablet, PRN  dextrose bolus, 125 mL, PRN   Or  dextrose bolus, 250 mL, PRN  glucagon (rDNA), 1 mg,  21.1*   *  --   --  382   MPV 11.7  --   --  11.5     Radiology:   XR CHEST PORTABLE   Final Result      1.  Stable interstitial prominence throughout both lungs related to pulmonary   vascular congestion or peribronchial inflammation.      2.  Slight increased opacities in the lower lung fields and more significant   on the right which may indicate increasing subsegmental atelectasis or   pleural effusions.         CT ABDOMEN PELVIS WO CONTRAST Additional Contrast? Radiologist Recommendation   Final Result   1. No findings of retroperitoneal hematoma.   2. Findings suggestive of fluid overload as demonstrated by small bilateral   pleural effusions, mild ascites and body wall edema.   3. Stable cholelithiasis and left nephrolithiasis.         XR CHEST PORTABLE   Final Result   Mild, patchy bilateral perihilar and interstitial infiltrates suggesting mild   pulmonary edema, mildly increased from prior examination.         XR CHEST PORTABLE    (Results Pending)     Assessment:  Principal Problem:    Hypernatremia  Active Problems:    Chronic respiratory failure with hypoxia ([facility])    Hx of AKA (above knee amputation), left (HCC)    PAF (paroxysmal atrial fibrillation) ([facility])    Ventilator dependent ([facility])    Uremic encephalopathy    Acute on chronic anemia    HFrEF (heart failure with reduced ejection fraction) ([facility])  Resolved Problems:    * No resolved hospital problems. *    Plan:    Hypernatremia: Nephrology following. D5W stopped, free water flushes have been decreased to 30cc q4h. Diuretic placed on hold.    Anemia in a patient with previous GI bleeding: GI following, colonoscopy postponed pending further discussion with family. Recent (6/21) EGD unremarkable, PEG in place with no ulcer, no bleeding source identified. Plavix/Eliquis on hold. 7/9 hemoccult positive, 7/11 hemoccult negative. Continue PPI IV BID. H&H Q8.     Urinary tract infection: Urine culture positive for >100k proteus mirabbilis. Continue  fetroja ->7/17.     Candida auris: From [facility]. Strict isolation.     EVELIA (resolved): Nephrology following. Resolved with IV fluids. Avoid Nephrotoxic agents.     Chronic hypoxic respiratory failure on mechanical ventilation: Pulmonology following, continue assist-control 18, Vt 375, FiO2 70% , PEEP 8. 7/14 cxr noted slight increase in lower lobe opacities, started on lasix as below.    HFrEF: 5/24 ECHO LVEF 30-35%. Global hypokinesis. Grade I diastolic dysfunction Mild Tricuspid Regurgitation. CXR obtained 7/14 for tachypnea-noted slight increase in lower lobe opacities, R>L. Started on lasix 20mg IV BID, but currently on hold for recurrent hypernatremia. Repeat cxr today notes persistent bilat pleural effusions with prominent vascularity & mild cardiomegaly.    Atrial Fibrillation: Holding home apixaban for anemia. Continue Coreg. Telemetry monitoring. Has seen [facility].     Sacral decubitus ulcer: Continue wound care. Branden skin protocol.     Chronic Encephalopathy    Bipolar Disorder/Depression    Hx CVA    Hypotension (resolved) in a patient with HTN: Continue home BB as above. On Midodrine TID, has not received over the last couple of days 2/2 elevated BP will place on hold.  Hydralazine PRN.  Follow.       Code Status: DNR-CCA  DVT Prophylaxis: SCD's. Apixaban on hold    Disposition: From [facility]. 7/15 colonoscopy postponed pending further discussion between GI & family.     NOTE: This report was transcribed using voice recognition software. Every effort was made to ensure accuracy; however, inadvertent computerized transcription errors may be present.     Electronically signed by TROY Zamora NP on 7/16/2024 at 8:24 AM

## 2024-07-16 NOTE — PROGRESS NOTES
Date:  07/16/24  Hospital Day:  Hospital Day: 8  PT Name:  Effie Portillo  MRN:   [id number]  Primary Care Physician: Sheba Sandoval MD  Requesting physician:   García Horton DO  Reason for follow up: antibiotics/infection  Chief Complaint:   Chief Complaint   Patient presents with    Loss of Consciousness     Pt comes to the ED from WNR unresponsive except to painful stimuli. Pt is vent dependent and is sating between 85-88% on 15-20 liters which is not baseline.        Assessment  Effie Portillo is a 79 y.o. year old female who presented on 7/9/2024 and is being treated for Hypernatremia   Chief Complaint   Patient presents with    Loss of Consciousness     Pt comes to the ED from WNR unresponsive except to painful stimuli. Pt is vent dependent and is sating between 85-88% on 15-20 liters which is not baseline.       Id following for   Plan    Pt came in with hypernatremia   Chronic respiratory failure   R/O SEPSIS/HCAP/FUNGEMIA  Leukocytosis better   Hcap CRAB/gpc  Asked mircro for more work up   Proteus bacteruria   DTI SACRAL AND  SKIN TEAR  H/O MDR A BAUMANNII  C AURIS EXPOSURE +  CONT Strict isolation   For C scope          Off micafungin (MYCAMINE)    Cont cefiderocol sulfate tosylate         Continue wound care  Encourage nutritional support  Continue to off load wound/pressure relief bed    Watch for cdiff colitis/clabsi-line infection  Monitor labs  Continue current therapy.  Please see orders for further management and care.    Subjective/HPI:  Effie was seen and examined at bedside today for sepsis    Overnight:  7/16 afebrile on vent nad afebrile ac45% wbc14.2 cr0.7   7/15 afebrile trach vent  not arousable  wbc23.6 cr0.6 spoke with nurse RT to see  7/14 trach vent flushed no rash tachypnea spoke with charge nurse   7/13 trach vent nad   7/12 trach vent open eyes for c scope   7/11 pt is not responsive trach vent   7/10 Pt is not awkae   There are no adverse drug reactions.  All    *  --   --  382   MCV 86.6  --   --  89.0   MCH 26.9  --   --  27.9   MCHC 31.0*  --   --  31.3*   RDW 20.3*  --   --  21.1*   NRBC  --   --   --  1   METASPCT 1  --   --  1   LYMPHOPCT 6*  --   --  0*   MONOPCT 4  --   --  1*   MYELOPCT 3*  --   --  1*   EOSPCT 0  --   --  1   BASOPCT 0  --   --  0   MONOSABS 0.94  --   --  0.13   LYMPHSABS 1.42*  --   --  0.00*   EOSABS 0.00*  --   --  0.13   BASOSABS 0.00  --   --  0.00       Recent Labs     07/14/24  0531 07/15/24  0500 07/16/24  0612    143 148*   K 3.5 3.7 4.0    108* 110*   CO2 25 26 26   BUN 30* 27* 27*   CREATININE 0.6 0.6 0.7   LABGLOM >90 90 88   GLUCOSE 107* 89 117*   CALCIUM 8.8 8.7 8.6       U/A:    Lab Results   Component Value Date/Time    COLORU Yellow 07/09/2024 06:24 AM    PROTEINU 30 07/09/2024 06:24 AM    PHUR 8.5 07/09/2024 06:24 AM    PHUR 5.5 09/10/2023 06:09 PM    WBCUA 6 TO 9 07/09/2024 06:24 AM    RBCUA 0 TO 2 07/09/2024 06:24 AM    MUCUS PRESENT 07/09/2024 06:24 AM    YEAST PRESENT 07/09/2024 06:24 AM    BACTERIA 1+ 07/09/2024 06:24 AM    LEUKOCYTESUR MODERATE 07/09/2024 06:24 AM    UROBILINOGEN 0.2 07/09/2024 06:24 AM    BILIRUBINUR NEGATIVE 07/09/2024 06:24 AM    GLUCOSEU 100 07/09/2024 06:24 AM         XR CHEST PORTABLE    Result Date: 7/9/2024  Mild, patchy bilateral perihilar and interstitial infiltrates suggesting mild pulmonary edema, mildly increased from prior examination.         Imaging and labs were reviewed per medical records and any ID pertinent labs were addressed with the patient.     The patient was educated about the diagnosis, prognosis, indications, risks and benefits of treatment.      Pt had the opportunity to ask questions.  All questions were answered.    Thank you for involving me in the care of Effie Protillo. Please do not hesitate to call for any questions or concerns.    Electronically signed by Allie Villegas MD on 7/16/2024 at 3:06 PM    Phone [phone]  Fax [phone]

## 2024-07-16 NOTE — PROGRESS NOTES
Department of Internal Medicine  Nephrology Attending Consult Note    Events reviewed.      SUBJECTIVE: We are following Mrs. Portillo for hyponatremia.  Patient is nonverbal    PHYSICAL EXAM:      Vitals:    VITALS:  BP (!) 167/70   Pulse 75   Temp 97.4 °F (36.3 °C) (Axillary)   Resp 27   Ht 1.575 m (5' 2.01\")   Wt 57.7 kg (127 lb 3.3 oz)   SpO2 93%   BMI 23.26 kg/m²   24HR INTAKE/OUTPUT:    Intake/Output Summary (Last 24 hours) at 7/16/2024 0902  Last data filed at 7/16/2024 0819  Gross per 24 hour   Intake 1589.09 ml   Output 550 ml   Net 1039.09 ml         Constitutional: Patient is lethargic  HEENT: Pupils are equal reactive  Respiratory: Tracheostomy in place  Cardiovascular/Edema: Heart sounds regular  Gastrointestinal: Abdomen soft, PEG in place  Neurologic: Overall nonfocal  Skin: No lesion  Other: No Edema    Scheduled Meds:   [Held by provider] furosemide  20 mg IntraVENous BID    potassium bicarb-citric acid  20 mEq Per G Tube BID    sodium chloride flush  5-40 mL IntraVENous 2 times per day    pantoprazole  40 mg IntraVENous BID    cefiderocol sulfate tosylate (FETROJA) 1,500 mg in sodium chloride 0.9 % 100 mL IVPB  1,500 mg IntraVENous Q8H    [Held by provider] apixaban  2.5 mg Per G Tube BID    arformoterol tartrate  15 mcg Nebulization BID    atorvastatin  10 mg PEG Tube Nightly    budesonide  500 mcg Nebulization Q12H    carvedilol  12.5 mg Oral BID WC    [Held by provider] clopidogrel  75 mg PEG Tube Daily    empagliflozin  10 mg PEG Tube Daily    ferrous Sulfate  300 mg Per G Tube Daily    [Held by provider] midodrine  15 mg Oral TID WC    predniSONE  5 mg PEG Tube Daily    sodium chloride flush  5-40 mL IntraVENous 2 times per day    miconazole nitrate   Topical BID    miconazole   Topical BID     Continuous Infusions:   dextrose      sodium chloride      sodium chloride 10 mL/hr at 07/11/24 0455     PRN Meds:.hydrALAZINE, glucose, dextrose bolus **OR** dextrose bolus, glucagon (rDNA),

## 2024-07-16 NOTE — PROGRESS NOTES
PROGRESS NOTE  By Neo Artis M.D.    [facility]  Dr. Rosemary Hartman M.D.,  Dr. Phil Benitez M.D.,   Dr. Pato Hernandez D.O.,  Dr. Dayday Trejo M.D.,  OTTO ArellanoO.,          Effie Portillo  79 y.o.  female    SUBJECTIVE:  Remains unresponsive/nonverbal.  No family at bedside    OBJECTIVE:    BP (!) 193/68   Pulse 81   Temp 97.6 °F (36.4 °C) (Infrared)   Resp 25   Ht 1.575 m (5' 2.01\")   Wt 57.7 kg (127 lb 3.3 oz)   SpO2 92%   BMI 23.26 kg/m²     General: Older adult  female.  Nonverbal/unresponsive  HEENT: Anicteric sclera/moist oral mucosa/poor dentition  Neck: Trachea midline/tracheostomy  Chest: Symmetric excursion/mechanically ventilated via tracheostomy  Cor: Regular/S1-S2  Abd.: Soft and obese.  BS +.  PEG tube in place  Extr.:  LLE BKA  Skin: Warm and dry.  Anicteric    DATA:    Monitor data reviewed -sinus rhythm noted.       Lab Results   Component Value Date/Time    WBC 14.2 07/16/2024 06:12 AM    RBC 2.83 07/16/2024 06:12 AM    HGB 7.9 07/16/2024 06:12 AM    HCT 25.2 07/16/2024 06:12 AM    MCV 89.0 07/16/2024 06:12 AM    MCH 27.9 07/16/2024 06:12 AM    MCHC 31.3 07/16/2024 06:12 AM    RDW 21.1 07/16/2024 06:12 AM     07/16/2024 06:12 AM    MPV 11.5 07/16/2024 06:12 AM     Lab Results   Component Value Date/Time     07/16/2024 06:12 AM    K 4.0 07/16/2024 06:12 AM     07/16/2024 06:12 AM    CO2 26 07/16/2024 06:12 AM    BUN 27 07/16/2024 06:12 AM    CREATININE 0.7 07/16/2024 06:12 AM    CALCIUM 8.6 07/16/2024 06:12 AM    BILITOT 0.2 07/09/2024 06:15 AM    ALKPHOS 107 07/09/2024 06:15 AM    AST 18 07/09/2024 06:15 AM    ALT 16 07/09/2024 06:15 AM     No results found for: \"LIPASE\"  No results found for: \"AMYLASE\"      ASSESSMENT/PLAN:  Patient Active Problem List   Diagnosis    Chronic respiratory failure with hypoxia (HCC)    Primary hypertension    History of stroke with residual deficit    History of DVT (deep vein thrombosis)     done

## 2024-07-16 NOTE — PROGRESS NOTES
Palliative Care Department  [phone]  Palliative Care Progress Note  Provider Rachel Smallwood, APRN - CNP     Effie Portillo  [id number]  Hospital Day: 8  Date of Initial Consult: 7/10/2024  Referring Provider: Martin Metz MD  Palliative Medicine was consulted for assistance with: goals of care    HPI:   Effie Portillo is a 79 y.o. with a medical history of Bipolar Disorder, Depression, CVA, COPD, Atrial Fibrillation, CHF, HTN, Ventilator Dependence who was admitted on 7/9/2024 from nursing facility with a CHIEF COMPLAINT of altered mental status. Pt is reportedly less responsive then normal. Pt is ventilator dependent.  The patient has had multiple hospitalizations recently.  Nephrology, GI, and ID consulted.  Palliative medicine consulted for further assistance.    ASSESSMENT/PLAN:     Pertinent Hospital Diagnoses     Chronic hypoxic respiratory failure on mechanical ventilation s/p tracheostomy and PEG  UTI  EVELIA  Hyponatremia  Acute blood loss anemia  GI bleed  Chronic encephalopathy  Aspiration pneumonia      Palliative Care Encounter / Counseling Regarding Goals of Care  Please see detailed goals of care discussion as below  At this time, Effie Portillo, Does Not have capacity for medical decision-making.  Capacity is time limited and situation/question specific  During encounter Parag was surrogate medical decision-maker  Outcome of goals of care meeting:   Continue current medical management  Discussed comfort measures & hospice philosophy  Code status DNR-CCA  Advanced Directives: no POA or living will in [facility]  Surrogate/Legal NOK:  Parag Portillo (child) [phone]    Spiritual assessment: no spiritual distress identified  Bereavement and grief: to be determined  Referrals to: none today  SUBJECTIVE:     Current medical issues leading to Palliative Medicine involvement include   Active Hospital Problems    Diagnosis Date Noted    HFrEF (heart failure with reduced ejection  fraction) (formerly [facility]) [I50.20] 07/15/2024    Acute on chronic anemia [D64.9] 06/22/2024    Uremic encephalopathy [G93.49, N19] 06/21/2024    PAF (paroxysmal atrial fibrillation) (formerly [facility]) [I48.0] 04/13/2024    Ventilator dependent (HCC) [Z99.11] 04/13/2024    Hx of AKA (above knee amputation), left (HCC) [Z89.612] 01/15/2024    Hypernatremia [E87.0] 01/13/2024    Chronic respiratory failure with hypoxia (formerly [facility]) [J96.11] 09/11/2023       Details of Conversation:    Chart reviewed.  Patient seen at the bedside, lethargic.  Patient unable to partake in meaningful conversation and unable to make decisions for herself.  No family present at the bedside.  Update provided by bedside RN.    The patient is scheduled for colonoscopy today and the physicians would like me to discuss goals of care further given patient's poor prognosis and poor quality of life. Call placed to patient's son, Parag.  We discussed patient's current condition.  We discussed despite aggressive medication management and treatment provided the patient continues to decline in health.  The patient's hemoglobin continues to drop and blood thinners have remained on hold due to concern for GI bleed.  Parag states that the patient has had multiple scopes within the last few months which have indicated that she has not had a GI bleed.  He does understand why the patient needs another colonoscopy to evaluate for GI bleed.  Discussed the concern of low hemoglobin and if we are continuing down the path of treatment then colonoscopy would be recommended.  Due to patient's poor prognosis, poor quality of life, advanced age, multiple comorbidities, and continued decline in alertness, recommended transitioning into hospice care.  Parag states that he \"hears me\" but does not feel that the patient is ready to transition into hospice care.  He is not receptive to hospice care and remains unrealistic with the patient's current condition and poor prognosis.  He would like to

## 2024-07-16 NOTE — PROGRESS NOTES
[facility]  Department of Internal Medicine  Division of Pulmonary, Critical Care and Sleep Medicine  Progress Note      Sagar Pretty, APRN-CNP  Azalea Monge, APRN-CNP  ICU Intensivist Attestation:    I saw, examined, and discussed the patient with Azalea SIMMONS-ACN and agree with her assessment and plan.    The patient is essentially at baseline.  However, when we walked in the room, her saturation was 99% on FiO2 of 65%.  We are in the process of weaning the FiO2 to the point where her saturation is 92 to 93%.  If this can be done using an FiO2 of 35 to 40%, she will be a much better candidate for discharge.    Additionally, we will restart her Lasix.  It is unusual for loop diuretics to result in hypernatremia.  She is receiving large amounts of free water per the attending physician with which I agree.  However, she also requires more aggressive CHF treatment because of her bilateral pleural effusions and mild CHF.  We will restart her IV Lasix.  We will also check a chest x-ray tomorrow.    Mental status, as usual, is unchanged.    We will need to watch her hemoglobin which is currently stable at 7.5.  Colonoscopy has been canceled by the GI service.    Electronically signed by Ant Burgos MD on 7/16/2024 at 2:59 PM    Subjective:  Patient seen and examined.  Remains on mechanical ventilation and is unresponsive.  She is breathing over the vent RR 20s.  She had an uneventful night.  No family at bedside.  Chest x-ray was reviewed and noted persistent bilateral pleural effusions. Lasix was held by primary due to elevated Na 148 this morning.  GI is still planning on colonoscopy at some point.   Wbc's are trending down.       OBJECTIVE:     PHYSICAL EXAM:   VITALS:   Vitals:    07/15/24 2045 07/15/24 2351 07/16/24 0415 07/16/24 1015   BP:  exam:->vent, FINDINGS: Patient remains rotated.  Stable heart and mediastinum.  Right-sided PICC line and tracheostomy tube are unchanged. There is prominence of the vasculature and interstitium that has slightly increased but this may be artifactual and related to posture.  No change in bibasilar opacity that may be from layering effusions particularly on the right side.     No significant interval change.  Suspect bilateral pleural effusions and bibasilar areas of consolidation/atelectasis.     XR CHEST PORTABLE    Result Date: 6/22/2024  EXAMINATION: ONE XRAY VIEW OF THE CHEST 6/22/2024 7:37 am COMPARISON: 06/21/2024 HISTORY: ORDERING SYSTEM PROVIDED HISTORY: vent TECHNOLOGIST PROVIDED HISTORY: Reason for exam:->vent FINDINGS: Cardiac silhouette is within normal limits. Pulmonary vasculature is within normal limits. Tracheostomy tube and right PICC line are stable.  There are small bilateral pleural effusions.  Focal increased density at the right lung base may reflect loculated effusion which has increased.  No pneumothorax is identified. Bony structures are unremarkable.     Small bilateral pleural effusions with probable increase in a right pleural effusion.     XR ABDOMEN (KUB) (SINGLE AP VIEW)    Result Date: 6/21/2024  EXAMINATION: ONE SUPINE XRAY VIEW(S) OF THE ABDOMEN 6/21/2024 9:58 am COMPARISON: None. HISTORY: ORDERING SYSTEM PROVIDED HISTORY: distention TECHNOLOGIST PROVIDED HISTORY: Reason for exam:->distention FINDINGS: Patient has a PEG catheter which bowel lumen projects in the area of the body of the stomach.  There is generalized air distension of the bowel including small bowel and colon.  Is also some air distension of the body fundal region stomach. There is a catheter in the bladder projection. Lower lung bases demonstrate a mild to moderate left-sided pleural effusion. The small pleural reaction or effusion on the right base.     Pattern of generalized ileus of the bowel.     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  Patient is currently on assist-control mechanical ventilation with a tidal volume of 375, rate 18, PEEP of 8, FiO2 65%.  Wean FiO2 as able for sats 88 to 94%  Trach care and suctioning as needed  Continue to diurese with lasix-- held due to hypernatremia Na 148 today   Reconcile home medications  Patient was recently on multiple antimicrobial therapy.  Monitor culture results.  ID team is following.  Patient is currently on Fetroja but off micafungin.  Patient's urine culture grew Proteus mirabilis which is multidrug-resistant and sputum is growing Acinetobacter which is likely colonization  Nephrology is managing hypernatremia and EVELIA.  DVT prophylaxis with SCD'S.  Patient is currently on Eliquis 2.5 mg twice daily and Plavix but they are on hold due to suspected GI bleeding.  GI is following for plans for colonoscopy at some point.  Palliative care following   DNR-CCA           TROY Prajapati NP     ICU/PULMONARY Staff Physician note of personal involvement in Care  As the attending physician, I certify that I personally reviewed the patient’s history and personnally examined the patient to confirm the physical findings described above,  And that I reviewed the relevant imaging studies and available reports.  I also discussed the differential diagnosis and all of the proposed management plans with the patient and individuals accompanying the patient to this  visit.  They had the opportunity to ask questions about the proposed management plans and to have those questions answered.     This patient has a high probability of sudden, clinically significant deterioration, which requires the highest level of physician preparedness to intervene urgently.  I managed/supervised life or organ supporting interventions that required frequent physician assessment.   I devoted my full attention to the direct care of this patient for the amount of time indicated below.  Time I spent with the family or surrogate(s) is included only if the patient was incapable of providing the necessary information or participating in medical decisions - Time devoted to teaching and to any procedures I billed separately is not included.    My time caring for this patient including history, physical examination, and medical management and evaluation added up to greater than 50% of the total time spent with this patient, including shared/split visits, should this note  reflect this event.     Critical Care Time: 31 minutes

## 2024-07-17 ENCOUNTER — APPOINTMENT (OUTPATIENT)
Dept: GENERAL RADIOLOGY | Age: 79
DRG: 207 | End: 2024-07-17
Payer: MEDICARE

## 2024-07-17 LAB
ANION GAP SERPL CALCULATED.3IONS-SCNC: 11 MMOL/L (ref 7–16)
BASOPHILS # BLD: 0 K/UL (ref 0–0.2)
BASOPHILS NFR BLD: 0 % (ref 0–2)
BUN SERPL-MCNC: 22 MG/DL (ref 6–23)
CALCIUM SERPL-MCNC: 8.4 MG/DL (ref 8.6–10.2)
CHLORIDE SERPL-SCNC: 103 MMOL/L (ref 98–107)
CO2 SERPL-SCNC: 27 MMOL/L (ref 22–29)
CREAT SERPL-MCNC: 0.7 MG/DL (ref 0.5–1)
EOSINOPHIL # BLD: 0.1 K/UL (ref 0.05–0.5)
EOSINOPHILS RELATIVE PERCENT: 1 % (ref 0–6)
ERYTHROCYTE [DISTWIDTH] IN BLOOD BY AUTOMATED COUNT: 21.6 % (ref 11.5–15)
GFR, ESTIMATED: 89 ML/MIN/1.73M2
GLUCOSE BLD-MCNC: 121 MG/DL (ref 74–99)
GLUCOSE BLD-MCNC: 151 MG/DL (ref 74–99)
GLUCOSE BLD-MCNC: 154 MG/DL (ref 74–99)
GLUCOSE SERPL-MCNC: 125 MG/DL (ref 74–99)
HCT VFR BLD AUTO: 23 % (ref 34–48)
HCT VFR BLD AUTO: 23.1 % (ref 34–48)
HCT VFR BLD AUTO: 23.2 % (ref 34–48)
HCT VFR BLD AUTO: 26.4 % (ref 34–48)
HGB BLD-MCNC: 7 G/DL (ref 11.5–15.5)
HGB BLD-MCNC: 7 G/DL (ref 11.5–15.5)
HGB BLD-MCNC: 7.1 G/DL (ref 11.5–15.5)
HGB BLD-MCNC: 8.2 G/DL (ref 11.5–15.5)
LYMPHOCYTES NFR BLD: 0.91 K/UL (ref 1.5–4)
LYMPHOCYTES RELATIVE PERCENT: 8 % (ref 20–42)
MCH RBC QN AUTO: 27.1 PG (ref 26–35)
MCHC RBC AUTO-ENTMCNC: 30.6 G/DL (ref 32–34.5)
MCV RBC AUTO: 88.5 FL (ref 80–99.9)
METAMYELOCYTES ABSOLUTE COUNT: 0.1 K/UL (ref 0–0.12)
METAMYELOCYTES: 1 % (ref 0–1)
MONOCYTES NFR BLD: 0.3 K/UL (ref 0.1–0.95)
MONOCYTES NFR BLD: 3 % (ref 2–12)
NEUTROPHILS NFR BLD: 88 % (ref 43–80)
NEUTS SEG NFR BLD: 10.09 K/UL (ref 1.8–7.3)
PLATELET # BLD AUTO: 293 K/UL (ref 130–450)
PMV BLD AUTO: 11.4 FL (ref 7–12)
POTASSIUM SERPL-SCNC: 3.6 MMOL/L (ref 3.5–5)
RBC # BLD AUTO: 2.62 M/UL (ref 3.5–5.5)
RBC # BLD: ABNORMAL 10*6/UL
SODIUM SERPL-SCNC: 141 MMOL/L (ref 132–146)
WBC OTHER # BLD: 11.5 K/UL (ref 4.5–11.5)

## 2024-07-17 PROCEDURE — 6370000000 HC RX 637 (ALT 250 FOR IP): Performed by: NURSE PRACTITIONER

## 2024-07-17 PROCEDURE — 85025 COMPLETE CBC W/AUTO DIFF WBC: CPT

## 2024-07-17 PROCEDURE — 6360000002 HC RX W HCPCS: Performed by: INTERNAL MEDICINE

## 2024-07-17 PROCEDURE — 94669 MECHANICAL CHEST WALL OSCILL: CPT

## 2024-07-17 PROCEDURE — 82962 GLUCOSE BLOOD TEST: CPT

## 2024-07-17 PROCEDURE — 6370000000 HC RX 637 (ALT 250 FOR IP): Performed by: INTERNAL MEDICINE

## 2024-07-17 PROCEDURE — 94667 MNPJ CHEST WALL 1ST: CPT

## 2024-07-17 PROCEDURE — 71045 X-RAY EXAM CHEST 1 VIEW: CPT

## 2024-07-17 PROCEDURE — 2580000003 HC RX 258: Performed by: NURSE PRACTITIONER

## 2024-07-17 PROCEDURE — 2060000000 HC ICU INTERMEDIATE R&B

## 2024-07-17 PROCEDURE — 94003 VENT MGMT INPAT SUBQ DAY: CPT

## 2024-07-17 PROCEDURE — 2580000003 HC RX 258: Performed by: INTERNAL MEDICINE

## 2024-07-17 PROCEDURE — 94640 AIRWAY INHALATION TREATMENT: CPT

## 2024-07-17 PROCEDURE — 36415 COLL VENOUS BLD VENIPUNCTURE: CPT

## 2024-07-17 PROCEDURE — 6360000002 HC RX W HCPCS: Performed by: NURSE PRACTITIONER

## 2024-07-17 PROCEDURE — 99233 SBSQ HOSP IP/OBS HIGH 50: CPT | Performed by: INTERNAL MEDICINE

## 2024-07-17 PROCEDURE — 80048 BASIC METABOLIC PNL TOTAL CA: CPT

## 2024-07-17 PROCEDURE — 94668 MNPJ CHEST WALL SBSQ: CPT

## 2024-07-17 PROCEDURE — APPSS30 APP SPLIT SHARED TIME 16-30 MINUTES: Performed by: NURSE PRACTITIONER

## 2024-07-17 RX ORDER — ALBUTEROL SULFATE 2.5 MG/3ML
2.5 SOLUTION RESPIRATORY (INHALATION)
Status: DISCONTINUED | OUTPATIENT
Start: 2024-07-17 | End: 2024-07-19 | Stop reason: HOSPADM

## 2024-07-17 RX ORDER — ACETYLCYSTEINE 100 MG/ML
4 SOLUTION ORAL; RESPIRATORY (INHALATION)
Status: DISCONTINUED | OUTPATIENT
Start: 2024-07-17 | End: 2024-07-19 | Stop reason: HOSPADM

## 2024-07-17 RX ORDER — MORPHINE SULFATE 2 MG/ML
2 INJECTION, SOLUTION INTRAMUSCULAR; INTRAVENOUS EVERY 4 HOURS PRN
Status: DISCONTINUED | OUTPATIENT
Start: 2024-07-17 | End: 2024-07-19 | Stop reason: HOSPADM

## 2024-07-17 RX ADMIN — MORPHINE SULFATE 2 MG: 2 INJECTION, SOLUTION INTRAMUSCULAR; INTRAVENOUS at 15:01

## 2024-07-17 RX ADMIN — Medication 300 MG: at 08:14

## 2024-07-17 RX ADMIN — FUROSEMIDE 20 MG: 10 INJECTION, SOLUTION INTRAMUSCULAR; INTRAVENOUS at 08:14

## 2024-07-17 RX ADMIN — ACETAMINOPHEN 650 MG: 325 TABLET ORAL at 08:14

## 2024-07-17 RX ADMIN — PANTOPRAZOLE SODIUM 40 MG: 40 INJECTION, POWDER, FOR SOLUTION INTRAVENOUS at 08:14

## 2024-07-17 RX ADMIN — ANTI-FUNGAL POWDER MICONAZOLE NITRATE TALC FREE: 1.42 POWDER TOPICAL at 21:00

## 2024-07-17 RX ADMIN — ARFORMOTEROL TARTRATE 15 MCG: 15 SOLUTION RESPIRATORY (INHALATION) at 06:24

## 2024-07-17 RX ADMIN — CARVEDILOL 12.5 MG: 6.25 TABLET, FILM COATED ORAL at 17:51

## 2024-07-17 RX ADMIN — POTASSIUM BICARBONATE 20 MEQ: 782 TABLET, EFFERVESCENT ORAL at 08:14

## 2024-07-17 RX ADMIN — Medication 10 ML: at 08:30

## 2024-07-17 RX ADMIN — MICONAZOLE NITRATE: 2 OINTMENT TOPICAL at 08:30

## 2024-07-17 RX ADMIN — PANTOPRAZOLE SODIUM 40 MG: 40 INJECTION, POWDER, FOR SOLUTION INTRAVENOUS at 20:56

## 2024-07-17 RX ADMIN — CEFIDEROCOL SULFATE TOSYLATE 1500 MG: 1 INJECTION, POWDER, FOR SOLUTION INTRAVENOUS at 04:27

## 2024-07-17 RX ADMIN — BUDESONIDE INHALATION 500 MCG: 0.5 SUSPENSION RESPIRATORY (INHALATION) at 18:14

## 2024-07-17 RX ADMIN — FUROSEMIDE 20 MG: 10 INJECTION, SOLUTION INTRAMUSCULAR; INTRAVENOUS at 17:51

## 2024-07-17 RX ADMIN — POTASSIUM BICARBONATE 20 MEQ: 782 TABLET, EFFERVESCENT ORAL at 20:56

## 2024-07-17 RX ADMIN — ANTI-FUNGAL POWDER MICONAZOLE NITRATE TALC FREE: 1.42 POWDER TOPICAL at 08:30

## 2024-07-17 RX ADMIN — LABETALOL HYDROCHLORIDE 10 MG: 5 INJECTION INTRAVENOUS at 17:58

## 2024-07-17 RX ADMIN — CARVEDILOL 12.5 MG: 6.25 TABLET, FILM COATED ORAL at 08:14

## 2024-07-17 RX ADMIN — Medication 10 ML: at 22:43

## 2024-07-17 RX ADMIN — MICONAZOLE NITRATE: 2 OINTMENT TOPICAL at 21:00

## 2024-07-17 RX ADMIN — ACETYLCYSTEINE 400 MG: 100 INHALANT RESPIRATORY (INHALATION) at 18:14

## 2024-07-17 RX ADMIN — ALBUTEROL SULFATE 2.5 MG: 2.5 SOLUTION RESPIRATORY (INHALATION) at 18:14

## 2024-07-17 RX ADMIN — EMPAGLIFLOZIN 10 MG: 10 TABLET, FILM COATED ORAL at 08:14

## 2024-07-17 RX ADMIN — BUDESONIDE INHALATION 500 MCG: 0.5 SUSPENSION RESPIRATORY (INHALATION) at 06:24

## 2024-07-17 RX ADMIN — ATORVASTATIN CALCIUM 10 MG: 10 TABLET, FILM COATED ORAL at 20:56

## 2024-07-17 RX ADMIN — PREDNISONE 5 MG: 5 TABLET ORAL at 08:14

## 2024-07-17 ASSESSMENT — PULMONARY FUNCTION TESTS
PIF_VALUE: 37
PIF_VALUE: 35
PIF_VALUE: 36
PIF_VALUE: 38
PIF_VALUE: 42
PIF_VALUE: 35

## 2024-07-17 ASSESSMENT — PAIN SCALES - WONG BAKER: WONGBAKER_NUMERICALRESPONSE: NO HURT

## 2024-07-17 ASSESSMENT — PAIN SCALES - GENERAL: PAINLEVEL_OUTOF10: 3

## 2024-07-17 NOTE — PROGRESS NOTES
Spoke with physician, ok to restart tube feeds of original order since colonoscopy is being held at this time

## 2024-07-17 NOTE — PROGRESS NOTES
Date:  07/17/24  Hospital Day:  Hospital Day: 9  PT Name:  Effie Portillo  MRN:   [id number]  Primary Care Physician: Shbea Sandoval MD  Requesting physician:   García Horton DO  Reason for follow up: antibiotics/infection  Chief Complaint:   Chief Complaint   Patient presents with    Loss of Consciousness     Pt comes to the ED from WNR unresponsive except to painful stimuli. Pt is vent dependent and is sating between 85-88% on 15-20 liters which is not baseline.        Assessment  Effie Portillo is a 79 y.o. year old female who presented on 7/9/2024 and is being treated for Hypernatremia   Chief Complaint   Patient presents with    Loss of Consciousness     Pt comes to the ED from WNR unresponsive except to painful stimuli. Pt is vent dependent and is sating between 85-88% on 15-20 liters which is not baseline.       Id following for   Plan    Pt came in with hypernatremia   Chronic respiratory failure   R/O SEPSIS/HCAP/FUNGEMIA  Leukocytosis better   Hcap CRAB/gpc  Asked mircro for more work up   Proteus bacteruria   DTI SACRAL AND  SKIN TEAR  H/O MDR A BAUMANNII  C AURIS EXPOSURE +  CONT Strict isolation   For C scope          Off micafungin (MYCAMINE)    Cont cefiderocol sulfate tosylate      Continue wound care  Encourage nutritional support  Continue to off load wound/pressure relief bed    Watch for cdiff colitis/clabsi-line infection  Monitor labs  Continue current therapy.  Please see orders for further management and care.    Subjective/HPI:  Effie was seen and examined at bedside today for sepsis    Overnight:  7/17 afebrile ac45% not arousable comfortable   7/16 afebrile on vent nad afebrile ac45% wbc14.2 cr0.7   7/15 afebrile trach vent  not arousable  wbc23.6 cr0.6 spoke with nurse RT to see  7/14 trach vent flushed no rash tachypnea spoke with charge nurse   7/13 trach vent nad   7/12 trach vent open eyes for c scope   7/11 pt is not responsive trach vent   7/10 Pt is not awkae  mirabilis                     Culture, Blood 2 [[id number]] Collected: 07/09/24 0624    Order Status: Completed Specimen: Blood Updated: 07/14/24 0928     Specimen Description .BLOOD     Special Requests Site: Blood     Culture NO GROWTH 5 DAYS    Blood Culture 1 [[id number]] Collected: 07/09/24 0624    Order Status: Completed Specimen: Blood Updated: 07/14/24 0928     Specimen Description .BLOOD     Special Requests          Culture NO GROWTH 5 DAYS    Respiratory Panel, Molecular, with COVID-19 (Restricted: peds pts or suitable admitted adults) [[id number]] Collected: 07/09/24 0624    Order Status: Completed Specimen: Nasopharyngeal Swab Updated: 07/09/24 1021     Specimen Description .NASOPHARYNGEAL SWAB     Adenovirus PCR Not Detected     Coronavirus 229E PCR Not Detected     Coronavirus HKU1 PCR Not Detected     Coronavirus NL63 PCR Not Detected     Coronavirus OC43 PCR Not Detected     SARS-CoV-2, PCR Not Detected     Human Metapneumovirus PCR Not Detected     Rhino/Enterovirus PCR Not Detected     Influenza A by PCR Not Detected     Influenza B by PCR Not Detected     Parainfluenza 1 PCR Not Detected     Parainfluenza 2 PCR Not Detected     Parainfluenza 3 PCR Not Detected     Parainfluenza 4 PCR Not Detected     Resp Syncytial Virus PCR Not Detected     Bordetella parapertussis by PCR Not Detected     B Pertussis by PCR Not Detected     Chlamydia pneumoniae By PCR Not Detected     Mycoplasma pneumo by PCR Not Detected     Comment: Performed by multiplexed nucleic acid assay.             No results for input(s): \"SEDRATE\", \"CRP\", \"PROCAL\", \"FERRITIN\", \"LDH\", \"TROPONINI\", \"DDIMER\", \"FIBRINOGEN\", \"INR\", \"PROTIME\", \"AST\", \"ALT\" in the last 72 hours.    No results found for: \"CHOL\", \"TRIG\", \"HDL\"  Lab Results   Component Value Date/Time    VITD25 22.5 (L) 06/26/2024 05:29 AM     Recent Labs     07/15/24  0839 07/15/24  1445 07/16/24  0612 07/16/24  1610 07/16/24  2345 07/17/24  0436 07/17/24  0825   WBC 23.6*  --   14.2*  --   --  11.5  --    HGB 8.6*   < > 7.9*   < > 8.2* 7.1* 7.0*   HCT 27.7*   < > 25.2*   < > 26.4* 23.2* 23.0*   *  --  382  --   --  293  --    MCV 86.6  --  89.0  --   --  88.5  --    MCH 26.9  --  27.9  --   --  27.1  --    MCHC 31.0*  --  31.3*  --   --  30.6*  --    RDW 20.3*  --  21.1*  --   --  21.6*  --    NRBC  --   --  1  --   --   --   --    METASPCT 1  --  1  --   --  1  --    LYMPHOPCT 6*  --  0*  --   --  8*  --    MONOPCT 4  --  1*  --   --  3  --    MYELOPCT 3*  --  1*  --   --   --   --    EOSPCT 0  --  1  --   --  1  --    BASOPCT 0  --  0  --   --  0  --    MONOSABS 0.94  --  0.13  --   --  0.30  --    LYMPHSABS 1.42*  --  0.00*  --   --  0.91*  --    EOSABS 0.00*  --  0.13  --   --  0.10  --    BASOSABS 0.00  --  0.00  --   --  0.00  --     < > = values in this interval not displayed.       Recent Labs     07/15/24  0500 07/16/24  0612 07/17/24  0436    148* 141   K 3.7 4.0 3.6   * 110* 103   CO2 26 26 27   BUN 27* 27* 22   CREATININE 0.6 0.7 0.7   LABGLOM 90 88 89   GLUCOSE 89 117* 125*   CALCIUM 8.7 8.6 8.4*       U/A:    Lab Results   Component Value Date/Time    COLORU Yellow 07/09/2024 06:24 AM    PROTEINU 30 07/09/2024 06:24 AM    PHUR 8.5 07/09/2024 06:24 AM    PHUR 5.5 09/10/2023 06:09 PM    WBCUA 6 TO 9 07/09/2024 06:24 AM    RBCUA 0 TO 2 07/09/2024 06:24 AM    MUCUS PRESENT 07/09/2024 06:24 AM    YEAST PRESENT 07/09/2024 06:24 AM    BACTERIA 1+ 07/09/2024 06:24 AM    LEUKOCYTESUR MODERATE 07/09/2024 06:24 AM    UROBILINOGEN 0.2 07/09/2024 06:24 AM    BILIRUBINUR NEGATIVE 07/09/2024 06:24 AM    GLUCOSEU 100 07/09/2024 06:24 AM         XR CHEST PORTABLE    Result Date: 7/9/2024  Mild, patchy bilateral perihilar and interstitial infiltrates suggesting mild pulmonary edema, mildly increased from prior examination.         Imaging and labs were reviewed per medical records and any ID pertinent labs were addressed with the patient.     The patient was educated about  the diagnosis, prognosis, indications, risks and benefits of treatment.      Pt had the opportunity to ask questions.  All questions were answered.    Thank you for involving me in the care of Effie DUGAN Ernestinameagan. Please do not hesitate to call for any questions or concerns.    Electronically signed by Allie Villegas MD on 7/17/2024 at 2:15 PM    Phone [phone]  Fax [phone]

## 2024-07-17 NOTE — PROGRESS NOTES
This RRT called to patient's bedside for desaturation and increased WOB. Patient SpO2 89% and patient was tachypneic and diaphoretic when arrived at bedside. This RRT bagged patient and suctioned out mucus plug. Patient sat increased to 94% and decreased WOB. RN present during my time spent with this patient.

## 2024-07-17 NOTE — FLOWSHEET NOTE
Inpatient Wound Care    Admit Date: 7/9/2024  6:01 AM    Reason for consult:  coccyx right heel    Significant history:    Past Medical History:   Diagnosis Date    Atrial fibrillation ([facility])     Bipolar disorder ([facility])     Candida auris colonization 01/16/2024    wound    Cerebral artery occlusion with cerebral infarction ([facility])     CHF (congestive heart failure) ([facility])     COPD (chronic obstructive pulmonary disease) ([facility])     COVID-19     Depression     ESBL (extended spectrum beta-lactamase) producing bacteria infection 01/15/2024    wound    GI (gastrointestinal bleed)     History of blood transfusion     Hx of blood clots     venous thrombosis    Hypertension     Infection due to extended spectrum beta-lactamase producing bacteria 09/12/2023    urine and blood    Ischemic cardiomyopathy     MI (myocardial infarction) ([facility])     Multiple drug resistant organism (MDRO) culture positive 01/17/2024    sputum    Pneumonia     Ventilator dependent ([facility])     VRE (vancomycin resistant enterococcus) culture positive 04/13/2024    urine       Wound history:      Findings:     07/17/24 1400   Wound 07/09/24 Heel Right   Date First Assessed/Time First Assessed: 07/09/24 2045   Primary Wound Type: Pressure Injury  Location: Heel  Wound Location Orientation: Right   Wound Image    Wound Etiology Deep tissue/Injury   Dressing/Treatment Open to air   Wound Length (cm) 3 cm   Wound Width (cm) 3 cm   Wound Surface Area (cm^2) 9 cm^2   Change in Wound Size % (l*w) 0   Drainage Amount None (dry)   Odor None   Wound 07/09/24 Coccyx DTI coocyx left right buttocks   Date First Assessed/Time First Assessed: 07/09/24 2045   Present on Original Admission: Yes  Primary Wound Type: Pressure Injury  Location: Coccyx  Wound Description (Comments): DTI coocyx left right buttocks   Wound Image    Wound Etiology Deep tissue/Injury   Wound Length (cm) 1.4 cm   Wound Width (cm) 4 cm   Wound Surface Area (cm^2) 5.6 cm^2   Change in Wound Size % (l*w)

## 2024-07-17 NOTE — PLAN OF CARE
Patient desated throughout the night, suctioned to maintain sats >90. Patient did not tolerate q1h 80ml flushes through peg tube. Restarted tube feeds. All other vitals stable at this time.

## 2024-07-17 NOTE — PROGRESS NOTES
[facility]  Department of Internal Medicine  Division of Pulmonary, Critical Care and Sleep Medicine  Progress Note      Sagar Pretty, APRN-CNP  Azalea Monge, APRN-CNP  ICU Intensivist Attestation:    I saw, examined, and discussed the patient with Azalea SIMMONS-ACN and agree with her assessment and plan.    The patient ran into some difficulty today with desaturation.  Aggressive suctioning was required to clear her airways.  She has been bronchoscoped numerous times and additional bronchoscopies are not likely to make any difference in her overall prognosis.  Therefore, we will decline any further invasive testing, especially considering her very severe encephalopathy.    Since the patient does appear uncomfortable, it is not unreasonable to give her small doses of morphine every 4 hours in order to decrease her work of breathing since she is already on the ventilator with adequate ventilator settings and minute ventilation.    We will still pursue possible discharge by the end of the week.    Electronically signed by Ant Burgos MD on 7/17/2024 at 3:22 PM      Subjective:  Patient seen and examined.  She is currently on mechanical ventilation 18/375/45/8.  She remains unresponsive.  RN at bedside, patient desaturated to 89% early this morning and was reported to be diaphoretic and dyspneic.  She was bagged by respiratory, lavaged and suctioned for mucous plug with improvement.         OBJECTIVE:     PHYSICAL EXAM:   VITALS:   Vitals:    07/17/24 0415 07/17/24 0724 07/17/24 0800 07/17/24 1030   BP: (!) 177/60 (!) 176/62 (!) 138/51 (!) 145/89   Pulse: 70 83 89 59   Resp: 24 (!) 34 22 21   Temp: 97.5 °F (36.4 °C) 97.4 °F (36.3 °C) 97.5 °F (36.4 °C) 98 °F (36.7 °C)   TempSrc: Axillary Axillary     SpO2: 94% 90% 94% 94%   Weight:       Height:             Intake/Output Summary (Last 24 hours) at 7/17/2024 1248  Last data filed at 7/17/2024 1016  Gross per 24 hour   Intake 2038.56 ml   Output 0 ml   Net 2038.56 ml        CONSTITUTIONAL:   Ill appearing, unresponsive   SKIN:     No rash, No suspicious lesions, No skin discoloration  HEENT:     EOMI, MMM, No thrush  NECK:    + tracheostomy, No bruits, No JVP appreciated  CV:      Sinus,  No murmur, No rubs, No gallops  PULMONARY:   Coarse breath sounds,  No Wheezing, No Rales, + Rhonchi      No noted egophony  ABDOMEN:     Soft, non-tender. BS normal. No R/R/G, +PEG  EXT:    No deformities .  No clubbing. Contracted, left AKA        + generalized edema, No venous stasis  PULSE:   Appears equal and palpable.  PSYCHIATRIC:  RAY  MS:    No fractures, No gross weakness  NEUROLOGIC:   Intermittently responds to verbal stimuli     DATA: IMAGING & TESTING:     LABORATORY TESTS:    CBC with Differential:    Lab Results   Component Value Date/Time    WBC 11.5 07/17/2024 04:36 AM    RBC 2.62 07/17/2024 04:36 AM    HGB 7.0 07/17/2024 08:25 AM    HCT 23.0 07/17/2024 08:25 AM     07/17/2024 04:36 AM    MCV 88.5 07/17/2024 04:36 AM    MCH 27.1 07/17/2024 04:36 AM    MCHC 30.6 07/17/2024 04:36 AM    RDW 21.6 07/17/2024 04:36 AM    NRBC 1 07/16/2024 06:12 AM    METASPCT 1 07/17/2024 04:36 AM    LYMPHOPCT 8 07/17/2024 04:36 AM    MONOPCT 3 07/17/2024 04:36 AM    MYELOPCT 1 07/16/2024 06:12 AM    EOSPCT 1 07/17/2024 04:36 AM    BASOPCT 0 07/17/2024 04:36 AM    MONOSABS 0.30 07/17/2024 04:36 AM    LYMPHSABS 0.91 07/17/2024 04:36 AM    EOSABS 0.10 07/17/2024 04:36 AM    BASOSABS 0.00 07/17/2024 04:36 AM     CMP:    Lab Results   Component Value Date/Time     07/17/2024 04:36 AM    K 3.6 07/17/2024 04:36 AM     07/17/2024 04:36 AM    CO2 27 07/17/2024 04:36 AM    BUN 22 07/17/2024 04:36 AM    CREATININE 0.7 07/17/2024 04:36 AM    LABGLOM 89 07/17/2024 04:36 AM    LABGLOM 80 04/17/2024 07:48 AM    GLUCOSE 125  posture.  No change in bibasilar opacity that may be from layering effusions particularly on the right side.     No significant interval change.  Suspect bilateral pleural effusions and bibasilar areas of consolidation/atelectasis.     XR CHEST PORTABLE    Result Date: 6/22/2024  EXAMINATION: ONE XRAY VIEW OF THE CHEST 6/22/2024 7:37 am COMPARISON: 06/21/2024 HISTORY: ORDERING SYSTEM PROVIDED HISTORY: vent TECHNOLOGIST PROVIDED HISTORY: Reason for exam:->vent FINDINGS: Cardiac silhouette is within normal limits. Pulmonary vasculature is within normal limits. Tracheostomy tube and right PICC line are stable.  There are small bilateral pleural effusions.  Focal increased density at the right lung base may reflect loculated effusion which has increased.  No pneumothorax is identified. Bony structures are unremarkable.     Small bilateral pleural effusions with probable increase in a right pleural effusion.     XR ABDOMEN (KUB) (SINGLE AP VIEW)    Result Date: 6/21/2024  EXAMINATION: ONE SUPINE XRAY VIEW(S) OF THE ABDOMEN 6/21/2024 9:58 am COMPARISON: None. HISTORY: ORDERING SYSTEM PROVIDED HISTORY: distention TECHNOLOGIST PROVIDED HISTORY: Reason for exam:->distention FINDINGS: Patient has a PEG catheter which bowel lumen projects in the area of the body of the stomach.  There is generalized air distension of the bowel including small bowel and colon.  Is also some air distension of the body fundal region stomach. There is a catheter in the bladder projection. Lower lung bases demonstrate a mild to moderate left-sided pleural effusion. The small pleural reaction or effusion on the right base.     Pattern of generalized ileus of the bowel.     XR CHEST PORTABLE    Result Date: 6/21/2024  EXAMINATION: ONE XRAY VIEW OF THE CHEST 6/21/2024 6:11 am COMPARISON: 06/21/2024 HISTORY: ORDERING SYSTEM PROVIDED HISTORY: vent TECHNOLOGIST PROVIDED HISTORY: Reason for exam:->vent FINDINGS: Portable chest reveals PICC line catheter

## 2024-07-17 NOTE — PROGRESS NOTES
Department of Internal Medicine  Nephrology Attending Consult Note    Events reviewed.      SUBJECTIVE: We are following Mrs. Portillo for hyponatremia.  Patient is nonverbal    PHYSICAL EXAM:      Vitals:    VITALS:  BP (!) 145/89   Pulse 59   Temp 98 °F (36.7 °C)   Resp 21   Ht 1.575 m (5' 2.01\")   Wt 57.7 kg (127 lb 3.3 oz)   SpO2 94%   BMI 23.26 kg/m²   24HR INTAKE/OUTPUT:    Intake/Output Summary (Last 24 hours) at 7/17/2024 1245  Last data filed at 7/17/2024 1016  Gross per 24 hour   Intake 2038.56 ml   Output 0 ml   Net 2038.56 ml         Constitutional: Patient is lethargic  HEENT: Pupils are equal reactive  Respiratory: Tracheostomy in place  Cardiovascular/Edema: Heart sounds regular  Gastrointestinal: Abdomen soft, PEG in place  Neurologic: Overall nonfocal  Skin: No lesion  Other: No Edema    Scheduled Meds:   furosemide  20 mg IntraVENous BID    potassium bicarb-citric acid  20 mEq Per G Tube BID    sodium chloride flush  5-40 mL IntraVENous 2 times per day    pantoprazole  40 mg IntraVENous BID    [Held by provider] apixaban  2.5 mg Per G Tube BID    arformoterol tartrate  15 mcg Nebulization BID    atorvastatin  10 mg PEG Tube Nightly    budesonide  500 mcg Nebulization Q12H    carvedilol  12.5 mg Oral BID WC    [Held by provider] clopidogrel  75 mg PEG Tube Daily    empagliflozin  10 mg PEG Tube Daily    ferrous Sulfate  300 mg Per G Tube Daily    [Held by provider] midodrine  15 mg Oral TID WC    predniSONE  5 mg PEG Tube Daily    sodium chloride flush  5-40 mL IntraVENous 2 times per day    miconazole nitrate   Topical BID    miconazole   Topical BID     Continuous Infusions:   dextrose 75 mL/hr at 07/17/24 1016    dextrose      sodium chloride      sodium chloride 10 mL/hr at 07/11/24 0455     PRN Meds:.labetalol, glucose, dextrose bolus **OR** dextrose bolus, glucagon (rDNA), dextrose, sodium chloride flush, sodium chloride, ipratropium 0.5 mg-albuterol 2.5 mg, sodium chloride flush,  sodium chloride, ondansetron **OR** ondansetron, polyethylene glycol, acetaminophen **OR** acetaminophen, bisacodyl, white petrolatum      DATA:    CBC:   Lab Results   Component Value Date/Time    WBC 11.5 07/17/2024 04:36 AM    RBC 2.62 07/17/2024 04:36 AM    HGB 7.0 07/17/2024 08:25 AM    HCT 23.0 07/17/2024 08:25 AM    MCV 88.5 07/17/2024 04:36 AM    MCH 27.1 07/17/2024 04:36 AM    MCHC 30.6 07/17/2024 04:36 AM    RDW 21.6 07/17/2024 04:36 AM     07/17/2024 04:36 AM    MPV 11.4 07/17/2024 04:36 AM     CMP:    Lab Results   Component Value Date/Time     07/17/2024 04:36 AM    K 3.6 07/17/2024 04:36 AM     07/17/2024 04:36 AM    CO2 27 07/17/2024 04:36 AM    BUN 22 07/17/2024 04:36 AM    CREATININE 0.7 07/17/2024 04:36 AM    LABGLOM 89 07/17/2024 04:36 AM    LABGLOM 80 04/17/2024 07:48 AM    GLUCOSE 125 07/17/2024 04:36 AM    CALCIUM 8.4 07/17/2024 04:36 AM    BILITOT 0.2 07/09/2024 06:15 AM    ALKPHOS 107 07/09/2024 06:15 AM    AST 18 07/09/2024 06:15 AM    ALT 16 07/09/2024 06:15 AM     Magnesium:    Lab Results   Component Value Date/Time    MG 2.2 07/13/2024 03:00 PM     Phosphorus:    Lab Results   Component Value Date/Time    PHOS 2.7 06/26/2024 05:29 AM     Radiology Review:      XR CHEST PORTABLE 6/23/24    IMPRESSION:  No significant interval change.  Suspect bilateral pleural effusions and  bibasilar areas of consolidation/atelectasis.      BRIEF SUMMARY OF INITIAL CONSULT:    Briefly Mrs. Portillo is a 79-year-old female with history of HTN, CAD status post MI, HFrEF 35%, PAF, emphysema with chronic respiratory failure ventilator dependent, status post tracheostomy, pulmonary hypertension, status post PEG tube placement, Candida aureus colonization, who was admitted on July 9, 2024 after she was referred from [facility] due to altered mental status, after evaluation she was found to have a sodium level of 163 mEq/L, reason for this consultation.  Prior to admission

## 2024-07-17 NOTE — PROGRESS NOTES
PROGRESS NOTE  By Neo Artis M.D.    [facility]  Dr. Rosemary Hartman M.D.,  Dr. Phil Benitez M.D.,   Dr. Pato Hernandez D.O.,  Dr. Dayday Trejo M.D.,  Dr. Cristobal Salcedo DViniO.,          Effie Portillo  79 y.o.  female    SUBJECTIVE:  Remains nonverbal/unresponsive.  No family at bedside    OBJECTIVE:    BP (!) 176/62   Pulse 83   Temp 97.4 °F (36.3 °C) (Axillary)   Resp (!) 34   Ht 1.575 m (5' 2.01\")   Wt 57.7 kg (127 lb 3.3 oz)   SpO2 90%   BMI 23.26 kg/m²     General: NAD/older  female.  Obtunded and unresponsive  HEENT: Anicteric sclera/moist oral mucosa/poor dentition  Neck: Trachea midline/tracheostomy  Chest: Mechanically ventilated via tracheostomy.  Symmetrical excursions.  No wheezing with coarse breath sounds  Cor: Irregular/S1-S2  Abd.: Soft and obese.  PEG tube in place.  BS +/4  Extr.:  BLE edema.  Decreased muscle tone and bulk throughout.  Contractures  Skin: Warm and dry/anicteric      DATA:    Monitor data reviewed -atrial fibrillation noted.       Lab Results   Component Value Date/Time    WBC 11.5 07/17/2024 04:36 AM    RBC 2.62 07/17/2024 04:36 AM    HGB 7.1 07/17/2024 04:36 AM    HCT 23.2 07/17/2024 04:36 AM    MCV 88.5 07/17/2024 04:36 AM    MCH 27.1 07/17/2024 04:36 AM    MCHC 30.6 07/17/2024 04:36 AM    RDW 21.6 07/17/2024 04:36 AM     07/17/2024 04:36 AM    MPV 11.4 07/17/2024 04:36 AM     Lab Results   Component Value Date/Time     07/17/2024 04:36 AM    K 3.6 07/17/2024 04:36 AM     07/17/2024 04:36 AM    CO2 27 07/17/2024 04:36 AM    BUN 22 07/17/2024 04:36 AM    CREATININE 0.7 07/17/2024 04:36 AM    CALCIUM 8.4 07/17/2024 04:36 AM    BILITOT 0.2 07/09/2024 06:15 AM    ALKPHOS 107 07/09/2024 06:15 AM    AST 18 07/09/2024 06:15 AM    ALT 16 07/09/2024 06:15 AM     No results found for: \"LIPASE\"  No results found for: \"AMYLASE\"      ASSESSMENT/PLAN:  Patient Active Problem List   Diagnosis    Chronic respiratory failure with hypoxia  transcription errors may be present.

## 2024-07-17 NOTE — PROGRESS NOTES
[facility] Hospitalist   Progress Note    Admitting Date and Time: 7/9/2024  6:01 AM  Admit Dx: Dehydration [E86.0]  Hyperchloremia [E87.8]  Hypernatremia [E87.0]  Uremic encephalopathy [G93.49, N19]    Subjective:    7/14: Pt more alert this morning. She is able to follow commands. Tachypnea. Loose brown stool . Cleansed with RN. For prep today. And Colonoscopy in am. Noted Respiratory distress this morning. Tachypnea. CXR ordered.    Per RN: Consent obtained Colonoscopy Monday. Prep to be initiated. BP elevated.     7/15: Pt resting comfortably in bed, in no apparent distress. Going for colonoscopy later today.     7/16: Received call from RN last evening-GI holding off on scope to have further discussion with family to ensure that they choose to proceed with scope. Pt resting comfortably in bed, in no apparent distress. Awaiting GI input regarding status of colonoscopy.    7/17: Pt resting comfortably in bed, in no apparent distress. Per RT note, pt desaturated this am & was diaphoretic. She was bagged by RT, lavaged and suctioned for mucous plug with improvement. Son does not want to proceed with colonoscopy at this time. Per Pall Care note-son not currently receptive to Hospice services.        furosemide  20 mg IntraVENous BID    potassium bicarb-citric acid  20 mEq Per G Tube BID    sodium chloride flush  5-40 mL IntraVENous 2 times per day    pantoprazole  40 mg IntraVENous BID    [Held by provider] apixaban  2.5 mg Per G Tube BID    arformoterol tartrate  15 mcg Nebulization BID    atorvastatin  10 mg PEG Tube Nightly    budesonide  500 mcg Nebulization Q12H    carvedilol  12.5 mg Oral BID WC    [Held by provider] clopidogrel  75 mg PEG Tube Daily    empagliflozin  10 mg PEG Tube Daily    ferrous Sulfate  300 mg Per G Tube Daily    [Held by provider] midodrine  15 mg Oral TID     predniSONE  5 mg PEG Tube Daily    sodium chloride flush  5-40 mL IntraVENous 2 times per day    miconazole  23.6*  --  14.2*  --   --  11.5   RBC 3.20*  --  2.83*  --   --  2.62*   HGB 8.6*   < > 7.9* 7.3* 8.2* 7.1*   HCT 27.7*   < > 25.2* 23.3* 26.4* 23.2*   MCV 86.6  --  89.0  --   --  88.5   MCH 26.9  --  27.9  --   --  27.1   MCHC 31.0*  --  31.3*  --   --  30.6*   RDW 20.3*  --  21.1*  --   --  21.6*   *  --  382  --   --  293   MPV 11.7  --  11.5  --   --  11.4    < > = values in this interval not displayed.     Radiology:   XR CHEST PORTABLE   Final Result   1. Persistent blunting of the costophrenic angles with patchy infiltrates   most pronounced at the right lung base.   2. Tracheostomy tube remains in good position.   3. Left-sided PICC line remains in the axillary vein.         XR CHEST PORTABLE   Final Result   1. Persistent bilateral pleural effusions with prominent pulmonary   vascularity and mild cardiomegaly.   2. Tracheostomy tube in good position.         XR CHEST PORTABLE   Final Result      1.  Stable interstitial prominence throughout both lungs related to pulmonary   vascular congestion or peribronchial inflammation.      2.  Slight increased opacities in the lower lung fields and more significant   on the right which may indicate increasing subsegmental atelectasis or   pleural effusions.         CT ABDOMEN PELVIS WO CONTRAST Additional Contrast? Radiologist Recommendation   Final Result   1. No findings of retroperitoneal hematoma.   2. Findings suggestive of fluid overload as demonstrated by small bilateral   pleural effusions, mild ascites and body wall edema.   3. Stable cholelithiasis and left nephrolithiasis.         XR CHEST PORTABLE   Final Result   Mild, patchy bilateral perihilar and interstitial infiltrates suggesting mild   pulmonary edema, mildly increased from prior examination.           Assessment:  Principal Problem:    Hypernatremia  Active Problems:    Chronic respiratory failure with hypoxia (HCC)    Hx of AKA (above knee amputation), left (HCC)    PAF (paroxysmal atrial  fibrillation) ([facility])    Ventilator dependent (HCC)    Uremic encephalopathy    Acute on chronic anemia    HFrEF (heart failure with reduced ejection fraction) ([facility])    Palliative care encounter  Resolved Problems:    * No resolved hospital problems. *    Plan:    Hypernatremia (currently resolved) and acute kidney injury (resolved): Nephrology following. D5W resumed by nephrology for increased in serum Na yesterday. Free water flushes have decreased by Attending to 300cc q6h in light of mild CHF exacerbation.     Anemia in a patient with previous GI bleeding: GI following, son does not want pt to have another coloscopy at this time, he reports several recent scopes which have found no cource of bleeding. Plavix/Eliquis remain on hold. 7/9 hemoccult positive, 7/11 hemoccult negative. Continue PPI IV BID. H&H Q8.     Urinary tract infection: Urine culture positive for >100k proteus mirabbilis. Completed course of Fetroja.     Candida auris: From [facility]. Strict isolation.     Chronic hypoxic respiratory failure on mechanical ventilation: Pulmonology following, continue assist-control 18, Vt 375, FiO2 70%, PEEP 8. 7/14 repeat cxr consistent with mild CHF, diuretics as below.    HFrEF: 5/24 ECHO LVEF 30-35%. Global hypokinesis. Grade I diastolic dysfunction Mild Tricuspid Regurgitation. Serial cxr's consistent with bilat pleural effusions and pt was started on IV diuretics which were briefly held for hypernatremia but have been resumed. Pt is on D5w infusion per nephrology, so Attending reduced free water flushes.    Atrial Fibrillation: Holding home apixaban for anemia. Continue Coreg. Telemetry monitoring. Has seen [facility].     Sacral decubitus ulcer: Continue wound care. Branden skin protocol.     Chronic Encephalopathy    Bipolar Disorder/Depression    Hx CVA    Hypotension (resolved) in a patient with HTN: Continue home BB as above. On Midodrine TID, has not received over the last couple of days

## 2024-07-18 ENCOUNTER — APPOINTMENT (OUTPATIENT)
Dept: GENERAL RADIOLOGY | Age: 79
DRG: 207 | End: 2024-07-18
Payer: MEDICARE

## 2024-07-18 LAB
ANION GAP SERPL CALCULATED.3IONS-SCNC: 11 MMOL/L (ref 7–16)
BUN SERPL-MCNC: 21 MG/DL (ref 6–23)
CALCIUM SERPL-MCNC: 8.4 MG/DL (ref 8.6–10.2)
CHLORIDE SERPL-SCNC: 100 MMOL/L (ref 98–107)
CO2 SERPL-SCNC: 29 MMOL/L (ref 22–29)
CREAT SERPL-MCNC: 0.7 MG/DL (ref 0.5–1)
GFR, ESTIMATED: 89 ML/MIN/1.73M2
GLUCOSE BLD-MCNC: 76 MG/DL (ref 74–99)
GLUCOSE SERPL-MCNC: 136 MG/DL (ref 74–99)
HCT VFR BLD AUTO: 21.8 % (ref 34–48)
HCT VFR BLD AUTO: 22.4 % (ref 34–48)
HCT VFR BLD AUTO: 22.7 % (ref 34–48)
HCT VFR BLD AUTO: 29.1 % (ref 34–48)
HGB BLD-MCNC: 6.7 G/DL (ref 11.5–15.5)
HGB BLD-MCNC: 6.9 G/DL (ref 11.5–15.5)
HGB BLD-MCNC: 7.1 G/DL (ref 11.5–15.5)
HGB BLD-MCNC: 9.6 G/DL (ref 11.5–15.5)
POTASSIUM SERPL-SCNC: 3.8 MMOL/L (ref 3.5–5)
SODIUM SERPL-SCNC: 140 MMOL/L (ref 132–146)

## 2024-07-18 PROCEDURE — 86850 RBC ANTIBODY SCREEN: CPT

## 2024-07-18 PROCEDURE — 86923 COMPATIBILITY TEST ELECTRIC: CPT

## 2024-07-18 PROCEDURE — 85018 HEMOGLOBIN: CPT

## 2024-07-18 PROCEDURE — 2060000000 HC ICU INTERMEDIATE R&B

## 2024-07-18 PROCEDURE — 85014 HEMATOCRIT: CPT

## 2024-07-18 PROCEDURE — 36415 COLL VENOUS BLD VENIPUNCTURE: CPT

## 2024-07-18 PROCEDURE — 6360000002 HC RX W HCPCS: Performed by: NURSE PRACTITIONER

## 2024-07-18 PROCEDURE — 99233 SBSQ HOSP IP/OBS HIGH 50: CPT | Performed by: INTERNAL MEDICINE

## 2024-07-18 PROCEDURE — 94640 AIRWAY INHALATION TREATMENT: CPT

## 2024-07-18 PROCEDURE — P9016 RBC LEUKOCYTES REDUCED: HCPCS

## 2024-07-18 PROCEDURE — 82962 GLUCOSE BLOOD TEST: CPT

## 2024-07-18 PROCEDURE — 86901 BLOOD TYPING SEROLOGIC RH(D): CPT

## 2024-07-18 PROCEDURE — 6370000000 HC RX 637 (ALT 250 FOR IP): Performed by: NURSE PRACTITIONER

## 2024-07-18 PROCEDURE — 71045 X-RAY EXAM CHEST 1 VIEW: CPT

## 2024-07-18 PROCEDURE — 99239 HOSP IP/OBS DSCHRG MGMT >30: CPT | Performed by: NURSE PRACTITIONER

## 2024-07-18 PROCEDURE — 2580000003 HC RX 258: Performed by: NURSE PRACTITIONER

## 2024-07-18 PROCEDURE — 94668 MNPJ CHEST WALL SBSQ: CPT

## 2024-07-18 PROCEDURE — 36430 TRANSFUSION BLD/BLD COMPNT: CPT

## 2024-07-18 PROCEDURE — 94003 VENT MGMT INPAT SUBQ DAY: CPT

## 2024-07-18 PROCEDURE — 94761 N-INVAS EAR/PLS OXIMETRY MLT: CPT

## 2024-07-18 PROCEDURE — 86900 BLOOD TYPING SEROLOGIC ABO: CPT

## 2024-07-18 PROCEDURE — 80048 BASIC METABOLIC PNL TOTAL CA: CPT

## 2024-07-18 PROCEDURE — 6360000002 HC RX W HCPCS: Performed by: INTERNAL MEDICINE

## 2024-07-18 PROCEDURE — 6370000000 HC RX 637 (ALT 250 FOR IP): Performed by: INTERNAL MEDICINE

## 2024-07-18 RX ORDER — GLUCAGON 1 MG/ML
1 KIT INJECTION PRN
Qty: 1 EACH | DISCHARGE
Start: 2024-07-18

## 2024-07-18 RX ORDER — SODIUM CHLORIDE 9 MG/ML
INJECTION, SOLUTION INTRAVENOUS PRN
Status: DISCONTINUED | OUTPATIENT
Start: 2024-07-18 | End: 2024-07-19 | Stop reason: HOSPADM

## 2024-07-18 RX ORDER — FUROSEMIDE 20 MG/1
20 TABLET ORAL 2 TIMES DAILY
Qty: 60 TABLET | Refills: 0 | DISCHARGE
Start: 2024-07-18

## 2024-07-18 RX ORDER — ALBUTEROL SULFATE 2.5 MG/3ML
2.5 SOLUTION RESPIRATORY (INHALATION)
Qty: 120 EACH | Refills: 0 | DISCHARGE
Start: 2024-07-18

## 2024-07-18 RX ADMIN — SODIUM CHLORIDE, PRESERVATIVE FREE 10 ML: 5 INJECTION INTRAVENOUS at 20:35

## 2024-07-18 RX ADMIN — PREDNISONE 5 MG: 5 TABLET ORAL at 08:32

## 2024-07-18 RX ADMIN — POTASSIUM BICARBONATE 20 MEQ: 782 TABLET, EFFERVESCENT ORAL at 20:34

## 2024-07-18 RX ADMIN — PANTOPRAZOLE SODIUM 40 MG: 40 INJECTION, POWDER, FOR SOLUTION INTRAVENOUS at 20:33

## 2024-07-18 RX ADMIN — ACETYLCYSTEINE 400 MG: 100 INHALANT RESPIRATORY (INHALATION) at 09:22

## 2024-07-18 RX ADMIN — BUDESONIDE INHALATION 500 MCG: 0.5 SUSPENSION RESPIRATORY (INHALATION) at 18:38

## 2024-07-18 RX ADMIN — MORPHINE SULFATE 2 MG: 2 INJECTION, SOLUTION INTRAMUSCULAR; INTRAVENOUS at 00:21

## 2024-07-18 RX ADMIN — CARVEDILOL 12.5 MG: 6.25 TABLET, FILM COATED ORAL at 08:32

## 2024-07-18 RX ADMIN — CARVEDILOL 12.5 MG: 6.25 TABLET, FILM COATED ORAL at 18:41

## 2024-07-18 RX ADMIN — EMPAGLIFLOZIN 10 MG: 10 TABLET, FILM COATED ORAL at 08:32

## 2024-07-18 RX ADMIN — Medication 10 ML: at 08:33

## 2024-07-18 RX ADMIN — FUROSEMIDE 20 MG: 10 INJECTION, SOLUTION INTRAMUSCULAR; INTRAVENOUS at 08:33

## 2024-07-18 RX ADMIN — ALBUTEROL SULFATE 2.5 MG: 2.5 SOLUTION RESPIRATORY (INHALATION) at 09:22

## 2024-07-18 RX ADMIN — MICONAZOLE NITRATE: 2 OINTMENT TOPICAL at 08:33

## 2024-07-18 RX ADMIN — Medication 10 ML: at 08:32

## 2024-07-18 RX ADMIN — PANTOPRAZOLE SODIUM 40 MG: 40 INJECTION, POWDER, FOR SOLUTION INTRAVENOUS at 08:33

## 2024-07-18 RX ADMIN — Medication 300 MG: at 08:38

## 2024-07-18 RX ADMIN — ALBUTEROL SULFATE 2.5 MG: 2.5 SOLUTION RESPIRATORY (INHALATION) at 18:38

## 2024-07-18 RX ADMIN — MICONAZOLE NITRATE: 2 OINTMENT TOPICAL at 20:34

## 2024-07-18 RX ADMIN — ACETYLCYSTEINE 400 MG: 100 INHALANT RESPIRATORY (INHALATION) at 18:39

## 2024-07-18 RX ADMIN — ACETYLCYSTEINE 400 MG: 100 INHALANT RESPIRATORY (INHALATION) at 13:19

## 2024-07-18 RX ADMIN — ALBUTEROL SULFATE 2.5 MG: 2.5 SOLUTION RESPIRATORY (INHALATION) at 06:18

## 2024-07-18 RX ADMIN — POTASSIUM BICARBONATE 20 MEQ: 782 TABLET, EFFERVESCENT ORAL at 08:32

## 2024-07-18 RX ADMIN — ATORVASTATIN CALCIUM 10 MG: 10 TABLET, FILM COATED ORAL at 20:34

## 2024-07-18 RX ADMIN — MORPHINE SULFATE 2 MG: 2 INJECTION, SOLUTION INTRAMUSCULAR; INTRAVENOUS at 14:38

## 2024-07-18 RX ADMIN — ALBUTEROL SULFATE 2.5 MG: 2.5 SOLUTION RESPIRATORY (INHALATION) at 13:19

## 2024-07-18 RX ADMIN — Medication 10 ML: at 20:33

## 2024-07-18 RX ADMIN — FUROSEMIDE 20 MG: 10 INJECTION, SOLUTION INTRAMUSCULAR; INTRAVENOUS at 18:41

## 2024-07-18 RX ADMIN — ANTI-FUNGAL POWDER MICONAZOLE NITRATE TALC FREE: 1.42 POWDER TOPICAL at 20:34

## 2024-07-18 RX ADMIN — ANTI-FUNGAL POWDER MICONAZOLE NITRATE TALC FREE: 1.42 POWDER TOPICAL at 08:33

## 2024-07-18 RX ADMIN — BUDESONIDE INHALATION 500 MCG: 0.5 SUSPENSION RESPIRATORY (INHALATION) at 06:18

## 2024-07-18 RX ADMIN — ACETYLCYSTEINE 400 MG: 100 INHALANT RESPIRATORY (INHALATION) at 06:18

## 2024-07-18 ASSESSMENT — PULMONARY FUNCTION TESTS
PIF_VALUE: 33
PIF_VALUE: 33
PIF_VALUE: 29
PIF_VALUE: 32

## 2024-07-18 ASSESSMENT — PAIN SCALES - GENERAL: PAINLEVEL_OUTOF10: 0

## 2024-07-18 ASSESSMENT — PAIN SCALES - WONG BAKER
WONGBAKER_NUMERICALRESPONSE: NO HURT

## 2024-07-18 NOTE — DISCHARGE SUMMARY
[facility] Hospitalist Group   Discharge Summary    Discharge date and time:  7/18/2024  6:22 PM    Follow-up with:     Sheba Sandoval MD  [address]  [facility] [address]  [facility] [address]  [phone]    Follow up      Pato Hernandez DO  [address] [facility] [address] [phone]    Follow up    Activity level: As tolerated    Diet: Diet NPO Exceptions are: Sips of Water with Meds, Other (Specify); Specify Other Exceptions: Clear water flushes with meds.  ADULT ORAL NUTRITION SUPPLEMENT; Breakfast, Lunch, Dinner, HS Snack; Clear Liquid Oral Supplement    Labs:Per PCP    Condition at discharge: Stable, poor prognosis    Dispo: Discharge back to skilled nursing facility      Patient ID:  Effie Portillo 1945  [id number]    Consults:  IP CONSULT TO PULMONOLOGY  IP CONSULT TO NEPHROLOGY  IP CONSULT TO DIETITIAN  IP CONSULT TO INFECTIOUS DISEASES  IP CONSULT TO DIETITIAN  IP CONSULT TO GI  IP CONSULT TO PALLIATIVE CARE  IP CONSULT TO VASCULAR ACCESS TEAM    Procedures: N/A    Hospital Course: Effie Portillo is a 79 y.o. female admitted with severe hypernatremia and EVELIA and acute on chronic anemia requiring blood transfusion. Nephrology & GI followed. IV fluids, free water boluses, and diuretics were adjusted throughout admission and kidney function has stabilized within normal limits and hypernatremia has resolved. GI considered colonoscopy, but son does not wish to pursue aggressive measures. She received 1 unit of PRBC today for Hgb of 6.7.  Pt is stable, though prognosis is poor. \[facility]\"" Care met with son and noted that son does not feel that the pt is ready to transition to hospice care.     Medical problems addressed during this admission:     Acute on chronic respiratory failure with hypoxia, ventilator dependent-mechanical ventilation per pulmonary/critical care.  Current antimicrobial coverage with Fetroja per infectious disease; she will complete course today    Acute on  June 25, 2024. HISTORY: ORDERING SYSTEM PROVIDED HISTORY: sepsis TECHNOLOGIST PROVIDED HISTORY: Reason for exam:->sepsis FINDINGS: Mild, patchy bilateral perihilar and interstitial infiltrates suggesting mild pulmonary edema, mildly increased from prior examination.  No focal disease. No effusion or pneumothorax.  Endotracheal tube remains well position. Osseous and body wall soft tissues unremarkable.  Interim removal of right arm PICC.     Mild, patchy bilateral perihilar and interstitial infiltrates suggesting mild pulmonary edema, mildly increased from prior examination.       Patient Instructions:      Medication List        START taking these medications      albuterol (2.5 MG/3ML) 0.083% nebulizer solution  Commonly known as: PROVENTIL  Take 3 mLs by nebulization 4 times daily     glucagon 1 MG Solr injection  Inject 1 mg into the skin as needed for Low blood sugar (Blood glucose LESS THAN 70 mg/dL and patient NOT ALERT or NPO and does not have IV access.)     potassium bicarb-citric acid 20 MEQ Tbef effervescent tablet  Commonly known as: EFFER-K  1 tablet by Per G Tube route 2 times daily     white petrolatum Oint ointment  Apply topically 2 times daily as needed (dry skin)            CHANGE how you take these medications      furosemide 20 MG tablet  Commonly known as: LASIX  1 tablet by PEG Tube route 2 times daily  What changed: when to take this            CONTINUE taking these medications      atorvastatin 10 MG tablet  Commonly known as: LIPITOR     bisacodyl 10 MG suppository  Commonly known as: DULCOLAX     Brovana 15 MCG/2ML Nebu  Generic drug: arformoterol tartrate     budesonide 0.5 MG/2ML nebulizer suspension  Commonly known as: PULMICORT  Take 2 mLs by nebulization in the morning and 2 mLs in the evening.     carvedilol 12.5 MG tablet  Commonly known as: COREG  Take 1 tablet by mouth 2 times daily (with meals)     empagliflozin 10 MG tablet  Commonly known as: JARDIANCE  1 tablet by PEG Tube  route daily     ferrous sulfate 300 (60 Fe) MG/5ML syrup     Full Kit Nebulizer Set Misc  Use as directed with nebulized medication.     HYDROcodone-acetaminophen 5-325 MG per tablet  Commonly known as: NORCO     ipratropium 0.5 mg-albuterol 2.5 mg 0.5-2.5 (3) MG/3ML Soln nebulizer solution  Commonly known as: DUONEB     * miconazole 2 % powder  Commonly known as: MICOTIN  Apply topically 2 times daily.     * miconazole nitrate 2 % Oint  Apply topically 2 times daily Apply around peg     omeprazole 2 MG/ML Susp 2 mg/mL oral suspension  Commonly known as: PriLOSEC     ondansetron 4 MG tablet  Commonly known as: ZOFRAN     polyethylene glycol 17 g packet  Commonly known as: GLYCOLAX     predniSONE 5 MG tablet  Commonly known as: DELTASONE           * This list has 2 medication(s) that are the same as other medications prescribed for you. Read the directions carefully, and ask your doctor or other care provider to review them with you.                STOP taking these medications      clopidogrel 75 MG tablet  Commonly known as: PLAVIX     Eliquis 5 MG Tabs tablet  Generic drug: apixaban     guaiFENesin 100 MG/5ML liquid  Commonly known as: ROBITUSSIN     isosorbide mononitrate 30 MG extended release tablet  Commonly known as: IMDUR     midodrine 5 MG tablet  Commonly known as: PROAMATINE     nitroGLYCERIN 0.4 MG SL tablet  Commonly known as: NITROSTAT     sodium polystyrene 15 GM/60ML suspension  Commonly known as: KAYEXALATE     sucralfate 1 GM/10ML suspension  Commonly known as: CARAFATE     tobramycin (PF) 300 MG/5ML nebulizer solution  Commonly known as: CHERELLE               Where to Get Your Medications        Information about where to get these medications is not yet available    Ask your nurse or doctor about these medications  albuterol (2.5 MG/3ML) 0.083% nebulizer solution  furosemide 20 MG tablet  glucagon 1 MG Solr injection  potassium bicarb-citric acid 20 MEQ Tbef effervescent tablet  white petrolatum

## 2024-07-18 NOTE — PROGRESS NOTES
Department of Internal Medicine  Nephrology Attending Consult Note    Events reviewed.      SUBJECTIVE: We are following Mrs. Portillo for hyponatremia.  Patient is nonverbal    PHYSICAL EXAM:      Vitals:    VITALS:  /61   Pulse 70   Temp 97.1 °F (36.2 °C) (Infrared)   Resp 30   Ht 1.575 m (5' 2.01\")   Wt 57.7 kg (127 lb 3.3 oz)   SpO2 96%   BMI 23.26 kg/m²   24HR INTAKE/OUTPUT:    Intake/Output Summary (Last 24 hours) at 7/18/2024 1316  Last data filed at 7/18/2024 0542  Gross per 24 hour   Intake 150 ml   Output 1600 ml   Net -1450 ml         Constitutional: Patient is lethargic  HEENT: Pupils are equal reactive  Respiratory: Tracheostomy in place  Cardiovascular/Edema: Heart sounds regular  Gastrointestinal: Abdomen soft, PEG in place  Neurologic: Overall nonfocal  Skin: No lesion  Other: No Edema    Scheduled Meds:   albuterol  2.5 mg Nebulization 4x Daily RT    acetylcysteine  4 mL Inhalation 4x Daily RT    furosemide  20 mg IntraVENous BID    potassium bicarb-citric acid  20 mEq Per G Tube BID    sodium chloride flush  5-40 mL IntraVENous 2 times per day    pantoprazole  40 mg IntraVENous BID    [Held by provider] apixaban  2.5 mg Per G Tube BID    atorvastatin  10 mg PEG Tube Nightly    budesonide  500 mcg Nebulization Q12H    carvedilol  12.5 mg Oral BID WC    [Held by provider] clopidogrel  75 mg PEG Tube Daily    empagliflozin  10 mg PEG Tube Daily    ferrous Sulfate  300 mg Per G Tube Daily    [Held by provider] midodrine  15 mg Oral TID WC    predniSONE  5 mg PEG Tube Daily    sodium chloride flush  5-40 mL IntraVENous 2 times per day    miconazole nitrate   Topical BID    miconazole   Topical BID     Continuous Infusions:   sodium chloride      dextrose      sodium chloride      sodium chloride 10 mL/hr at 07/11/24 0455     PRN Meds:.sodium chloride, morphine, labetalol, glucose, dextrose bolus **OR** dextrose bolus, glucagon (rDNA), dextrose, sodium chloride flush, sodium chloride,

## 2024-07-18 NOTE — PLAN OF CARE
Problem: Respiratory - Adult  Goal: Achieves optimal ventilation and oxygenation  Outcome: Progressing     Problem: Pain  Goal: Verbalizes/displays adequate comfort level or baseline comfort level  Flowsheets (Taken 7/18/2024 2613)  Verbalizes/displays adequate comfort level or baseline comfort level: Assess pain using appropriate pain scale

## 2024-07-18 NOTE — PROGRESS NOTES
[facility]  Department of Internal Medicine  Division of Pulmonary, Critical Care and Sleep Medicine  Progress Note      Sagar Pretty, APRN-CNP  Azalea Monge, APRN-CNP    ICU Intensivist Attestation:    I saw, examined, and discussed the patient with Azalea Monge APRGORDON-ACN and agree with her assessment and plan.      I examined the patient myself at the bedside and we were able to reduce the FiO2 to 45% which is yielding a saturation of 90 to 91%.    I have also personally looked at the chest x-ray from today which is unchanged from the last several chest x-rays and still shows chronic right lower lobe atelectatic changes.    The patient's mental status is unchanged and she is not running any significant fevers.    She is tolerating prednisone 5 mg/day along with aerosolized bronchodilators and inhaled steroids.    She should be acceptable for discharge today following transfusion of 1 unit of packed red blood cells.  Apparently, the family does not want any further investigation into her anemia which reoccurs from time to time.  She should remain on pantoprazole 40 mg per NG twice daily.    I have spoken with the attending physician, the , and numerous nurses and respiratory therapist regarding the above issues and problems.    Electronically signed by Ant Burgos MD on 7/18/2024 at 2:32 PM    Subjective:  Patient seen and examined.  Her respiratory status is unchanged.  She remains unresponsive.  There is no family at the bedside.  Mucolytic's and vest therapy were added yesterday.  She is up to 60% FiO2 today.  This has been increased from 45% yesterday.  Discussed case with RT to try and wean down FiO2.  She has had slight improvement in secretions.    OBJECTIVE:     PHYSICAL EXAM:   VITALS:   Vitals:    07/18/24 0051 07/18/24 0055  effusions. 2. Lines and tubes in good position.     XR CHEST PORTABLE    Result Date: 6/19/2024  EXAMINATION: ONE XRAY VIEW OF THE CHEST 6/19/2024 6:31 am COMPARISON: 06/18/2024 HISTORY: ORDERING SYSTEM PROVIDED HISTORY: vent TECHNOLOGIST PROVIDED HISTORY: Reason for exam:->vent FINDINGS: Cardiac silhouette is within normal limits. Pulmonary vasculature is within normal limits. Lungs are free of infiltrate.  There is a small to moderate left pleural effusion which has increased.  No pneumothorax is identified. Bony structures are unremarkable.Tracheostomy tube and right PICC line are stable.     Interval increase in left pleural effusion.     XR CHEST 1 VIEW    Result Date: 6/18/2024  EXAMINATION: ONE XRAY VIEW OF THE CHEST 6/18/2024 3:48 pm COMPARISON: Comparison June 18 same day performed early at 06:43 a.m.. HISTORY: ORDERING SYSTEM PROVIDED HISTORY: Postprocedural pneumothorax TECHNOLOGIST PROVIDED HISTORY: Reason for exam:->s/p left thora FINDINGS: After left-sided thoracentesis. There is no left-sided pneumothorax. Tracheostomy tube in good position. Right-sided PICC line tip in the SVC right atrial junction. Increased densities seen in the left lower lung base represent a mild to moderate left-sided pleural effusion. Cannot exclude minimal pleural effusion on the right. Discrete prominence of perihilar vessels are seen.     No left-sided pneumothorax.  No right-sided pneumothorax. Mild to moderate left-sided pleural effusion.     IR FLUORO GUIDED CVA DEVICE PLMT/REPLACE/REMOVAL    Result Date: 6/18/2024  PROCEDURE: IR FLUOROSCOPY GUIDED CENTRAL VENOUS ACCESS DEVICE PLACEMENT; US GUIDED VASCULAR ACCESS MODERATE CONSCIOUS SEDATION 6/18/2024 HISTORY: ORDERING SYSTEM PROVIDED HISTORY: llong term abx TECHNOLOGIST PROVIDED HISTORY: Reason for exam:->llong term abx Lumen?->Double Lumen TECHNIQUE: Ultrasound and fluoroscopic guided CONTRAST: None SEDATION: None FLUOROSCOPY DOSE AND TYPE: Radiation Exposure Index:  Fluoroscopy time equals 0.9 minutes.  Total dose equals 1.77 mGy DESCRIPTION OF PROCEDURE: Informed consent was obtained after a detailed explanation of the procedure including risks, benefits, and alternatives.  Universal protocol was observed. Sterile gowns, masks, hats and gloves utilized for maximal sterile barrier. FINDINGS: The patient's right arm was prepped and draped in a sterile fashion using a maximum sterile barrier technique. Following the uneventful administration of lidocaine I introduced a micropuncture needle into the right brachial vein using ultrasound guidance. Through the micropuncture needle a microwire was introduced into the vein. Over the microwire a peel-away sheath was placed within the vein.  A catheter measurement was obtained.  The catheter was then trimmed to 33 cm and introduced over the wire into the right brachial vein.  The catheter tip was placed at the SVC/right atrial junction.  The peel-away sheath was then removed and the PICC line was secured to the skin. A sterile dressing was applied. The patient tolerated the procedure well and no complications. A fluoroscopic image was obtained which confirms position of the PICC line catheter.     Successful placement of a dual lumen power PICC line.     IR ULTRASOUND GUIDANCE VASCULAR ACCESS    Result Date: 6/18/2024  PROCEDURE: IR FLUOROSCOPY GUIDED CENTRAL VENOUS ACCESS DEVICE PLACEMENT; US GUIDED VASCULAR ACCESS MODERATE CONSCIOUS SEDATION 6/18/2024 HISTORY: ORDERING SYSTEM PROVIDED HISTORY: llong term abx TECHNOLOGIST PROVIDED HISTORY: Reason for exam:->llong term abx Lumen?->Double Lumen TECHNIQUE: Ultrasound and fluoroscopic guided CONTRAST: None SEDATION: None FLUOROSCOPY DOSE AND TYPE: Radiation Exposure Index: Fluoroscopy time equals 0.9 minutes.  Total dose equals 1.77 mGy DESCRIPTION OF PROCEDURE: Informed consent was obtained after a detailed explanation of the procedure including risks, benefits, and alternatives.   relevant imaging studies and available reports.  I also discussed the differential diagnosis and all of the proposed management plans with the patient and individuals accompanying the patient to this visit.  They had the opportunity to ask questions about the proposed management plans and to have those questions answered.     This patient has a high probability of sudden, clinically significant deterioration, which requires the highest level of physician preparedness to intervene urgently.  I managed/supervised life or organ supporting interventions that required frequent physician assessment.   I devoted my full attention to the direct care of this patient for the amount of time indicated below.  Time I spent with the family or surrogate(s) is included only if the patient was incapable of providing the necessary information or participating in medical decisions - Time devoted to teaching and to any procedures I billed separately is not included.    My time caring for this patient including history, physical examination, and medical management and evaluation added up to greater than 50% of the total time spent with this patient, including shared/split visits, should this note  reflect this event.

## 2024-07-18 NOTE — PROGRESS NOTES
[facility] Hospitalist   Progress Note    Admitting Date and Time: 7/9/2024  6:01 AM  Admit Dx: Dehydration [E86.0]  Hyperchloremia [E87.8]  Hypernatremia [E87.0]  Uremic encephalopathy [G93.49, N19]    Subjective:    7/14: Pt more alert this morning. She is able to follow commands. Tachypnea. Loose brown stool . Cleansed with RN. For prep today. And Colonoscopy in am. Noted Respiratory distress this morning. Tachypnea. CXR ordered.    Per RN: Consent obtained Colonoscopy Monday. Prep to be initiated. BP elevated.     7/15: Pt resting comfortably in bed, in no apparent distress. Going for colonoscopy later today.     7/16: Received call from RN last evening-GI holding off on scope to have further discussion with family to ensure that they choose to proceed with scope. Pt resting comfortably in bed, in no apparent distress. Awaiting GI input regarding status of colonoscopy.    7/17: Pt resting comfortably in bed, in no apparent distress. Per RT note, pt desaturated this am & was diaphoretic. She was bagged by RT, lavaged and suctioned for mucous plug with improvement. Son does not want to proceed with colonoscopy at this time. Per Pall Care note-son not currently receptive to Hospice services.     7/18: Pt resting comfortably in bed, in no apparent distress. No events reported overnight. 1 unit PRBC ordered for Hgb of 6.7.        albuterol  2.5 mg Nebulization 4x Daily RT    acetylcysteine  4 mL Inhalation 4x Daily RT    furosemide  20 mg IntraVENous BID    potassium bicarb-citric acid  20 mEq Per G Tube BID    sodium chloride flush  5-40 mL IntraVENous 2 times per day    pantoprazole  40 mg IntraVENous BID    [Held by provider] apixaban  2.5 mg Per G Tube BID    atorvastatin  10 mg PEG Tube Nightly    budesonide  500 mcg Nebulization Q12H    carvedilol  12.5 mg Oral BID WC    [Held by provider] clopidogrel  75 mg PEG Tube Daily    empagliflozin  10 mg PEG Tube Daily    ferrous Sulfate  300 mg Per G

## 2024-07-18 NOTE — CONSENT
Informed Consent for Blood Component Transfusion Note    I have discussed with the son the rationale for blood component transfusion; its benefits in treating or preventing fatigue, organ damage, or death; and its risk which includes mild transfusion reactions, rare risk of blood borne infection, or more serious but rare reactions. I have discussed the alternatives to transfusion, including the risk and consequences of not receiving transfusion. The son had an opportunity to ask questions and had agreed to proceed with transfusion of blood components.    Electronically signed by TROY Zamora NP on 7/18/24 at 4:42 PM EDT

## 2024-07-18 NOTE — PROGRESS NOTES
Date:  07/18/24  Hospital Day:  Hospital Day: 10  PT Name:  Effie Portillo  MRN:   [id number]  Primary Care Physician: Sheba Sandoval MD  Requesting physician:   García Horton DO  Reason for follow up: antibiotics/infection  Chief Complaint:   Chief Complaint   Patient presents with    Loss of Consciousness     Pt comes to the ED from WNR unresponsive except to painful stimuli. Pt is vent dependent and is sating between 85-88% on 15-20 liters which is not baseline.        Assessment  Effie Portillo is a 79 y.o. year old female who presented on 7/9/2024 and is being treated for Hypernatremia   Chief Complaint   Patient presents with    Loss of Consciousness     Pt comes to the ED from WNR unresponsive except to painful stimuli. Pt is vent dependent and is sating between 85-88% on 15-20 liters which is not baseline.       Id following for   Plan    Pt came in with hypernatremia   Chronic respiratory failure   R/O SEPSIS/HCAP/FUNGEMIA  Leukocytosis better   Hcap CRAB/gpc  Asked mircro for more work up   Proteus bacteruria   DTI SACRAL AND  SKIN TEAR  H/O MDR A BAUMANNII  C AURIS EXPOSURE +  CONT Strict isolation   For C scope          Off micafungin (MYCAMINE)    cefiderocol sulfate tosylate had 8 days will follow off     Continue wound care  Encourage nutritional support  Continue to off load wound/pressure relief bed    Watch for cdiff colitis/clabsi-line infection  Monitor labs  Continue current therapy.  Please see orders for further management and care.  Discussed with care team    Subjective/HPI:  Effie was seen and examined at bedside today for sepsis    Overnight:  7/18 afebrile trach vent peg not responsive wbc normal hgb7.1   7/17 afebrile ac45% not arousable comfortable   7/16 afebrile on vent nad afebrile ac45% wbc14.2 cr0.7   7/15 afebrile trach vent  not arousable  wbc23.6 cr0.6 spoke with nurse RT to see  7/14 trach vent flushed no rash tachypnea spoke with charge nurse   7/13 trach  tigecycline >=8  Resistant      trimethoprim-sulfamethoxazole >=320  Resistant                   [1]  Cefazolin sensitivity results can be used to predict the effectiveness of oral   cephalosporins (eg. Cephalexin) in uncomplicated Urinary Tract Infections due to E. coli, K.   pneumoniae, and P. mirabilis                     Culture, C Auris Screen [[id number]]  (Abnormal) Collected: 07/09/24 1639    Order Status: Completed Specimen: Axilla Updated: 07/14/24 2029     Specimen Description .AXILLA SWAB     Culture YARELIS AURIS POSITIVE FOR: No further workup      YARELIS KRUSEI POSITIVE FOR: No further workup    Culture, Urine [[id number]]  (Abnormal)  (Susceptibility) Collected: 07/09/24 0630    Order Status: Completed Specimen: Urine, clean catch Updated: 07/12/24 1105     Specimen Description .CLEAN CATCH URINE     Special Requests Site: Urine     Culture PROTEUS MIRABILIS >100,000 CFU/ML Identification by MALDI-TOF Organism is Multi Drug Resistant      MIXED GRAM POSITIVE ORGANISMS <10,000 CFU/ML    Susceptibility        Proteus mirabilis      BACTERIAL SUSCEPTIBILITY PANEL DIANE      ampicillin >=32  Resistant      aztreonam 2  Sensitive      ceFAZolin >=64  Resistant  [1]       cefepime 16  Resistant      cefotaxime >=64  Resistant      cefOXitin 32  Resistant      cefpodoxime proxetil >=8  Resistant      cefTAZidime <=1  Sensitive      Ceftolozane/Tazobactam (Zerbaxa) 1  Sensitive      Cefuroxime axetil >=64  Resistant      Cefuroxime-Sodium >=64  Resistant      ciprofloxacin >=4  Resistant      doxycycline 4  Sensitive      ertapenem <=0.12  Sensitive      meropenem <=0.25  Sensitive      minocycline 16  Resistant      moxifloxacin >=8  Resistant      nitrofurantoin 128  Resistant      piperacillin-tazobactam <=4  Sensitive      tigecycline >=8  Resistant      trimethoprim-sulfamethoxazole >=320  Resistant                   [1]  Cefazolin sensitivity results can be used to predict the effectiveness of oral  labs were reviewed per medical records and any ID pertinent labs were addressed with the patient.     The patient was educated about the diagnosis, prognosis, indications, risks and benefits of treatment.      Pt had the opportunity to ask questions.  All questions were answered.    Thank you for involving me in the care of Effie Portillo. Please do not hesitate to call for any questions or concerns.    Electronically signed by Allie Villegas MD on 7/18/2024 at 11:37 AM    Phone [phone]  Fax [phone]

## 2024-07-18 NOTE — PLAN OF CARE
Problem: Discharge Planning  Goal: Discharge to home or other facility with appropriate resources  Outcome: Progressing     Problem: Pain  Goal: Verbalizes/displays adequate comfort level or baseline comfort level  Outcome: Progressing     Problem: Safety - Adult  Goal: Free from fall injury  Outcome: Progressing     Problem: Respiratory - Adult  Goal: Achieves optimal ventilation and oxygenation  Outcome: Progressing     Problem: Cardiovascular - Adult  Goal: Maintains optimal cardiac output and hemodynamic stability  Outcome: Progressing  Goal: Absence of cardiac dysrhythmias or at baseline  Outcome: Progressing     Problem: Musculoskeletal - Adult  Goal: Return mobility to safest level of function  Outcome: Progressing  Flowsheets (Taken 7/18/2024 0821)  Return Mobility to Safest Level of Function: Assess patient stability and activity tolerance for standing, transferring and ambulating with or without assistive devices  Goal: Maintain proper alignment of affected body part  Outcome: Progressing  Flowsheets (Taken 7/18/2024 0821)  Maintain proper alignment of affected body part: Support and protect limb and body alignment per provider's orders  Goal: Return ADL status to a safe level of function  Outcome: Progressing  Flowsheets (Taken 7/18/2024 0821)  Return ADL Status to a Safe Level of Function: Assess activities of daily living deficits and provide assistive devices as needed     Problem: Chronic Conditions and Co-morbidities  Goal: Patient's chronic conditions and co-morbidity symptoms are monitored and maintained or improved  Outcome: Progressing     Problem: Nutrition Deficit:  Goal: Optimize nutritional status  Outcome: Progressing     Problem: Skin/Tissue Integrity  Goal: Absence of new skin breakdown  Description: 1.  Monitor for areas of redness and/or skin breakdown  2.  Assess vascular access sites hourly  3.  Every 4-6 hours minimum:  Change oxygen saturation probe site  4.  Every 4-6 hours:  If

## 2024-07-18 NOTE — PROGRESS NOTES
PROGRESS NOTE  By Neo Artis M.D.    [facility]  Dr. Rosemary Hartman M.D.,  Dr. Phil Benitez M.D.,   Dr. Pato Hernandez D.O.,  Dr. Dayday Trejo M.D.,  OTTO ArellanoO.,          Effie DUGAN Lindsey  79 y.o.  female    SUBJECTIVE:  Remains nonverbal/obtunded    OBJECTIVE:    BP (!) 142/54   Pulse 70   Temp 97.1 °F (36.2 °C) (Infrared)   Resp 25   Ht 1.575 m (5' 2.01\")   Wt 57.7 kg (127 lb 3.3 oz)   SpO2 97%   BMI 23.26 kg/m²     General: Obtunded/older adult  female  HEENT: Moist oral mucosa/anicteric sclera  Neck: Trachea midline/tracheostomy  Chest: Symmetric excursion/mechanically ventilated via tracheostomy  Cor: Regular  Abd.: Soft and obese.  BS +.  PEG tube in place  Extr.:  LLE BKA.  Skin: Warm and dry/anicteric  Other: Goncalves catheter      DATA:    Monitor data reviewed -atrial fibrillation noted.       Lab Results   Component Value Date/Time    WBC 11.5 07/17/2024 04:36 AM    RBC 2.62 07/17/2024 04:36 AM    HGB 7.1 07/18/2024 02:12 AM    HCT 22.7 07/18/2024 02:12 AM    MCV 88.5 07/17/2024 04:36 AM    MCH 27.1 07/17/2024 04:36 AM    MCHC 30.6 07/17/2024 04:36 AM    RDW 21.6 07/17/2024 04:36 AM     07/17/2024 04:36 AM    MPV 11.4 07/17/2024 04:36 AM     Lab Results   Component Value Date/Time     07/18/2024 04:21 AM    K 3.8 07/18/2024 04:21 AM     07/18/2024 04:21 AM    CO2 29 07/18/2024 04:21 AM    BUN 21 07/18/2024 04:21 AM    CREATININE 0.7 07/18/2024 04:21 AM    CALCIUM 8.4 07/18/2024 04:21 AM    BILITOT 0.2 07/09/2024 06:15 AM    ALKPHOS 107 07/09/2024 06:15 AM    AST 18 07/09/2024 06:15 AM    ALT 16 07/09/2024 06:15 AM     No results found for: \"LIPASE\"  No results found for: \"AMYLASE\"      ASSESSMENT/PLAN:  Patient Active Problem List   Diagnosis    Chronic respiratory failure with hypoxia (HCC)    Primary hypertension    History of stroke with residual deficit    History of DVT (deep vein thrombosis)    Coronary artery disease involving native

## 2024-07-18 NOTE — PROGRESS NOTES
Department of Internal Medicine  Nephrology Attending Consult Note    Events reviewed.      SUBJECTIVE: We are following Mrs. Portillo for hyponatremia.  Patient is nonverbal    PHYSICAL EXAM:      Vitals:    VITALS:  BP (!) 142/54   Pulse 70   Temp 97.1 °F (36.2 °C) (Infrared)   Resp 25   Ht 1.575 m (5' 2.01\")   Wt 57.7 kg (127 lb 3.3 oz)   SpO2 97%   BMI 23.26 kg/m²   24HR INTAKE/OUTPUT:    Intake/Output Summary (Last 24 hours) at 7/18/2024 0910  Last data filed at 7/18/2024 0542  Gross per 24 hour   Intake 2158.56 ml   Output 1600 ml   Net 558.56 ml         Constitutional: Patient is lethargic  HEENT: Pupils are equal reactive  Respiratory: Tracheostomy in place  Cardiovascular/Edema: Heart sounds regular  Gastrointestinal: Abdomen soft, PEG in place  Neurologic: Overall nonfocal  Skin: No lesion  Other: No Edema    Scheduled Meds:   albuterol  2.5 mg Nebulization 4x Daily RT    acetylcysteine  4 mL Inhalation 4x Daily RT    furosemide  20 mg IntraVENous BID    potassium bicarb-citric acid  20 mEq Per G Tube BID    sodium chloride flush  5-40 mL IntraVENous 2 times per day    pantoprazole  40 mg IntraVENous BID    [Held by provider] apixaban  2.5 mg Per G Tube BID    atorvastatin  10 mg PEG Tube Nightly    budesonide  500 mcg Nebulization Q12H    carvedilol  12.5 mg Oral BID WC    [Held by provider] clopidogrel  75 mg PEG Tube Daily    empagliflozin  10 mg PEG Tube Daily    ferrous Sulfate  300 mg Per G Tube Daily    [Held by provider] midodrine  15 mg Oral TID WC    predniSONE  5 mg PEG Tube Daily    sodium chloride flush  5-40 mL IntraVENous 2 times per day    miconazole nitrate   Topical BID    miconazole   Topical BID     Continuous Infusions:   dextrose      sodium chloride      sodium chloride 10 mL/hr at 07/11/24 0455     PRN Meds:.morphine, labetalol, glucose, dextrose bolus **OR** dextrose bolus, glucagon (rDNA), dextrose, sodium chloride flush, sodium chloride, ipratropium 0.5 mg-albuterol 2.5  consultation.  Prior to admission current medication including Lasix 20 mg daily, empagliflozin 10 mg daily, midodrine 5 mg 3 times daily.    IMPRESSION/RECOMMENDATIONS:         Severe hypernatremia, with severe dehydration due to lack of free water administration along with the use of diuretics and SGLT2 inhibitors.  Sodium levels improve with D5W.    EVELIA stage I, volume responsive EVELIA, due to severe dehydration, renal function slowly improving, creatinine down to 0.9 mg/dL.,  Resolved    HFrEF 35%, on carvedilol, empagliflozin  Hypokalemia, 2/2 diuretics, potassium 3.7, resolved  Hypotension, on midodrine  Normocytic anemia, with iron deficiency ferritin 360, iron saturation 11%, folate 16.5, B12 1154    -----------------------------------------------  Ventilator dependent chronic respiratory failure status post tracheostomy  PAF, on apixaban, carvedilol  Hyperlipidemia, on atorvastatin  Nutrition, n.p.o., for colonoscopy, on water flushes 300 cc every 6 hours     Plan:       Discontinue D5W  Continue free water 300 cc every 6 hours  Continue to monitor sodium level  Continue to monitor renal function  Continue midodrine 15 mg p.o. 3 times daily  Continue carvedilol 12.5 mg twice daily         Electronically signed by Marcie Rodrigues MD on 7/18/2024 at 9:10 AM      I saw and evaluated the patient, performing the key elements of the service. I discussed wthe findings , assessment and plan with NP and agree with her findings and plans as documented in her note.     Marcie Rodrigues MD

## 2024-07-18 NOTE — CARE COORDINATION
7-18-Cm note: [facility] scheduled to transport pt via stretcher with Vent (settings given)  at 9:30 PM, not able to outsource-pt is going to get PRBC transfusion prior to dc. Placed call to pt's son to update on transport time, left message with transport time. Electronically signed by Chioma Ragsdale RN on 7/18/2024 at 5:08 PM

## 2024-07-18 NOTE — PROGRESS NOTES
Blood delayed because no consent is in the chart. Call out to POA for consent left voicemail to call back nurses station.

## 2024-07-18 NOTE — PLAN OF CARE
Problem: Musculoskeletal - Adult  Goal: Maintain proper alignment of affected body part  7/18/2024 1904 by Jamari Aburto, RN  Outcome: Progressing  7/18/2024 1902 by Jamari Aburto, RN  Outcome: Progressing  Flowsheets (Taken 7/18/2024 0821)  Maintain proper alignment of affected body part: Support and protect limb and body alignment per provider's orders     Problem: Chronic Conditions and Co-morbidities  Goal: Patient's chronic conditions and co-morbidity symptoms are monitored and maintained or improved  7/18/2024 1904 by Jamari Aburto, RN  Outcome: Progressing  7/18/2024 1902 by Jamari Aburto, RN  Outcome: Progressing

## 2024-07-18 NOTE — CARE COORDINATION
7-18-Cm note: Spoke to Dr. Burgos, pt is back on 45%FI02 and is stable to return to snf, called [facility] , they will call me back to see if they can accept pt today, pt ordered PRBC's so will most likely be a late transport time. Electronically signed by Chioma Ragsdale RN on 7/18/2024 at 2:25 PM    7-18-Cm note: Pt seems to be at a new normal baseline with oxygen requirements (60% Fi02) [facility] is investigting whether pt can return on this much 02 (normally not until <50%fi02), pt is a long term bedhold and can return with NO PRECERT when acceptable to return. Per liaison, the respiratory director at the facility will speak to the respiratory manager here to see if they can come up with a solution to get her back to [facility]. Cm following .Electronically signed by Chioma Ragsdale RN on 7/18/2024 at 10:49 AM

## 2024-07-19 VITALS
HEART RATE: 68 BPM | DIASTOLIC BLOOD PRESSURE: 74 MMHG | WEIGHT: 127.21 LBS | SYSTOLIC BLOOD PRESSURE: 136 MMHG | RESPIRATION RATE: 34 BRPM | OXYGEN SATURATION: 95 % | BODY MASS INDEX: 23.41 KG/M2 | TEMPERATURE: 97.4 F | HEIGHT: 62 IN

## 2024-07-19 LAB
ABO/RH: NORMAL
ANTIBODY SCREEN: NEGATIVE
ARM BAND NUMBER: NORMAL
BLOOD BANK BLOOD PRODUCT EXPIRATION DATE: NORMAL
BLOOD BANK DISPENSE STATUS: NORMAL
BLOOD BANK DISPENSE STATUS: NORMAL
BLOOD BANK ISBT PRODUCT BLOOD TYPE: 5100
BLOOD BANK PRODUCT CODE: NORMAL
BLOOD BANK SAMPLE EXPIRATION: NORMAL
BLOOD BANK UNIT TYPE AND RH: NORMAL
BPU ID: NORMAL
BPU ID: NORMAL
COMPONENT: NORMAL
COMPONENT: NORMAL
CROSSMATCH RESULT: NORMAL
CROSSMATCH RESULT: NORMAL
GLUCOSE BLD-MCNC: 147 MG/DL (ref 74–99)
TRANSFUSION STATUS: NORMAL
TRANSFUSION STATUS: NORMAL
UNIT DIVISION: 0
UNIT DIVISION: 0
UNIT ISSUE DATE/TIME: NORMAL

## 2024-07-19 PROCEDURE — 82962 GLUCOSE BLOOD TEST: CPT

## 2024-07-19 PROCEDURE — 6360000002 HC RX W HCPCS: Performed by: INTERNAL MEDICINE

## 2024-07-19 RX ADMIN — MORPHINE SULFATE 2 MG: 2 INJECTION, SOLUTION INTRAMUSCULAR; INTRAVENOUS at 01:15

## 2024-07-19 ASSESSMENT — PAIN - FUNCTIONAL ASSESSMENT: PAIN_FUNCTIONAL_ASSESSMENT: PREVENTS OR INTERFERES SOME ACTIVE ACTIVITIES AND ADLS

## 2024-07-19 ASSESSMENT — PAIN SCALES - GENERAL: PAINLEVEL_OUTOF10: 0

## 2024-07-19 NOTE — PROGRESS NOTES
[facility] at [facility] to transfer patient to [facility]. Oxygen saturation 96% on 65% FiO2. Patient at 88% on 45% FiO2 earlier. Report called to Tae. All questions answered appropriately. Facility verified with their respiratory therapy department that they would take patient back on higher FiO2 setting. Patient's ventilator (from nursing home) packed and sent with ambulance staff upon discharge. Vital signs stable.

## 2024-07-19 NOTE — PLAN OF CARE
Problem: Discharge Planning  Goal: Discharge to home or other facility with appropriate resources  7/19/2024 0332 by June Gamino RN  Outcome: Completed  7/19/2024 0017 by June Gamino RN  Outcome: Progressing  7/18/2024 1904 by Jamari Aburto RN  Outcome: Progressing  7/18/2024 1902 by Jamari Aburto RN  Outcome: Progressing  Flowsheets (Taken 7/18/2024 1900 by June Gamino RN)  Discharge to home or other facility with appropriate resources:   Identify barriers to discharge with patient and caregiver   Arrange for needed discharge resources and transportation as appropriate   Identify discharge learning needs (meds, wound care, etc)   Refer to discharge planning if patient needs post-hospital services based on physician order or complex needs related to functional status, cognitive ability or social support system     Problem: Pain  Goal: Verbalizes/displays adequate comfort level or baseline comfort level  7/19/2024 0332 by June Gamino RN  Outcome: Completed  7/19/2024 0017 by June Gamino RN  Outcome: Progressing  7/18/2024 1904 by Jamari Aburto RN  Outcome: Progressing  7/18/2024 1902 by Jamari Aburto RN  Outcome: Progressing     Problem: Safety - Adult  Goal: Free from fall injury  7/19/2024 0332 by June Gamino RN  Outcome: Completed  7/19/2024 0017 by June Gamino RN  Outcome: Progressing  7/18/2024 1904 by Jamari Aburto RN  Outcome: Progressing  7/18/2024 1902 by Jamari Aburto RN  Outcome: Progressing     Problem: Respiratory - Adult  Goal: Achieves optimal ventilation and oxygenation  7/19/2024 0332 by June Gamino RN  Outcome: Completed  7/19/2024 0017 by June Gamino RN  Outcome: Progressing  7/18/2024 1904 by Jamari Aburto RN  Outcome: Progressing  7/18/2024 1902 by Jamari Aburto RN  Outcome: Progressing     Problem: Cardiovascular - Adult  Goal: Maintains optimal cardiac output and hemodynamic stability  7/19/2024 0332 by  Ryhal, June, RN  Outcome: Completed  7/19/2024 0017 by June Gamino RN  Outcome: Progressing  7/18/2024 1904 by Jamari Aburto RN  Outcome: Progressing  7/18/2024 1902 by Jamari Aburto RN  Outcome: Progressing  Goal: Absence of cardiac dysrhythmias or at baseline  7/19/2024 0332 by June Gamino RN  Outcome: Completed  7/19/2024 0017 by June Gamino RN  Outcome: Progressing  7/18/2024 1904 by Jamari Aburto RN  Outcome: Progressing  7/18/2024 1902 by Jamari Aburto RN  Outcome: Progressing     Problem: Skin/Tissue Integrity - Adult  Goal: Skin integrity remains intact  7/19/2024 0332 by June Gamino RN  Outcome: Completed  7/18/2024 1904 by Jamari Aburto RN  Outcome: Progressing  7/18/2024 1902 by Jamari Aburto RN  Outcome: Progressing  Flowsheets (Taken 7/18/2024 0821)  Skin Integrity Remains Intact: Monitor for areas of redness and/or skin breakdown  Goal: Incisions, wounds, or drain sites healing without S/S of infection  7/19/2024 0332 by June Gamino RN  Outcome: Completed  7/18/2024 1904 by Jamari Aburto RN  Outcome: Progressing  7/18/2024 1902 by Jamari Aburto RN  Outcome: Progressing  Flowsheets (Taken 7/18/2024 0821)  Incisions, Wounds, or Drain Sites Healing Without Sign and Symptoms of Infection:   Implement wound care per orders   Initiate pressure ulcer prevention bundle as indicated   Initiate isolation precautions as appropriate  Goal: Oral mucous membranes remain intact  7/19/2024 0332 by June Gamino RN  Outcome: Completed  7/18/2024 1904 by Jamari Aburto RN  Outcome: Progressing  7/18/2024 1902 by Jamari Aburto RN  Outcome: Progressing  Flowsheets (Taken 7/18/2024 0821)  Oral Mucous Membranes Remain Intact: Assess oral mucosa and hygiene practices     Problem: Musculoskeletal - Adult  Goal: Return mobility to safest level of function  7/19/2024 0332 by June Gamino, RN  Outcome: Completed  7/18/2024 1904 by  oxygen saturation probe site  4.  Every 4-6 hours:  If on nasal continuous positive airway pressure, respiratory therapy assess nares and determine need for appliance change or resting period.  7/19/2024 0332 by June Gamino RN  Outcome: Completed  7/18/2024 1904 by Jamari Aburto, RN  Outcome: Progressing  7/18/2024 1902 by Jamari Aburto, RN  Outcome: Progressing

## 2024-07-30 LAB
MICROORGANISM SPEC CULT: ABNORMAL
MICROORGANISM SPEC CULT: ABNORMAL
SPECIMEN DESCRIPTION: ABNORMAL

## 2024-09-01 ENCOUNTER — HOSPITAL ENCOUNTER (INPATIENT)
Age: 79
LOS: 9 days | Discharge: HOME | DRG: 870 | End: 2024-09-10
Attending: EMERGENCY MEDICINE | Admitting: INTERNAL MEDICINE
Payer: MEDICARE

## 2024-09-01 ENCOUNTER — APPOINTMENT (OUTPATIENT)
Dept: GENERAL RADIOLOGY | Age: 79
DRG: 870 | End: 2024-09-01
Payer: MEDICARE
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
## 2024-09-01 DIAGNOSIS — J18.9 HOSPITAL-ACQUIRED PNEUMONIA: Primary | ICD-10-CM

## 2024-09-01 DIAGNOSIS — E87.5 HYPERKALEMIA: ICD-10-CM

## 2024-09-01 DIAGNOSIS — J96.01 ACUTE RESPIRATORY FAILURE WITH HYPOXIA (HCC): ICD-10-CM

## 2024-09-01 DIAGNOSIS — N19 UREMIA: ICD-10-CM

## 2024-09-01 PROBLEM — Z93.1 PEG (PERCUTANEOUS ENDOSCOPIC GASTROSTOMY) STATUS (HCC): Status: ACTIVE | Noted: 2024-09-01

## 2024-09-01 PROBLEM — L89.150 PRESSURE INJURY OF SACRAL REGION, UNSTAGEABLE (HCC): Status: ACTIVE | Noted: 2024-09-01

## 2024-09-01 PROBLEM — R34 OLIGOURIA: Status: ACTIVE | Noted: 2024-09-01

## 2024-09-01 PROBLEM — Z93.0 TRACHEOSTOMY DEPENDENCE (HCC): Status: ACTIVE | Noted: 2024-09-01

## 2024-09-01 LAB
ALBUMIN SERPL-MCNC: 2.7 G/DL (ref 3.5–5.2)
ALP SERPL-CCNC: 112 U/L (ref 35–104)
ALT SERPL-CCNC: 13 U/L (ref 0–32)
ANION GAP SERPL CALCULATED.3IONS-SCNC: 6 MMOL/L (ref 7–16)
ANION GAP SERPL CALCULATED.3IONS-SCNC: 8 MMOL/L (ref 7–16)
AST SERPL-CCNC: 16 U/L (ref 0–31)
BACTERIA URNS QL MICRO: ABNORMAL
BASOPHILS # BLD: 0 K/UL (ref 0–0.2)
BASOPHILS NFR BLD: 0 % (ref 0–2)
BILIRUB SERPL-MCNC: 0.3 MG/DL (ref 0–1.2)
BILIRUB UR QL STRIP: NEGATIVE
BNP SERPL-MCNC: 1877 PG/ML (ref 0–450)
BNP SERPL-MCNC: ABNORMAL PG/ML (ref 0–450)
BUN SERPL-MCNC: 103 MG/DL (ref 6–23)
BUN SERPL-MCNC: 87 MG/DL (ref 6–23)
CALCIUM SERPL-MCNC: 10 MG/DL (ref 8.6–10.2)
CALCIUM SERPL-MCNC: 9.4 MG/DL (ref 8.6–10.2)
CHLORIDE SERPL-SCNC: 86 MMOL/L (ref 98–107)
CHLORIDE SERPL-SCNC: 94 MMOL/L (ref 98–107)
CLARITY UR: ABNORMAL
CO2 SERPL-SCNC: 37 MMOL/L (ref 22–29)
CO2 SERPL-SCNC: 39 MMOL/L (ref 22–29)
COLOR UR: YELLOW
CREAT SERPL-MCNC: 1.1 MG/DL (ref 0.5–1)
CREAT SERPL-MCNC: 1.3 MG/DL (ref 0.5–1)
EKG ATRIAL RATE: 93 BPM
EKG P AXIS: 78 DEGREES
EKG P-R INTERVAL: 126 MS
EKG Q-T INTERVAL: 326 MS
EKG QRS DURATION: 88 MS
EKG QTC CALCULATION (BAZETT): 405 MS
EKG R AXIS: 17 DEGREES
EKG T AXIS: 128 DEGREES
EKG VENTRICULAR RATE: 93 BPM
EOSINOPHIL # BLD: 0 K/UL (ref 0.05–0.5)
EOSINOPHILS RELATIVE PERCENT: 0 % (ref 0–6)
ERYTHROCYTE [DISTWIDTH] IN BLOOD BY AUTOMATED COUNT: 16.8 % (ref 11.5–15)
GFR, ESTIMATED: 42 ML/MIN/1.73M2
GFR, ESTIMATED: 52 ML/MIN/1.73M2
GLUCOSE BLD-MCNC: 105 MG/DL (ref 74–99)
GLUCOSE BLD-MCNC: 150 MG/DL (ref 74–99)
GLUCOSE BLD-MCNC: 196 MG/DL (ref 74–99)
GLUCOSE BLD-MCNC: 81 MG/DL (ref 74–99)
GLUCOSE BLD-MCNC: 86 MG/DL (ref 74–99)
GLUCOSE BLD-MCNC: 92 MG/DL (ref 74–99)
GLUCOSE SERPL-MCNC: 109 MG/DL (ref 74–99)
GLUCOSE SERPL-MCNC: 78 MG/DL (ref 74–99)
GLUCOSE UR STRIP-MCNC: 250 MG/DL
HCT VFR BLD AUTO: 27.4 % (ref 34–48)
HGB BLD-MCNC: 8.5 G/DL (ref 11.5–15.5)
HGB UR QL STRIP.AUTO: ABNORMAL
KETONES UR STRIP-MCNC: NEGATIVE MG/DL
LACTATE BLDV-SCNC: 0.7 MMOL/L (ref 0.5–1.9)
LACTATE BLDV-SCNC: 1.6 MMOL/L (ref 0.5–1.9)
LEUKOCYTE ESTERASE UR QL STRIP: ABNORMAL
LYMPHOCYTES NFR BLD: 2.07 K/UL (ref 1.5–4)
LYMPHOCYTES RELATIVE PERCENT: 22 % (ref 20–42)
MCH RBC QN AUTO: 29.6 PG (ref 26–35)
MCHC RBC AUTO-ENTMCNC: 31 G/DL (ref 32–34.5)
MCV RBC AUTO: 95.5 FL (ref 80–99.9)
MONOCYTES NFR BLD: 0.99 K/UL (ref 0.1–0.95)
MONOCYTES NFR BLD: 10 % (ref 2–12)
NEUTROPHILS NFR BLD: 68 % (ref 43–80)
NEUTS SEG NFR BLD: 6.44 K/UL (ref 1.8–7.3)
NITRITE UR QL STRIP: NEGATIVE
PH UR STRIP: 5.5 [PH] (ref 5–9)
PLATELET # BLD AUTO: 285 K/UL (ref 130–450)
PMV BLD AUTO: 12.4 FL (ref 7–12)
POTASSIUM SERPL-SCNC: 5.3 MMOL/L (ref 3.5–5)
POTASSIUM SERPL-SCNC: 6.5 MMOL/L (ref 3.5–5)
PROT SERPL-MCNC: 6.7 G/DL (ref 6.4–8.3)
PROT UR STRIP-MCNC: 100 MG/DL
RBC # BLD AUTO: 2.87 M/UL (ref 3.5–5.5)
RBC # BLD: ABNORMAL 10*6/UL
RBC #/AREA URNS HPF: ABNORMAL /HPF
SODIUM SERPL-SCNC: 133 MMOL/L (ref 132–146)
SODIUM SERPL-SCNC: 137 MMOL/L (ref 132–146)
SP GR UR STRIP: 1.02 (ref 1–1.03)
TROPONIN I SERPL HS-MCNC: 51 NG/L (ref 0–9)
UROBILINOGEN UR STRIP-ACNC: 0.2 EU/DL (ref 0–1)
WBC #/AREA URNS HPF: ABNORMAL /HPF
WBC OTHER # BLD: 9.5 K/UL (ref 4.5–11.5)

## 2024-09-01 PROCEDURE — 80048 BASIC METABOLIC PNL TOTAL CA: CPT

## 2024-09-01 PROCEDURE — 93010 ELECTROCARDIOGRAM REPORT: CPT | Performed by: INTERNAL MEDICINE

## 2024-09-01 PROCEDURE — 36415 COLL VENOUS BLD VENIPUNCTURE: CPT

## 2024-09-01 PROCEDURE — 99223 1ST HOSP IP/OBS HIGH 75: CPT | Performed by: INTERNAL MEDICINE

## 2024-09-01 PROCEDURE — 6360000002 HC RX W HCPCS: Performed by: EMERGENCY MEDICINE

## 2024-09-01 PROCEDURE — 93005 ELECTROCARDIOGRAM TRACING: CPT | Performed by: EMERGENCY MEDICINE

## 2024-09-01 PROCEDURE — 94002 VENT MGMT INPAT INIT DAY: CPT

## 2024-09-01 PROCEDURE — 83605 ASSAY OF LACTIC ACID: CPT

## 2024-09-01 PROCEDURE — 82962 GLUCOSE BLOOD TEST: CPT

## 2024-09-01 PROCEDURE — 6370000000 HC RX 637 (ALT 250 FOR IP): Performed by: EMERGENCY MEDICINE

## 2024-09-01 PROCEDURE — 2580000003 HC RX 258: Performed by: INTERNAL MEDICINE

## 2024-09-01 PROCEDURE — 94640 AIRWAY INHALATION TREATMENT: CPT

## 2024-09-01 PROCEDURE — 83880 ASSAY OF NATRIURETIC PEPTIDE: CPT

## 2024-09-01 PROCEDURE — 2580000003 HC RX 258: Performed by: EMERGENCY MEDICINE

## 2024-09-01 PROCEDURE — 99285 EMERGENCY DEPT VISIT HI MDM: CPT

## 2024-09-01 PROCEDURE — 5A1955Z RESPIRATORY VENTILATION, GREATER THAN 96 CONSECUTIVE HOURS: ICD-10-PCS | Performed by: INTERNAL MEDICINE

## 2024-09-01 PROCEDURE — 85025 COMPLETE CBC W/AUTO DIFF WBC: CPT

## 2024-09-01 PROCEDURE — 87154 CUL TYP ID BLD PTHGN 6+ TRGT: CPT

## 2024-09-01 PROCEDURE — 71045 X-RAY EXAM CHEST 1 VIEW: CPT

## 2024-09-01 PROCEDURE — 6370000000 HC RX 637 (ALT 250 FOR IP): Performed by: INTERNAL MEDICINE

## 2024-09-01 PROCEDURE — 84484 ASSAY OF TROPONIN QUANT: CPT

## 2024-09-01 PROCEDURE — 2060000000 HC ICU INTERMEDIATE R&B

## 2024-09-01 PROCEDURE — 80053 COMPREHEN METABOLIC PANEL: CPT

## 2024-09-01 PROCEDURE — 87040 BLOOD CULTURE FOR BACTERIA: CPT

## 2024-09-01 PROCEDURE — 2500000003 HC RX 250 WO HCPCS: Performed by: EMERGENCY MEDICINE

## 2024-09-01 PROCEDURE — 87077 CULTURE AEROBIC IDENTIFY: CPT

## 2024-09-01 PROCEDURE — 99291 CRITICAL CARE FIRST HOUR: CPT | Performed by: INTERNAL MEDICINE

## 2024-09-01 PROCEDURE — 6360000002 HC RX W HCPCS: Performed by: INTERNAL MEDICINE

## 2024-09-01 PROCEDURE — 87086 URINE CULTURE/COLONY COUNT: CPT

## 2024-09-01 PROCEDURE — 6360000002 HC RX W HCPCS: Performed by: NURSE PRACTITIONER

## 2024-09-01 PROCEDURE — 81001 URINALYSIS AUTO W/SCOPE: CPT

## 2024-09-01 RX ORDER — ACETAMINOPHEN 650 MG/1
650 SUPPOSITORY RECTAL EVERY 6 HOURS PRN
Status: DISCONTINUED | OUTPATIENT
Start: 2024-09-01 | End: 2024-09-10 | Stop reason: HOSPADM

## 2024-09-01 RX ORDER — SODIUM CHLORIDE 9 MG/ML
INJECTION, SOLUTION INTRAVENOUS CONTINUOUS
Status: DISCONTINUED | OUTPATIENT
Start: 2024-09-01 | End: 2024-09-01

## 2024-09-01 RX ORDER — SODIUM CHLORIDE 9 MG/ML
INJECTION, SOLUTION INTRAVENOUS ONCE
Status: DISCONTINUED | OUTPATIENT
Start: 2024-09-01 | End: 2024-09-01

## 2024-09-01 RX ORDER — MAGNESIUM SULFATE IN WATER 40 MG/ML
2000 INJECTION, SOLUTION INTRAVENOUS PRN
Status: DISCONTINUED | OUTPATIENT
Start: 2024-09-01 | End: 2024-09-01

## 2024-09-01 RX ORDER — SODIUM CHLORIDE 9 MG/ML
INJECTION, SOLUTION INTRAVENOUS PRN
Status: DISCONTINUED | OUTPATIENT
Start: 2024-09-01 | End: 2024-09-10 | Stop reason: HOSPADM

## 2024-09-01 RX ORDER — GLUCAGON 1 MG/ML
1 KIT INJECTION PRN
Status: DISCONTINUED | OUTPATIENT
Start: 2024-09-01 | End: 2024-09-03 | Stop reason: SDUPTHER

## 2024-09-01 RX ORDER — SODIUM CHLORIDE 0.9 % (FLUSH) 0.9 %
5-40 SYRINGE (ML) INJECTION PRN
Status: DISCONTINUED | OUTPATIENT
Start: 2024-09-01 | End: 2024-09-10 | Stop reason: HOSPADM

## 2024-09-01 RX ORDER — ARFORMOTEROL TARTRATE 15 UG/2ML
15 SOLUTION RESPIRATORY (INHALATION)
Status: DISCONTINUED | OUTPATIENT
Start: 2024-09-01 | End: 2024-09-10 | Stop reason: HOSPADM

## 2024-09-01 RX ORDER — DEXTROSE, SODIUM CHLORIDE, SODIUM LACTATE, POTASSIUM CHLORIDE, AND CALCIUM CHLORIDE 5; .6; .31; .03; .02 G/100ML; G/100ML; G/100ML; G/100ML; G/100ML
INJECTION, SOLUTION INTRAVENOUS CONTINUOUS
Status: DISCONTINUED | OUTPATIENT
Start: 2024-09-01 | End: 2024-09-06

## 2024-09-01 RX ORDER — FERROUS SULFATE 300 MG/5ML
300 LIQUID (ML) ORAL DAILY
Status: DISCONTINUED | OUTPATIENT
Start: 2024-09-01 | End: 2024-09-10 | Stop reason: HOSPADM

## 2024-09-01 RX ORDER — ONDANSETRON 4 MG/1
4 TABLET, FILM COATED ORAL EVERY 8 HOURS PRN
Status: DISCONTINUED | OUTPATIENT
Start: 2024-09-01 | End: 2024-09-10 | Stop reason: HOSPADM

## 2024-09-01 RX ORDER — POTASSIUM CHLORIDE 1500 MG/1
40 TABLET, EXTENDED RELEASE ORAL PRN
Status: DISCONTINUED | OUTPATIENT
Start: 2024-09-01 | End: 2024-09-01

## 2024-09-01 RX ORDER — SODIUM CHLORIDE 0.9 % (FLUSH) 0.9 %
5-40 SYRINGE (ML) INJECTION EVERY 12 HOURS SCHEDULED
Status: DISCONTINUED | OUTPATIENT
Start: 2024-09-01 | End: 2024-09-10 | Stop reason: HOSPADM

## 2024-09-01 RX ORDER — ACETAMINOPHEN 325 MG/1
650 TABLET ORAL EVERY 6 HOURS PRN
Status: DISCONTINUED | OUTPATIENT
Start: 2024-09-01 | End: 2024-09-10 | Stop reason: HOSPADM

## 2024-09-01 RX ORDER — POTASSIUM CHLORIDE 7.45 MG/ML
10 INJECTION INTRAVENOUS PRN
Status: DISCONTINUED | OUTPATIENT
Start: 2024-09-01 | End: 2024-09-01

## 2024-09-01 RX ORDER — LORAZEPAM 2 MG/1
2 TABLET ORAL EVERY 6 HOURS PRN
COMMUNITY

## 2024-09-01 RX ORDER — ATORVASTATIN CALCIUM 10 MG/1
10 TABLET, FILM COATED ORAL NIGHTLY
Status: DISCONTINUED | OUTPATIENT
Start: 2024-09-01 | End: 2024-09-10 | Stop reason: HOSPADM

## 2024-09-01 RX ORDER — POLYETHYLENE GLYCOL 3350 17 G/17G
17 POWDER, FOR SOLUTION ORAL DAILY PRN
Status: DISCONTINUED | OUTPATIENT
Start: 2024-09-01 | End: 2024-09-10 | Stop reason: HOSPADM

## 2024-09-01 RX ORDER — BUDESONIDE 0.5 MG/2ML
500 INHALANT ORAL EVERY 12 HOURS
Status: DISCONTINUED | OUTPATIENT
Start: 2024-09-01 | End: 2024-09-10 | Stop reason: HOSPADM

## 2024-09-01 RX ORDER — DEXTROSE MONOHYDRATE 100 MG/ML
INJECTION, SOLUTION INTRAVENOUS CONTINUOUS PRN
Status: DISCONTINUED | OUTPATIENT
Start: 2024-09-01 | End: 2024-09-03 | Stop reason: SDUPTHER

## 2024-09-01 RX ORDER — SODIUM CHLORIDE, SODIUM LACTATE, POTASSIUM CHLORIDE, CALCIUM CHLORIDE 600; 310; 30; 20 MG/100ML; MG/100ML; MG/100ML; MG/100ML
INJECTION, SOLUTION INTRAVENOUS CONTINUOUS
Status: DISCONTINUED | OUTPATIENT
Start: 2024-09-01 | End: 2024-09-01

## 2024-09-01 RX ORDER — ENOXAPARIN SODIUM 100 MG/ML
30 INJECTION SUBCUTANEOUS DAILY
Status: DISCONTINUED | OUTPATIENT
Start: 2024-09-01 | End: 2024-09-02

## 2024-09-01 RX ORDER — IPRATROPIUM BROMIDE AND ALBUTEROL SULFATE 2.5; .5 MG/3ML; MG/3ML
1 SOLUTION RESPIRATORY (INHALATION)
Status: DISCONTINUED | OUTPATIENT
Start: 2024-09-01 | End: 2024-09-10 | Stop reason: HOSPADM

## 2024-09-01 RX ORDER — CARVEDILOL 6.25 MG/1
12.5 TABLET ORAL 2 TIMES DAILY WITH MEALS
Status: DISCONTINUED | OUTPATIENT
Start: 2024-09-01 | End: 2024-09-10 | Stop reason: HOSPADM

## 2024-09-01 RX ORDER — ALBUTEROL SULFATE 0.83 MG/ML
10 SOLUTION RESPIRATORY (INHALATION) ONCE
Status: COMPLETED | OUTPATIENT
Start: 2024-09-01 | End: 2024-09-01

## 2024-09-01 RX ORDER — 0.9 % SODIUM CHLORIDE 0.9 %
1000 INTRAVENOUS SOLUTION INTRAVENOUS ONCE
Status: COMPLETED | OUTPATIENT
Start: 2024-09-01 | End: 2024-09-01

## 2024-09-01 RX ORDER — ENOXAPARIN SODIUM 100 MG/ML
30 INJECTION SUBCUTANEOUS DAILY
Status: DISCONTINUED | OUTPATIENT
Start: 2024-09-01 | End: 2024-09-01 | Stop reason: DRUGHIGH

## 2024-09-01 RX ORDER — LANSOPRAZOLE 30 MG/1
30 TABLET, ORALLY DISINTEGRATING, DELAYED RELEASE ORAL
Status: DISCONTINUED | OUTPATIENT
Start: 2024-09-02 | End: 2024-09-10 | Stop reason: HOSPADM

## 2024-09-01 RX ORDER — BISACODYL 10 MG
10 SUPPOSITORY, RECTAL RECTAL DAILY PRN
Status: DISCONTINUED | OUTPATIENT
Start: 2024-09-01 | End: 2024-09-10 | Stop reason: HOSPADM

## 2024-09-01 RX ORDER — CALCIUM GLUCONATE 20 MG/ML
2000 INJECTION, SOLUTION INTRAVENOUS ONCE
Status: COMPLETED | OUTPATIENT
Start: 2024-09-01 | End: 2024-09-01

## 2024-09-01 RX ADMIN — IPRATROPIUM BROMIDE AND ALBUTEROL SULFATE 1 DOSE: .5; 2.5 SOLUTION RESPIRATORY (INHALATION) at 19:09

## 2024-09-01 RX ADMIN — MEROPENEM 1000 MG: 1 INJECTION INTRAVENOUS at 08:30

## 2024-09-01 RX ADMIN — SODIUM CHLORIDE 1000 ML: 9 INJECTION, SOLUTION INTRAVENOUS at 07:13

## 2024-09-01 RX ADMIN — ALBUTEROL SULFATE 10 MG: 2.5 SOLUTION RESPIRATORY (INHALATION) at 07:34

## 2024-09-01 RX ADMIN — ARFORMOTEROL TARTRATE 15 MCG: 15 SOLUTION RESPIRATORY (INHALATION) at 19:09

## 2024-09-01 RX ADMIN — SODIUM CHLORIDE 1000 ML: 9 INJECTION, SOLUTION INTRAVENOUS at 09:23

## 2024-09-01 RX ADMIN — SODIUM ZIRCONIUM CYCLOSILICATE 10 G: 10 POWDER, FOR SUSPENSION ORAL at 00:37

## 2024-09-01 RX ADMIN — WATER 60 MG: 1 INJECTION INTRAMUSCULAR; INTRAVENOUS; SUBCUTANEOUS at 17:21

## 2024-09-01 RX ADMIN — VANCOMYCIN HYDROCHLORIDE 1250 MG: 10 INJECTION, POWDER, LYOPHILIZED, FOR SOLUTION INTRAVENOUS at 11:56

## 2024-09-01 RX ADMIN — ENOXAPARIN SODIUM 30 MG: 100 INJECTION SUBCUTANEOUS at 17:30

## 2024-09-01 RX ADMIN — CALCIUM GLUCONATE 2000 MG: 20 INJECTION, SOLUTION INTRAVENOUS at 08:23

## 2024-09-01 RX ADMIN — MEROPENEM 1000 MG: 1 INJECTION INTRAVENOUS at 17:21

## 2024-09-01 RX ADMIN — ATORVASTATIN CALCIUM 10 MG: 10 TABLET, FILM COATED ORAL at 21:00

## 2024-09-01 RX ADMIN — SODIUM BICARBONATE 50 MEQ: 84 INJECTION INTRAVENOUS at 08:45

## 2024-09-01 RX ADMIN — SODIUM CHLORIDE, POTASSIUM CHLORIDE, SODIUM LACTATE AND CALCIUM CHLORIDE: 600; 310; 30; 20 INJECTION, SOLUTION INTRAVENOUS at 17:33

## 2024-09-01 RX ADMIN — SODIUM CHLORIDE, SODIUM LACTATE, POTASSIUM CHLORIDE, CALCIUM CHLORIDE AND DEXTROSE MONOHYDRATE: 5; 600; 310; 30; 20 INJECTION, SOLUTION INTRAVENOUS at 14:40

## 2024-09-01 RX ADMIN — DEXTROSE MONOHYDRATE 250 ML: 100 INJECTION, SOLUTION INTRAVENOUS at 08:32

## 2024-09-01 RX ADMIN — INSULIN HUMAN 10 UNITS: 100 INJECTION, SOLUTION PARENTERAL at 08:57

## 2024-09-01 RX ADMIN — BUDESONIDE 500 MCG: 0.5 INHALANT RESPIRATORY (INHALATION) at 19:09

## 2024-09-01 ASSESSMENT — PULMONARY FUNCTION TESTS
PIF_VALUE: 29
PIF_VALUE: 37
PIF_VALUE: 30
PIF_VALUE: 32
PIF_VALUE: 29
PIF_VALUE: 34
PIF_VALUE: 36

## 2024-09-01 ASSESSMENT — PAIN SCALES - WONG BAKER: WONGBAKER_NUMERICALRESPONSE: NO HURT

## 2024-09-01 ASSESSMENT — PAIN - FUNCTIONAL ASSESSMENT: PAIN_FUNCTIONAL_ASSESSMENT: CRITICAL CARE PAIN OBSERVATION TOOL (CPOT)

## 2024-09-02 LAB
ALBUMIN SERPL-MCNC: 2.5 G/DL (ref 3.5–5.2)
ALP SERPL-CCNC: 130 U/L (ref 35–104)
ALT SERPL-CCNC: 14 U/L (ref 0–32)
ANION GAP SERPL CALCULATED.3IONS-SCNC: 8 MMOL/L (ref 7–16)
AST SERPL-CCNC: 19 U/L (ref 0–31)
BASOPHILS # BLD: 0 K/UL (ref 0–0.2)
BASOPHILS NFR BLD: 0 % (ref 0–2)
BILIRUB SERPL-MCNC: 0.2 MG/DL (ref 0–1.2)
BUN SERPL-MCNC: 78 MG/DL (ref 6–23)
CALCIUM SERPL-MCNC: 9.4 MG/DL (ref 8.6–10.2)
CHLORIDE SERPL-SCNC: 95 MMOL/L (ref 98–107)
CO2 SERPL-SCNC: 35 MMOL/L (ref 22–29)
CREAT SERPL-MCNC: 1 MG/DL (ref 0.5–1)
DATE LAST DOSE: NORMAL
EOSINOPHIL # BLD: 0 K/UL (ref 0.05–0.5)
EOSINOPHILS RELATIVE PERCENT: 0 % (ref 0–6)
ERYTHROCYTE [DISTWIDTH] IN BLOOD BY AUTOMATED COUNT: 16.6 % (ref 11.5–15)
GFR, ESTIMATED: 59 ML/MIN/1.73M2
GLUCOSE BLD-MCNC: 136 MG/DL (ref 74–99)
GLUCOSE SERPL-MCNC: 126 MG/DL (ref 74–99)
HCT VFR BLD AUTO: 23.1 % (ref 34–48)
HGB BLD-MCNC: 7.1 G/DL (ref 11.5–15.5)
LYMPHOCYTES NFR BLD: 0.25 K/UL (ref 1.5–4)
LYMPHOCYTES RELATIVE PERCENT: 6 % (ref 20–42)
MCH RBC QN AUTO: 29.2 PG (ref 26–35)
MCHC RBC AUTO-ENTMCNC: 30.7 G/DL (ref 32–34.5)
MCV RBC AUTO: 95.1 FL (ref 80–99.9)
METAMYELOCYTES ABSOLUTE COUNT: 0.11 K/UL (ref 0–0.12)
METAMYELOCYTES: 3 % (ref 0–1)
MONOCYTES NFR BLD: 0.11 K/UL (ref 0.1–0.95)
MONOCYTES NFR BLD: 3 % (ref 2–12)
NEUTROPHILS NFR BLD: 89 % (ref 43–80)
NEUTS SEG NFR BLD: 3.54 K/UL (ref 1.8–7.3)
PHOSPHATE SERPL-MCNC: 4 MG/DL (ref 2.5–4.5)
PLATELET # BLD AUTO: 173 K/UL (ref 130–450)
PMV BLD AUTO: 11.2 FL (ref 7–12)
POTASSIUM SERPL-SCNC: 4.7 MMOL/L (ref 3.5–5)
PROCALCITONIN SERPL-MCNC: 0.28 NG/ML (ref 0–0.08)
PROT SERPL-MCNC: 5.9 G/DL (ref 6.4–8.3)
RBC # BLD AUTO: 2.43 M/UL (ref 3.5–5.5)
RBC # BLD: NORMAL 10*6/UL
SODIUM SERPL-SCNC: 138 MMOL/L (ref 132–146)
TME LAST DOSE: NORMAL H
VANCOMYCIN DOSE: NORMAL MG
VANCOMYCIN SERPL-MCNC: 14.9 UG/ML (ref 5–40)
WBC OTHER # BLD: 4 K/UL (ref 4.5–11.5)

## 2024-09-02 PROCEDURE — 94640 AIRWAY INHALATION TREATMENT: CPT

## 2024-09-02 PROCEDURE — 6360000002 HC RX W HCPCS: Performed by: INTERNAL MEDICINE

## 2024-09-02 PROCEDURE — 87040 BLOOD CULTURE FOR BACTERIA: CPT

## 2024-09-02 PROCEDURE — 80053 COMPREHEN METABOLIC PANEL: CPT

## 2024-09-02 PROCEDURE — 6370000000 HC RX 637 (ALT 250 FOR IP): Performed by: INTERNAL MEDICINE

## 2024-09-02 PROCEDURE — 6370000000 HC RX 637 (ALT 250 FOR IP): Performed by: NURSE PRACTITIONER

## 2024-09-02 PROCEDURE — 94003 VENT MGMT INPAT SUBQ DAY: CPT

## 2024-09-02 PROCEDURE — 84100 ASSAY OF PHOSPHORUS: CPT

## 2024-09-02 PROCEDURE — 2500000003 HC RX 250 WO HCPCS: Performed by: INTERNAL MEDICINE

## 2024-09-02 PROCEDURE — 2060000000 HC ICU INTERMEDIATE R&B

## 2024-09-02 PROCEDURE — 97165 OT EVAL LOW COMPLEX 30 MIN: CPT | Performed by: OCCUPATIONAL THERAPIST

## 2024-09-02 PROCEDURE — 85025 COMPLETE CBC W/AUTO DIFF WBC: CPT

## 2024-09-02 PROCEDURE — 36415 COLL VENOUS BLD VENIPUNCTURE: CPT

## 2024-09-02 PROCEDURE — 99233 SBSQ HOSP IP/OBS HIGH 50: CPT | Performed by: INTERNAL MEDICINE

## 2024-09-02 PROCEDURE — 97530 THERAPEUTIC ACTIVITIES: CPT | Performed by: OCCUPATIONAL THERAPIST

## 2024-09-02 PROCEDURE — 2580000003 HC RX 258: Performed by: INTERNAL MEDICINE

## 2024-09-02 PROCEDURE — 84145 PROCALCITONIN (PCT): CPT

## 2024-09-02 PROCEDURE — 99291 CRITICAL CARE FIRST HOUR: CPT | Performed by: INTERNAL MEDICINE

## 2024-09-02 PROCEDURE — 82962 GLUCOSE BLOOD TEST: CPT

## 2024-09-02 PROCEDURE — APPSS30 APP SPLIT SHARED TIME 16-30 MINUTES: Performed by: NURSE PRACTITIONER

## 2024-09-02 PROCEDURE — 80202 ASSAY OF VANCOMYCIN: CPT

## 2024-09-02 PROCEDURE — 6360000002 HC RX W HCPCS: Performed by: NURSE PRACTITIONER

## 2024-09-02 RX ORDER — ENOXAPARIN SODIUM 100 MG/ML
40 INJECTION SUBCUTANEOUS DAILY
Status: DISCONTINUED | OUTPATIENT
Start: 2024-09-02 | End: 2024-09-10 | Stop reason: HOSPADM

## 2024-09-02 RX ADMIN — ANTI-FUNGAL POWDER MICONAZOLE NITRATE TALC FREE: 1.42 POWDER TOPICAL at 08:30

## 2024-09-02 RX ADMIN — Medication 10 ML: at 21:43

## 2024-09-02 RX ADMIN — ATORVASTATIN CALCIUM 10 MG: 10 TABLET, FILM COATED ORAL at 21:40

## 2024-09-02 RX ADMIN — LANSOPRAZOLE 30 MG: 30 TABLET, ORALLY DISINTEGRATING, DELAYED RELEASE ORAL at 05:39

## 2024-09-02 RX ADMIN — BUDESONIDE 500 MCG: 0.5 INHALANT RESPIRATORY (INHALATION) at 06:11

## 2024-09-02 RX ADMIN — BUDESONIDE 500 MCG: 0.5 INHALANT RESPIRATORY (INHALATION) at 18:40

## 2024-09-02 RX ADMIN — CARVEDILOL 12.5 MG: 6.25 TABLET, FILM COATED ORAL at 17:43

## 2024-09-02 RX ADMIN — ANTI-FUNGAL POWDER MICONAZOLE NITRATE TALC FREE: 1.42 POWDER TOPICAL at 00:42

## 2024-09-02 RX ADMIN — VANCOMYCIN HYDROCHLORIDE 750 MG: 10 INJECTION, POWDER, LYOPHILIZED, FOR SOLUTION INTRAVENOUS at 12:13

## 2024-09-02 RX ADMIN — SODIUM CHLORIDE, SODIUM LACTATE, POTASSIUM CHLORIDE, CALCIUM CHLORIDE AND DEXTROSE MONOHYDRATE: 5; 600; 310; 30; 20 INJECTION, SOLUTION INTRAVENOUS at 23:30

## 2024-09-02 RX ADMIN — WATER 20 MG: 1 INJECTION INTRAMUSCULAR; INTRAVENOUS; SUBCUTANEOUS at 11:08

## 2024-09-02 RX ADMIN — SODIUM CHLORIDE, SODIUM LACTATE, POTASSIUM CHLORIDE, CALCIUM CHLORIDE AND DEXTROSE MONOHYDRATE: 5; 600; 310; 30; 20 INJECTION, SOLUTION INTRAVENOUS at 12:11

## 2024-09-02 RX ADMIN — ANTI-FUNGAL POWDER MICONAZOLE NITRATE TALC FREE: 1.42 POWDER TOPICAL at 21:45

## 2024-09-02 RX ADMIN — MICONAZOLE NITRATE: 2 OINTMENT TOPICAL at 21:45

## 2024-09-02 RX ADMIN — IPRATROPIUM BROMIDE AND ALBUTEROL SULFATE 1 DOSE: .5; 2.5 SOLUTION RESPIRATORY (INHALATION) at 14:31

## 2024-09-02 RX ADMIN — MINERAL SUPPLEMENT IRON 300 MG / 5 ML STRENGTH LIQUID 100 PER BOX UNFLAVORED 300 MG: at 11:08

## 2024-09-02 RX ADMIN — MICONAZOLE NITRATE: 2 OINTMENT TOPICAL at 00:41

## 2024-09-02 RX ADMIN — ENOXAPARIN SODIUM 40 MG: 100 INJECTION SUBCUTANEOUS at 17:43

## 2024-09-02 RX ADMIN — IPRATROPIUM BROMIDE AND ALBUTEROL SULFATE 1 DOSE: .5; 2.5 SOLUTION RESPIRATORY (INHALATION) at 18:40

## 2024-09-02 RX ADMIN — MEROPENEM 1000 MG: 1 INJECTION INTRAVENOUS at 17:44

## 2024-09-02 RX ADMIN — ARFORMOTEROL TARTRATE 15 MCG: 15 SOLUTION RESPIRATORY (INHALATION) at 06:11

## 2024-09-02 RX ADMIN — WATER 20 MG: 1 INJECTION INTRAMUSCULAR; INTRAVENOUS; SUBCUTANEOUS at 15:38

## 2024-09-02 RX ADMIN — WATER 20 MG: 1 INJECTION INTRAMUSCULAR; INTRAVENOUS; SUBCUTANEOUS at 02:50

## 2024-09-02 RX ADMIN — ARFORMOTEROL TARTRATE 15 MCG: 15 SOLUTION RESPIRATORY (INHALATION) at 18:40

## 2024-09-02 RX ADMIN — CARVEDILOL 12.5 MG: 6.25 TABLET, FILM COATED ORAL at 11:07

## 2024-09-02 RX ADMIN — IPRATROPIUM BROMIDE AND ALBUTEROL SULFATE 1 DOSE: .5; 2.5 SOLUTION RESPIRATORY (INHALATION) at 06:11

## 2024-09-02 RX ADMIN — Medication 10 ML: at 11:19

## 2024-09-02 RX ADMIN — MEROPENEM 1000 MG: 1 INJECTION INTRAVENOUS at 05:50

## 2024-09-02 RX ADMIN — MICONAZOLE NITRATE: 2 OINTMENT TOPICAL at 08:30

## 2024-09-02 RX ADMIN — IPRATROPIUM BROMIDE AND ALBUTEROL SULFATE 1 DOSE: .5; 2.5 SOLUTION RESPIRATORY (INHALATION) at 10:33

## 2024-09-02 RX ADMIN — WATER 20 MG: 1 INJECTION INTRAMUSCULAR; INTRAVENOUS; SUBCUTANEOUS at 21:40

## 2024-09-02 ASSESSMENT — PULMONARY FUNCTION TESTS
PIF_VALUE: 26
PIF_VALUE: 27
PIF_VALUE: 25
PIF_VALUE: 29

## 2024-09-02 ASSESSMENT — PAIN SCALES - WONG BAKER: WONGBAKER_NUMERICALRESPONSE: NO HURT

## 2024-09-03 ENCOUNTER — APPOINTMENT (OUTPATIENT)
Dept: GENERAL RADIOLOGY | Age: 79
DRG: 870 | End: 2024-09-03
Payer: MEDICARE

## 2024-09-03 LAB
ALBUMIN SERPL-MCNC: 2.5 G/DL (ref 3.5–5.2)
ALP SERPL-CCNC: 122 U/L (ref 35–104)
ALT SERPL-CCNC: 23 U/L (ref 0–32)
ANION GAP SERPL CALCULATED.3IONS-SCNC: 6 MMOL/L (ref 7–16)
AST SERPL-CCNC: 24 U/L (ref 0–31)
BASOPHILS # BLD: 0 K/UL (ref 0–0.2)
BASOPHILS NFR BLD: 0 % (ref 0–2)
BILIRUB SERPL-MCNC: 0.2 MG/DL (ref 0–1.2)
BUN SERPL-MCNC: 61 MG/DL (ref 6–23)
CALCIUM SERPL-MCNC: 9.4 MG/DL (ref 8.6–10.2)
CHLORIDE SERPL-SCNC: 99 MMOL/L (ref 98–107)
CO2 SERPL-SCNC: 35 MMOL/L (ref 22–29)
CREAT SERPL-MCNC: 0.7 MG/DL (ref 0.5–1)
DATE LAST DOSE: NORMAL
EOSINOPHIL # BLD: 0 K/UL (ref 0.05–0.5)
EOSINOPHILS RELATIVE PERCENT: 0 % (ref 0–6)
ERYTHROCYTE [DISTWIDTH] IN BLOOD BY AUTOMATED COUNT: 16.5 % (ref 11.5–15)
GFR, ESTIMATED: 85 ML/MIN/1.73M2
GLUCOSE SERPL-MCNC: 213 MG/DL (ref 74–99)
HCT VFR BLD AUTO: 22.9 % (ref 34–48)
HGB BLD-MCNC: 7 G/DL (ref 11.5–15.5)
LYMPHOCYTES NFR BLD: 0.34 K/UL (ref 1.5–4)
LYMPHOCYTES RELATIVE PERCENT: 8 % (ref 20–42)
MCH RBC QN AUTO: 28.6 PG (ref 26–35)
MCHC RBC AUTO-ENTMCNC: 30.6 G/DL (ref 32–34.5)
MCV RBC AUTO: 93.5 FL (ref 80–99.9)
MICROORGANISM SPEC CULT: ABNORMAL
MONOCYTES NFR BLD: 0.21 K/UL (ref 0.1–0.95)
MONOCYTES NFR BLD: 5 % (ref 2–12)
NEUTROPHILS NFR BLD: 87 % (ref 43–80)
NEUTS SEG NFR BLD: 3.65 K/UL (ref 1.8–7.3)
PLATELET # BLD AUTO: 191 K/UL (ref 130–450)
PMV BLD AUTO: 11.4 FL (ref 7–12)
POTASSIUM SERPL-SCNC: 3.9 MMOL/L (ref 3.5–5)
PROT SERPL-MCNC: 5.9 G/DL (ref 6.4–8.3)
RBC # BLD AUTO: 2.45 M/UL (ref 3.5–5.5)
RBC # BLD: ABNORMAL 10*6/UL
SERVICE CMNT-IMP: ABNORMAL
SODIUM SERPL-SCNC: 140 MMOL/L (ref 132–146)
SPECIMEN DESCRIPTION: ABNORMAL
TME LAST DOSE: NORMAL H
VANCOMYCIN DOSE: NORMAL MG
VANCOMYCIN SERPL-MCNC: 17 UG/ML (ref 5–40)
WBC OTHER # BLD: 4.2 K/UL (ref 4.5–11.5)

## 2024-09-03 PROCEDURE — 6360000002 HC RX W HCPCS: Performed by: INTERNAL MEDICINE

## 2024-09-03 PROCEDURE — 80053 COMPREHEN METABOLIC PANEL: CPT

## 2024-09-03 PROCEDURE — 6370000000 HC RX 637 (ALT 250 FOR IP): Performed by: INTERNAL MEDICINE

## 2024-09-03 PROCEDURE — 94003 VENT MGMT INPAT SUBQ DAY: CPT

## 2024-09-03 PROCEDURE — 87205 SMEAR GRAM STAIN: CPT

## 2024-09-03 PROCEDURE — 85025 COMPLETE CBC W/AUTO DIFF WBC: CPT

## 2024-09-03 PROCEDURE — 71045 X-RAY EXAM CHEST 1 VIEW: CPT

## 2024-09-03 PROCEDURE — 87181 SC STD AGAR DILUTION PER AGT: CPT

## 2024-09-03 PROCEDURE — 36415 COLL VENOUS BLD VENIPUNCTURE: CPT

## 2024-09-03 PROCEDURE — 97161 PT EVAL LOW COMPLEX 20 MIN: CPT | Performed by: PHYSICAL THERAPIST

## 2024-09-03 PROCEDURE — APPSS30 APP SPLIT SHARED TIME 16-30 MINUTES: Performed by: NURSE PRACTITIONER

## 2024-09-03 PROCEDURE — 87077 CULTURE AEROBIC IDENTIFY: CPT

## 2024-09-03 PROCEDURE — 99232 SBSQ HOSP IP/OBS MODERATE 35: CPT | Performed by: INTERNAL MEDICINE

## 2024-09-03 PROCEDURE — 94640 AIRWAY INHALATION TREATMENT: CPT

## 2024-09-03 PROCEDURE — 2060000000 HC ICU INTERMEDIATE R&B

## 2024-09-03 PROCEDURE — 87070 CULTURE OTHR SPECIMN AEROBIC: CPT

## 2024-09-03 PROCEDURE — 80202 ASSAY OF VANCOMYCIN: CPT

## 2024-09-03 PROCEDURE — 83036 HEMOGLOBIN GLYCOSYLATED A1C: CPT

## 2024-09-03 PROCEDURE — 2580000003 HC RX 258: Performed by: INTERNAL MEDICINE

## 2024-09-03 PROCEDURE — 6370000000 HC RX 637 (ALT 250 FOR IP): Performed by: NURSE PRACTITIONER

## 2024-09-03 PROCEDURE — 99291 CRITICAL CARE FIRST HOUR: CPT | Performed by: INTERNAL MEDICINE

## 2024-09-03 PROCEDURE — 6360000002 HC RX W HCPCS: Performed by: NURSE PRACTITIONER

## 2024-09-03 RX ORDER — GLUCAGON 1 MG/ML
1 KIT INJECTION PRN
Status: DISCONTINUED | OUTPATIENT
Start: 2024-09-03 | End: 2024-09-10 | Stop reason: HOSPADM

## 2024-09-03 RX ORDER — DEXTROSE MONOHYDRATE 100 MG/ML
INJECTION, SOLUTION INTRAVENOUS CONTINUOUS PRN
Status: DISCONTINUED | OUTPATIENT
Start: 2024-09-03 | End: 2024-09-10 | Stop reason: HOSPADM

## 2024-09-03 RX ADMIN — WATER 20 MG: 1 INJECTION INTRAMUSCULAR; INTRAVENOUS; SUBCUTANEOUS at 03:44

## 2024-09-03 RX ADMIN — SODIUM CHLORIDE, SODIUM LACTATE, POTASSIUM CHLORIDE, CALCIUM CHLORIDE AND DEXTROSE MONOHYDRATE: 5; 600; 310; 30; 20 INJECTION, SOLUTION INTRAVENOUS at 12:11

## 2024-09-03 RX ADMIN — IPRATROPIUM BROMIDE AND ALBUTEROL SULFATE 1 DOSE: .5; 2.5 SOLUTION RESPIRATORY (INHALATION) at 10:55

## 2024-09-03 RX ADMIN — WATER 20 MG: 1 INJECTION INTRAMUSCULAR; INTRAVENOUS; SUBCUTANEOUS at 16:36

## 2024-09-03 RX ADMIN — VANCOMYCIN HYDROCHLORIDE 1000 MG: 1 INJECTION, POWDER, LYOPHILIZED, FOR SOLUTION INTRAVENOUS at 10:31

## 2024-09-03 RX ADMIN — ARFORMOTEROL TARTRATE 15 MCG: 15 SOLUTION RESPIRATORY (INHALATION) at 07:13

## 2024-09-03 RX ADMIN — SODIUM CHLORIDE, SODIUM LACTATE, POTASSIUM CHLORIDE, CALCIUM CHLORIDE AND DEXTROSE MONOHYDRATE: 5; 600; 310; 30; 20 INJECTION, SOLUTION INTRAVENOUS at 23:40

## 2024-09-03 RX ADMIN — MICONAZOLE NITRATE: 2 OINTMENT TOPICAL at 08:50

## 2024-09-03 RX ADMIN — CARVEDILOL 12.5 MG: 6.25 TABLET, FILM COATED ORAL at 08:52

## 2024-09-03 RX ADMIN — BUDESONIDE 500 MCG: 0.5 INHALANT RESPIRATORY (INHALATION) at 19:22

## 2024-09-03 RX ADMIN — WATER 20 MG: 1 INJECTION INTRAMUSCULAR; INTRAVENOUS; SUBCUTANEOUS at 08:46

## 2024-09-03 RX ADMIN — MEROPENEM 1000 MG: 1 INJECTION INTRAVENOUS at 19:16

## 2024-09-03 RX ADMIN — LANSOPRAZOLE 30 MG: 30 TABLET, ORALLY DISINTEGRATING, DELAYED RELEASE ORAL at 05:46

## 2024-09-03 RX ADMIN — ATORVASTATIN CALCIUM 10 MG: 10 TABLET, FILM COATED ORAL at 21:59

## 2024-09-03 RX ADMIN — BUDESONIDE 500 MCG: 0.5 INHALANT RESPIRATORY (INHALATION) at 07:13

## 2024-09-03 RX ADMIN — MINERAL SUPPLEMENT IRON 300 MG / 5 ML STRENGTH LIQUID 100 PER BOX UNFLAVORED 300 MG: at 08:52

## 2024-09-03 RX ADMIN — IPRATROPIUM BROMIDE AND ALBUTEROL SULFATE 1 DOSE: .5; 2.5 SOLUTION RESPIRATORY (INHALATION) at 07:13

## 2024-09-03 RX ADMIN — IPRATROPIUM BROMIDE AND ALBUTEROL SULFATE 1 DOSE: .5; 2.5 SOLUTION RESPIRATORY (INHALATION) at 19:22

## 2024-09-03 RX ADMIN — MEROPENEM 1000 MG: 1 INJECTION INTRAVENOUS at 05:45

## 2024-09-03 RX ADMIN — ARFORMOTEROL TARTRATE 15 MCG: 15 SOLUTION RESPIRATORY (INHALATION) at 19:22

## 2024-09-03 RX ADMIN — CARVEDILOL 12.5 MG: 6.25 TABLET, FILM COATED ORAL at 16:36

## 2024-09-03 RX ADMIN — ANTI-FUNGAL POWDER MICONAZOLE NITRATE TALC FREE: 1.42 POWDER TOPICAL at 08:50

## 2024-09-03 RX ADMIN — IPRATROPIUM BROMIDE AND ALBUTEROL SULFATE 1 DOSE: .5; 2.5 SOLUTION RESPIRATORY (INHALATION) at 12:36

## 2024-09-03 RX ADMIN — ENOXAPARIN SODIUM 40 MG: 100 INJECTION SUBCUTANEOUS at 16:35

## 2024-09-03 RX ADMIN — Medication 10 ML: at 08:47

## 2024-09-03 RX ADMIN — ANTI-FUNGAL POWDER MICONAZOLE NITRATE TALC FREE: 1.42 POWDER TOPICAL at 21:58

## 2024-09-03 ASSESSMENT — PULMONARY FUNCTION TESTS
PIF_VALUE: 25
PIF_VALUE: 30
PIF_VALUE: 32
PIF_VALUE: 30
PIF_VALUE: 30

## 2024-09-03 ASSESSMENT — PAIN SCALES - WONG BAKER: WONGBAKER_NUMERICALRESPONSE: NO HURT

## 2024-09-04 LAB
ALBUMIN SERPL-MCNC: 2.8 G/DL (ref 3.5–5.2)
ALP SERPL-CCNC: 105 U/L (ref 35–104)
ALT SERPL-CCNC: 25 U/L (ref 0–32)
ANION GAP SERPL CALCULATED.3IONS-SCNC: 10 MMOL/L (ref 7–16)
AST SERPL-CCNC: 20 U/L (ref 0–31)
BASOPHILS # BLD: 0 K/UL (ref 0–0.2)
BASOPHILS NFR BLD: 0 % (ref 0–2)
BILIRUB SERPL-MCNC: 0.2 MG/DL (ref 0–1.2)
BUN SERPL-MCNC: 53 MG/DL (ref 6–23)
CALCIUM SERPL-MCNC: 9.7 MG/DL (ref 8.6–10.2)
CHLORIDE SERPL-SCNC: 101 MMOL/L (ref 98–107)
CO2 SERPL-SCNC: 32 MMOL/L (ref 22–29)
CREAT SERPL-MCNC: 0.6 MG/DL (ref 0.5–1)
EOSINOPHIL # BLD: 0 K/UL (ref 0.05–0.5)
EOSINOPHILS RELATIVE PERCENT: 0 % (ref 0–6)
ERYTHROCYTE [DISTWIDTH] IN BLOOD BY AUTOMATED COUNT: 16.3 % (ref 11.5–15)
GFR, ESTIMATED: >90 ML/MIN/1.73M2
GLUCOSE BLD-MCNC: 114 MG/DL (ref 74–99)
GLUCOSE BLD-MCNC: 123 MG/DL (ref 74–99)
GLUCOSE BLD-MCNC: 210 MG/DL (ref 74–99)
GLUCOSE SERPL-MCNC: 197 MG/DL (ref 74–99)
HBA1C MFR BLD: 5.3 % (ref 4–5.6)
HCT VFR BLD AUTO: 24.4 % (ref 34–48)
HGB BLD-MCNC: 7.4 G/DL (ref 11.5–15.5)
LYMPHOCYTES NFR BLD: 0.25 K/UL (ref 1.5–4)
LYMPHOCYTES RELATIVE PERCENT: 4 % (ref 20–42)
MCH RBC QN AUTO: 28.6 PG (ref 26–35)
MCHC RBC AUTO-ENTMCNC: 30.3 G/DL (ref 32–34.5)
MCV RBC AUTO: 94.2 FL (ref 80–99.9)
MICROORGANISM SPEC CULT: ABNORMAL
MICROORGANISM/AGENT SPEC: ABNORMAL
MONOCYTES NFR BLD: 0.46 K/UL (ref 0.1–0.95)
MONOCYTES NFR BLD: 8 % (ref 2–12)
NEUTROPHILS NFR BLD: 88 % (ref 43–80)
NEUTS SEG NFR BLD: 5.09 K/UL (ref 1.8–7.3)
PLATELET # BLD AUTO: 204 K/UL (ref 130–450)
PMV BLD AUTO: 11.2 FL (ref 7–12)
POTASSIUM SERPL-SCNC: 4.2 MMOL/L (ref 3.5–5)
PROT SERPL-MCNC: 6.2 G/DL (ref 6.4–8.3)
RBC # BLD AUTO: 2.59 M/UL (ref 3.5–5.5)
RBC # BLD: ABNORMAL 10*6/UL
SERVICE CMNT-IMP: ABNORMAL
SODIUM SERPL-SCNC: 143 MMOL/L (ref 132–146)
SPECIMEN DESCRIPTION: ABNORMAL
WBC OTHER # BLD: 5.8 K/UL (ref 4.5–11.5)

## 2024-09-04 PROCEDURE — 6360000002 HC RX W HCPCS: Performed by: INTERNAL MEDICINE

## 2024-09-04 PROCEDURE — 82962 GLUCOSE BLOOD TEST: CPT

## 2024-09-04 PROCEDURE — 6370000000 HC RX 637 (ALT 250 FOR IP): Performed by: NURSE PRACTITIONER

## 2024-09-04 PROCEDURE — APPSS30 APP SPLIT SHARED TIME 16-30 MINUTES: Performed by: NURSE PRACTITIONER

## 2024-09-04 PROCEDURE — 94640 AIRWAY INHALATION TREATMENT: CPT

## 2024-09-04 PROCEDURE — 2580000003 HC RX 258: Performed by: INTERNAL MEDICINE

## 2024-09-04 PROCEDURE — 2060000000 HC ICU INTERMEDIATE R&B

## 2024-09-04 PROCEDURE — 99291 CRITICAL CARE FIRST HOUR: CPT | Performed by: INTERNAL MEDICINE

## 2024-09-04 PROCEDURE — 99232 SBSQ HOSP IP/OBS MODERATE 35: CPT | Performed by: INTERNAL MEDICINE

## 2024-09-04 PROCEDURE — 6370000000 HC RX 637 (ALT 250 FOR IP): Performed by: INTERNAL MEDICINE

## 2024-09-04 PROCEDURE — 80053 COMPREHEN METABOLIC PANEL: CPT

## 2024-09-04 PROCEDURE — 94003 VENT MGMT INPAT SUBQ DAY: CPT

## 2024-09-04 PROCEDURE — 6360000002 HC RX W HCPCS: Performed by: NURSE PRACTITIONER

## 2024-09-04 PROCEDURE — 36415 COLL VENOUS BLD VENIPUNCTURE: CPT

## 2024-09-04 PROCEDURE — 85025 COMPLETE CBC W/AUTO DIFF WBC: CPT

## 2024-09-04 RX ADMIN — MEROPENEM 1000 MG: 1 INJECTION INTRAVENOUS at 23:11

## 2024-09-04 RX ADMIN — IPRATROPIUM BROMIDE AND ALBUTEROL SULFATE 1 DOSE: .5; 2.5 SOLUTION RESPIRATORY (INHALATION) at 16:40

## 2024-09-04 RX ADMIN — ACETAMINOPHEN 650 MG: 325 TABLET ORAL at 23:37

## 2024-09-04 RX ADMIN — ENOXAPARIN SODIUM 40 MG: 100 INJECTION SUBCUTANEOUS at 17:12

## 2024-09-04 RX ADMIN — ATORVASTATIN CALCIUM 10 MG: 10 TABLET, FILM COATED ORAL at 23:09

## 2024-09-04 RX ADMIN — WATER 20 MG: 1 INJECTION INTRAMUSCULAR; INTRAVENOUS; SUBCUTANEOUS at 23:09

## 2024-09-04 RX ADMIN — LANSOPRAZOLE 30 MG: 30 TABLET, ORALLY DISINTEGRATING, DELAYED RELEASE ORAL at 06:00

## 2024-09-04 RX ADMIN — ANTI-FUNGAL POWDER MICONAZOLE NITRATE TALC FREE: 1.42 POWDER TOPICAL at 08:33

## 2024-09-04 RX ADMIN — CARVEDILOL 12.5 MG: 6.25 TABLET, FILM COATED ORAL at 08:31

## 2024-09-04 RX ADMIN — Medication 10 ML: at 23:40

## 2024-09-04 RX ADMIN — IPRATROPIUM BROMIDE AND ALBUTEROL SULFATE 1 DOSE: .5; 2.5 SOLUTION RESPIRATORY (INHALATION) at 06:28

## 2024-09-04 RX ADMIN — SODIUM CHLORIDE, SODIUM LACTATE, POTASSIUM CHLORIDE, CALCIUM CHLORIDE AND DEXTROSE MONOHYDRATE: 5; 600; 310; 30; 20 INJECTION, SOLUTION INTRAVENOUS at 23:03

## 2024-09-04 RX ADMIN — BUDESONIDE 500 MCG: 0.5 INHALANT RESPIRATORY (INHALATION) at 16:39

## 2024-09-04 RX ADMIN — ARFORMOTEROL TARTRATE 15 MCG: 15 SOLUTION RESPIRATORY (INHALATION) at 06:28

## 2024-09-04 RX ADMIN — IPRATROPIUM BROMIDE AND ALBUTEROL SULFATE 1 DOSE: .5; 2.5 SOLUTION RESPIRATORY (INHALATION) at 09:55

## 2024-09-04 RX ADMIN — MICONAZOLE NITRATE: 2 OINTMENT TOPICAL at 12:26

## 2024-09-04 RX ADMIN — BUDESONIDE 500 MCG: 0.5 INHALANT RESPIRATORY (INHALATION) at 06:28

## 2024-09-04 RX ADMIN — ARFORMOTEROL TARTRATE 15 MCG: 15 SOLUTION RESPIRATORY (INHALATION) at 16:39

## 2024-09-04 RX ADMIN — Medication 10 ML: at 08:34

## 2024-09-04 RX ADMIN — MEROPENEM 1000 MG: 1 INJECTION INTRAVENOUS at 08:12

## 2024-09-04 RX ADMIN — SODIUM CHLORIDE, SODIUM LACTATE, POTASSIUM CHLORIDE, CALCIUM CHLORIDE AND DEXTROSE MONOHYDRATE: 5; 600; 310; 30; 20 INJECTION, SOLUTION INTRAVENOUS at 12:42

## 2024-09-04 RX ADMIN — MICONAZOLE NITRATE: 2 OINTMENT TOPICAL at 23:39

## 2024-09-04 RX ADMIN — WATER 20 MG: 1 INJECTION INTRAMUSCULAR; INTRAVENOUS; SUBCUTANEOUS at 10:56

## 2024-09-04 RX ADMIN — CARVEDILOL 12.5 MG: 6.25 TABLET, FILM COATED ORAL at 17:23

## 2024-09-04 RX ADMIN — VANCOMYCIN HYDROCHLORIDE 1000 MG: 1 INJECTION, POWDER, LYOPHILIZED, FOR SOLUTION INTRAVENOUS at 11:09

## 2024-09-04 RX ADMIN — ANTI-FUNGAL POWDER MICONAZOLE NITRATE TALC FREE: 1.42 POWDER TOPICAL at 23:39

## 2024-09-04 RX ADMIN — MINERAL SUPPLEMENT IRON 300 MG / 5 ML STRENGTH LIQUID 100 PER BOX UNFLAVORED 300 MG: at 11:02

## 2024-09-04 RX ADMIN — MEROPENEM 1000 MG: 1 INJECTION INTRAVENOUS at 17:10

## 2024-09-04 ASSESSMENT — PAIN DESCRIPTION - DESCRIPTORS
DESCRIPTORS: ACHING
DESCRIPTORS: PATIENT UNABLE TO DESCRIBE

## 2024-09-04 ASSESSMENT — PULMONARY FUNCTION TESTS
PIF_VALUE: 24
PIF_VALUE: 27
PIF_VALUE: 30
PIF_VALUE: 27
PIF_VALUE: 33
PIF_VALUE: 28

## 2024-09-04 ASSESSMENT — PAIN SCALES - WONG BAKER
WONGBAKER_NUMERICALRESPONSE: NO HURT

## 2024-09-04 ASSESSMENT — PAIN DESCRIPTION - ORIENTATION: ORIENTATION: RIGHT;LEFT;MID

## 2024-09-05 LAB
ACB COMPLEX DNA BLD POS QL NAA+NON-PROBE: NOT DETECTED
B FRAGILIS DNA BLD POS QL NAA+NON-PROBE: NOT DETECTED
BIOFIRE TEST COMMENT: ABNORMAL
C ALBICANS DNA BLD POS QL NAA+NON-PROBE: NOT DETECTED
C AURIS DNA BLD POS QL NAA+NON-PROBE: NOT DETECTED
C GATTII+NEOFOR DNA BLD POS QL NAA+N-PRB: NOT DETECTED
C GLABRATA DNA BLD POS QL NAA+NON-PROBE: NOT DETECTED
C KRUSEI DNA BLD POS QL NAA+NON-PROBE: NOT DETECTED
C PARAP DNA BLD POS QL NAA+NON-PROBE: NOT DETECTED
C TROPICLS DNA BLD POS QL NAA+NON-PROBE: NOT DETECTED
E CLOAC COMP DNA BLD POS NAA+NON-PROBE: NOT DETECTED
E COLI DNA BLD POS QL NAA+NON-PROBE: NOT DETECTED
E FAECALIS DNA BLD POS QL NAA+NON-PROBE: NOT DETECTED
E FAECIUM DNA BLD POS QL NAA+NON-PROBE: NOT DETECTED
ENTEROBACTERALES DNA BLD POS NAA+N-PRB: NOT DETECTED
GLUCOSE BLD-MCNC: 129 MG/DL (ref 74–99)
GLUCOSE BLD-MCNC: 136 MG/DL (ref 74–99)
GLUCOSE BLD-MCNC: 146 MG/DL (ref 74–99)
GLUCOSE BLD-MCNC: 159 MG/DL (ref 74–99)
GLUCOSE BLD-MCNC: 171 MG/DL (ref 74–99)
GP B STREP DNA BLD POS QL NAA+NON-PROBE: NOT DETECTED
HAEM INFLU DNA BLD POS QL NAA+NON-PROBE: NOT DETECTED
K OXYTOCA DNA BLD POS QL NAA+NON-PROBE: NOT DETECTED
KLEBSIELLA SP DNA BLD POS QL NAA+NON-PRB: NOT DETECTED
KLEBSIELLA SP DNA BLD POS QL NAA+NON-PRB: NOT DETECTED
L MONOCYTOG DNA BLD POS QL NAA+NON-PROBE: NOT DETECTED
MICROORGANISM SPEC CULT: ABNORMAL
MICROORGANISM/AGENT SPEC: ABNORMAL
N MEN DNA BLD POS QL NAA+NON-PROBE: NOT DETECTED
P AERUGINOSA DNA BLD POS NAA+NON-PROBE: NOT DETECTED
PROTEUS SP DNA BLD POS QL NAA+NON-PROBE: NOT DETECTED
S AUREUS DNA BLD POS QL NAA+NON-PROBE: NOT DETECTED
S AUREUS+CONS DNA BLD POS NAA+NON-PROBE: DETECTED
S EPIDERMIDIS DNA BLD POS QL NAA+NON-PRB: NOT DETECTED
S LUGDUNENSIS DNA BLD POS QL NAA+NON-PRB: NOT DETECTED
S MALTOPHILIA DNA BLD POS QL NAA+NON-PRB: NOT DETECTED
S MARCESCENS DNA BLD POS NAA+NON-PROBE: NOT DETECTED
S PNEUM DNA BLD POS QL NAA+NON-PROBE: NOT DETECTED
S PYO DNA BLD POS QL NAA+NON-PROBE: NOT DETECTED
SALMONELLA DNA BLD POS QL NAA+NON-PROBE: NOT DETECTED
SERVICE CMNT-IMP: ABNORMAL
SPECIMEN DESCRIPTION: ABNORMAL
STREPTOCOCCUS DNA BLD POS NAA+NON-PROBE: NOT DETECTED

## 2024-09-05 PROCEDURE — 99291 CRITICAL CARE FIRST HOUR: CPT | Performed by: INTERNAL MEDICINE

## 2024-09-05 PROCEDURE — 94640 AIRWAY INHALATION TREATMENT: CPT

## 2024-09-05 PROCEDURE — 94003 VENT MGMT INPAT SUBQ DAY: CPT

## 2024-09-05 PROCEDURE — 2580000003 HC RX 258: Performed by: INTERNAL MEDICINE

## 2024-09-05 PROCEDURE — APPSS30 APP SPLIT SHARED TIME 16-30 MINUTES: Performed by: NURSE PRACTITIONER

## 2024-09-05 PROCEDURE — 99232 SBSQ HOSP IP/OBS MODERATE 35: CPT | Performed by: INTERNAL MEDICINE

## 2024-09-05 PROCEDURE — 2060000000 HC ICU INTERMEDIATE R&B

## 2024-09-05 PROCEDURE — 6370000000 HC RX 637 (ALT 250 FOR IP): Performed by: INTERNAL MEDICINE

## 2024-09-05 PROCEDURE — 6360000002 HC RX W HCPCS: Performed by: NURSE PRACTITIONER

## 2024-09-05 PROCEDURE — 87070 CULTURE OTHR SPECIMN AEROBIC: CPT

## 2024-09-05 PROCEDURE — 87181 SC STD AGAR DILUTION PER AGT: CPT

## 2024-09-05 PROCEDURE — 82962 GLUCOSE BLOOD TEST: CPT

## 2024-09-05 PROCEDURE — 97110 THERAPEUTIC EXERCISES: CPT

## 2024-09-05 PROCEDURE — 87077 CULTURE AEROBIC IDENTIFY: CPT

## 2024-09-05 PROCEDURE — 6360000002 HC RX W HCPCS: Performed by: INTERNAL MEDICINE

## 2024-09-05 PROCEDURE — 87205 SMEAR GRAM STAIN: CPT

## 2024-09-05 PROCEDURE — 6370000000 HC RX 637 (ALT 250 FOR IP): Performed by: NURSE PRACTITIONER

## 2024-09-05 RX ORDER — HYDROXYZINE HYDROCHLORIDE 10 MG/1
10 TABLET, FILM COATED ORAL 3 TIMES DAILY
Status: DISCONTINUED | OUTPATIENT
Start: 2024-09-05 | End: 2024-09-10 | Stop reason: HOSPADM

## 2024-09-05 RX ADMIN — ANTI-FUNGAL POWDER MICONAZOLE NITRATE TALC FREE: 1.42 POWDER TOPICAL at 09:50

## 2024-09-05 RX ADMIN — SODIUM CHLORIDE, SODIUM LACTATE, POTASSIUM CHLORIDE, CALCIUM CHLORIDE AND DEXTROSE MONOHYDRATE: 5; 600; 310; 30; 20 INJECTION, SOLUTION INTRAVENOUS at 07:59

## 2024-09-05 RX ADMIN — MEROPENEM 1000 MG: 1 INJECTION INTRAVENOUS at 15:28

## 2024-09-05 RX ADMIN — MICONAZOLE NITRATE: 2 OINTMENT TOPICAL at 09:49

## 2024-09-05 RX ADMIN — Medication 10 ML: at 08:26

## 2024-09-05 RX ADMIN — IPRATROPIUM BROMIDE AND ALBUTEROL SULFATE 1 DOSE: .5; 2.5 SOLUTION RESPIRATORY (INHALATION) at 17:27

## 2024-09-05 RX ADMIN — ACETAMINOPHEN 650 MG: 325 TABLET ORAL at 19:47

## 2024-09-05 RX ADMIN — SODIUM CHLORIDE, SODIUM LACTATE, POTASSIUM CHLORIDE, CALCIUM CHLORIDE AND DEXTROSE MONOHYDRATE: 5; 600; 310; 30; 20 INJECTION, SOLUTION INTRAVENOUS at 17:18

## 2024-09-05 RX ADMIN — WATER 20 MG: 1 INJECTION INTRAMUSCULAR; INTRAVENOUS; SUBCUTANEOUS at 19:47

## 2024-09-05 RX ADMIN — ENOXAPARIN SODIUM 40 MG: 100 INJECTION SUBCUTANEOUS at 15:30

## 2024-09-05 RX ADMIN — HYDROXYZINE HYDROCHLORIDE 10 MG: 10 TABLET, FILM COATED ORAL at 19:51

## 2024-09-05 RX ADMIN — WATER 20 MG: 1 INJECTION INTRAMUSCULAR; INTRAVENOUS; SUBCUTANEOUS at 09:53

## 2024-09-05 RX ADMIN — ATORVASTATIN CALCIUM 10 MG: 10 TABLET, FILM COATED ORAL at 19:48

## 2024-09-05 RX ADMIN — BUDESONIDE 500 MCG: 0.5 INHALANT RESPIRATORY (INHALATION) at 06:18

## 2024-09-05 RX ADMIN — MEROPENEM 1000 MG: 1 INJECTION INTRAVENOUS at 08:25

## 2024-09-05 RX ADMIN — MINERAL SUPPLEMENT IRON 300 MG / 5 ML STRENGTH LIQUID 100 PER BOX UNFLAVORED 300 MG: at 09:50

## 2024-09-05 RX ADMIN — ARFORMOTEROL TARTRATE 15 MCG: 15 SOLUTION RESPIRATORY (INHALATION) at 17:27

## 2024-09-05 RX ADMIN — VANCOMYCIN HYDROCHLORIDE 1000 MG: 1 INJECTION, POWDER, LYOPHILIZED, FOR SOLUTION INTRAVENOUS at 10:05

## 2024-09-05 RX ADMIN — HYDROXYZINE HYDROCHLORIDE 10 MG: 10 TABLET, FILM COATED ORAL at 16:43

## 2024-09-05 RX ADMIN — ANTI-FUNGAL POWDER MICONAZOLE NITRATE TALC FREE: 1.42 POWDER TOPICAL at 19:48

## 2024-09-05 RX ADMIN — MICONAZOLE NITRATE: 2 OINTMENT TOPICAL at 19:48

## 2024-09-05 RX ADMIN — IPRATROPIUM BROMIDE AND ALBUTEROL SULFATE 1 DOSE: .5; 2.5 SOLUTION RESPIRATORY (INHALATION) at 10:22

## 2024-09-05 RX ADMIN — IPRATROPIUM BROMIDE AND ALBUTEROL SULFATE 1 DOSE: .5; 2.5 SOLUTION RESPIRATORY (INHALATION) at 14:11

## 2024-09-05 RX ADMIN — MEROPENEM 1000 MG: 1 INJECTION INTRAVENOUS at 23:42

## 2024-09-05 RX ADMIN — CARVEDILOL 12.5 MG: 6.25 TABLET, FILM COATED ORAL at 15:53

## 2024-09-05 RX ADMIN — CARVEDILOL 12.5 MG: 6.25 TABLET, FILM COATED ORAL at 09:47

## 2024-09-05 RX ADMIN — ARFORMOTEROL TARTRATE 15 MCG: 15 SOLUTION RESPIRATORY (INHALATION) at 06:18

## 2024-09-05 RX ADMIN — BUDESONIDE 500 MCG: 0.5 INHALANT RESPIRATORY (INHALATION) at 17:27

## 2024-09-05 RX ADMIN — IPRATROPIUM BROMIDE AND ALBUTEROL SULFATE 1 DOSE: .5; 2.5 SOLUTION RESPIRATORY (INHALATION) at 06:18

## 2024-09-05 RX ADMIN — LANSOPRAZOLE 30 MG: 30 TABLET, ORALLY DISINTEGRATING, DELAYED RELEASE ORAL at 09:44

## 2024-09-05 ASSESSMENT — PULMONARY FUNCTION TESTS
PIF_VALUE: 35
PIF_VALUE: 30
PIF_VALUE: 31
PIF_VALUE: 25
PIF_VALUE: 29
PIF_VALUE: 31

## 2024-09-05 ASSESSMENT — PAIN DESCRIPTION - DESCRIPTORS
DESCRIPTORS: ACHING
DESCRIPTORS: ACHING

## 2024-09-05 ASSESSMENT — PAIN DESCRIPTION - ORIENTATION: ORIENTATION: MID

## 2024-09-05 ASSESSMENT — PAIN SCALES - WONG BAKER
WONGBAKER_NUMERICALRESPONSE: NO HURT
WONGBAKER_NUMERICALRESPONSE: HURTS A LITTLE BIT
WONGBAKER_NUMERICALRESPONSE: HURTS A LITTLE BIT

## 2024-09-05 ASSESSMENT — PAIN DESCRIPTION - LOCATION
LOCATION: COCCYX;GENERALIZED
LOCATION: GENERALIZED;COCCYX

## 2024-09-06 LAB
GLUCOSE BLD-MCNC: 112 MG/DL (ref 74–99)
GLUCOSE BLD-MCNC: 140 MG/DL (ref 74–99)
GLUCOSE BLD-MCNC: 146 MG/DL (ref 74–99)
GLUCOSE BLD-MCNC: 97 MG/DL (ref 74–99)

## 2024-09-06 PROCEDURE — 6360000002 HC RX W HCPCS: Performed by: SPECIALIST

## 2024-09-06 PROCEDURE — 99233 SBSQ HOSP IP/OBS HIGH 50: CPT | Performed by: INTERNAL MEDICINE

## 2024-09-06 PROCEDURE — 99232 SBSQ HOSP IP/OBS MODERATE 35: CPT | Performed by: INTERNAL MEDICINE

## 2024-09-06 PROCEDURE — 76937 US GUIDE VASCULAR ACCESS: CPT

## 2024-09-06 PROCEDURE — 6360000002 HC RX W HCPCS: Performed by: INTERNAL MEDICINE

## 2024-09-06 PROCEDURE — 6360000002 HC RX W HCPCS: Performed by: NURSE PRACTITIONER

## 2024-09-06 PROCEDURE — 2580000003 HC RX 258: Performed by: SPECIALIST

## 2024-09-06 PROCEDURE — 94640 AIRWAY INHALATION TREATMENT: CPT

## 2024-09-06 PROCEDURE — 6370000000 HC RX 637 (ALT 250 FOR IP): Performed by: INTERNAL MEDICINE

## 2024-09-06 PROCEDURE — 82962 GLUCOSE BLOOD TEST: CPT

## 2024-09-06 PROCEDURE — 6370000000 HC RX 637 (ALT 250 FOR IP): Performed by: NURSE PRACTITIONER

## 2024-09-06 PROCEDURE — 2060000000 HC ICU INTERMEDIATE R&B

## 2024-09-06 PROCEDURE — 94003 VENT MGMT INPAT SUBQ DAY: CPT

## 2024-09-06 PROCEDURE — 2580000003 HC RX 258: Performed by: INTERNAL MEDICINE

## 2024-09-06 RX ORDER — SODIUM CHLORIDE 9 MG/ML
INJECTION, SOLUTION INTRAVENOUS PRN
Status: DISCONTINUED | OUTPATIENT
Start: 2024-09-06 | End: 2024-09-10 | Stop reason: HOSPADM

## 2024-09-06 RX ORDER — SODIUM CHLORIDE 0.9 % (FLUSH) 0.9 %
5-40 SYRINGE (ML) INJECTION PRN
Status: DISCONTINUED | OUTPATIENT
Start: 2024-09-06 | End: 2024-09-10 | Stop reason: HOSPADM

## 2024-09-06 RX ORDER — SODIUM CHLORIDE 0.9 % (FLUSH) 0.9 %
5-40 SYRINGE (ML) INJECTION EVERY 12 HOURS SCHEDULED
Status: DISCONTINUED | OUTPATIENT
Start: 2024-09-06 | End: 2024-09-10 | Stop reason: HOSPADM

## 2024-09-06 RX ADMIN — ANTI-FUNGAL POWDER MICONAZOLE NITRATE TALC FREE: 1.42 POWDER TOPICAL at 08:33

## 2024-09-06 RX ADMIN — ACETAMINOPHEN 650 MG: 325 TABLET ORAL at 05:29

## 2024-09-06 RX ADMIN — BUDESONIDE 500 MCG: 0.5 INHALANT RESPIRATORY (INHALATION) at 18:04

## 2024-09-06 RX ADMIN — ENOXAPARIN SODIUM 40 MG: 100 INJECTION SUBCUTANEOUS at 16:12

## 2024-09-06 RX ADMIN — IPRATROPIUM BROMIDE AND ALBUTEROL SULFATE 1 DOSE: .5; 2.5 SOLUTION RESPIRATORY (INHALATION) at 18:04

## 2024-09-06 RX ADMIN — IPRATROPIUM BROMIDE AND ALBUTEROL SULFATE 1 DOSE: .5; 2.5 SOLUTION RESPIRATORY (INHALATION) at 09:45

## 2024-09-06 RX ADMIN — IPRATROPIUM BROMIDE AND ALBUTEROL SULFATE 1 DOSE: .5; 2.5 SOLUTION RESPIRATORY (INHALATION) at 14:52

## 2024-09-06 RX ADMIN — LANSOPRAZOLE 30 MG: 30 TABLET, ORALLY DISINTEGRATING, DELAYED RELEASE ORAL at 05:29

## 2024-09-06 RX ADMIN — ANTI-FUNGAL POWDER MICONAZOLE NITRATE TALC FREE: 1.42 POWDER TOPICAL at 21:09

## 2024-09-06 RX ADMIN — MEROPENEM 1000 MG: 1 INJECTION INTRAVENOUS at 13:16

## 2024-09-06 RX ADMIN — VANCOMYCIN HYDROCHLORIDE 1000 MG: 1 INJECTION, POWDER, LYOPHILIZED, FOR SOLUTION INTRAVENOUS at 09:04

## 2024-09-06 RX ADMIN — MEROPENEM 1000 MG: 1 INJECTION INTRAVENOUS at 21:21

## 2024-09-06 RX ADMIN — Medication 10 ML: at 08:45

## 2024-09-06 RX ADMIN — SODIUM CHLORIDE 100 MG: 9 INJECTION, SOLUTION INTRAVENOUS at 16:21

## 2024-09-06 RX ADMIN — SODIUM CHLORIDE, SODIUM LACTATE, POTASSIUM CHLORIDE, CALCIUM CHLORIDE AND DEXTROSE MONOHYDRATE: 5; 600; 310; 30; 20 INJECTION, SOLUTION INTRAVENOUS at 05:36

## 2024-09-06 RX ADMIN — ARFORMOTEROL TARTRATE 15 MCG: 15 SOLUTION RESPIRATORY (INHALATION) at 18:04

## 2024-09-06 RX ADMIN — HYDROXYZINE HYDROCHLORIDE 10 MG: 10 TABLET, FILM COATED ORAL at 08:32

## 2024-09-06 RX ADMIN — Medication 10 ML: at 21:10

## 2024-09-06 RX ADMIN — HYDROXYZINE HYDROCHLORIDE 10 MG: 10 TABLET, FILM COATED ORAL at 16:12

## 2024-09-06 RX ADMIN — MICONAZOLE NITRATE: 2 OINTMENT TOPICAL at 08:33

## 2024-09-06 RX ADMIN — BUDESONIDE 500 MCG: 0.5 INHALANT RESPIRATORY (INHALATION) at 04:56

## 2024-09-06 RX ADMIN — IPRATROPIUM BROMIDE AND ALBUTEROL SULFATE 1 DOSE: .5; 2.5 SOLUTION RESPIRATORY (INHALATION) at 04:56

## 2024-09-06 RX ADMIN — ACETAMINOPHEN 650 MG: 325 TABLET ORAL at 21:22

## 2024-09-06 RX ADMIN — HYDROXYZINE HYDROCHLORIDE 10 MG: 10 TABLET, FILM COATED ORAL at 21:22

## 2024-09-06 RX ADMIN — ATORVASTATIN CALCIUM 10 MG: 10 TABLET, FILM COATED ORAL at 21:22

## 2024-09-06 RX ADMIN — MINERAL SUPPLEMENT IRON 300 MG / 5 ML STRENGTH LIQUID 100 PER BOX UNFLAVORED 300 MG: at 08:32

## 2024-09-06 RX ADMIN — ARFORMOTEROL TARTRATE 15 MCG: 15 SOLUTION RESPIRATORY (INHALATION) at 04:56

## 2024-09-06 RX ADMIN — SODIUM CHLORIDE, PRESERVATIVE FREE 10 ML: 5 INJECTION INTRAVENOUS at 21:10

## 2024-09-06 RX ADMIN — CARVEDILOL 12.5 MG: 6.25 TABLET, FILM COATED ORAL at 08:32

## 2024-09-06 RX ADMIN — CARVEDILOL 12.5 MG: 6.25 TABLET, FILM COATED ORAL at 16:12

## 2024-09-06 RX ADMIN — MICONAZOLE NITRATE: 2 OINTMENT TOPICAL at 21:09

## 2024-09-06 ASSESSMENT — PAIN DESCRIPTION - DESCRIPTORS: DESCRIPTORS: ACHING

## 2024-09-06 ASSESSMENT — PULMONARY FUNCTION TESTS
PIF_VALUE: 40
PIF_VALUE: 32
PIF_VALUE: 32
PIF_VALUE: 36
PIF_VALUE: 35
PIF_VALUE: 35

## 2024-09-06 ASSESSMENT — PAIN SCALES - GENERAL: PAINLEVEL_OUTOF10: 4

## 2024-09-06 ASSESSMENT — PAIN SCALES - WONG BAKER
WONGBAKER_NUMERICALRESPONSE: NO HURT
WONGBAKER_NUMERICALRESPONSE: NO HURT
WONGBAKER_NUMERICALRESPONSE: HURTS A LITTLE BIT

## 2024-09-06 ASSESSMENT — PAIN DESCRIPTION - LOCATION
LOCATION: GENERALIZED;BUTTOCKS;LEG
LOCATION: GENERALIZED

## 2024-09-07 LAB
ANION GAP SERPL CALCULATED.3IONS-SCNC: 8 MMOL/L (ref 7–16)
ANION GAP SERPL CALCULATED.3IONS-SCNC: 9 MMOL/L (ref 7–16)
BASOPHILS # BLD: 0 K/UL (ref 0–0.2)
BASOPHILS NFR BLD: 0 % (ref 0–2)
BUN SERPL-MCNC: 51 MG/DL (ref 6–23)
BUN SERPL-MCNC: 51 MG/DL (ref 6–23)
CALCIUM SERPL-MCNC: 8.4 MG/DL (ref 8.6–10.2)
CALCIUM SERPL-MCNC: 8.6 MG/DL (ref 8.6–10.2)
CHLORIDE SERPL-SCNC: 102 MMOL/L (ref 98–107)
CHLORIDE SERPL-SCNC: 102 MMOL/L (ref 98–107)
CO2 SERPL-SCNC: 28 MMOL/L (ref 22–29)
CO2 SERPL-SCNC: 29 MMOL/L (ref 22–29)
CREAT SERPL-MCNC: 0.5 MG/DL (ref 0.5–1)
CREAT SERPL-MCNC: 0.5 MG/DL (ref 0.5–1)
DATE LAST DOSE: ABNORMAL
EOSINOPHIL # BLD: 0.31 K/UL (ref 0.05–0.5)
EOSINOPHILS RELATIVE PERCENT: 3 % (ref 0–6)
ERYTHROCYTE [DISTWIDTH] IN BLOOD BY AUTOMATED COUNT: 16.7 % (ref 11.5–15)
GFR, ESTIMATED: >90 ML/MIN/1.73M2
GFR, ESTIMATED: >90 ML/MIN/1.73M2
GLUCOSE BLD-MCNC: 101 MG/DL (ref 74–99)
GLUCOSE BLD-MCNC: 101 MG/DL (ref 74–99)
GLUCOSE BLD-MCNC: 103 MG/DL (ref 74–99)
GLUCOSE BLD-MCNC: 83 MG/DL (ref 74–99)
GLUCOSE SERPL-MCNC: 117 MG/DL (ref 74–99)
GLUCOSE SERPL-MCNC: 88 MG/DL (ref 74–99)
HCT VFR BLD AUTO: 28.2 % (ref 34–48)
HGB BLD-MCNC: 8.8 G/DL (ref 11.5–15.5)
LYMPHOCYTES NFR BLD: 0.72 K/UL (ref 1.5–4)
LYMPHOCYTES RELATIVE PERCENT: 6 % (ref 20–42)
MCH RBC QN AUTO: 29.1 PG (ref 26–35)
MCHC RBC AUTO-ENTMCNC: 31.2 G/DL (ref 32–34.5)
MCV RBC AUTO: 93.4 FL (ref 80–99.9)
METAMYELOCYTES ABSOLUTE COUNT: 0.1 K/UL (ref 0–0.12)
METAMYELOCYTES: 1 % (ref 0–1)
MICROORGANISM SPEC CULT: NORMAL
MICROORGANISM SPEC CULT: NORMAL
MONOCYTES NFR BLD: 0.62 K/UL (ref 0.1–0.95)
MONOCYTES NFR BLD: 5 % (ref 2–12)
MYELOCYTES ABSOLUTE COUNT: 0.31 K/UL
MYELOCYTES: 3 %
NEUTROPHILS NFR BLD: 83 % (ref 43–80)
NEUTS SEG NFR BLD: 9.93 K/UL (ref 1.8–7.3)
NUCLEATED RED BLOOD CELLS: 1 PER 100 WBC
PLATELET # BLD AUTO: 259 K/UL (ref 130–450)
PMV BLD AUTO: 10.9 FL (ref 7–12)
POTASSIUM SERPL-SCNC: 4 MMOL/L (ref 3.5–5)
POTASSIUM SERPL-SCNC: 5.5 MMOL/L (ref 3.5–5)
RBC # BLD AUTO: 3.02 M/UL (ref 3.5–5.5)
RBC # BLD: ABNORMAL 10*6/UL
RBC # BLD: ABNORMAL 10*6/UL
SERVICE CMNT-IMP: NORMAL
SERVICE CMNT-IMP: NORMAL
SODIUM SERPL-SCNC: 138 MMOL/L (ref 132–146)
SODIUM SERPL-SCNC: 140 MMOL/L (ref 132–146)
SPECIMEN DESCRIPTION: NORMAL
SPECIMEN DESCRIPTION: NORMAL
TME LAST DOSE: ABNORMAL H
VANCOMYCIN DOSE: ABNORMAL MG
VANCOMYCIN TROUGH SERPL-MCNC: 19.1 UG/ML (ref 5–16)
WBC OTHER # BLD: 12 K/UL (ref 4.5–11.5)

## 2024-09-07 PROCEDURE — 2580000003 HC RX 258: Performed by: INTERNAL MEDICINE

## 2024-09-07 PROCEDURE — 6360000002 HC RX W HCPCS: Performed by: INTERNAL MEDICINE

## 2024-09-07 PROCEDURE — 6370000000 HC RX 637 (ALT 250 FOR IP): Performed by: INTERNAL MEDICINE

## 2024-09-07 PROCEDURE — 76937 US GUIDE VASCULAR ACCESS: CPT

## 2024-09-07 PROCEDURE — 2720000010 HC SURG SUPPLY STERILE

## 2024-09-07 PROCEDURE — 6370000000 HC RX 637 (ALT 250 FOR IP): Performed by: NURSE PRACTITIONER

## 2024-09-07 PROCEDURE — 36568 INSJ PICC <5 YR W/O IMAGING: CPT

## 2024-09-07 PROCEDURE — 94003 VENT MGMT INPAT SUBQ DAY: CPT

## 2024-09-07 PROCEDURE — 36415 COLL VENOUS BLD VENIPUNCTURE: CPT

## 2024-09-07 PROCEDURE — 80202 ASSAY OF VANCOMYCIN: CPT

## 2024-09-07 PROCEDURE — 02HV33Z INSERTION OF INFUSION DEVICE INTO SUPERIOR VENA CAVA, PERCUTANEOUS APPROACH: ICD-10-PCS | Performed by: INTERNAL MEDICINE

## 2024-09-07 PROCEDURE — 94640 AIRWAY INHALATION TREATMENT: CPT

## 2024-09-07 PROCEDURE — C1751 CATH, INF, PER/CENT/MIDLINE: HCPCS

## 2024-09-07 PROCEDURE — 2060000000 HC ICU INTERMEDIATE R&B

## 2024-09-07 PROCEDURE — 82962 GLUCOSE BLOOD TEST: CPT

## 2024-09-07 PROCEDURE — 6360000002 HC RX W HCPCS: Performed by: NURSE PRACTITIONER

## 2024-09-07 PROCEDURE — 80048 BASIC METABOLIC PNL TOTAL CA: CPT

## 2024-09-07 PROCEDURE — 2580000003 HC RX 258: Performed by: SPECIALIST

## 2024-09-07 PROCEDURE — 99233 SBSQ HOSP IP/OBS HIGH 50: CPT | Performed by: INTERNAL MEDICINE

## 2024-09-07 PROCEDURE — 85025 COMPLETE CBC W/AUTO DIFF WBC: CPT

## 2024-09-07 PROCEDURE — 99232 SBSQ HOSP IP/OBS MODERATE 35: CPT | Performed by: INTERNAL MEDICINE

## 2024-09-07 PROCEDURE — 6360000002 HC RX W HCPCS: Performed by: SPECIALIST

## 2024-09-07 RX ORDER — FUROSEMIDE 20 MG
20 TABLET ORAL 2 TIMES DAILY
Status: DISCONTINUED | OUTPATIENT
Start: 2024-09-07 | End: 2024-09-10 | Stop reason: HOSPADM

## 2024-09-07 RX ADMIN — MINERAL SUPPLEMENT IRON 300 MG / 5 ML STRENGTH LIQUID 100 PER BOX UNFLAVORED 300 MG: at 09:56

## 2024-09-07 RX ADMIN — ANTI-FUNGAL POWDER MICONAZOLE NITRATE TALC FREE: 1.42 POWDER TOPICAL at 20:59

## 2024-09-07 RX ADMIN — CARVEDILOL 12.5 MG: 6.25 TABLET, FILM COATED ORAL at 09:56

## 2024-09-07 RX ADMIN — HYDROXYZINE HYDROCHLORIDE 10 MG: 10 TABLET, FILM COATED ORAL at 13:34

## 2024-09-07 RX ADMIN — IPRATROPIUM BROMIDE AND ALBUTEROL SULFATE 1 DOSE: .5; 2.5 SOLUTION RESPIRATORY (INHALATION) at 18:20

## 2024-09-07 RX ADMIN — MICONAZOLE NITRATE: 2 OINTMENT TOPICAL at 09:58

## 2024-09-07 RX ADMIN — BUDESONIDE 500 MCG: 0.5 INHALANT RESPIRATORY (INHALATION) at 05:59

## 2024-09-07 RX ADMIN — LANSOPRAZOLE 30 MG: 30 TABLET, ORALLY DISINTEGRATING, DELAYED RELEASE ORAL at 05:11

## 2024-09-07 RX ADMIN — CARVEDILOL 12.5 MG: 6.25 TABLET, FILM COATED ORAL at 16:02

## 2024-09-07 RX ADMIN — IPRATROPIUM BROMIDE AND ALBUTEROL SULFATE 1 DOSE: .5; 2.5 SOLUTION RESPIRATORY (INHALATION) at 14:45

## 2024-09-07 RX ADMIN — ANTI-FUNGAL POWDER MICONAZOLE NITRATE TALC FREE: 1.42 POWDER TOPICAL at 09:57

## 2024-09-07 RX ADMIN — SODIUM CHLORIDE 100 MG: 9 INJECTION, SOLUTION INTRAVENOUS at 03:06

## 2024-09-07 RX ADMIN — VANCOMYCIN HYDROCHLORIDE 750 MG: 10 INJECTION, POWDER, LYOPHILIZED, FOR SOLUTION INTRAVENOUS at 13:40

## 2024-09-07 RX ADMIN — MEROPENEM 1000 MG: 1 INJECTION INTRAVENOUS at 04:38

## 2024-09-07 RX ADMIN — SODIUM CHLORIDE 100 MG: 9 INJECTION, SOLUTION INTRAVENOUS at 16:05

## 2024-09-07 RX ADMIN — MEROPENEM 1000 MG: 1 INJECTION INTRAVENOUS at 20:58

## 2024-09-07 RX ADMIN — IPRATROPIUM BROMIDE AND ALBUTEROL SULFATE 1 DOSE: .5; 2.5 SOLUTION RESPIRATORY (INHALATION) at 09:25

## 2024-09-07 RX ADMIN — HYDROXYZINE HYDROCHLORIDE 10 MG: 10 TABLET, FILM COATED ORAL at 20:51

## 2024-09-07 RX ADMIN — Medication 10 ML: at 09:58

## 2024-09-07 RX ADMIN — ARFORMOTEROL TARTRATE 15 MCG: 15 SOLUTION RESPIRATORY (INHALATION) at 05:59

## 2024-09-07 RX ADMIN — MEROPENEM 1000 MG: 1 INJECTION INTRAVENOUS at 13:37

## 2024-09-07 RX ADMIN — ARFORMOTEROL TARTRATE 15 MCG: 15 SOLUTION RESPIRATORY (INHALATION) at 18:20

## 2024-09-07 RX ADMIN — HYDROXYZINE HYDROCHLORIDE 10 MG: 10 TABLET, FILM COATED ORAL at 09:56

## 2024-09-07 RX ADMIN — ENOXAPARIN SODIUM 40 MG: 100 INJECTION SUBCUTANEOUS at 16:04

## 2024-09-07 RX ADMIN — IPRATROPIUM BROMIDE AND ALBUTEROL SULFATE 1 DOSE: .5; 2.5 SOLUTION RESPIRATORY (INHALATION) at 05:59

## 2024-09-07 RX ADMIN — FUROSEMIDE 20 MG: 20 TABLET ORAL at 20:51

## 2024-09-07 RX ADMIN — ACETAMINOPHEN 650 MG: 325 TABLET ORAL at 05:11

## 2024-09-07 RX ADMIN — BUDESONIDE 500 MCG: 0.5 INHALANT RESPIRATORY (INHALATION) at 18:20

## 2024-09-07 RX ADMIN — SODIUM CHLORIDE, PRESERVATIVE FREE 10 ML: 5 INJECTION INTRAVENOUS at 09:57

## 2024-09-07 RX ADMIN — ATORVASTATIN CALCIUM 10 MG: 10 TABLET, FILM COATED ORAL at 20:51

## 2024-09-07 RX ADMIN — MICONAZOLE NITRATE: 2 OINTMENT TOPICAL at 21:00

## 2024-09-07 ASSESSMENT — PAIN SCALES - GENERAL
PAINLEVEL_OUTOF10: 0

## 2024-09-07 ASSESSMENT — PAIN DESCRIPTION - ORIENTATION: ORIENTATION: MID

## 2024-09-07 ASSESSMENT — PAIN DESCRIPTION - LOCATION
LOCATION: GENERALIZED
LOCATION: GENERALIZED

## 2024-09-07 ASSESSMENT — PULMONARY FUNCTION TESTS
PIF_VALUE: 34
PIF_VALUE: 38
PIF_VALUE: 38

## 2024-09-07 ASSESSMENT — PAIN DESCRIPTION - DESCRIPTORS: DESCRIPTORS: ACHING

## 2024-09-07 ASSESSMENT — PAIN SCALES - WONG BAKER: WONGBAKER_NUMERICALRESPONSE: NO HURT

## 2024-09-08 ENCOUNTER — APPOINTMENT (OUTPATIENT)
Dept: GENERAL RADIOLOGY | Age: 79
DRG: 870 | End: 2024-09-08
Payer: MEDICARE

## 2024-09-08 LAB
ANION GAP SERPL CALCULATED.3IONS-SCNC: 8 MMOL/L (ref 7–16)
BASOPHILS # BLD: 0.01 K/UL (ref 0–0.2)
BASOPHILS NFR BLD: 0 % (ref 0–2)
BUN SERPL-MCNC: 53 MG/DL (ref 6–23)
CALCIUM SERPL-MCNC: 8.3 MG/DL (ref 8.6–10.2)
CHLORIDE SERPL-SCNC: 103 MMOL/L (ref 98–107)
CO2 SERPL-SCNC: 30 MMOL/L (ref 22–29)
CREAT SERPL-MCNC: 0.5 MG/DL (ref 0.5–1)
EOSINOPHIL # BLD: 0.21 K/UL (ref 0.05–0.5)
EOSINOPHILS RELATIVE PERCENT: 1 % (ref 0–6)
ERYTHROCYTE [DISTWIDTH] IN BLOOD BY AUTOMATED COUNT: 16.9 % (ref 11.5–15)
GFR, ESTIMATED: >90 ML/MIN/1.73M2
GLUCOSE BLD-MCNC: 73 MG/DL (ref 74–99)
GLUCOSE BLD-MCNC: 88 MG/DL (ref 74–99)
GLUCOSE BLD-MCNC: 89 MG/DL (ref 74–99)
GLUCOSE SERPL-MCNC: 105 MG/DL (ref 74–99)
HCT VFR BLD AUTO: 26.8 % (ref 34–48)
HGB BLD-MCNC: 8.3 G/DL (ref 11.5–15.5)
IMM GRANULOCYTES # BLD AUTO: 0.35 K/UL (ref 0–0.58)
IMM GRANULOCYTES NFR BLD: 2 % (ref 0–5)
LYMPHOCYTES NFR BLD: 1.3 K/UL (ref 1.5–4)
LYMPHOCYTES RELATIVE PERCENT: 8 % (ref 20–42)
MCH RBC QN AUTO: 29.2 PG (ref 26–35)
MCHC RBC AUTO-ENTMCNC: 31 G/DL (ref 32–34.5)
MCV RBC AUTO: 94.4 FL (ref 80–99.9)
MONOCYTES NFR BLD: 0.88 K/UL (ref 0.1–0.95)
MONOCYTES NFR BLD: 6 % (ref 2–12)
NEUTROPHILS NFR BLD: 82 % (ref 43–80)
NEUTS SEG NFR BLD: 12.72 K/UL (ref 1.8–7.3)
PLATELET # BLD AUTO: 239 K/UL (ref 130–450)
PMV BLD AUTO: 10.7 FL (ref 7–12)
POTASSIUM SERPL-SCNC: 4.2 MMOL/L (ref 3.5–5)
RBC # BLD AUTO: 2.84 M/UL (ref 3.5–5.5)
SODIUM SERPL-SCNC: 141 MMOL/L (ref 132–146)
WBC OTHER # BLD: 15.5 K/UL (ref 4.5–11.5)

## 2024-09-08 PROCEDURE — 82962 GLUCOSE BLOOD TEST: CPT

## 2024-09-08 PROCEDURE — 80048 BASIC METABOLIC PNL TOTAL CA: CPT

## 2024-09-08 PROCEDURE — 6360000002 HC RX W HCPCS: Performed by: SPECIALIST

## 2024-09-08 PROCEDURE — 6360000002 HC RX W HCPCS: Performed by: INTERNAL MEDICINE

## 2024-09-08 PROCEDURE — 2580000003 HC RX 258: Performed by: INTERNAL MEDICINE

## 2024-09-08 PROCEDURE — APPSS30 APP SPLIT SHARED TIME 16-30 MINUTES: Performed by: NURSE PRACTITIONER

## 2024-09-08 PROCEDURE — 2060000000 HC ICU INTERMEDIATE R&B

## 2024-09-08 PROCEDURE — 6370000000 HC RX 637 (ALT 250 FOR IP): Performed by: NURSE PRACTITIONER

## 2024-09-08 PROCEDURE — 74018 RADEX ABDOMEN 1 VIEW: CPT

## 2024-09-08 PROCEDURE — 94640 AIRWAY INHALATION TREATMENT: CPT

## 2024-09-08 PROCEDURE — 6370000000 HC RX 637 (ALT 250 FOR IP): Performed by: INTERNAL MEDICINE

## 2024-09-08 PROCEDURE — 99232 SBSQ HOSP IP/OBS MODERATE 35: CPT | Performed by: INTERNAL MEDICINE

## 2024-09-08 PROCEDURE — 94003 VENT MGMT INPAT SUBQ DAY: CPT

## 2024-09-08 PROCEDURE — 85025 COMPLETE CBC W/AUTO DIFF WBC: CPT

## 2024-09-08 PROCEDURE — 6370000000 HC RX 637 (ALT 250 FOR IP): Performed by: SPECIALIST

## 2024-09-08 PROCEDURE — 99233 SBSQ HOSP IP/OBS HIGH 50: CPT | Performed by: INTERNAL MEDICINE

## 2024-09-08 PROCEDURE — 2580000003 HC RX 258: Performed by: SPECIALIST

## 2024-09-08 PROCEDURE — 6360000002 HC RX W HCPCS: Performed by: NURSE PRACTITIONER

## 2024-09-08 RX ORDER — COLISTIMETHATE SODIUM 150 MG/1
75 INJECTION, POWDER, LYOPHILIZED, FOR SOLUTION INTRAMUSCULAR; INTRAVENOUS
Status: DISCONTINUED | OUTPATIENT
Start: 2024-09-08 | End: 2024-09-10 | Stop reason: HOSPADM

## 2024-09-08 RX ORDER — SODIUM CHLORIDE FOR INHALATION 0.9 %
3 VIAL, NEBULIZER (ML) INHALATION
Status: DISCONTINUED | OUTPATIENT
Start: 2024-09-08 | End: 2024-09-10 | Stop reason: HOSPADM

## 2024-09-08 RX ADMIN — MEROPENEM 1000 MG: 1 INJECTION INTRAVENOUS at 04:29

## 2024-09-08 RX ADMIN — IPRATROPIUM BROMIDE AND ALBUTEROL SULFATE 1 DOSE: .5; 2.5 SOLUTION RESPIRATORY (INHALATION) at 05:43

## 2024-09-08 RX ADMIN — MICONAZOLE NITRATE: 2 OINTMENT TOPICAL at 22:44

## 2024-09-08 RX ADMIN — ENOXAPARIN SODIUM 40 MG: 100 INJECTION SUBCUTANEOUS at 17:13

## 2024-09-08 RX ADMIN — HYDROXYZINE HYDROCHLORIDE 10 MG: 10 TABLET, FILM COATED ORAL at 22:40

## 2024-09-08 RX ADMIN — MICONAZOLE NITRATE: 2 OINTMENT TOPICAL at 09:55

## 2024-09-08 RX ADMIN — ARFORMOTEROL TARTRATE 15 MCG: 15 SOLUTION RESPIRATORY (INHALATION) at 05:44

## 2024-09-08 RX ADMIN — MINERAL SUPPLEMENT IRON 300 MG / 5 ML STRENGTH LIQUID 100 PER BOX UNFLAVORED 300 MG: at 09:55

## 2024-09-08 RX ADMIN — IPRATROPIUM BROMIDE AND ALBUTEROL SULFATE 1 DOSE: .5; 2.5 SOLUTION RESPIRATORY (INHALATION) at 17:26

## 2024-09-08 RX ADMIN — ARFORMOTEROL TARTRATE 15 MCG: 15 SOLUTION RESPIRATORY (INHALATION) at 17:27

## 2024-09-08 RX ADMIN — ATORVASTATIN CALCIUM 10 MG: 10 TABLET, FILM COATED ORAL at 22:40

## 2024-09-08 RX ADMIN — HYDROXYZINE HYDROCHLORIDE 10 MG: 10 TABLET, FILM COATED ORAL at 13:17

## 2024-09-08 RX ADMIN — SODIUM CHLORIDE, PRESERVATIVE FREE 10 ML: 5 INJECTION INTRAVENOUS at 22:51

## 2024-09-08 RX ADMIN — ANTI-FUNGAL POWDER MICONAZOLE NITRATE TALC FREE: 1.42 POWDER TOPICAL at 22:44

## 2024-09-08 RX ADMIN — LANSOPRAZOLE 30 MG: 30 TABLET, ORALLY DISINTEGRATING, DELAYED RELEASE ORAL at 05:49

## 2024-09-08 RX ADMIN — CARVEDILOL 12.5 MG: 6.25 TABLET, FILM COATED ORAL at 17:13

## 2024-09-08 RX ADMIN — SODIUM CHLORIDE 100 MG: 9 INJECTION, SOLUTION INTRAVENOUS at 16:58

## 2024-09-08 RX ADMIN — IPRATROPIUM BROMIDE AND ALBUTEROL SULFATE 1 DOSE: .5; 2.5 SOLUTION RESPIRATORY (INHALATION) at 09:59

## 2024-09-08 RX ADMIN — BUDESONIDE 500 MCG: 0.5 INHALANT RESPIRATORY (INHALATION) at 17:28

## 2024-09-08 RX ADMIN — CARVEDILOL 12.5 MG: 6.25 TABLET, FILM COATED ORAL at 09:54

## 2024-09-08 RX ADMIN — BUDESONIDE 500 MCG: 0.5 INHALANT RESPIRATORY (INHALATION) at 05:43

## 2024-09-08 RX ADMIN — FUROSEMIDE 20 MG: 20 TABLET ORAL at 09:54

## 2024-09-08 RX ADMIN — ACETAMINOPHEN 650 MG: 325 TABLET ORAL at 22:40

## 2024-09-08 RX ADMIN — COLISTIMETHATE SODIUM 75 MG: 150 INJECTION, POWDER, LYOPHILIZED, FOR SOLUTION INTRAMUSCULAR; INTRAVENOUS at 17:19

## 2024-09-08 RX ADMIN — SODIUM CHLORIDE 100 MG: 9 INJECTION, SOLUTION INTRAVENOUS at 03:09

## 2024-09-08 RX ADMIN — HYDROXYZINE HYDROCHLORIDE 10 MG: 10 TABLET, FILM COATED ORAL at 09:54

## 2024-09-08 RX ADMIN — ISODIUM CHLORIDE 3 ML: 0.03 SOLUTION RESPIRATORY (INHALATION) at 17:19

## 2024-09-08 RX ADMIN — ANTI-FUNGAL POWDER MICONAZOLE NITRATE TALC FREE: 1.42 POWDER TOPICAL at 09:55

## 2024-09-08 RX ADMIN — VANCOMYCIN HYDROCHLORIDE 750 MG: 10 INJECTION, POWDER, LYOPHILIZED, FOR SOLUTION INTRAVENOUS at 13:30

## 2024-09-08 RX ADMIN — FUROSEMIDE 20 MG: 20 TABLET ORAL at 22:40

## 2024-09-08 RX ADMIN — IPRATROPIUM BROMIDE AND ALBUTEROL SULFATE 1 DOSE: .5; 2.5 SOLUTION RESPIRATORY (INHALATION) at 14:20

## 2024-09-08 RX ADMIN — SODIUM CHLORIDE, PRESERVATIVE FREE 10 ML: 5 INJECTION INTRAVENOUS at 09:54

## 2024-09-08 ASSESSMENT — PULMONARY FUNCTION TESTS
PIF_VALUE: 33
PIF_VALUE: 40
PIF_VALUE: 37
PIF_VALUE: 38
PIF_VALUE: 33

## 2024-09-08 ASSESSMENT — PAIN DESCRIPTION - LOCATION: LOCATION: GENERALIZED

## 2024-09-08 ASSESSMENT — PAIN SCALES - GENERAL
PAINLEVEL_OUTOF10: 0

## 2024-09-08 ASSESSMENT — PAIN SCALES - WONG BAKER
WONGBAKER_NUMERICALRESPONSE: NO HURT

## 2024-09-09 LAB
GLUCOSE BLD-MCNC: 104 MG/DL (ref 74–99)
GLUCOSE BLD-MCNC: 106 MG/DL (ref 74–99)
GLUCOSE BLD-MCNC: 81 MG/DL (ref 74–99)
GLUCOSE BLD-MCNC: 98 MG/DL (ref 74–99)
MICROORGANISM SPEC CULT: ABNORMAL
MICROORGANISM/AGENT SPEC: ABNORMAL
SERVICE CMNT-IMP: ABNORMAL
SPECIMEN DESCRIPTION: ABNORMAL

## 2024-09-09 PROCEDURE — 2060000000 HC ICU INTERMEDIATE R&B

## 2024-09-09 PROCEDURE — 6370000000 HC RX 637 (ALT 250 FOR IP): Performed by: INTERNAL MEDICINE

## 2024-09-09 PROCEDURE — 2580000003 HC RX 258: Performed by: INTERNAL MEDICINE

## 2024-09-09 PROCEDURE — 6370000000 HC RX 637 (ALT 250 FOR IP): Performed by: NURSE PRACTITIONER

## 2024-09-09 PROCEDURE — 2580000003 HC RX 258: Performed by: SPECIALIST

## 2024-09-09 PROCEDURE — 6360000002 HC RX W HCPCS: Performed by: SPECIALIST

## 2024-09-09 PROCEDURE — 82962 GLUCOSE BLOOD TEST: CPT

## 2024-09-09 PROCEDURE — 6360000002 HC RX W HCPCS: Performed by: INTERNAL MEDICINE

## 2024-09-09 PROCEDURE — 94640 AIRWAY INHALATION TREATMENT: CPT

## 2024-09-09 PROCEDURE — 99232 SBSQ HOSP IP/OBS MODERATE 35: CPT | Performed by: INTERNAL MEDICINE

## 2024-09-09 PROCEDURE — 6370000000 HC RX 637 (ALT 250 FOR IP): Performed by: SPECIALIST

## 2024-09-09 PROCEDURE — 94003 VENT MGMT INPAT SUBQ DAY: CPT

## 2024-09-09 PROCEDURE — 6360000002 HC RX W HCPCS: Performed by: NURSE PRACTITIONER

## 2024-09-09 RX ORDER — LEVOFLOXACIN 5 MG/ML
750 INJECTION, SOLUTION INTRAVENOUS EVERY 24 HOURS
Qty: 2100 ML | Refills: 0 | Status: SHIPPED | OUTPATIENT
Start: 2024-09-09 | End: 2024-09-23

## 2024-09-09 RX ORDER — LEVOFLOXACIN 5 MG/ML
750 INJECTION, SOLUTION INTRAVENOUS EVERY 24 HOURS
Status: DISCONTINUED | OUTPATIENT
Start: 2024-09-09 | End: 2024-09-10 | Stop reason: HOSPADM

## 2024-09-09 RX ADMIN — SODIUM CHLORIDE, PRESERVATIVE FREE 10 ML: 5 INJECTION INTRAVENOUS at 21:22

## 2024-09-09 RX ADMIN — FUROSEMIDE 20 MG: 20 TABLET ORAL at 16:12

## 2024-09-09 RX ADMIN — HYDROXYZINE HYDROCHLORIDE 10 MG: 10 TABLET, FILM COATED ORAL at 16:12

## 2024-09-09 RX ADMIN — Medication 10 ML: at 08:59

## 2024-09-09 RX ADMIN — IPRATROPIUM BROMIDE AND ALBUTEROL SULFATE 1 DOSE: .5; 2.5 SOLUTION RESPIRATORY (INHALATION) at 18:00

## 2024-09-09 RX ADMIN — ARFORMOTEROL TARTRATE 15 MCG: 15 SOLUTION RESPIRATORY (INHALATION) at 18:00

## 2024-09-09 RX ADMIN — SODIUM CHLORIDE 100 MG: 9 INJECTION, SOLUTION INTRAVENOUS at 15:48

## 2024-09-09 RX ADMIN — ACETAMINOPHEN 650 MG: 325 TABLET ORAL at 05:46

## 2024-09-09 RX ADMIN — CARVEDILOL 12.5 MG: 6.25 TABLET, FILM COATED ORAL at 16:12

## 2024-09-09 RX ADMIN — MICONAZOLE NITRATE: 2 OINTMENT TOPICAL at 08:58

## 2024-09-09 RX ADMIN — BUDESONIDE 500 MCG: 0.5 INHALANT RESPIRATORY (INHALATION) at 18:00

## 2024-09-09 RX ADMIN — ISODIUM CHLORIDE 3 ML: 0.03 SOLUTION RESPIRATORY (INHALATION) at 06:50

## 2024-09-09 RX ADMIN — PETROLATUM: 420 OINTMENT TOPICAL at 16:00

## 2024-09-09 RX ADMIN — CARVEDILOL 12.5 MG: 6.25 TABLET, FILM COATED ORAL at 08:57

## 2024-09-09 RX ADMIN — HYDROXYZINE HYDROCHLORIDE 10 MG: 10 TABLET, FILM COATED ORAL at 08:57

## 2024-09-09 RX ADMIN — SODIUM CHLORIDE 100 MG: 9 INJECTION, SOLUTION INTRAVENOUS at 04:39

## 2024-09-09 RX ADMIN — LEVOFLOXACIN 750 MG: 750 INJECTION, SOLUTION INTRAVENOUS at 23:42

## 2024-09-09 RX ADMIN — ANTI-FUNGAL POWDER MICONAZOLE NITRATE TALC FREE: 1.42 POWDER TOPICAL at 21:19

## 2024-09-09 RX ADMIN — IPRATROPIUM BROMIDE AND ALBUTEROL SULFATE 1 DOSE: .5; 2.5 SOLUTION RESPIRATORY (INHALATION) at 14:43

## 2024-09-09 RX ADMIN — SODIUM CHLORIDE, PRESERVATIVE FREE 10 ML: 5 INJECTION INTRAVENOUS at 08:58

## 2024-09-09 RX ADMIN — LANSOPRAZOLE 30 MG: 30 TABLET, ORALLY DISINTEGRATING, DELAYED RELEASE ORAL at 05:46

## 2024-09-09 RX ADMIN — ENOXAPARIN SODIUM 40 MG: 100 INJECTION SUBCUTANEOUS at 16:12

## 2024-09-09 RX ADMIN — Medication 10 ML: at 21:22

## 2024-09-09 RX ADMIN — ARFORMOTEROL TARTRATE 15 MCG: 15 SOLUTION RESPIRATORY (INHALATION) at 06:48

## 2024-09-09 RX ADMIN — COLISTIMETHATE SODIUM 75 MG: 150 INJECTION, POWDER, LYOPHILIZED, FOR SOLUTION INTRAMUSCULAR; INTRAVENOUS at 06:49

## 2024-09-09 RX ADMIN — MICONAZOLE NITRATE: 2 OINTMENT TOPICAL at 21:20

## 2024-09-09 RX ADMIN — ISODIUM CHLORIDE 3 ML: 0.03 SOLUTION RESPIRATORY (INHALATION) at 18:00

## 2024-09-09 RX ADMIN — ATORVASTATIN CALCIUM 10 MG: 10 TABLET, FILM COATED ORAL at 21:18

## 2024-09-09 RX ADMIN — MINERAL SUPPLEMENT IRON 300 MG / 5 ML STRENGTH LIQUID 100 PER BOX UNFLAVORED 300 MG: at 08:57

## 2024-09-09 RX ADMIN — BUDESONIDE 500 MCG: 0.5 INHALANT RESPIRATORY (INHALATION) at 06:48

## 2024-09-09 RX ADMIN — HYDROXYZINE HYDROCHLORIDE 10 MG: 10 TABLET, FILM COATED ORAL at 21:18

## 2024-09-09 RX ADMIN — IPRATROPIUM BROMIDE AND ALBUTEROL SULFATE 1 DOSE: .5; 2.5 SOLUTION RESPIRATORY (INHALATION) at 10:04

## 2024-09-09 RX ADMIN — IPRATROPIUM BROMIDE AND ALBUTEROL SULFATE 1 DOSE: .5; 2.5 SOLUTION RESPIRATORY (INHALATION) at 06:50

## 2024-09-09 RX ADMIN — FUROSEMIDE 20 MG: 20 TABLET ORAL at 08:57

## 2024-09-09 RX ADMIN — ANTI-FUNGAL POWDER MICONAZOLE NITRATE TALC FREE: 1.42 POWDER TOPICAL at 08:58

## 2024-09-09 ASSESSMENT — PULMONARY FUNCTION TESTS
PIF_VALUE: 36
PIF_VALUE: 33
PIF_VALUE: 35
PIF_VALUE: 35
PIF_VALUE: 33

## 2024-09-09 ASSESSMENT — PAIN SCALES - WONG BAKER
WONGBAKER_NUMERICALRESPONSE: NO HURT

## 2024-09-09 ASSESSMENT — PAIN SCALES - GENERAL: PAINLEVEL_OUTOF10: 0

## 2024-09-10 VITALS
SYSTOLIC BLOOD PRESSURE: 115 MMHG | RESPIRATION RATE: 22 BRPM | WEIGHT: 127.21 LBS | TEMPERATURE: 97.8 F | HEART RATE: 82 BPM | DIASTOLIC BLOOD PRESSURE: 77 MMHG | OXYGEN SATURATION: 94 % | HEIGHT: 62 IN | BODY MASS INDEX: 23.41 KG/M2

## 2024-09-10 LAB
GLUCOSE BLD-MCNC: 120 MG/DL (ref 74–99)
GLUCOSE BLD-MCNC: 97 MG/DL (ref 74–99)

## 2024-09-10 PROCEDURE — 6370000000 HC RX 637 (ALT 250 FOR IP): Performed by: NURSE PRACTITIONER

## 2024-09-10 PROCEDURE — 99239 HOSP IP/OBS DSCHRG MGMT >30: CPT | Performed by: INTERNAL MEDICINE

## 2024-09-10 PROCEDURE — 6360000002 HC RX W HCPCS: Performed by: INTERNAL MEDICINE

## 2024-09-10 PROCEDURE — 6360000002 HC RX W HCPCS: Performed by: SPECIALIST

## 2024-09-10 PROCEDURE — 2580000003 HC RX 258: Performed by: INTERNAL MEDICINE

## 2024-09-10 PROCEDURE — 94640 AIRWAY INHALATION TREATMENT: CPT

## 2024-09-10 PROCEDURE — 2580000003 HC RX 258: Performed by: SPECIALIST

## 2024-09-10 PROCEDURE — 6360000002 HC RX W HCPCS: Performed by: NURSE PRACTITIONER

## 2024-09-10 PROCEDURE — 6370000000 HC RX 637 (ALT 250 FOR IP): Performed by: INTERNAL MEDICINE

## 2024-09-10 PROCEDURE — 94003 VENT MGMT INPAT SUBQ DAY: CPT

## 2024-09-10 PROCEDURE — 82962 GLUCOSE BLOOD TEST: CPT

## 2024-09-10 RX ORDER — LANSOPRAZOLE 30 MG/1
30 TABLET, ORALLY DISINTEGRATING, DELAYED RELEASE ORAL
Qty: 30 TABLET | Status: CANCELLED | OUTPATIENT
Start: 2024-09-11

## 2024-09-10 RX ORDER — SODIUM CHLORIDE FOR INHALATION 0.9 %
3 VIAL, NEBULIZER (ML) INHALATION
DISCHARGE
Start: 2024-09-10 | End: 2024-10-10

## 2024-09-10 RX ADMIN — Medication 10 ML: at 09:39

## 2024-09-10 RX ADMIN — MICONAZOLE NITRATE: 2 OINTMENT TOPICAL at 07:52

## 2024-09-10 RX ADMIN — MINERAL SUPPLEMENT IRON 300 MG / 5 ML STRENGTH LIQUID 100 PER BOX UNFLAVORED 300 MG: at 07:44

## 2024-09-10 RX ADMIN — FUROSEMIDE 20 MG: 20 TABLET ORAL at 07:45

## 2024-09-10 RX ADMIN — BUDESONIDE 500 MCG: 0.5 INHALANT RESPIRATORY (INHALATION) at 06:16

## 2024-09-10 RX ADMIN — HYDROXYZINE HYDROCHLORIDE 10 MG: 10 TABLET, FILM COATED ORAL at 07:45

## 2024-09-10 RX ADMIN — COLISTIMETHATE SODIUM 75 MG: 150 INJECTION, POWDER, LYOPHILIZED, FOR SOLUTION INTRAMUSCULAR; INTRAVENOUS at 09:51

## 2024-09-10 RX ADMIN — ANTI-FUNGAL POWDER MICONAZOLE NITRATE TALC FREE: 1.42 POWDER TOPICAL at 09:39

## 2024-09-10 RX ADMIN — ACETAMINOPHEN 650 MG: 325 TABLET ORAL at 05:26

## 2024-09-10 RX ADMIN — ARFORMOTEROL TARTRATE 15 MCG: 15 SOLUTION RESPIRATORY (INHALATION) at 06:16

## 2024-09-10 RX ADMIN — SODIUM CHLORIDE 100 MG: 9 INJECTION, SOLUTION INTRAVENOUS at 03:03

## 2024-09-10 RX ADMIN — CARVEDILOL 12.5 MG: 6.25 TABLET, FILM COATED ORAL at 07:44

## 2024-09-10 RX ADMIN — IPRATROPIUM BROMIDE AND ALBUTEROL SULFATE 1 DOSE: .5; 2.5 SOLUTION RESPIRATORY (INHALATION) at 09:28

## 2024-09-10 RX ADMIN — IPRATROPIUM BROMIDE AND ALBUTEROL SULFATE 1 DOSE: .5; 2.5 SOLUTION RESPIRATORY (INHALATION) at 06:15

## 2024-09-10 RX ADMIN — PETROLATUM: 420 OINTMENT TOPICAL at 07:55

## 2024-09-10 RX ADMIN — ENOXAPARIN SODIUM 40 MG: 100 INJECTION SUBCUTANEOUS at 07:44

## 2024-09-10 RX ADMIN — LANSOPRAZOLE 30 MG: 30 TABLET, ORALLY DISINTEGRATING, DELAYED RELEASE ORAL at 05:26

## 2024-09-10 ASSESSMENT — PAIN SCALES - GENERAL: PAINLEVEL_OUTOF10: 0

## 2024-09-10 ASSESSMENT — PAIN SCALES - WONG BAKER
WONGBAKER_NUMERICALRESPONSE: NO HURT
WONGBAKER_NUMERICALRESPONSE: NO HURT

## 2024-09-10 ASSESSMENT — PAIN DESCRIPTION - LOCATION: LOCATION: GENERALIZED

## 2024-09-10 ASSESSMENT — PULMONARY FUNCTION TESTS
PIF_VALUE: 33
PIF_VALUE: 30

## 2024-09-11 LAB
MICROORGANISM SPEC CULT: ABNORMAL
MICROORGANISM/AGENT SPEC: ABNORMAL
SERVICE CMNT-IMP: ABNORMAL
SPECIMEN DESCRIPTION: ABNORMAL

## 2024-09-27 ENCOUNTER — ANESTHESIA EVENT (OUTPATIENT)
Dept: ENDOSCOPY | Age: 79
End: 2024-09-27
Payer: MEDICARE

## 2024-09-27 ENCOUNTER — ANESTHESIA (OUTPATIENT)
Dept: ENDOSCOPY | Age: 79
End: 2024-09-27
Payer: MEDICARE

## 2024-09-27 PROBLEM — M86.9 OSTEOMYELITIS: Status: ACTIVE | Noted: 2024-09-27

## 2024-09-27 PROCEDURE — 6360000002 HC RX W HCPCS: Performed by: NURSE ANESTHETIST, CERTIFIED REGISTERED

## 2024-09-27 PROCEDURE — 2580000003 HC RX 258: Performed by: NURSE ANESTHETIST, CERTIFIED REGISTERED

## 2024-09-27 RX ORDER — PROPOFOL 10 MG/ML
INJECTION, EMULSION INTRAVENOUS
Status: DISCONTINUED | OUTPATIENT
Start: 2024-09-27 | End: 2024-09-27 | Stop reason: SDUPTHER

## 2024-09-27 RX ORDER — SODIUM CHLORIDE 9 MG/ML
INJECTION, SOLUTION INTRAVENOUS
Status: DISCONTINUED | OUTPATIENT
Start: 2024-09-27 | End: 2024-09-27 | Stop reason: SDUPTHER

## 2024-09-27 RX ADMIN — SODIUM CHLORIDE: 9 INJECTION, SOLUTION INTRAVENOUS at 15:38

## 2024-09-27 RX ADMIN — PROPOFOL 30 MG: 10 INJECTION, EMULSION INTRAVENOUS at 15:47

## 2024-09-27 NOTE — CONSULTS
[facility]  Department of Internal Medicine  Division of Pulmonary, Critical Care and Sleep Medicine  Consult Note      Sagar Pretty, APRN-CNP  Azalea Beckiclarisa, APRN-CNP    ICU Intensivist Attestation:    I saw, examined, and discussed the patient with Azalea SIMMONS-ACNP and agree with her assessment and plan.    The patient is quite familiar to our service having been here multiple times both in the ICU and the regular floors.    Compared to the most recent film of 9/3/2024, the right lower lobe density has improved considerably.  However, she now has a new and more exacerbated infiltrate in the left lower lobe which was not there on the previous film.  Therefore, given her nursing home background, she will require treatment for presumed ESBL organisms such as Klebsiella and possible Staphylococcus either MSSA or MRSA.  Therefore, coverage with combination of meropenem and vancomycin is quite appropriate.  She will continue aerosolized bronchodilators and inhaled steroids.  His sputum is just being sent now from the nurse that is taking care of her.  I do not see that any sputum was sent last night from the emergency room.  We will repeat her chest x-ray after the weekend and reassess her condition pending Gram stain and C&S of the sputum and blood..    Electronically signed by Ant Burgos MD on 2024 at 3:18 PM      Patient: Effie Portillo  MRN: [id number]  : 1945    Encounter Time: 9:32 AM     Date of Admission: 2024 11:11 PM    Primary Care Physician: Sheba Sandoval MD    Reason for Consultation: chronic trach vent, pneumonia      HISTORY OF PRESENT ILLNESS : Effie Portillo 79 y.o. female was seen in consultation regarding the above chief complaint with past medical history noted for chronic trach/vent  09/26/2024 11:21 PM    MCHC 30.3 09/26/2024 11:21 PM    RDW 17.0 09/26/2024 11:21 PM    NRBC 1 09/07/2024 02:37 PM    METASPCT 1 09/07/2024 02:37 PM    LYMPHOPCT 7 09/26/2024 11:21 PM    MONOPCT 6 09/26/2024 11:21 PM    MYELOPCT 3 09/07/2024 02:37 PM    EOSPCT 0 09/26/2024 11:21 PM    BASOPCT 0 09/26/2024 11:21 PM    MONOSABS 0.45 09/26/2024 11:21 PM    LYMPHSABS 0.51 09/26/2024 11:21 PM    EOSABS 0.00 09/26/2024 11:21 PM    BASOSABS 0.00 09/26/2024 11:21 PM     CMP:    Lab Results   Component Value Date/Time     09/26/2024 11:21 PM    K 4.8 09/26/2024 11:21 PM     09/26/2024 11:21 PM    CO2 28 09/26/2024 11:21 PM     09/26/2024 11:21 PM    CREATININE 1.2 09/26/2024 11:21 PM    LABGLOM 48 09/26/2024 11:21 PM    LABGLOM 80 04/17/2024 07:48 AM    GLUCOSE 181 09/26/2024 11:21 PM    CALCIUM 9.5 09/26/2024 11:21 PM    BILITOT 0.2 09/26/2024 11:21 PM    ALKPHOS 114 09/26/2024 11:21 PM    AST 10 09/26/2024 11:21 PM    ALT 12 09/26/2024 11:21 PM        PRO-BNP:   Lab Results   Component Value Date    PROBNP 12,875 (H) 09/26/2024    PROBNP 1,877 (H) 09/01/2024      ABGs:   Lab Results   Component Value Date/Time    PH 7.335 09/27/2024 04:30 AM    PO2 90.3 09/27/2024 04:30 AM    PCO2 42.1 09/27/2024 04:30 AM     Hemoglobin A1C: No components found for: \"HGBA1C\"    IMAGING:  Imaging tests were completed and reviewed and discussed radiology and care team involved and reveals   XR CHEST PORTABLE  Result Date: 9/27/2024  EXAMINATION: ONE XRAY VIEW OF THE CHEST 9/26/2024 11:34 pm COMPARISON: Chest radiograph 09/03/2024. HISTORY: ORDERING SYSTEM PROVIDED HISTORY: altered TECHNOLOGIST PROVIDED HISTORY: Reason for exam:->altered FINDINGS/IMPRESSION: 1.  Tracheostomy tube is in place.  Otherwise no invasive tubes, lines, or drains. 2.  Mild interval improvement of right lower lung consolidative opacities suggestive of resolving atelectasis.  Similar left-sided hazy airspace opacities and small left-sided pleural

## 2024-09-27 NOTE — H&P
[facility]   History and Physical      CHIEF COMPLAINT:  hypoxia    History of Present Illness:  79 y.o. female with a history of chronic trach vent, AFib, COPD, HFrEF, HTN, VTE, MDRO, bipolar, left AKA presents from [facility] with hypoxia from nursing facility.  Patient nonverbal.  ED physician spoke with patient's son, who changed patient's code status from DNR-CC to DNR-CCA.  She was admitted 9/1-9/10 with sepsis from pneumonia and bacteremia.  ID saw patient during admission, she was treated with merrem/vancomycin/tygacil.  Was d/c to complete 7 days of tygacil and 14 days of levaquin.  On arrival to ED today trach was exchanged.  Patient was sao2 84% on her regular vent settings, was also noted to have bright red blood per rectum per nursing facility.  Hemoccult positive in ED.  Workup significant for creatinine 1.2 (baseline 0.5), lactic acid 2.0, glucose 181, pro-BNP 18095, ammonia 74, troponin 23 (down from 51 last admission), WBC normal at 7.2 with 87% neutrophils, hgb 7.3 (down from 8.3 last admit).  CT head possible bilateral mastoiditis.  CTA chest patchy ground glass and nodular opacities with bulky mediastinal lymphadenopathy suggesting acute infection or inflammatory process, pleural effusions.  CT abd/pelvis inflammation around bladder, thinning of skin over dorsal coccyx suggesting sacral ulcer with osseous deminieralization concerning for osteomyelitis, anasarca.      Informant(s) for H&P: chart    REVIEW OF SYSTEMS:  Complete ROS unable to be performed with the patient and is otherwise negative.      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

## 2024-09-27 NOTE — PLAN OF CARE
Problem: Safety - Adult  Goal: Free from fall injury  Outcome: Progressing  Flowsheets (Taken 9/27/2024 1224)  Free From Fall Injury: Instruct family/caregiver on patient safety     Problem: Discharge Planning  Goal: Discharge to home or other facility with appropriate resources  Outcome: Progressing  Flowsheets  Taken 9/27/2024 1827  Discharge to home or other facility with appropriate resources: Identify barriers to discharge with patient and caregiver  Taken 9/27/2024 1232  Discharge to home or other facility with appropriate resources: Identify barriers to discharge with patient and caregiver  Taken 9/27/2024 1023  Discharge to home or other facility with appropriate resources: Identify barriers to discharge with patient and caregiver     Problem: Pain  Goal: Verbalizes/displays adequate comfort level or baseline comfort level  Outcome: Progressing  Flowsheets  Taken 9/27/2024 1827  Verbalizes/displays adequate comfort level or baseline comfort level:   Assess pain using appropriate pain scale   Administer analgesics based on type and severity of pain and evaluate response  Taken 9/27/2024 1300  Verbalizes/displays adequate comfort level or baseline comfort level:   Assess pain using appropriate pain scale   Administer analgesics based on type and severity of pain and evaluate response  Taken 9/27/2024 1005  Verbalizes/displays adequate comfort level or baseline comfort level:   Assess pain using appropriate pain scale   Administer analgesics based on type and severity of pain and evaluate response     Problem: Chronic Conditions and Co-morbidities  Goal: Patient's chronic conditions and co-morbidity symptoms are monitored and maintained or improved  Recent Flowsheet Documentation  Taken 9/27/2024 1023 by Maria Alejandra Rodrigues, RN  Care Plan - Patient's Chronic Conditions and Co-Morbidity Symptoms are Monitored and Maintained or Improved: Monitor and assess patient's chronic conditions and comorbid symptoms for  stability, deterioration, or improvement

## 2024-09-27 NOTE — PROGRESS NOTES
Transported patient from 6th floor to [facility]. using eziCONEX Karime portable ventilator. No problems encountered. Total time 25 minutes.

## 2024-09-27 NOTE — CONSULTS
Lisbet Garcia DO        acetaminophen (TYLENOL) tablet 650 mg  650 mg Oral Q6H PRN Lisbet Garcia DO        Or    acetaminophen (TYLENOL) suppository 650 mg  650 mg Rectal Q6H PRN Lisbet Garcia DO        pantoprazole (PROTONIX) 40 mg in sodium chloride (PF) 0.9 % 10 mL injection  40 mg IntraVENous 2 times per day Lisbet Garcia DO        ipratropium 0.5 mg-albuterol 2.5 mg (DUONEB) nebulizer solution 1 Dose  1 Dose Inhalation Q4H WA RT Lisbet Garcia DO   1 Dose at 09/27/24 1001    furosemide (LASIX) injection 20 mg  20 mg IntraVENous Once Lisbet Garcia DO            SocHx:  Social History     Socioeconomic History    Marital status:      Spouse name: Not on file    Number of children: Not on file    Years of education: Not on file    Highest education level: Not on file   Occupational History    Not on file   Tobacco Use    Smoking status: Former     Types: Cigarettes    Smokeless tobacco: Not on file   Vaping Use    Vaping status: Never Used   Substance and Sexual Activity    Alcohol use: Not Currently    Drug use: Never    Sexual activity: Not on file   Other Topics Concern    Not on file   Social History Narrative    Not on file     Social Determinants of Health     Financial Resource Strain: Not on file   Food Insecurity: Patient Unable To Answer (9/1/2024)    Hunger Vital Sign     Worried About Running Out of Food in the Last Year: Patient unable to answer     Ran Out of Food in the Last Year: Patient unable to answer   Transportation Needs: Patient Unable To Answer (9/1/2024)    PRAPARE - Transportation     Lack of Transportation (Medical): Patient unable to answer     Lack of Transportation (Non-Medical): Patient unable to answer   Physical Activity: Not on file   Stress: Not on file   Social Connections: Patient Declined (9/27/2024)    Social Connections ([facility] HRSN)     If for any reason you need help with day-to-day activities such as bathing, preparing meals, shopping,  09/26/2024 11:21 PM    CALCIUM 9.5 09/26/2024 11:21 PM    BILITOT 0.2 09/26/2024 11:21 PM    ALKPHOS 114 09/26/2024 11:21 PM    AST 10 09/26/2024 11:21 PM    ALT 12 09/26/2024 11:21 PM     Lab Results   Component Value Date/Time    LIPASE 19 09/26/2024 11:21 PM     No results found for: \"AMYLASE\"      ASSESSMENT/PLAN:  Patient Active Problem List   Diagnosis    Chronic respiratory failure with hypoxia    Primary hypertension    History of stroke with residual deficit    History of DVT (deep vein thrombosis)    Coronary artery disease involving native coronary artery of native heart without angina pectoris    Ischemic cardiomyopathy    Subacute osteomyelitis of left foot    Hypernatremia    Severe protein-energy malnutrition (HCC)    Hx of AKA (above knee amputation), left ([facility])    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 hypoxia    COPD exacerbation (HCC)    PAF (paroxysmal atrial fibrillation) ([facility])    Sinus tachycardia    Ventilator dependent (HCC)    Sepsis (HCC)    Acute renal failure (ARF) ([facility])    Aspiration pneumonia of both lungs ([facility])    ACP (advance care planning)    Uremic encephalopathy    EVELIA (acute kidney injury) (HCC)    Acute on chronic anemia    Encephalopathy    Acute respiratory failure with hypoxia    Aspiration pneumonia (HCC)    Hyponatremia    HFrEF (heart failure with reduced ejection fraction) ([facility])    Palliative care encounter    Pressure injury of sacral region, unstageable (HCC)    Hyperkalemia    Tracheostomy dependence (HCC)    PEG (percutaneous endoscopic gastrostomy) status ([facility])    Hospital-acquired pneumonia    Oligouria    Osteomyelitis ([facility])     1.  Anemia  -Acute on chronic  -Mildly microcytic  --EGD 6/21/2024 by Dr. Smith with findings of unremarkable exam, PEG in place with no ulcer, no bleeding source identified  -Colonoscopy canceled 7/16/2024- -son refused  -Disproportionately elevated BUN raising suspicion for upper

## 2024-09-27 NOTE — ED NOTES
Trach exchanged at this time. Same trach size (6CN75H) done by dr almonte. Respiratory and dr stratton at bedside. Bilateral breath sounds noted and Xray pending. Pt connected back to vent an good VTs are noted. No trach complications at this time, pt tolerated exchange well.

## 2024-09-27 NOTE — CARE COORDINATION
Case Management Assessment  Initial Evaluation    Date/Time of Evaluation: 9/27/2024 10:13 AM  Assessment Completed by: Shena Solis RN    If patient is discharged prior to next notation, then this note serves as note for discharge by case management.    Patient Name: Effie Torres                   YOB: 1945  Diagnosis: Hyperammonemia (HCC) [E72.20]  Septicemia (HCC) [A41.9]  Osteomyelitis [M86.9]  Acute GI bleeding [K92.2]  Cystitis with hematuria [N30.91]  Chronic anemia [D64.9]  Sacral osteomyelitis [M46.28]                   Date / Time: 9/26/2024 11:11 PM    Patient Admission Status: Inpatient   Readmission Risk (Low < 19, Mod (19-27), High > 27): Readmission Risk Score: 27    Current PCP: Sheba Sandoval MD  PCP verified by CM? Yes    Chart Reviewed: Yes      History Provided by: Medical Record  Patient Orientation: Unable to Assess (pt not interactive, trach/vent)    Patient Cognition: Other (see comment) (See above)    Hospitalization in the last 30 days (Readmission):  Yes    If yes, Readmission Assessment in  Navigator will be completed.    Advance Directives:      Code Status: DNR-CCA   Patient's Primary Decision Maker is: Legal Next of Kin    Primary Decision Maker (Active): PO TORRES - Child - [phone]    Discharge Planning:    Patient lives with:   Type of Home:    Primary Care Giver: Other (Comment) ([facility] staff)  Patient Support Systems include: Children, Other (Comment) ([facility] staff)   Current Financial resources:    Current community resources:    Current services prior to admission:              Current DME:              Type of Home Care services:       ADLS  Prior functional level: Assistance with the following:, Bathing, Dressing, Toileting, Feeding, Cooking, Housework, Shopping, Mobility  Current functional level: Assistance with the following:, Bathing, Dressing, Toileting, Feeding, Cooking, Housework, Shopping, Mobility    PT AM-PAC:   /24  OT AM-PAC:

## 2024-09-27 NOTE — CONSULTS
CONSULTATION NOTE :ID     Patient - Effie Portillo,  Age - 79 y.o.    - 1945      Room Number - 0640/0640-01   N -  [id number]   [facility] # - [id number]  Date of Admission -  2024 11:11 PM  Patient's PCP: Sheba Sandoval MD     Requesting Physician: Lisbet Garcia DO    REASON FOR CONSULTATION   Atbc   HISTORY OF PRESENT ILLNESS   This is a very pleasant 79 y.o. female who was admitted on 2024   to the hospital with a chief complaints of   Chief Complaint   Patient presents with    Shortness of Breath     Patient arrives from [facility] - staff states patient was 84% on normal vent settings.      ID was consulted on 24  Pt is knonw to ID  Last seen 9/10/2024  CONS prob contaminant   Uti mixed gnr <10  Yash better   C aureus exposure and +cx in 2024  Resp cx gram stain no PMN  gnr A BAUMANNI/KLEBSIELLA/PSEUDOMONAS      levoFLOXacin (LEVAQUIN) 750 MG/150ML infusion 750 mg, Q24H  colistimethate (COLYMYCIN) 75 mg, BID RT   And  sodium chloride nebulizer 0.9 % solution 3 mL, BID RT  tigecycline (TYGACIL) 100 mg in sodium chloride 0.9 % 100 mL IVPB, Q12H    Pt presents with change of MS rectal bleeding   Afebrile hypotensive trach vent  Cr1.2  wbc7.2 hgb7.3   RVP neg covid neg   UA yeast   Currenly not able to obtain h/o from  pt  Not awake trach vent   S/p EGD   NM GI BLOOD LOSS    Result Date: 2024  Diffuse gastric activity is observed, which could be technical secondary to free pertechnetate or due to gastric hyperemia or hemorrhage.  No hemorrhage is seen within bowel however.  If patient has clinical symptoms of gastric hemorrhage, endoscopy could be considered. RECOMMENDATIONS: If the patient shows hemodynamic signs of an active bleed in the next 20 hours, additional images can be acquired.     CTA PULMONARY W CONTRAST    Result Date: 2024  1.  No evidence of pulmonary embolism. 2.  Patchy ground-glass and nodular opacities of the lungs

## 2024-09-27 NOTE — PROGRESS NOTES
Patient seen and examined by me.  Chart reviewed.  I read the history and physical by my night colleague.    Patient had no events since admission.  Per nurse, the patient's perineal area is very macerated, red, bleeding.  I also saw  the area this morning.  Also looked at her sacral decubitus ulcer, there is some purulent drainage from it.    No active GI bleeding seen since admission.    GI performed upper endoscopy this afternoon --no lesions seen around the PEG site as on prior admission.  Upper endoscopy is fairly normal.    Pulmonology on board, patient has chronic ventilator.    I ordered Micatin powder and Micatin cream to be used, she probably has yeast superinfection of her macerated areas between her thighs and perineum.    DNR CC-A status.    Discharge back to [facility], in about 3 days.

## 2024-09-27 NOTE — PROGRESS NOTES
4 Eyes Skin Assessment     NAME:  Effie Portillo  YOB: 1945  MEDICAL RECORD NUMBER:  [id number]    The patient is being assessed for  Admission    I agree that at least one RN has performed a thorough Head to Toe Skin Assessment on the patient. ALL assessment sites listed below have been assessed.      Areas assessed by both nurses:    Head, Face, Ears, Shoulders, Back, Chest, Arms, Elbows, Hands, Sacrum. Buttock, Coccyx, Ischium, and Legs. Feet and Heels        Does the Patient have a Wound? Yes wound(s) were present on assessment. LDA wound assessment was Initiated and completed by RN       Branden Prevention initiated by RN: Yes  Wound Care Orders initiated by RN: Yes    Pressure Injury (Stage 3,4, Unstageable, DTI, NWPT, and Complex wounds) if present, place Wound referral order by RN under : Yes    New Ostomies, if present place, Ostomy referral order under : No     Nurse 1 eSignature: Electronically signed by Maria Alejandra Rodrigues RN on 9/27/24 at 1:10 PM EDT    **SHARE this note so that the co-signing nurse can place an eSignature**    Nurse 2 eSignature: {Esignature:[id number]}

## 2024-09-27 NOTE — CONSULTS
Comprehensive Nutrition Assessment    Type and Reason for Visit:  Initial, Consult (Tube Feedings Order and Managment)    Nutrition Recommendations/Plan:   Continue NPO status   Provide tube feeding recommendations/orders per MD Consult.    RN/MD to place order when appropriate d/t NPO status for procedure, Michela GIBSON informed.     Recommend continuous, Jevity 1.5 (standard w/ fiber) @ 35ml/h with 100ml flush q4h and 1 protein modular daily. Regimen to provide 840ml, 1364kcal, 61g protein and 80g protein & 1238ml water per day. TF regimen to meet 100% of estimated protein & energy needs at goal rate.        Malnutrition Assessment:  Malnutrition Status:  At risk for malnutrition (Comment) (09/27/24 1058)    Context:  Chronic Illness     Findings of the 6 clinical characteristics of malnutrition:  Energy Intake:  No significant decrease in energy intake (on tube feedings)  Weight Loss:  No significant weight loss     Body Fat Loss:  No significant body fat loss     Muscle Mass Loss:  No significant muscle mass loss    Fluid Accumulation:  Unable to assess     Strength:  Not Performed    Nutrition Assessment:    Pt admit w/ acute on chronic resp failure r/t PNA, bilateral mastoiditis and sacral osteomyelitis, EVELIA & concern for GIB. PMHx of CVA, CAD s/p MI, COPD, CHF, Afib, L AKA, s/p trach/PEG, nonverbal & unresponsive - baseline, VRE & MDRO, hx GIBs. Pt is NPO for EGD, will provide tube feed recs per consult & monitor nutrition progression.    Nutrition Related Findings:    abd soft, hyperactive BSx4, diarrhea noted, nonverbal, +4 edema pitting/weeping, I/O's WDL, NPO for EGD Wound Type: Multiple, Wound Consult Pending (R heel & coccyx DTI)       Current Nutrition Intake & Therapies:    Average Meal Intake: NPO  Average Supplements Intake: NPO  Diet NPO    Anthropometric Measures:  Height: 157.5 cm (5' 2.01\")  Ideal Body Weight (IBW): 110 lbs (50 kg)    Admission Body Weight: 57.7 kg (127 lb 2 oz) (9/27 not

## 2024-09-27 NOTE — OP NOTE
Operative Note      Patient: Effie Portillo  YOB: 1945  MRN: [id number]    Date of Procedure: 9/27/2024    Pre-Op Diagnosis Codes:      * Anemia, unspecified type [D64.9]    Post-Op Diagnosis: Anatomical study, normal status post PEG.       Procedure(s):  **TRACH/VENT** ESOPHAGOGASTRODUODENOSCOPY    Surgeon(s):  Jelani Smith MD    Assistant:   * No surgical staff found *    Anesthesia: Monitor Anesthesia Care    Estimated Blood Loss (mL): None    Complications: None    Specimens:   * No specimens in log *    Implants:  * No implants in log *      Drains:   Gastrostomy/Enterostomy/Jejunostomy Tube LUQ (Active)   External Catheter Length (cm) 3 cm 09/09/24 2104   Site Description Healing;Clean, dry & intact 09/27/24 1317   G Port Status Infusing 09/27/24 1317   Surrounding Skin Clean, dry & intact 09/27/24 1317   Dressing Status Clean, dry & intact 09/27/24 1317   Dressing Type Split gauze 09/27/24 1317   G-Tube Care Completed Yes 09/27/24 1317   Tube Feeding Standard with Fiber 09/27/24 1317   Tube feeding/verify rate (mL/hr) 41 mL/hr 09/09/24 2104   Tube Feeding Supplement (Product) Wound Healing Supplement 09/27/24 1317   Tube Feeding Intake (mL) 187 ml 09/09/24 2104   Tube Feeding Supplement Amount (mL) 240 mL 09/08/24 0800   Free Water/Flush (mL) 120 mL 09/10/24 0741   Action Taken Placement verified (comment) 09/27/24 1317   Residual Volume (ml) 10 ml 09/10/24 0741       [REMOVED] Gastrostomy/Enterostomy/Jejunostomy Tube Percutaneous Endoscopic Gastrostomy (PEG) LUQ 1 (Removed)   Drainage Appearance Brown;Mucous shreds;Thick;Serosanguinous 09/02/24 0414   Site Description Reddened 09/02/24 0414   Surrounding Skin Reddened;Macerated 09/02/24 0414   Dressing Status New dressing applied 09/02/24 0414   Dressing Type Split gauze 09/02/24 0414   G-Tube Care Completed Yes 09/02/24 0414   Tube Feeding Standard with Fiber 09/02/24 0414   Tube feeding/verify rate (mL/hr) 30 mL/hr 09/02/24 0640   Free

## 2024-09-27 NOTE — PROGRESS NOTES
Pharmacist Review and Automatic Dose Adjustment of Extended Antibiotic Infusions    The reviewing pharmacist has made an adjustment to the ordered meropenem dose per the approved [facility] protocol and table as identified below.      Lisbet Munoz DO, Jeanne L Marzullo is a 79 y.o. female.     Renal Function Assessment:    Date Body Weight IBW  Adjusted BW SCr  CrCl Dialysis status   9/27/2024 57.7 kg (127 lb 3.3 oz)  Ideal body weight: 50.1 kg (110 lb 7.9 oz)  Adjusted ideal body weight: 53.2 kg (117 lb 2.8 oz) Serum creatinine: 1.2 mg/dL (H) 09/26/24 2321  Estimated creatinine clearance: 30 mL/min (A) N/a     Estimated Creatinine Clearance: 30 mL/min (A) (based on SCr of 1.2 mg/dL (H)).    Recent Labs     09/26/24  2321   CREATININE 1.2*       Height:   Ht Readings from Last 1 Encounters:   09/27/24 1.575 m (5' 2.01\")     Weight:  Wt Readings from Last 1 Encounters:   09/27/24 57.7 kg (127 lb 3.3 oz)                   Plan: Based upon the patient's weight, antibiotic indication, and renal function, the ordered meropenem dose of 1000 mg every 8 hours has been changed/converted to 1000 mg every 12 hours.      Thank you,  Bubba Ricks RPH  9/27/2024   5:11 PM

## 2024-09-27 NOTE — PROGRESS NOTES
Transported patient from [facility] to 6th floor while on Drager Karime portable ventilator. Switch patient back to 980 vent. No problems encountered. Total time 25 minutes.

## 2024-09-27 NOTE — ED PROVIDER NOTES
Department of Emergency Medicine     Written by: Rakan Alva DO  Patient Name: Effie Portillo  Admit Date: 2024 11:11 PM  MRN: [id number]                   : 1945      ------------------------- CC-------------------------  Chief Complaint   Patient presents with    Shortness of Breath     Patient arrives from [facility] - staff states patient was 84% on normal vent settings.      ------------------------- HPI -------------------------    Effie Portillo is a 79 y.o. female who presents to the emergency department today complaining of altered mental status and rectal bleeding from the nursing home.  Patient reviewed with nursing and rehab as they state that she was satting 84% on her normal vent settings through her trach.  They state that she has been having rectal bleeding and has bright red blood per rectum.  Apparently at baseline, the patient is alert and will look at you when talking to her but she does not normally speak.  Secondary to current clinical presentation, cannot obtain further history or ROS at this time.    Nursing notes were all reviewed and agreed with or any disagreements were addressed in the HPI.    REVIEW OF SYSTEMS:    Pertinent positives and negatives mentioned in the HPI/MDM.     ------------------------- PAST HISTORY -------------------------    I personally reviewed the following history:    Past Medical History:  has a past medical history of Atrial fibrillation ([facility]), Bipolar disorder ([facility]), Candida auris colonization, Cerebral artery occlusion with cerebral infarction ([facility]), CHF (congestive heart failure) ([facility]), COPD (chronic obstructive pulmonary disease) ([facility]), COVID-19, Depression, ESBL (extended spectrum beta-lactamase) producing bacteria infection, GI (gastrointestinal bleed), History of blood transfusion, Hx of blood clots, Hypertension, Infection due to extended spectrum beta-lactamase producing bacteria, Ischemic cardiomyopathy, MI

## 2024-09-28 PROBLEM — E16.2 HYPOGLYCEMIA: Status: ACTIVE | Noted: 2024-09-28

## 2024-09-28 NOTE — PLAN OF CARE
Problem: Safety - Adult  Goal: Free from fall injury  9/28/2024 1109 by Ant White RN  Outcome: Progressing  Flowsheets (Taken 9/28/2024 1109)  Free From Fall Injury: Instruct family/caregiver on patient safety     Problem: Discharge Planning  Goal: Discharge to home or other facility with appropriate resources  9/28/2024 1109 by Ant White RN  Outcome: Progressing  Flowsheets (Taken 9/27/2024 1827 by Maria Alejandra Rodrigues RN)  Discharge to home or other facility with appropriate resources: Identify barriers to discharge with patient and caregiver     Problem: Pain  Goal: Verbalizes/displays adequate comfort level or baseline comfort level  9/28/2024 1109 by Ant White RN  Outcome: Progressing  Flowsheets (Taken 9/27/2024 1827 by Maria Alejandra Rodrigues RN)  Verbalizes/displays adequate comfort level or baseline comfort level:   Assess pain using appropriate pain scale   Administer analgesics based on type and severity of pain and evaluate response     Problem: Chronic Conditions and Co-morbidities  Goal: Patient's chronic conditions and co-morbidity symptoms are monitored and maintained or improved  9/28/2024 1109 by Ant White RN  Outcome: Progressing  Flowsheets (Taken 9/27/2024 1023 by Maria Alejandra Rodrigues, RN)  Care Plan - Patient's Chronic Conditions and Co-Morbidity Symptoms are Monitored and Maintained or Improved: Monitor and assess patient's chronic conditions and comorbid symptoms for stability, deterioration, or improvement     Problem: Nutrition Deficit:  Goal: Optimize nutritional status  9/28/2024 1109 by Ant White RN  Outcome: Progressing  Flowsheets (Taken 9/28/2024 1109)  Nutrient intake appropriate for improving, restoring, or maintaining nutritional needs:   Assess nutritional status and recommend course of action   Monitor oral intake, labs, and treatment plans     Problem: Skin/Tissue Integrity  Goal: Absence of new skin breakdown  Description: 1.  Monitor for areas of redness  and/or skin breakdown  2.  Assess vascular access sites hourly  3.  Every 4-6 hours minimum:  Change oxygen saturation probe site  4.  Every 4-6 hours:  If on nasal continuous positive airway pressure, respiratory therapy assess nares and determine need for appliance change or resting period.  9/28/2024 1109 by Ant White, RN  Outcome: Progressing     Problem: ABCDS Injury Assessment  Goal: Absence of physical injury  9/28/2024 1109 by Ant White, RN  Outcome: Progressing  Flowsheets (Taken 9/28/2024 1109)  Absence of Physical Injury: Implement safety measures based on patient assessment

## 2024-09-28 NOTE — PROGRESS NOTES
NAME: Effie Portillo  MR:  [id number]  :   1945  Admit Date:  2024    Elements of this note, were copied and pasted from Previous. Updates have been made where noted and reflect current exam and medical decision making from the DOS of this encounter.  CHIEF COMPLAINT       Chief Complaint   Patient presents with    Shortness of Breath     Patient arrives from [facility] - staff states patient was 84% on normal vent settings.      HISTORY OF PRESENT ILLNESS     Effie Portillo is a 79 y.o. female admitted on 2024 and has  has a past medical history of Atrial fibrillation ([facility]), Bipolar disorder ([facility]), Candida auris colonization, Cerebral artery occlusion with cerebral infarction ([facility]), CHF (congestive heart failure) ([facility]), COPD (chronic obstructive pulmonary disease) ([facility]), COVID-19, Depression, ESBL (extended spectrum beta-lactamase) producing bacteria infection, GI (gastrointestinal bleed), History of blood transfusion, Hx of blood clots, Hypertension, Infection due to extended spectrum beta-lactamase producing bacteria, Ischemic cardiomyopathy, MI (myocardial infarction) ([facility]), Multiple drug resistant organism (MDRO) culture positive, Pneumonia, Ventilator dependent ([facility]), and VRE (vancomycin resistant enterococcus) culture positive.     This is a face to face encounter   24 trach noah tnot alert blood cx +    Patient is tolerating medications. No reported adverse drug reactions.  Available labs, imaging studies, microbiologic studies have been reviewed with pt and family if present.  Assessment & Plan     Admitted for   Hyperammonemia (HCC) [E72.20]  Septicemia (HCC) [A41.9]  Osteomyelitis [M86.9]  Acute GI bleeding [K92.2]  Cystitis with hematuria [N30.91]  Chronic anemia [D64.9]  Sacral osteomyelitis [M46.28]  ID following for   Chronic respiratory failure  Leukocytosis  Gn Klebsiella pneumonia bacteremia   Gpc bateremia   Candiduria   Sacral ulcer       miconazole  SWAB     SARS-CoV-2, Rapid Not Detected     Comment:       Rapid NATT:  Negative results should be treated as presumptive and, if inconsistent with clinical signs   and symptoms or necessary for patient management,  should be tested with an alternative molecular assay. Negative results do not preclude   SARS-CoV-2 infection and   should not be used as the sole basis for patient management decisions.   This test has been authorized by the FDA under an Emergency Use Authorization (EUA) for use   by authorized laboratories.        Fact sheet for Healthcare Providers: https://www.fda.gov/media/920253/download  Fact sheet for Patients: https://www.fda.gov/media/016174/download          Methodology: Isothermal Nucleic Acid Amplification         Influenza A+B, PCR [[id number]] Collected: 09/26/24 2321    Order Status: Completed Specimen: Nasopharyngeal Updated: 09/27/24 0038     Influenza A by PCR Not Detected     Comment: Methodology: Isothermal Nucleic Acid Amplification        Influenza B by PCR Not Detected     Comment: Methodology: Isothermal Nucleic Acid Amplification               No results found for: \"BC\", \"BLOODCULT2\", \"ORG\"  No results for input(s): \"CDIFFTOXANT\" in the last 72 hours.  No results found for: \"WNDABS\"  Recent Labs     09/26/24 2321 09/28/24  0802   WBC 7.2 13.0*   PLT  --  113*     Recent Labs     09/26/24 2321 09/28/24 0802    144   K 4.8 4.2   * 114*   CO2 28 22   * 98*   CREATININE 1.2* 1.3*   GLUCOSE 181* 37*   CALCIUM 9.5 9.0   BILITOT 0.2 0.4   ALKPHOS 114* 127*   AST 10 15   ALT 12 12     Lab Results   Component Value Date    SEDRATE 55 (H) 09/12/2023     Lab Results   Component Value Date    CRP 48.0 (H) 09/12/2023     Lab Results   Component Value Date    PROCAL 1.54 (H) 09/27/2024    PROCAL 0.28 (H) 09/02/2024     Recent Labs     09/26/24 2321 09/27/24  1135 09/28/24  0802   PROCAL  --  1.54*  --    FERRITIN  --  591  --    INR  --  1.3  --    PROTIME  --  14.1*   (3) adequate

## 2024-09-28 NOTE — PROGRESS NOTES
[facility] Hospitalist Progress Note    Admitting Date and Time: 9/26/2024 11:11 PM  Admit Dx: Hyperammonemia (HCC) [E72.20]  Septicemia (HCC) [A41.9]  Osteomyelitis [M86.9]  Acute GI bleeding [K92.2]  Cystitis with hematuria [N30.91]  Chronic anemia [D64.9]  Sacral osteomyelitis [M46.28]    Subjective:  Patient is being followed for Hyperammonemia (HCC) [E72.20]  Septicemia (HCC) [A41.9]  Osteomyelitis [M86.9]  Acute GI bleeding [K92.2]  Cystitis with hematuria [N30.91]  Chronic anemia [D64.9]  Sacral osteomyelitis [M46.28]     No events overnight  No fevers  Nothing per PEG    Per RN: BGM to 34 this AM -- hypoglycemia replacement ordered    ROS: denies fever, chills, cp, sob, n/v, HA unless stated above.      sodium chloride flush  5-40 mL IntraVENous 2 times per day    enoxaparin  40 mg SubCUTAneous Daily    ipratropium 0.5 mg-albuterol 2.5 mg  1 Dose Inhalation Q4H WA RT    pantoprazole (PROTONIX) 40 mg in sodium chloride (PF) 0.9 % 10 mL injection  40 mg IntraVENous Q6H    miconazole nitrate   Topical BID    miconazole   Topical BID    fluconazole  400 mg IntraVENous Q24H    meropenem  1,000 mg IntraVENous Q12H     glucose, 4 tablet, PRN  dextrose bolus, 125 mL, PRN   Or  dextrose bolus, 250 mL, PRN  glucagon (rDNA), 1 mg, PRN  dextrose, , Continuous PRN  sodium chloride flush, 5-40 mL, PRN  sodium chloride, , PRN  acetaminophen, 650 mg, Q6H PRN   Or  acetaminophen, 650 mg, Q6H PRN  sodium chloride, , PRN         Objective:    BP (!) 83/56   Pulse (!) 107   Temp 98.7 °F (37.1 °C) (Axillary)   Resp 22   Ht 1.575 m (5' 2.01\")   Wt 63.5 kg (140 lb)   SpO2 90%   BMI 25.60 kg/m²     General Appearance: on ventilator, not responsive -- chronic encephalopathy  Skin: warm and dry, good turgor, severe rash -- between thighs -- macerated, less bleeding, less erythema, no jaundice  Eyes: conjunctivae normal  Mouth: clear, moist, no thrush  Neck: No JVD  Pulmonary/Chest: no wheezes, rales or rhonchi, normal  air movement, no respiratory distress on ventilator  Cardiovascular: normal rate, normal S1 and S2  Abdomen: soft, non-tender, non-distended, normal bowel sounds  Extremities: no cyanosis, no clubbing and some creases c/w pitting edema, pos venous stasis dermatitis  Neurologic: not responsive, on vent        Recent Labs     09/26/24 2321 09/28/24  0802    144   K 4.8 4.2   * 114*   CO2 28 22   * 98*   CREATININE 1.2* 1.3*   GLUCOSE 181* 37*   CALCIUM 9.5 9.0       Recent Labs     09/26/24  2321 09/27/24  1135 09/28/24  0032 09/28/24  0802   WBC 7.2  --   --  13.0*   RBC 2.49*  --   --  3.16*   HGB 7.3* 7.1* 7.1* 8.8*   HCT 24.1* 23.5* 22.9* 28.0*   MCV 96.8  --   --  88.6   MCH 29.3  --   --  27.8   MCHC 30.3*  --   --  31.4*   RDW 17.0*  --   --  17.5*   PLT  --   --   --  113*   MPV 11.2  --   --  11.7       Radiology:   None new    Assessment:    Principal Problem:    Osteomyelitis (HCC)  Active Problems:    Primary hypertension    History of DVT (deep vein thrombosis)    Coronary artery disease involving native coronary artery of native heart without angina pectoris    Hx of AKA (above knee amputation), left ([facility])    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 hypoxia    Ventilator dependent ([facility])    Septicemia ([facility])    EVELIA (acute kidney injury) ([facility])    Acute on chronic anemia    HFrEF (heart failure with reduced ejection fraction) ([facility])    Pressure injury of sacral region, unstageable ([facility])    Tracheostomy dependence ([facility])    PEG (percutaneous endoscopic gastrostomy) status ([facility])    Hypoglycemia  Resolved Problems:    * No resolved hospital problems. *      Plan:  Acute on chronic hypoxic respiratory failure -- improving  Pneumonia  Lactic acidosis  - pulmonology on board  - Continue Merrem  - unclear if recurrent pneumonia versus prior pneumonia has not cleared  - Aerosols  - continue vent support     Bilateral mastoiditis  Sacral

## 2024-09-28 NOTE — PROGRESS NOTES
PROGRESS NOTE    [facility]  Dr. Rosemary Hartman M.D., Dr. Phil Benitez M.D., Pato Hernandez D.O.,  Dayday Trejo M.D., Cristobal Salcedo D.O., and Neo Artis M.D.      Effie Portillo  79 y.o.  female    SUBJECTIVE: No new complaints.  Pt with sacral wound with some pus and bleeding.  No other gross bleeding reported    OBJECTIVE:    BP (!) 83/56   Pulse (!) 107   Temp 98.7 °F (37.1 °C) (Axillary)   Resp 22   Ht 1.575 m (5' 2.01\")   Wt 63.5 kg (140 lb)   SpO2 90%   BMI 25.60 kg/m²     HEENT:PEERL, no icterus  Heart: RRR  Lungs: CTAB, MV BS  Abd.: soft, NT, ND, BS +, no G/R, positive PEG       Lab Results   Component Value Date/Time    WBC 13.0 09/28/2024 08:02 AM    HGB 8.8 09/28/2024 08:02 AM    HCT 28.0 09/28/2024 08:02 AM    MCV 88.6 09/28/2024 08:02 AM    RDW 17.5 09/28/2024 08:02 AM     09/28/2024 08:02 AM     Lab Results   Component Value Date/Time     09/28/2024 08:02 AM    K 4.2 09/28/2024 08:02 AM     09/28/2024 08:02 AM    CO2 22 09/28/2024 08:02 AM    BUN 98 09/28/2024 08:02 AM    CREATININE 1.3 09/28/2024 08:02 AM    CALCIUM 9.0 09/28/2024 08:02 AM    BILITOT 0.4 09/28/2024 08:02 AM    ALKPHOS 127 09/28/2024 08:02 AM    AST 15 09/28/2024 08:02 AM    ALT 12 09/28/2024 08:02 AM     Lab Results   Component Value Date/Time    LIPASE 19 09/26/2024 11:21 PM     No results found for: \"AMYLASE\"    ASSESSMENT/PLAN:  Patient Active Problem List   Diagnosis    Chronic respiratory failure with hypoxia    Primary hypertension    History of stroke with residual deficit    History of DVT (deep vein thrombosis)    Coronary artery disease involving native coronary artery of native heart without angina pectoris    Ischemic cardiomyopathy    Subacute osteomyelitis of left foot    Hypernatremia    Severe protein-energy malnutrition (HCC)    Hx of AKA (above knee amputation), left (HCC)    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

## 2024-09-28 NOTE — PROGRESS NOTES
Assessment and Plan  Patient is a 79 y.o. female with the following medical Problems:   Acute on Chronic hypoxic respiratory failure on mechanical ventilation (S/P tracheostomy and PEG)  Healthcare associated pneumonia with Acinetobacter baumannii colonization , MRSA, CRE MDRO and Canddia Auris  Recurrent Urinary tract infection (Proteus mirabilis multidrug-resistant )  Acute kidney injury   Chronic encephalopathy  Aspiration pneumonia  Bilateral pleural effusions  Metabolic encephalopathy.        Plan of care:  Continue with mechanical ventilation.  Patient is currently on assist-control mechanical ventilation with a tidal volume of 375, rate 20 PEEP of 10, FiO2 60%.  Wean FiO2 as able for sats 88 to 94%  Trach care and suctioning as needed  Reconcile home medications  Patient is currently on Merrem and fluconazole as per ID.  DVT prophylaxis.  .  Palliative care dietitian consult.  Goals of care were discussed.  Patient is currently DNR CCA  Optimize tube feeding and optimize nutrition.  Tracheostomy was evaluated and no cuff leak was detected.  Patient was started on tube feeding today.          History of Present Illness:   Patient is known to me from prior hospitalizations in 2024.  She is currently encephalopathic and unable to provide history.  Effie Portillo 79 y.o. female was seen in consultation regarding the above chief complaint with past medical history noted for chronic trach/vent dependence, COPD, HFrEF, Afib, HTN, VTE, MDRO, left AKA, bipolar disorder, candida auris, presents to [facility] ER on 9/26/2024  from [facility] for hypoxemia and rectal bleed.       Of note patient was a DNR CC at last admission.  Son apparently changed her CODE STATUS to DNR CCA.       Pulmonary was consulted for chronic vent management.      Daily progress  September 28, 2024: Patient continues to be on mechanical ventilation.  She has no fever, chills, rigors.  She remains on Merrem and fluconazole.

## 2024-09-28 NOTE — PROGRESS NOTES
Call placed to patient's son Parag to seek approval for blood consent. Left contact information on [facility] with request for return call.

## 2024-09-28 NOTE — PLAN OF CARE
Problem: Safety - Adult  Goal: Free from fall injury  9/28/2024 0321 by Carisa Cano RN  Outcome: Progressing  9/27/2024 1827 by Maria Alejandra Rodrigues RN  Outcome: Progressing  Flowsheets (Taken 9/27/2024 1224)  Free From Fall Injury: Instruct family/caregiver on patient safety     Problem: Discharge Planning  Goal: Discharge to home or other facility with appropriate resources  9/28/2024 0321 by Carisa Cano RN  Outcome: Progressing  9/27/2024 1827 by Maria Alejandra Rodrigues RN  Outcome: Progressing  Flowsheets  Taken 9/27/2024 1827  Discharge to home or other facility with appropriate resources: Identify barriers to discharge with patient and caregiver  Taken 9/27/2024 1232  Discharge to home or other facility with appropriate resources: Identify barriers to discharge with patient and caregiver  Taken 9/27/2024 1023  Discharge to home or other facility with appropriate resources: Identify barriers to discharge with patient and caregiver     Problem: Pain  Goal: Verbalizes/displays adequate comfort level or baseline comfort level  9/28/2024 0321 by Carisa Cano RN  Outcome: Progressing  9/27/2024 1827 by Maria Alejandra Rodrigues RN  Outcome: Progressing  Flowsheets  Taken 9/27/2024 1827  Verbalizes/displays adequate comfort level or baseline comfort level:   Assess pain using appropriate pain scale   Administer analgesics based on type and severity of pain and evaluate response  Taken 9/27/2024 1300  Verbalizes/displays adequate comfort level or baseline comfort level:   Assess pain using appropriate pain scale   Administer analgesics based on type and severity of pain and evaluate response  Taken 9/27/2024 1005  Verbalizes/displays adequate comfort level or baseline comfort level:   Assess pain using appropriate pain scale   Administer analgesics based on type and severity of pain and evaluate response     Problem: Chronic Conditions and Co-morbidities  Goal: Patient's chronic conditions and co-morbidity

## 2024-09-29 NOTE — PROGRESS NOTES
NAME: Effie Portillo  MR:  [id number]  :   1945  Admit Date:  2024    Elements of this note, were copied and pasted from Previous. Updates have been made where noted and reflect current exam and medical decision making from the DOS of this encounter.  CHIEF COMPLAINT       Chief Complaint   Patient presents with    Shortness of Breath     Patient arrives from [facility] - staff states patient was 84% on normal vent settings.      HISTORY OF PRESENT ILLNESS     Effie Portillo is a 79 y.o. female admitted on 2024 and has  has a past medical history of Atrial fibrillation ([facility]), Bipolar disorder ([facility]), Candida auris colonization, Cerebral artery occlusion with cerebral infarction ([facility]), CHF (congestive heart failure) ([facility]), COPD (chronic obstructive pulmonary disease) ([facility]), COVID-19, Depression, ESBL (extended spectrum beta-lactamase) producing bacteria infection, GI (gastrointestinal bleed), History of blood transfusion, Hx of blood clots, Hypertension, Infection due to extended spectrum beta-lactamase producing bacteria, Ischemic cardiomyopathy, MI (myocardial infarction) ([facility]), Multiple drug resistant organism (MDRO) culture positive, Pneumonia, Ventilator dependent ([facility]), and VRE (vancomycin resistant enterococcus) culture positive.     This is a face to face encounter   24 trach nad pale line has  not been changed afebrile wbc20.2    trach noah tnot alert blood cx +    Patient is tolerating medications. No reported adverse drug reactions.  Available labs, imaging studies, microbiologic studies have been reviewed with pt and family if present.  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      Culture, Blood 2 [[id number]]  (Abnormal)  (Susceptibility) Collected: 09/26/24 2024    Order Status: Completed Specimen: Blood Updated: 09/29/24 0663     Specimen Description .BLOOD     Special Requests          [employer] test comment AEROBIC BLD BOTTLE     Direct Exam DIRECT GRAM STAIN FROM BOTTLE: GRAM NEGATIVE RODS     Comment: Direct Gram Stain from bottle result called to and read back by: Radha Grey RN 9/27/24 2000           Culture KLEBSIELLA PNEUMONIAE ESBL This organism is an Extended Spectrum Beta Lactamase (ESBL)  and resistance to therapy with penicillins, cephalosporins and aztreonam is expected. These organisms generally remain susceptible to carbapenems. Consider ID Consultation. CONTACT PRECAUTIONS INDICATED.       Candida auris Not Detected     Candida albicans Not Detected     Candida glabrata Not Detected     Candida krusei Not Detected     Candida parapsilosis Not Detected     Candida tropicalis Not Detected     Cryptococcus neoformans/gattii Not Detected     Acinetobacter calcoaceticus-baumannii complex Not Detected     Escherichia Coli Not Detected     Enterobacter Cloacae Complex Not Detected     Enterobacterales DETECTED     Klebsiella oxytoca Not Detected     Klebsiella aerogenes Not Detected     Klebsiella pneumoniae group DETECTED     Pseudomonas aeruginosa Not Detected     Proteus spp Not Detected     Salmonella species Not Detected     SERRATIA MARCESCENS Not Detected     Stenotrophomonas maltophilia Not Detected     Haemophilus influenzae Not Detected     Listeria monocytogenes Not Detected     Bacteroides fragilis Not Detected     Neisseria Meningitidis, NMNI Not Detected     Staphylococcus spp Not Detected     Staphylococcus Aureus Not Detected     Staphylococcus epidermidis Not Detected

## 2024-09-29 NOTE — PROGRESS NOTES
PROGRESS NOTE    [facility]  Dr. Rosemary Hartman M.D., Dr. Phil Benitez M.D., Pato Hernandez D.O.,  Dayday Trejo M.D., Cristobal Salcedo D.O., and Neo Artis M.D.      Effie Portillo  79 y.o.  female    SUBJECTIVE: Pt Hg improved this am.  Suspect 6.1 yesterday was falsely low.  Pt had BM's with no blood or melena per nursing.  PT on tube feeds.  No blood or coffee ground suctioned from PEG.  Bleeding scan reviewed.  Pt with sacral wound with some pus and bleeding.  No other gross bleeding reported    OBJECTIVE:    /64   Pulse (!) 127   Temp 98 °F (36.7 °C) (Axillary)   Resp 24   Ht 1.575 m (5' 2.01\")   Wt 68.3 kg (150 lb 8 oz)   SpO2 96%   BMI 27.52 kg/m²     HEENT:PEERL, no icterus  Heart: RRR  Lungs: CTAB, MV BS  Abd.: soft, NT, ND, BS +, no G/R, positive PEG    Stool (measured) : 0 mL  Lab Results   Component Value Date/Time    WBC 13.0 09/28/2024 08:02 AM    HGB 10.5 09/29/2024 05:27 AM    HCT 31.6 09/29/2024 05:27 AM    MCV 88.6 09/28/2024 08:02 AM    RDW 17.5 09/28/2024 08:02 AM     09/28/2024 08:02 AM     Lab Results   Component Value Date/Time     09/28/2024 08:02 AM    K 4.2 09/28/2024 08:02 AM     09/28/2024 08:02 AM    CO2 22 09/28/2024 08:02 AM    BUN 98 09/28/2024 08:02 AM    CREATININE 1.3 09/28/2024 08:02 AM    CALCIUM 9.0 09/28/2024 08:02 AM    BILITOT 0.4 09/28/2024 08:02 AM    ALKPHOS 127 09/28/2024 08:02 AM    AST 15 09/28/2024 08:02 AM    ALT 12 09/28/2024 08:02 AM     Lab Results   Component Value Date/Time    LIPASE 19 09/26/2024 11:21 PM     No results found for: \"AMYLASE\"    ASSESSMENT/PLAN:  Patient Active Problem List   Diagnosis    Chronic respiratory failure with hypoxia    Primary hypertension    History of stroke with residual deficit    History of DVT (deep vein thrombosis)    Coronary artery disease involving native coronary artery of native heart without angina pectoris    Ischemic cardiomyopathy    Subacute osteomyelitis of left

## 2024-09-29 NOTE — PROGRESS NOTES
and reactive to light, conjunctivae normal  Mouth: clear, moist, no thrush  Neck: No JVD,   Pulmonary/Chest: no wheezes, rales or rhonchi, air movement OK, no resp distress on vent  Cardiovascular: normal rate, normal S1 and S2  Abdomen: soft, non-tender, non-distended, normal bowel sounds, PEG site OK  Extremities: no cyanosis, no clubbing and no edema, no venous stasis dermatitis  Neurologic: non-verbal, chronic encephalopathy  Fecal bag with brownish/greenish stool -- no blood      Recent Labs     09/26/24 2321 09/28/24  0802    144   K 4.8 4.2   * 114*   CO2 28 22   * 98*   CREATININE 1.2* 1.3*   GLUCOSE 181* 37*   CALCIUM 9.5 9.0       Recent Labs     09/26/24 2321 09/27/24  1135 09/28/24  0802 09/28/24  1231 09/29/24  0220 09/29/24  0527   WBC 7.2  --  13.0*  --   --   --    RBC 2.49*  --  3.16*  --   --   --    HGB 7.3*   < > 8.8* 6.1* 10.9* 10.5*   HCT 24.1*   < > 28.0* 19.0* 32.3* 31.6*   MCV 96.8  --  88.6  --   --   --    MCH 29.3  --  27.8  --   --   --    MCHC 30.3*  --  31.4*  --   --   --    RDW 17.0*  --  17.5*  --   --   --    PLT  --   --  113*  --   --   --    MPV 11.2  --  11.7  --   --   --     < > = values in this interval not displayed.       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 *      Plan:  Acute on chronic hypoxic respiratory failure -- improving -- WBC 13 -- ? If sepsis is etiology of hypotension and tachycardia, however, this resolved with 2 units of RBC's, so more likely due to GI bleeding  Pneumonia  Lactic acidosis  - pulmonology on board  - Continue Merrem  - unclear if recurrent pneumonia versus prior pneumonia has not cleared  - Aerosols  - continue vent support     Bilateral mastoiditis  Sacral osteomyelitis  - vanc/merrem  - f/u culture results  - General surgery on board -- consider debridement     Acute renal failure -- could be cardio-renal syndrome -- EF is 30% -- severe uremia rather than elevated creatinine consistent with GI bleeding  - no IVF due to volume overload  - trend creatinine  - Try Lasix 20 mg IV BID  - Watch intakes/outputs     GI bleed -- hemodynamically unstable, worsening uremia, s/p 2 units of pRBC's on 9/28 with stabilization of hypotension and tachycardia  Acute on chronic anemia  - trend hgb/hct  - GI on board -- performed  upper scope, unrevealing for bleeding  - C-scope being considered -- son refused this on last visit, son left VM by GI for consent for scope  - PPI  - consider getting further images today if drops her BP again     Elevated BNP  Acute HFrEF exacerbation -- improved  - echo in April EF 30-35%  - diuresis on hold due to hypotension -- restart soon if hypotension resolves  - intakes/output  - daily weight     Elevated ammonia  - lactulose given in ED  - Recheck ammonia  - Lactulose TID for now  - Consider rifaximin     Hyperglycemia, and hypoglycemia  - no diagnosis of diabetes  - last A1c 5.3% this month  - Watch sugars closely   - Hypoglycemia protocol as needed     Stage IV sacral decubitus -- wound care, fecal collection bag placed     Mid-Line to be removed -- placed 9/7 at [facility] and rehab

## 2024-09-29 NOTE — PROGRESS NOTES
Updates to ID. Midline still in place due to unsuccessful attempts at PIV. Primary updated and recommends a new midline be placed on opposite arm tomorrow 9/30/24 by IV team. ID updated and stated that the patients cultured tip from midline need to be resulted prior to insertion of new midline.

## 2024-09-29 NOTE — PROGRESS NOTES
Assessment and Plan  Patient is a 79 y.o. female with the following medical Problems:   Acute on Chronic hypoxic respiratory failure on mechanical ventilation (S/P tracheostomy and PEG)  Healthcare associated pneumonia with Acinetobacter baumannii colonization , MRSA, CRE MDRO and Canddia Auris  Recurrent Urinary tract infection (Proteus mirabilis multidrug-resistant )  Acute kidney injury   Chronic encephalopathy  Aspiration pneumonia  Bilateral pleural effusions  Metabolic encephalopathy.        Plan of care:  Continue with mechanical ventilation.  Patient is currently on assist-control mechanical ventilation with a tidal volume of 375, rate 20 PEEP of 10, FiO2 60%.  Wean FiO2 as able for sats 88 to 94%  Trach care and suctioning as needed  Reconcile home medications  Patient is currently on Merrem and fluconazole as per ID.  DVT prophylaxis with lovenox.  Palliative care dietitian consult.  Goals of care were discussed.  Patient is currently DNR CCA  Optimize tube feeding and optimize nutrition.  Tracheostomy was evaluated and no cuff leak was detected.  Patient was started on tube feeding today.          History of Present Illness:   Patient is known to me from prior hospitalizations in 2024.  She is currently encephalopathic and unable to provide history.  Effie Portillo 79 y.o. female was seen in consultation regarding the above chief complaint with past medical history noted for chronic trach/vent dependence, COPD, HFrEF, Afib, HTN, VTE, MDRO, left AKA, bipolar disorder, candida auris, presents to [facility] ER on 9/26/2024  from [facility] for hypoxemia and rectal bleed.       Of note patient was a DNR CC at last admission.  Son apparently changed her CODE STATUS to DNR CCA.       Pulmonary was consulted for chronic vent management.      Daily progress  September 28, 2024: Patient continues to be on mechanical ventilation.  She has no fever, chills, rigors.  She remains on Merrem and  DO, Given at 09/29/24 1028    miconazole (MICOTIN) 2 % powder, , Topical, BID, Moon Burkett, DO, Given at 09/29/24 1027    fluconazole (DIFLUCAN) in 0.9 % sodium chloride IVPB 400 mg, 400 mg, IntraVENous, Q24H, Allie Villegas MD, Last Rate: 100 mL/hr at 09/29/24 1552, 400 mg at 09/29/24 1552    meropenem (MERREM) 1,000 mg in sodium chloride 0.9 % 100 mL IVPB ([id number]), 1,000 mg, IntraVENous, Q12H, Rakan Alva, DO, Stopped at 09/29/24 1331    Review of Systems:   Unable to obtain due to medical condition  Physical Exam:   Vital Signs:  /60   Pulse (!) 118   Temp 99.3 °F (37.4 °C) (Axillary)   Resp 28   Ht 1.575 m (5' 2.01\")   Wt 68.3 kg (150 lb 8 oz)   SpO2 96%   BMI 27.52 kg/m²     Input/Output:  In: 788.6 [Blood:788.6]  Out: 1000     Oxygen requirements: MV    Ventilator Information:  Vent Information  Ventilator ID: [id number]  Vent Mode: AC/VC    General appearance: , not in pain or distress, in no respiratory distress    HEENT: +ve tracheostomy  Neck: Supple, no jugular venous distension, lymphadenopathy, thyromegaly or carotid bruits  Chest: Decreased breath sounds, no wheezing, +ve crackles but  no tenderness over ribs   Cardiovascular: Normal S1 , S2, regular rate and rhythm, no murmur, rub or gallop  Abdomen: Normal sounds present, soft, lax with no tenderness, no hepatosplenomegaly, and no masses  Extremities: +ve edema. +ve left AKA  Skin: intact, no rashes   Neurologic: Unresponsive, opens eyes but does not follow commands, No focal deficit     Investigations:  Labs, radiological imaging and cardiac work up were personally reviewed and independently interpreted.        ICU STAFF PHYSICIAN NOTE OF PERSONAL INVOLVEMENT IN CARE  As the attending physician, I certify that I personally reviewed the patient's history and personally examined the patient to confirm the physical findings described above, and that I reviewed the relevant imaging studies and available reports.  I also discussed

## 2024-09-29 NOTE — PLAN OF CARE
Problem: Safety - Adult  Goal: Free from fall injury  9/29/2024 1235 by Ant White RN  Outcome: Progressing  Flowsheets (Taken 9/28/2024 1109)  Free From Fall Injury: Instruct family/caregiver on patient safety     Problem: Discharge Planning  Goal: Discharge to home or other facility with appropriate resources  9/29/2024 1235 by Ant White RN  Outcome: Progressing  Flowsheets (Taken 9/27/2024 1827 by Maria Alejandra Rodrigues, RN)  Discharge to home or other facility with appropriate resources: Identify barriers to discharge with patient and caregiver     Problem: Pain  Goal: Verbalizes/displays adequate comfort level or baseline comfort level  9/29/2024 1235 by Ant White RN  Outcome: Progressing  Flowsheets (Taken 9/28/2024 1614 by Lola Frias RN)  Verbalizes/displays adequate comfort level or baseline comfort level: Assess pain using appropriate pain scale     Problem: Chronic Conditions and Co-morbidities  Goal: Patient's chronic conditions and co-morbidity symptoms are monitored and maintained or improved  9/29/2024 1235 by Ant White RN  Outcome: Progressing  Flowsheets (Taken 9/27/2024 1023 by Maria Alejandra Rodrigues, RN)  Care Plan - Patient's Chronic Conditions and Co-Morbidity Symptoms are Monitored and Maintained or Improved: Monitor and assess patient's chronic conditions and comorbid symptoms for stability, deterioration, or improvement     Problem: Nutrition Deficit:  Goal: Optimize nutritional status  9/29/2024 1235 by Ant White RN  Outcome: Progressing  Flowsheets (Taken 9/28/2024 1109)  Nutrient intake appropriate for improving, restoring, or maintaining nutritional needs:   Assess nutritional status and recommend course of action   Monitor oral intake, labs, and treatment plans     Problem: Skin/Tissue Integrity  Goal: Absence of new skin breakdown  Description: 1.  Monitor for areas of redness and/or skin breakdown  2.  Assess vascular access sites hourly  3.  Every 4-6 hours

## 2024-09-30 PROBLEM — M46.28 SACRAL OSTEOMYELITIS: Status: ACTIVE | Noted: 2024-01-01

## 2024-09-30 NOTE — FLOWSHEET NOTE
Inpatient Wound Care    Admit Date: 9/26/2024 11:11 PM    Reason for consult:  mult wounds    Significant history:    Past Medical History:   Diagnosis Date    Atrial fibrillation (HCC)     Bipolar disorder ([facility])     Candida auris colonization 01/16/2024    wound    Cerebral artery occlusion with cerebral infarction ([facility])     CHF (congestive heart failure) ([facility])     COPD (chronic obstructive pulmonary disease) ([facility])     COVID-19     Depression     ESBL (extended spectrum beta-lactamase) producing bacteria infection 01/15/2024    wound    GI (gastrointestinal bleed)     History of blood transfusion     Hx of blood clots     venous thrombosis    Hypertension     Infection due to extended spectrum beta-lactamase producing bacteria 09/12/2023    urine and blood    Ischemic cardiomyopathy     MI (myocardial infarction) ([facility])     Multiple drug resistant organism (MDRO) culture positive 01/17/2024    sputum    Pneumonia     Ventilator dependent ([facility])     VRE (vancomycin resistant enterococcus) culture positive 04/13/2024    urine       Wound history:      Findings:     09/30/24 1043   Wound 07/09/24 Heel Right   Date First Assessed/Time First Assessed: 07/09/24 2045   Primary Wound Type: Pressure Injury  Location: Heel  Wound Location Orientation: Right   Wound Image    Wound Etiology Deep tissue/Injury   Wound Cleansed Cleansed with saline   Offloading for Diabetic Foot Ulcers Offloading ordered   Wound Length (cm) 1 cm   Wound Width (cm) 1 cm   Wound Surface Area (cm^2) 1 cm^2   Change in Wound Size % (l*w) 88.89   Wound Assessment Purple/maroon   Drainage Amount None (dry)   Odor None   Wound 09/27/24 Buttocks Left   Date First Assessed/Time First Assessed: 09/27/24 1240   Location: Buttocks  Wound Location Orientation: Left   Wound Image    Wound Etiology Pressure Unstageable   Wound Cleansed Cleansed with saline   Dressing/Treatment Xeroform;Foam   Wound Length (cm) 2 cm   Wound Width (cm) 3 cm   Wound Surface Area

## 2024-09-30 NOTE — CARE COORDINATION
9-30-Cm note: Pt here from [facility], she is trach/vent dependent, NO PRECERT required , Pt's FI02 will need to be at 50% to return. Electronically signed by Chioma Ragsdale RN on 9/30/2024 at 3:51 PM

## 2024-09-30 NOTE — PROGRESS NOTES
PROGRESS NOTE  By Neo Artis M.D.    [facility]  Dr. Rosemary Hartman M.D.,  Dr. Phil Benitez M.D.,   Dr. Pato Hernandez D.O.,  Dr. Dayday Trejo M.D.,  OTTO ArellanoO.,          Effie DUGAN Lindsey  79 y.o.  female    SUBJECTIVE:  Remains nonverbal.  No family at bedside    OBJECTIVE:    BP (!) 148/70   Pulse (!) 108   Temp 97.5 °F (36.4 °C) (Axillary)   Resp 20   Ht 1.575 m (5' 2.01\")   Wt 65.1 kg (143 lb 8 oz)   SpO2 95%   BMI 26.24 kg/m²     General: NAD/older adult  female.  HEENT: Anicteric sclera/moist oral mucosa/poor dentition  Neck: Trachea midline/tracheostomy  Chest: Mechanically ventilated via tracheostomy/symmetric excursion/mostly clear to auscultation anteriorly  Cor: Regular/S1-S2  Abd.: Soft and obese.  PEG tube in place.  Bowel sounds present.  Appears nontender  Extr.:  LLE AKA.  Diffuse peripheral edema/  Skin: Warm and dry/anicteric        DATA:    Monitor data reviewed -atrial fibrillation noted.    Stool (measured) : 0 mL  Lab Results   Component Value Date/Time    WBC 20.2 09/29/2024 11:25 AM    RBC 3.60 09/29/2024 11:25 AM    HGB 10.7 09/30/2024 05:45 AM    HCT 32.3 09/30/2024 05:45 AM    MCV 87.5 09/29/2024 11:25 AM    MCH 28.9 09/29/2024 11:25 AM    MCHC 33.0 09/29/2024 11:25 AM    RDW 17.1 09/29/2024 11:25 AM     09/28/2024 08:02 AM    MPV 11.9 09/29/2024 11:25 AM     Lab Results   Component Value Date/Time     09/29/2024 11:25 AM    K 3.3 09/29/2024 11:25 AM     09/29/2024 11:25 AM    CO2 20 09/29/2024 11:25 AM    BUN 90 09/29/2024 11:25 AM    CREATININE 1.4 09/29/2024 11:25 AM    CALCIUM 9.2 09/29/2024 11:25 AM    BILITOT 0.4 09/28/2024 08:02 AM    ALKPHOS 127 09/28/2024 08:02 AM    AST 15 09/28/2024 08:02 AM    ALT 12 09/28/2024 08:02 AM     Lab Results   Component Value Date/Time    LIPASE 19 09/26/2024 11:21 PM     No results found for: \"AMYLASE\"      ASSESSMENT/PLAN:  Patient Active Problem List   Diagnosis    Chronic  respiratory failure with hypoxia    Primary hypertension    History of stroke with residual deficit    History of DVT (deep vein thrombosis)    Coronary artery disease involving native coronary artery of native heart without angina pectoris    Ischemic cardiomyopathy    Subacute osteomyelitis of left foot    Hypernatremia    Severe protein-energy malnutrition (HCC)    Hx of AKA (above knee amputation), left (HCC)    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 hypoxia    COPD exacerbation (HCC)    PAF (paroxysmal atrial fibrillation) (HCC)    Sinus tachycardia    Ventilator dependent (HCC)    Septicemia (HCC)    Acute renal failure (ARF) (HCC)    Aspiration pneumonia of both lungs (HCC)    ACP (advance care planning)    Uremic encephalopathy    EVELIA (acute kidney injury) (HCC)    Acute on chronic anemia    Encephalopathy    Acute respiratory failure with hypoxia    Aspiration pneumonia (HCC)    Hyponatremia    HFrEF (heart failure with reduced ejection fraction) ([facility])    Palliative care encounter    Pressure injury of sacral region, unstageable (HCC)    Hyperkalemia    Tracheostomy dependence (HCC)    PEG (percutaneous endoscopic gastrostomy) status (HCC)    Hospital-acquired pneumonia    Oligouria    Osteomyelitis (HCC)    Hypoglycemia     1.   Anemia  -Acute on chronic  -Mildly microcytic  --EGD 6/21/2024 by Dr. Smith with findings of unremarkable exam, PEG in place with no ulcer, no bleeding source identified  -Colonoscopy canceled 7/16/2024- -son refused  -Disproportionately elevated BUN raising suspicion for upper gastrointestinal source of blood loss  -Plan for repeat upper endoscopy followed by colonoscopy -see discussion below  -High-dose IV PPI  - Type cross and hold 2 units PRBC  -EGD 9/27/24 reported as normal with normal internal PEG bumper with no mention of ulcer or buried bumper.  No fresh or old blood or source of anemia     2.  Comorbidites / goals of

## 2024-09-30 NOTE — DISCHARGE INSTR - DIET

## 2024-09-30 NOTE — PLAN OF CARE
Problem: Safety - Adult  Goal: Free from fall injury  Outcome: Progressing     Problem: Discharge Planning  Goal: Discharge to home or other facility with appropriate resources  Outcome: Progressing     Problem: Pain  Goal: Verbalizes/displays adequate comfort level or baseline comfort level  Outcome: Progressing     Problem: Chronic Conditions and Co-morbidities  Goal: Patient's chronic conditions and co-morbidity symptoms are monitored and maintained or improved  Outcome: Progressing     Problem: Nutrition Deficit:  Goal: Optimize nutritional status  Outcome: Progressing     Problem: Skin/Tissue Integrity  Goal: Absence of new skin breakdown  Description: 1.  Monitor for areas of redness and/or skin breakdown  2.  Assess vascular access sites hourly  3.  Every 4-6 hours minimum:  Change oxygen saturation probe site  4.  Every 4-6 hours:  If on nasal continuous positive airway pressure, respiratory therapy assess nares and determine need for appliance change or resting period.  Outcome: Progressing     Problem: ABCDS Injury Assessment  Goal: Absence of physical injury  Outcome: Progressing

## 2024-09-30 NOTE — DISCHARGE INSTR - COC
Continuity of Care Form    Patient Name: Effie Torres   :  1945  MRN:  [id number]    Admit date:  2024  Discharge date:  ***    Code Status Order: DNR-CCA   Advance Directives:   Advance Care Flowsheet Documentation             Admitting Physician:  Lisbet Garcia DO  PCP: Sheba Sandoval MD    Discharging Nurse: ***  Discharging Hospital Unit/Room#: 0640/0640-01  Discharging Unit Phone Number: ***    Emergency Contact:   Extended Emergency Contact Information  Primary Emergency Contact: PO TORRES  Home Phone: [phone]  Relation: Child   needed? No    Past Surgical History:  Past Surgical History:   Procedure Laterality Date    BRONCHOSCOPY N/A 2024    BRONCHOSCOPY performed by Ant Burgos MD at [facility] ENDOSCOPY    LOWER EXTREMITY AMPUTATION Left     aka of left leg    PEG W/TRACHEOSTOMY PLACEMENT      UPPER GASTROINTESTINAL ENDOSCOPY N/A 2024    ESOPHAGOGASTRODUODENOSCOPY performed by Jelani Smith MD at [facility] ENDOSCOPY    UPPER GASTROINTESTINAL ENDOSCOPY N/A 2024    ESOPHAGOGASTRODUODENOSCOPY performed by Jelani Smith MD at [facility] ENDOSCOPY       Immunization History:     There is no immunization history on file for this patient.    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    Acute on chronic respiratory failure with hypoxia J96.21    COPD exacerbation (HCC) J44.1    PAF (paroxysmal atrial  Hospital Discharge:   Respiratory Treatments: ***  Oxygen Therapy:  {Therapy; copd oxygen:75633}  Ventilator:    {[facility] Vent List:706645689}    Rehab Therapies: {THERAPEUTIC INTERVENTION:5628627612}  Weight Bearing Status/Restrictions: {[facility] Weight Bearin}  Other Medical Equipment (for information only, NOT a DME order):  {EQUIPMENT:138062626}  Other Treatments: ***    Patient's personal belongings (please select all that are sent with patient):  {[facility] DME Belongings:421873122}    RN SIGNATURE:  {Esignature:341996787}    CASE MANAGEMENT/SOCIAL WORK SECTION    Inpatient Status Date: 2024    Readmission Risk Assessment Score:  Readmission Risk              Risk of Unplanned Readmission:  45           Discharging to Facility/ Agency   Name: [facility]   Address: [address] [place] [address]  Phone: [phone]  Fax:    Dialysis Facility (if applicable)   Name:  Address:  Dialysis Schedule:  Phone:  Fax:    / signature: Electronically signed by Chioma Ragsdale RN on 24 at 3:57 PM EDT    PHYSICIAN SECTION    Prognosis: Fair    Condition at Discharge: Stable    Rehab Potential (if transferring to Rehab): Fair    Recommended Labs or Other Treatments After Discharge: ***    Physician Certification: I certify the above information and transfer of Effie Portillo  is necessary for the continuing treatment of the diagnosis listed and that she requires Intermediate Nursing Care for greater 30 days.     Update Admission H&P: {[facility] DME Changes in HandP:120279645}    PHYSICIAN SIGNATURE:  {Esignature:[id number]}

## 2024-09-30 NOTE — PROGRESS NOTES
[facility] Hospitalist   Progress Note    Admitting Date and Time: 9/26/2024 11:11 PM  Admit Dx: Hyperammonemia (HCC) [E72.20]  Septicemia (HCC) [A41.9]  Osteomyelitis [M86.9]  Acute GI bleeding [K92.2]  Cystitis with hematuria [N30.91]  Chronic anemia [D64.9]  Sacral osteomyelitis [M46.28]    Subjective:    Pt lying in bed in no acute distress. Chronic Ventilation support. FIO2 60%. Unresponsive.       Per RN: Has had some noted tachycardia. Suctioned.      carvedilol  3.125 mg Oral Daily    [Held by provider] furosemide  20 mg IntraVENous BID    lactulose  20 g Oral BID    sodium chloride flush  5-40 mL IntraVENous 2 times per day    enoxaparin  40 mg SubCUTAneous Daily    ipratropium 0.5 mg-albuterol 2.5 mg  1 Dose Inhalation Q4H WA RT    pantoprazole (PROTONIX) 40 mg in sodium chloride (PF) 0.9 % 10 mL injection  40 mg IntraVENous Q6H    miconazole nitrate   Topical BID    miconazole   Topical BID    fluconazole  400 mg IntraVENous Q24H    meropenem  1,000 mg IntraVENous Q12H     glucose, 4 tablet, PRN  dextrose bolus, 125 mL, PRN   Or  dextrose bolus, 250 mL, PRN  glucagon (rDNA), 1 mg, PRN  dextrose, , Continuous PRN  sodium chloride, , PRN  sodium chloride flush, 5-40 mL, PRN  sodium chloride, , PRN  acetaminophen, 650 mg, Q6H PRN   Or  acetaminophen, 650 mg, Q6H PRN  sodium chloride, , PRN         Objective:    BP (!) 132/58   Pulse 89   Temp 98.5 °F (36.9 °C)   Resp 24   Ht 1.575 m (5' 2.01\")   Wt 65.1 kg (143 lb 8 oz)   SpO2 91%   BMI 26.24 kg/m²   General Appearance: Unresponsive. In no acute distress  Skin: warm and dry. Pale. Ecchymosis.   Head: normocephalic and atraumatic  Eyes: pupils equal, round, and reactive to light, extraocular eye movements intact, conjunctivae normal  Neck: neck supple and non tender without mass . Trach.   Pulmonary/Chest: Diminished to auscultation bilaterally- no wheezes, rales or rhonchi, normal air movement, no respiratory    Patient:   NEIL CONRAD [id number]            MRN: [id number]            FIN: [id number]              Age:   89 years     Sex:  FEMALE     :  24   Associated Diagnoses:   None   Author:   NARESH SAN Examination  Procedure: Abdominal Ultrasound  Surgeon: Ramón      Assistant:    Chin  Transducer:  Cardiac/Abdominal  Findings:   No free fluid  Site I                Pericardial Sac: Imaged                           Comments: No fluid or abnormalities visualized  Site II              Brady Pouch: Imaged                           Comments: No fluid or abnormalities visualized  Site III              Right Costophrenic Recess: Imaged                           Comments:  No fluid or abnormalities visualized  Site IV            Left Subphrenic Space: Imaged                           Comments: No fluid or abnormalities visualized  Site V             Left Costophrenic Recess: Imaged                           Comments: No fluid or abnormalities visualized  Site VI            Pelvis: Imaged                          Comments: No fluid or abnormalities visualized  The integrity of all instruments and equipment used on the patient during the procedure remained intact after use.  Complications: None  Disposition:         Serial exams; Observation  Please see medical record for representative images.  Naresh Rg, PGY1  General Surgery, Trauma Service  Please call Trauma Jr on Call with questions [phone]  Attending Addendum  I was present for and either performed or directly supervised all aspects of this procedure.  Yogi Melgar MD   3.125mg Daily. Follow adjust as needed  Stage IV Sacral Decubitus-Wound care. ROSELIA bed. Turn and reposition. ?Diverting Ostomy.  GS input appreciated.   Disposition- Return to [facility] upon DC. Will require FIO2 50% prior to DC. No precert required. Case Management/Social Work following.     NOTE: This report was transcribed using voice recognition software. Every effort was made to ensure accuracy; however, inadvertent computerized transcription errors may be present.     Electronically signed by TROY Camacho CNP on 9/30/2024 at 11:43 AM

## 2024-09-30 NOTE — PROGRESS NOTES
Comment:       Presumptive Negative  suggests no current or recent pneumococcal infection. Infection due to Strep pneumoniae   cannot be ruled out since the antigen present in the sample may be below the detection limit   of the test.         Culture, MRSA, Screening [[id number]] Collected: 09/27/24 1510    Order Status: Completed Specimen: Nares Updated: 09/29/24 1000     Specimen Description .NARES     Culture NEGATIVE FOR: METHICILLIN RESISTANT STAPHYLOCOCCUS AUREUS    Culture, Respiratory [[id number]]  (Abnormal) Collected: 09/27/24 1500    Order Status: Completed Specimen: Tracheal Aspirate Updated: 09/30/24 0647     Specimen Description .TRACHEAL ASPIRATE     Direct Exam RARE EPITHELIAL CELLS      FEW Polymorphonuclear leukocytes      MANY GRAM NEGATIVE COCCI      RARE GRAM VARIABLE RODS     Culture ACINETOBACTER BAUMANNII MODERATE GROWTH Susceptibility to follow.      NORMAL RESPIRATORY BENITO    Legionella antigen, urine [[id number]] Collected: 09/27/24 1500    Order Status: Completed Specimen: Urine Updated: 09/27/24 2119     Legionella Pneumophilia Ag, Urine NEGATIVE     Comment:       L. pneumophila serogroup 1 antigen not detected.  A negative result does not exclude infection with Leginella pnemophila serogroup 1 nor does   it rule out other microbial-caused respiratory infections of disease caused by other   serogroups of Legionella pneumophila.         Culture, Respiratory [[id number]]  (Abnormal) Collected: 09/27/24 1045    Order Status: Completed Specimen: Tracheal Aspirate Updated: 09/30/24 0958     Specimen Description .TRACHEAL ASPIRATE     Direct Exam NO Polymorphonuclear leukocytes      NO EPITHELIAL CELLS      MODERATE GRAM VARIABLE COCCI     Culture ACINETOBACTER BAUMANNII Identification by MALDI-TOF HEAVY GROWTH For susceptibility, refer to previous culture. [id number] 9/27/24 1500      NO NORMAL BENITO    Culture, Urine [[id number]] Collected: 09/26/24 2321    Order Status: Sent Specimen: Urine,  DETECTED     Pseudomonas aeruginosa Not Detected     Proteus spp Not Detected     Salmonella species Not Detected     SERRATIA MARCESCENS Not Detected     Stenotrophomonas maltophilia Not Detected     Haemophilus influenzae Not Detected     Listeria monocytogenes Not Detected     Bacteroides fragilis Not Detected     Neisseria Meningitidis, NMNI Not Detected     Staphylococcus spp Not Detected     Staphylococcus Aureus Not Detected     Staphylococcus epidermidis Not Detected     Staphylococcus lugdunensis Not Detected     Streptococcus spp Not Detected     Enterococcus faecalis Not Detected     Enterococcus faecium Not Detected     Streptococcus agalactiae (Group B) Not Detected     Streptococcus pneumoniae Not Detected     Streptococcus pyogenes Not Detected     Resistant gene imp by PCR Not Detected     Resistant gene ctx-m by PCR DETECTED     Resistant gene kpc by PCR Not Detected     Colistin Resistance mcr-1 gene by PCR Not Detected     Resistant gene ndm by PCR Not Detected     Resistant gene oxa-48-like by pcr Not Detected     Resistant gene vim by PCR Not Detected    Susceptibility        Klebsiella pneumoniae ESBL      BACTERIAL SUSCEPTIBILITY PANEL DIANE      ampicillin >=32  Resistant      aztreonam 32  Resistant      ceFAZolin >=64  Resistant      cefepime >=32  Resistant      cefotaxime >=64  Resistant      cefOXitin 16  Intermediate      cefpodoxime proxetil >=8  Resistant      cefTAZidime 16  Intermediate      cefTAZidime-avibactam 0.5  Sensitive      Ceftolozane/Tazobactam (Zerbaxa) 16  Resistant      Cefuroxime axetil >=64  Resistant      Cefuroxime-Sodium >=64  Resistant      ciprofloxacin >=4  Resistant      Confirmatory Extended Spectrum Beta-Lactamase POSITIVE  Resistant      ertapenem <=0.12  Sensitive      gentamicin <=1  Sensitive      levofloxacin >=8  Resistant      meropenem <=0.25  Sensitive      Meropenem-vaborbactam <=0.5  Sensitive      minocycline 16  Resistant      moxifloxacin >=8

## 2024-09-30 NOTE — PLAN OF CARE
Problem: Safety - Adult  Goal: Free from fall injury  9/30/2024 1345 by Heather Candelaria RN  Outcome: Progressing     Problem: Discharge Planning  Goal: Discharge to home or other facility with appropriate resources  9/30/2024 1345 by Heather Candelaria RN  Outcome: Progressing     Problem: Pain  Goal: Verbalizes/displays adequate comfort level or baseline comfort level  9/30/2024 1345 by Heather Candelaria RN  Outcome: Progressing     Problem: Chronic Conditions and Co-morbidities  Goal: Patient's chronic conditions and co-morbidity symptoms are monitored and maintained or improved  9/30/2024 1345 by Heather Candelaria RN  Outcome: Progressing     Problem: Nutrition Deficit:  Goal: Optimize nutritional status  9/30/2024 1345 by Heather Candelaria RN  Outcome: Progressing     Problem: Skin/Tissue Integrity  Goal: Absence of new skin breakdown  Description: 1.  Monitor for areas of redness and/or skin breakdown  2.  Assess vascular access sites hourly  3.  Every 4-6 hours minimum:  Change oxygen saturation probe site  4.  Every 4-6 hours:  If on nasal continuous positive airway pressure, respiratory therapy assess nares and determine need for appliance change or resting period.  9/30/2024 1345 by Heather Candelaria RN  Outcome: Progressing     Problem: ABCDS Injury Assessment  Goal: Absence of physical injury  9/30/2024 1345 by Heather Candelaria RN  Outcome: Progressing

## 2024-10-01 NOTE — PROGRESS NOTES
Through review of our prior cardiac evaluation, patient was noted to follow with cardiology at [place] --> records were requested during prior admission earlier this year, with records showing patient seen in consultation and underwent cardiac cath by Dr. Gandara.    Per Dr. Méndez, patient to be evaluated by her primary cardiologist at this time  Consult to be switched to Dr. Gandara        Please call with any questions or concerns        April Campoverde PA-C  [facility]

## 2024-10-01 NOTE — PROGRESS NOTES
[facility] Hospitalist   Progress Note    Admitting Date and Time: 9/26/2024 11:11 PM  Admit Dx: Hyperammonemia (HCC) [E72.20]  Septicemia (HCC) [A41.9]  Osteomyelitis [M86.9]  Acute GI bleeding [K92.2]  Cystitis with hematuria [N30.91]  Chronic anemia [D64.9]  Sacral osteomyelitis [M46.28]    Subjective:    Pt lying in bed in no acute distress. Atrial fibrillation with RVR on monitor. Received Metoprolol 5mg IV x 1. Initially helped, however returned back into the 160s. EKG confirmed Atrial fibrillation with RVR, Digoxin orderedx 1. Cardiology has been consulted.     Per RN:  HR 160s. Metoprolol 5mg IV x 1.      carvedilol  3.125 mg Oral Daily    [Held by provider] furosemide  20 mg IntraVENous BID    lactulose  20 g Oral BID    sodium chloride flush  5-40 mL IntraVENous 2 times per day    enoxaparin  40 mg SubCUTAneous Daily    ipratropium 0.5 mg-albuterol 2.5 mg  1 Dose Inhalation Q4H WA RT    pantoprazole (PROTONIX) 40 mg in sodium chloride (PF) 0.9 % 10 mL injection  40 mg IntraVENous Q6H    miconazole nitrate   Topical BID    miconazole   Topical BID    fluconazole  400 mg IntraVENous Q24H    meropenem  1,000 mg IntraVENous Q12H     glucose, 4 tablet, PRN  dextrose bolus, 125 mL, PRN   Or  dextrose bolus, 250 mL, PRN  glucagon (rDNA), 1 mg, PRN  dextrose, , Continuous PRN  sodium chloride, , PRN  sodium chloride flush, 5-40 mL, PRN  sodium chloride, , PRN  acetaminophen, 650 mg, Q6H PRN   Or  acetaminophen, 650 mg, Q6H PRN  sodium chloride, , PRN         Objective:    /70   Pulse (!) 117   Temp 98.6 °F (37 °C) (Axillary)   Resp 25   Ht 1.575 m (5' 2.01\")   Wt 65.1 kg (143 lb 8 oz)   SpO2 92%   BMI 26.24 kg/m²   General Appearance: Unresponsive. In no acute distress  Skin: warm and dry. Pale. Ecchymosis.   Head: normocephalic and atraumatic  Eyes: pupils equal, round, and reactive to light, extraocular eye movements intact, conjunctivae normal  Neck: neck supple and non tender

## 2024-10-01 NOTE — PROGRESS NOTES
NAME: Effie Protillo  MR:  [id number]  :   1945  Admit Date:  2024    Elements of this note, were copied and pasted from Previous. Updates have been made where noted and reflect current exam and medical decision making from the DOS of this encounter.  CHIEF COMPLAINT       Chief Complaint   Patient presents with    Shortness of Breath     Patient arrives from [facility] - staff states patient was 84% on normal vent settings.      HISTORY OF PRESENT ILLNESS     Effie Portillo is a 79 y.o. female admitted on 2024 and has  has a past medical history of Atrial fibrillation ([facility]), Bipolar disorder ([facility]), Candida auris colonization, Cerebral artery occlusion with cerebral infarction ([facility]), CHF (congestive heart failure) ([facility]), COPD (chronic obstructive pulmonary disease) ([facility]), COVID-19, Depression, ESBL (extended spectrum beta-lactamase) producing bacteria infection, GI (gastrointestinal bleed), History of blood transfusion, Hx of blood clots, Hypertension, Infection due to extended spectrum beta-lactamase producing bacteria, Ischemic cardiomyopathy, MI (myocardial infarction) ([facility]), Multiple drug resistant organism (MDRO) culture positive, Pneumonia, Ventilator dependent ([facility]), and VRE (vancomycin resistant enterococcus) culture positive.     This is a face to face encounter   10/01/24 in bed not awake  trach vent inc edema    in bed awake nad trach vent has tachy dec o2 sat  afebrile AC 60%  cr1.4 wbc20.2    trach nad pale line has  not been changed afebrile wbc20.2    trach noah tnot alert blood cx +    Patient is tolerating medications. No reported adverse drug reactions.  Available labs, imaging studies, microbiologic studies have been reviewed with pt and family if present.  Assessment & Plan     Admitted for   Hyperammonemia (HCC) [E72.20]  Septicemia (HCC) [A41.9]  Osteomyelitis [M86.9]  Acute GI bleeding [K92.2]  Cystitis with hematuria [N30.91]  Chronic anemia

## 2024-10-01 NOTE — PROGRESS NOTES
PROGRESS NOTE  By Neo Artis M.D.    [facility]  Dr. Rosemary Hartman M.D.,  Dr. Phil Benitez M.D.,   Dr. Pato Hernandez D.O.,  Dr. Dayday Trejo M.D.,  OTTO ArellanoO.,          Effie DUGAN Lindsey  79 y.o.  female    SUBJECTIVE:  Remains nonverbal.  No family at bedside    OBJECTIVE:    BP (!) 107/59   Pulse (!) 110   Temp 97.1 °F (36.2 °C) (Temporal)   Resp 30   Ht 1.575 m (5' 2.01\")   Wt 65.1 kg (143 lb 8 oz)   SpO2 93%   BMI 26.24 kg/m²     General: NAD/older adult  female.  HEENT: Moist oral mucosa/poor dentition  Neck: Trachea midline/tracheostomy  Chest: Mechanically ventilated via tracheostomy.  No wheezing  Cor: Irregular/S1-S2  Abd.: Soft and obese.  PEG tube in place.  BS +  Extr.:  LLE amputation  Skin: Warm and dry/anicteric      DATA:    Monitor data reviewed -atrial fibrillation noted.    Stool (measured) : 0 mL  Lab Results   Component Value Date/Time    WBC 22.1 10/01/2024 06:12 AM    RBC 3.46 10/01/2024 06:12 AM    HGB 10.0 10/01/2024 06:12 AM    HCT 31.6 10/01/2024 06:12 AM    MCV 91.3 10/01/2024 06:12 AM    MCH 28.9 10/01/2024 06:12 AM    MCHC 31.6 10/01/2024 06:12 AM    RDW 18.6 10/01/2024 06:12 AM    PLT 77 10/01/2024 06:12 AM    MPV 12.2 10/01/2024 06:12 AM     Lab Results   Component Value Date/Time     10/01/2024 06:12 AM    K 4.0 10/01/2024 06:12 AM     10/01/2024 06:12 AM    CO2 20 10/01/2024 06:12 AM    BUN 80 10/01/2024 06:12 AM    CREATININE 1.3 10/01/2024 06:12 AM    CALCIUM 9.0 10/01/2024 06:12 AM    BILITOT 0.4 09/28/2024 08:02 AM    ALKPHOS 127 09/28/2024 08:02 AM    AST 15 09/28/2024 08:02 AM    ALT 12 09/28/2024 08:02 AM     Lab Results   Component Value Date/Time    LIPASE 19 09/26/2024 11:21 PM     No results found for: \"AMYLASE\"      ASSESSMENT/PLAN:  Patient Active Problem List   Diagnosis    Chronic respiratory failure with hypoxia    Primary hypertension    History of stroke with residual deficit    History of DVT (deep

## 2024-10-01 NOTE — PROGRESS NOTES
Comprehensive Nutrition Assessment    Type and Reason for Visit:  Reassess    Nutrition Recommendations/Plan:   Continue NPO status   Continue tube feedings - Add Willie BID via PEG     Recommend continuous, Jevity 1.5 (standard w/ fiber) @ 35ml/h with 100ml flush q4h and 1 protein modular daily, willie BID via PEG. Regimen to provide 1544kcal, 66g protein & 1478ml water per day (including 8oz from willie)        Malnutrition Assessment:  Malnutrition Status:  At risk for malnutrition (Comment) (09/27/24 1058)    Context:  Chronic Illness     Findings of the 6 clinical characteristics of malnutrition:  Energy Intake:  No significant decrease in energy intake (on tube feedings)  Weight Loss:  No significant weight loss     Body Fat Loss:  No significant body fat loss     Muscle Mass Loss:  No significant muscle mass loss    Fluid Accumulation:  Unable to assess     Strength:  Not Performed    Nutrition Assessment:    Pt remains at risk d/t wounds/trach/PEG.  Pt admit w/ acute on chronic resp failure r/t PNA, bilateral mastoiditis and sacral osteomyelitis, EVELIA & concern for GIB. PMHx of CVA, CAD s/p MI, COPD, CHF, Afib, L AKA, s/p trach/PEG, nonverbal & unresponsive - baseline, VRE & MDRO, hx GIBs. Pt started on TF & tolerating @ goal, will adjust to add Willie + promod, will monitor nutrition progression    Nutrition Related Findings:    abd soft, obese, active BSx4, +4 pitting/weeping, trach/PEG in place, missing teeth, diarrhea, Jevity @ 35ml/h Wound Type: Multiple, Wound Consult Pending (R heel & coccyx DTI)       Current Nutrition Intake & Therapies:    Average Meal Intake: NPO (on tube feedings)  Average Supplements Intake: NPO  Diet NPO  ADULT TUBE FEEDING; PEG; Standard with Fiber; Continuous; 35; No; 100; Q 4 hours; Protein, Wound Healing; 1 Dose; Daily; 1 Dose; BID  Current Tube Feeding (TF) Orders:  Feeding Route: PEG  Formula: Standard with Fiber  Schedule: Continuous  Feeding Regimen: 35ml/h (840ml

## 2024-10-01 NOTE — PROGRESS NOTES
Assessment and Plan  Patient is a 79 y.o. female with the following medical Problems:   Acute on Chronic hypoxic respiratory failure on mechanical ventilation (S/P tracheostomy and PEG)  Healthcare associated pneumonia with Acinetobacter baumannii colonization , MRSA, CRE MDRO and Canddia Auris  Recurrent Urinary tract infection (Proteus mirabilis multidrug-resistant )  Acute kidney injury   Chronic encephalopathy  Aspiration pneumonia  Bilateral pleural effusions  Metabolic encephalopathy.  Atrial fibrillation with RVR        Plan of care:  Continue with mechanical ventilation.  Patient is currently on assist-control mechanical ventilation with a tidal volume of 375, rate 20 PEEP of 10, FiO2 60%.  Wean FiO2 as able for sats 88 to 94%  Trach care and suctioning as needed  Patient is currently on Merrem and fluconazole as per ID.  DVT prophylaxis with lovenox.  Palliative care dietitian consult.  Goals of care were discussed.  Patient is currently DNR CCA  Optimize tube feeding and optimize nutrition.  Tracheostomy was evaluated and no cuff leak was detected.  Optimize tube feeding and nutrition  Patient was started on D5 water due to hypernatremia and worsening kidney function.  Cardiology to manage atrial fibrillation with RVR          History of Present Illness:   Patient is known to me from prior hospitalizations in 2024.  She is currently encephalopathic and unable to provide history.  Effie Portillo 79 y.o. female was seen in consultation regarding the above chief complaint with past medical history noted for chronic trach/vent dependence, COPD, HFrEF, Afib, HTN, VTE, MDRO, left AKA, bipolar disorder, candida auris, presents to [facility] ER on 9/26/2024  from [facility] for hypoxemia and rectal bleed.       Of note patient was a DNR CC at last admission.  Son apparently changed her CODE STATUS to DNR CCA.       Pulmonary was consulted for chronic vent management.      Daily progress  September

## 2024-10-01 NOTE — PROGRESS NOTES
Assessment and Plan  Patient is a 79 y.o. female with the following medical Problems:   Acute on Chronic hypoxic respiratory failure on mechanical ventilation (S/P tracheostomy and PEG)  Healthcare associated pneumonia with Acinetobacter baumannii colonization , MRSA, CRE MDRO and Canddia Auris  Recurrent Urinary tract infection (Proteus mirabilis multidrug-resistant )  Acute kidney injury   Chronic encephalopathy  Aspiration pneumonia  Bilateral pleural effusions  Metabolic encephalopathy.        Plan of care:  Continue with mechanical ventilation.  Patient is currently on assist-control mechanical ventilation with a tidal volume of 375, rate 20 PEEP of 10, FiO2 60%.  Wean FiO2 as able for sats 88 to 94%  Trach care and suctioning as needed  Patient is currently on Merrem and fluconazole as per ID.  DVT prophylaxis with lovenox.  Palliative care dietitian consult.  Goals of care were discussed.  Patient is currently DNR CCA  Optimize tube feeding and optimize nutrition.  Tracheostomy was evaluated and no cuff leak was detected.  Optimize tube feeding and nutrition  Patient will be started on D5 water due to hypernatremia and worsening kidney function.          History of Present Illness:   Patient is known to me from prior hospitalizations in 2024.  She is currently encephalopathic and unable to provide history.  Effie Portillo 79 y.o. female was seen in consultation regarding the above chief complaint with past medical history noted for chronic trach/vent dependence, COPD, HFrEF, Afib, HTN, VTE, MDRO, left AKA, bipolar disorder, candida auris, presents to [facility] ER on 9/26/2024  from [facility] for hypoxemia and rectal bleed.       Of note patient was a DNR CC at last admission.  Son apparently changed her CODE STATUS to DNR CCA.       Pulmonary was consulted for chronic vent management.      Daily progress  September 28, 2024: Patient continues to be on mechanical ventilation.  She has no

## 2024-10-01 NOTE — CONSULTS
bowel however.  If patient has clinical symptoms of gastric hemorrhage, endoscopy could be considered. RECOMMENDATIONS: If the patient shows hemodynamic signs of an active bleed in the next 20 hours, additional images can be acquired.     XR CHEST PORTABLE    Result Date: 9/27/2024  EXAMINATION: ONE XRAY VIEW OF THE CHEST 9/26/2024 11:34 pm COMPARISON: Chest radiograph 09/03/2024. HISTORY: ORDERING SYSTEM PROVIDED HISTORY: altered TECHNOLOGIST PROVIDED HISTORY: Reason for exam:->altered FINDINGS/IMPRESSION: 1.  Tracheostomy tube is in place.  Otherwise no invasive tubes, lines, or drains. 2.  Mild interval improvement of right lower lung consolidative opacities suggestive of resolving atelectasis.  Similar left-sided hazy airspace opacities and small left-sided pleural effusion. 3.  Stable cardiomediastinal silhouette. 4.  No acute osseous abnormality.     CTA PULMONARY W CONTRAST    Result Date: 9/27/2024  EXAMINATION: CTA OF THE CHEST; CT OF THE ABDOMEN AND PELVIS WITH CONTRAST 9/27/2024 12:17 am TECHNIQUE: CTA of the chest was performed after the administration of intravenous contrast.  Multiplanar reformatted images are provided for review.  MIP images are provided for review. Automated exposure control, iterative reconstruction, and/or weight based adjustment of the mA/kV was utilized to reduce the radiation dose to as low as reasonably achievable.; CT of the abdomen and pelvis was performed with the administration of intravenous contrast. Multiplanar reformatted images are provided for review. Automated exposure control, iterative reconstruction, and/or weight based adjustment of the mA/kV was utilized to reduce the radiation dose to as low as reasonably achievable. COMPARISON: CT abdomen pelvis 07/13/2024 and chest radiograph 09/26/2024. HISTORY: ORDERING SYSTEM PROVIDED HISTORY: r/o pe TECHNOLOGIST PROVIDED HISTORY: Reason for exam:->r/o pe Additional Contrast?->None; ORDERING SYSTEM PROVIDED HISTORY: sepsis  PROVIDED HISTORY: DISTENDED PAIN TECHNOLOGIST PROVIDED HISTORY: Reason for exam:->DISTENDED PAIN FINDINGS: Gastric PEG tube is present.  No evidence of bowel obstruction or pneumoperitoneum.  Opacities are seen in the visualized lower lung fields. Vascular calcifications are present.     1. No evidence of bowel obstruction or pneumoperitoneum. 2. Opacities are present in the visualized lower lung fields which may indicate bilateral pleural effusions.     XR CHEST PORTABLE    Result Date: 9/3/2024  EXAMINATION: ONE XRAY VIEW OF THE CHEST 9/3/2024 6:11 am COMPARISON: 09/01/2024 and there is no change. HISTORY: ORDERING SYSTEM PROVIDED HISTORY: Ventilator associated pneumonia. TECHNOLOGIST PROVIDED HISTORY: Reason for exam:->Ventilator associated pneumonia. FINDINGS: Single AP portable chest demonstrates the tracheostomy tube remaining in good position. There are persistent bilateral small pleural effusions with bibasilar atelectatic changes most pronounced on the right There is no evidence of a pneumothorax.  There is mild cardiomegaly present.     1. Persistent bilateral small pleural effusions with bibasilar atelectatic changes most pronounced on the right. 2. Tracheostomy tube in good position.           IMPRESSION:      Acute respiratory distress  COPD  Status post trach/PEG  Coronary disease  Previous anterior MI  Ischemic cardiomyopathy.  Moderate LV dysfunction.  Previous PCI of LAD done 2023  Recurrent aspiration pneumonia  Peripheral arterial disease  Post left above-knee amputation  Leukocytosis  Prerenal azotemia      RECOMMENDATIONS:    Beta-blocker for rate control as long as blood pressure tolerates.  Anticoagulation  Treatment for COPD/pneumonia  IV antibiotics  She is candidate only for conservative medical measures.      Tay Gandara MD, MD, FACC

## 2024-10-02 PROBLEM — J18.9 SEPSIS DUE TO PNEUMONIA (HCC): Status: ACTIVE | Noted: 2024-01-01

## 2024-10-02 PROBLEM — I48.91 ATRIAL FIBRILLATION WITH RVR (HCC): Status: ACTIVE | Noted: 2024-01-01

## 2024-10-02 PROBLEM — A41.9 SEPTIC SHOCK (HCC): Status: ACTIVE | Noted: 2024-01-01

## 2024-10-02 PROBLEM — R65.21 SEPTIC SHOCK (HCC): Status: ACTIVE | Noted: 2024-01-01

## 2024-10-02 PROBLEM — A41.9 SEPSIS DUE TO PNEUMONIA (HCC): Status: ACTIVE | Noted: 2024-01-01

## 2024-10-02 NOTE — PROGRESS NOTES
Assessment and Plan  Patient is a 79 y.o. female with the following medical Problems:   Acute on Chronic hypoxic respiratory failure on mechanical ventilation (S/P tracheostomy and PEG)  Healthcare associated pneumonia with Acinetobacter baumannii colonization , MRSA, CRE MDRO and Canddia Auris  Recurrent Urinary tract infection (Proteus mirabilis multidrug-resistant )  Acute kidney injury   Chronic encephalopathy  Aspiration pneumonia  Bilateral pleural effusions  Metabolic encephalopathy.  Atrial fibrillation with RVR        Plan of care:  Patient became hypotensive today.  She received fluid and sodium bicarbonate however with poor response.  Eventually patient went into a PEA cardiac arrest.  No CPR was carried out since patient is DNR CCA.          History of Present Illness:   Patient is known to me from prior hospitalizations in 2024.  She is currently encephalopathic and unable to provide history.  Effie Portillo 79 y.o. female was seen in consultation regarding the above chief complaint with past medical history noted for chronic trach/vent dependence, COPD, HFrEF, Afib, HTN, VTE, MDRO, left AKA, bipolar disorder, candida auris, presents to [facility] ER on 9/26/2024  from [facility] for hypoxemia and rectal bleed.       Of note patient was a DNR CC at last admission.  Son apparently changed her CODE STATUS to DNR CCA.       Pulmonary was consulted for chronic vent management.      Daily progress  September 28, 2024: Patient continues to be on mechanical ventilation.  She has no fever, chills, rigors.  She remains on Merrem and fluconazole.  Patient underwent an EGD that revealed no active bleeding and was started on tube feeding.  Chest x-ray from September 27, 2024 showed mild improvement in the infiltrate with small bilateral pleural effusions.  Patient is currently on 60% FiO2 saturating 100%.    September 29, 2024: Patient has been maintained on 60% FiO2 and continues to be on Merrem  and vancomycin.  She remains on lactulose twice a day.  She has no fever, chills, or rigors.  Patient continues to be nonverbal.  Tube feeding was ordered but has not been started.  Hemoglobin has been maintained around 10.    September 30, 2024 Patient continues to be on mechanical ventilation has been maintained at 60% FiO2.  Ventilatory settings were reviewed.  Blood pressure continues to be normal with a few reading of mildly lower blood pressure.  Kidney function has slightly worsened and patient was started on D5 water due to hypernatremia as well.  Hemoglobin has been above the transfusion threshold.    October 1, 2024: Patient went into atrial fibrillation with RVR today and was given IV metoprolol.  She is currently on carvedilol.  White blood cell count is trending down.  Kidney function has been stable around 1.3 however sodium level is trending down.  He has no fever, chills, rigors.  Patient continues to be on Merrem and fluconazole as per ID team.  Hemoglobin has been maintained above the transfusion threshold.    October 2, 2024: Patient became hypotensive and unresponsive today.  She received fluid boluses with poor response.  Rapid response was called however patient was DNR CCA.  Shortly after resuscitation patient went into a PEA cardiac arrest and no CPR was carried out.    Past Medical History:  Past Medical History:   Diagnosis Date    Atrial fibrillation ([facility])     Bipolar disorder ([facility])     Candida auris colonization 01/16/2024    wound    Cerebral artery occlusion with cerebral infarction ([facility])     CHF (congestive heart failure) ([facility])     COPD (chronic obstructive pulmonary disease) ([facility])     COVID-19     Depression     ESBL (extended spectrum beta-lactamase) producing bacteria infection 01/15/2024    wound    GI (gastrointestinal bleed)     History of blood transfusion     Hx of blood clots     venous thrombosis    Hypertension     Infection due to extended spectrum beta-lactamase producing

## 2024-10-02 NOTE — CODE DOCUMENTATION
RRT called on patient due to unresponsive and bradycardia Upon arrival to room, pt agonal breathing. Respiratory therapy took the patient off the ventilator and was bagging her as per protocol. She was given normal saline fluid bolus. She developed PEA and was given 1mg of epinephrine. Pt given second EPI and 1 amp Bicarbonate. Developed PEA with slower rate and pauses. Ultimately Asystole Time of death recorded 8:47am.

## 2024-10-02 NOTE — PROGRESS NOTES
Notification made to [facility] and [facility].   [facility]ha ARGUELLES Trey   [place]  [address] [phone]

## 2024-10-02 NOTE — DEATH NOTES
Death Pronouncement Note  Patient's Name: Effie Portillo   Patient's YOB: 1945  MRN Number: [id number]    Admitting Provider: Lisbet Garcia DO  Attending Provider: Kwame Enamorado MD    Patient was examined and the following were absent: Pulses, Blood Pressure, and Respiratory effort    I declared the patient dead on 10/2/2024 at 8:47 AM    Preliminary Cause of Death: Septic shock (HCC)     Electronically signed by Kwame Enamorado MD on 10/2/24 at 9:54 AM EDT

## 2024-10-02 NOTE — SIGNIFICANT EVENT
[facility] Hospitalist    Hospitalist RRT/Code Blue Note    Subjective:    Called to bedside RRT related to hypotension 55/33. Patient is DNR CCA.  Patient was mottled from her lower extremities up to her neck.  Respiratory therapy took the patient off the ventilator and was bagging her as per protocol.  She was given normal saline fluid bolus.  She developed PEA and was given a amp of epinephrine.  Capnography was applied to her ET tube and only measured 8.  No CPR initiated due to CODE STATUS.  She was given a second epinephrine and did develop tachycardia but end-tidal CO2 did not improve.  She was given an amp of bicarb.  No additional interventions were undertaken she remained in PEA but rate was slower with large longer longer pauses and ultimately asystole.  Time of death 8:47 AM.       sulfamethoxazole-trimethoprim  160 mg Oral Q12H    metoprolol  5 mg IntraVENous Once    sodium chloride flush  5-40 mL IntraVENous 2 times per day    lidocaine 1 % injection  50 mg IntraDERmal Once    carvedilol  3.125 mg Oral Daily    [Held by provider] furosemide  20 mg IntraVENous BID    lactulose  20 g Oral BID    sodium chloride flush  5-40 mL IntraVENous 2 times per day    enoxaparin  40 mg SubCUTAneous Daily    ipratropium 0.5 mg-albuterol 2.5 mg  1 Dose Inhalation Q4H WA RT    pantoprazole (PROTONIX) 40 mg in sodium chloride (PF) 0.9 % 10 mL injection  40 mg IntraVENous Q6H    miconazole nitrate   Topical BID    miconazole   Topical BID    fluconazole  400 mg IntraVENous Q24H    meropenem  1,000 mg IntraVENous Q12H     metoprolol, 2.5 mg, Q6H PRN  sodium chloride flush, 5-40 mL, PRN  sodium chloride, , PRN  glucose, 4 tablet, PRN  dextrose bolus, 125 mL, PRN   Or  dextrose bolus, 250 mL, PRN  glucagon (rDNA), 1 mg, PRN  dextrose, , Continuous PRN  sodium chloride, , PRN  sodium chloride flush, 5-40 mL, PRN  sodium chloride, , PRN  acetaminophen, 650 mg, Q6H PRN   Or  acetaminophen, 650 mg, Q6H

## 2024-10-02 NOTE — PROGRESS NOTES
Entered patient's room to take vitals, patient hypotensive, bradycardic, and non-responsive. Alerted charge nurse, RRT called.

## 2024-10-02 NOTE — PROGRESS NOTES
I came to see the patient found out that she  early morning.  According to the nurse had significant desaturation and the respiratory therapist was suctioning large amount of mucous plugs.  Death likely occurred secondary to severe hypoxemia.   right hand pain left knee pain

## 2024-10-02 NOTE — DISCHARGE SUMMARY
[facility] Hospitalist       Hospitalist Physician Discharge Summary       [facility]  [address] [address] [phone]          Condition at discharge:     Dispo:[facility]  Home      Patient ID:  Effie Portillo  [id number]  79 y.o.  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    Ventilator dependent (HCC)    Septicemia (HCC)    EVELIA (acute kidney injury) (HCC)    Acute on chronic anemia    HFrEF (heart failure with reduced ejection fraction) (HCC)    Pressure injury of sacral region, unstageable (HCC)    Tracheostomy dependence (HCC)    PEG (percutaneous endoscopic gastrostomy) status ([facility])    Osteomyelitis    Hypoglycemia    Sacral osteomyelitis    Sepsis due to pneumonia ([facility])    Septic shock (HCC)    Atrial fibrillation with RVR ([facility])  Resolved Problems:    * No resolved hospital problems. 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  No evidence of intraluminal filling defect to suggest pulmonary embolism.  Main pulmonary artery measures 3.0 cm in diameter. Mediastinum: Endotracheal tube tip terminates 3.8 cm from the swati.  Bulky multilevel mediastinal lymphadenopathy.  The heart is enlarged.  There is no acute abnormality of the thoracic aorta.  Diffuse fluid throughout the esophagus. Lungs/pleura: Bibasilar consolidations and effusions.  Diffuse ground-glass and reticular and nodular opacities most pronounced in the right middle lobe. Moderate emphysema with interlobular septal thickening. Soft Tissues/Bones: No acute bone or soft tissue abnormality. ABDOMEN AND PELVIS Organs: No focal lesions in the liver, spleen, pancreas, or adrenal glands. No hydronephrosis or nephrolithiasis.  Vascular calcifications in the left kidney.  Bilateral renal cysts. Cholelithiasis with similar gallbladder wall thickening of the neck.  No prominent adjacent inflammation. Spleen continues to decrease in size with irregular hypodense region around the hilum that may relate to decreased perfusion. GI: No evidence of obstruction.  Colonic diverticulosis again noted. Pelvis: Uterus is unremarkable.  Air and a Goncalves catheter present in the bladder with mild adjacent inflammation. Peritoneum/retroperitoneum: No free intraperitoneal air or encapsulated fluid. Bones/soft tissues: No acute osseous abnormality.  Minimal compression morphology of T11.  Diffuse body wall edema.  Skin defect of the overlying the dorsal coccyx appears new.     1.  No evidence of pulmonary embolism. 2.  Patchy ground-glass and nodular opacities of the lungs with bulky mediastinal lymphadenopathy may relate to an

## 2024-10-03 LAB
MICROORGANISM SPEC CULT: NORMAL
MICROORGANISM SPEC CULT: NORMAL
SERVICE CMNT-IMP: NORMAL
SERVICE CMNT-IMP: NORMAL
SPECIMEN DESCRIPTION: NORMAL
SPECIMEN DESCRIPTION: NORMAL

## (undated) DEVICE — PAD,EYE,1-5/8X2 5/8,STERILE,LF,1/PK: Brand: MEDLINE

## (undated) DEVICE — CONTAINER SPEC COLL 960ML POLYPR TRIANG GRAD INTAKE/OUTPUT

## (undated) DEVICE — JELLY,LUBE,STERILE,FLIP TOP,TUBE,4-OZ: Brand: MEDLINE

## (undated) DEVICE — GOWN ISOLATN XL BLU POLYPR OVR HD OPN BK KNIT CUF PROTCT

## (undated) DEVICE — Device: Brand: DEFENDO VALVE AND CONNECTOR KIT

## (undated) DEVICE — KENDALL 450 SERIES MONITORING FOAM ELECTRODE - RECTANGULAR SHAPE ( 3/PK): Brand: KENDALL

## (undated) DEVICE — SYRINGE 20ML LL S/C 50

## (undated) DEVICE — FORCEPS BX L240CM JAW DIA2.8MM L CAP W/ NDL MIC MESH TOOTH

## (undated) DEVICE — 4-PORT MANIFOLD: Brand: NEPTUNE 2

## (undated) DEVICE — KIT BEDSIDE REVITAL OX 500ML

## (undated) DEVICE — SINGLE USE SUCTION VALVE MAJ-209: Brand: SINGLE USE SUCTION VALVE (STERILE)

## (undated) DEVICE — WIPES SKIN CLOTH READYPREP 9 X 10.5 IN 2% CHLORHEX GLUCONATE CHG PREOP

## (undated) DEVICE — YANKAUER,BULB TIP,W/O VENT,RIGID,STERILE: Brand: MEDLINE

## (undated) DEVICE — BLOCK BITE 60FR CAREGUARD

## (undated) DEVICE — AIR/WATER CLEANING ADAPTER FOR OLYMPUS® GI ENDOSCOPE: Brand: BULLDOG®

## (undated) DEVICE — TOWEL,OR,DSP,ST,BLUE,STD,6/PK,12PK/CS: Brand: MEDLINE

## (undated) DEVICE — SPONGE GZ 4IN 4IN 4 PLY N WVN AVANT

## (undated) DEVICE — GLOVE ORANGE PI 7 1/2   MSG9075

## (undated) DEVICE — APPLICATOR  COTTON-TIPPED 6 IN WOOD STRL

## (undated) DEVICE — COVER,LIGHT HANDLE,FLX,1/PK: Brand: MEDLINE INDUSTRIES, INC.

## (undated) DEVICE — MASK,FACE,MAXFLUIDPROTECT,SHIELD/ERLPS: Brand: MEDLINE

## (undated) DEVICE — TUBING, SUCTION, 1/4" X 10', STRAIGHT: Brand: MEDLINE

## (undated) DEVICE — BAG SPECIMEN BIOHAZARD 6IN X 9IN

## (undated) DEVICE — GAUZE,SPONGE,4"X4",16PLY,XRAY,STRL,LF: Brand: MEDLINE

## (undated) DEVICE — COTTONBALL L DIA1.25IN 100% HIGHLY ABSRB BLEACH COT WHT SFT

## (undated) DEVICE — TRAP,MUCUS SPECIMEN, 80CC: Brand: MEDLINE

## (undated) DEVICE — FORCEPS ENDO L28CM JAW W4XL23MM DIA6MM STD GRSP JACK ALGTR

## (undated) DEVICE — SINGLE USE BIOPSY VALVE MAJ-210: Brand: SINGLE USE BIOPSY VALVE (STERILE)